# Patient Record
Sex: FEMALE | Race: WHITE | Employment: OTHER | URBAN - METROPOLITAN AREA
[De-identification: names, ages, dates, MRNs, and addresses within clinical notes are randomized per-mention and may not be internally consistent; named-entity substitution may affect disease eponyms.]

---

## 2017-02-09 ENCOUNTER — APPOINTMENT (OUTPATIENT)
Dept: CT IMAGING | Facility: CLINIC | Age: 82
DRG: 304 | End: 2017-02-09
Attending: EMERGENCY MEDICINE
Payer: COMMERCIAL

## 2017-02-09 ENCOUNTER — HOSPITAL ENCOUNTER (INPATIENT)
Facility: CLINIC | Age: 82
LOS: 3 days | Discharge: HOME | DRG: 304 | End: 2017-02-12
Attending: EMERGENCY MEDICINE
Payer: COMMERCIAL

## 2017-02-09 ENCOUNTER — APPOINTMENT (OUTPATIENT)
Dept: GENERAL RADIOLOGY | Facility: CLINIC | Age: 82
DRG: 304 | End: 2017-02-09
Attending: EMERGENCY MEDICINE
Payer: COMMERCIAL

## 2017-02-09 ENCOUNTER — APPOINTMENT (OUTPATIENT)
Dept: CARDIOLOGY | Facility: CLINIC | Age: 82
DRG: 304 | End: 2017-02-09
Payer: COMMERCIAL

## 2017-02-09 DIAGNOSIS — I63.9 CVA (CEREBRAL INFARCTION): Primary | ICD-10-CM

## 2017-02-09 DIAGNOSIS — N17.9 ACUTE RENAL FAILURE, UNSPECIFIED ACUTE RENAL FAILURE TYPE (H): ICD-10-CM

## 2017-02-09 DIAGNOSIS — R06.02 SHORTNESS OF BREATH: ICD-10-CM

## 2017-02-09 DIAGNOSIS — I25.10 ASCVD (ARTERIOSCLEROTIC CARDIOVASCULAR DISEASE): ICD-10-CM

## 2017-02-09 DIAGNOSIS — R11.2 NAUSEA AND VOMITING, INTRACTABILITY OF VOMITING NOT SPECIFIED, UNSPECIFIED VOMITING TYPE: ICD-10-CM

## 2017-02-09 DIAGNOSIS — I10 HYPERTENSION GOAL BP (BLOOD PRESSURE) < 140/90: ICD-10-CM

## 2017-02-09 DIAGNOSIS — I63.9 CEREBRAL INFARCTION (H): ICD-10-CM

## 2017-02-09 DIAGNOSIS — I10 ESSENTIAL HYPERTENSION WITH GOAL BLOOD PRESSURE LESS THAN 140/90: ICD-10-CM

## 2017-02-09 DIAGNOSIS — I50.21 CHF (CONGESTIVE HEART FAILURE), NYHA CLASS I, ACUTE, SYSTOLIC (H): ICD-10-CM

## 2017-02-09 PROBLEM — I50.9 HEART FAILURE (H): Status: ACTIVE | Noted: 2017-02-09

## 2017-02-09 LAB
ALBUMIN SERPL-MCNC: 3.6 G/DL (ref 3.4–5)
ALBUMIN SERPL-MCNC: 4.1 G/DL (ref 3.4–5)
ALP SERPL-CCNC: 85 U/L (ref 40–150)
ALP SERPL-CCNC: 87 U/L (ref 40–150)
ALT SERPL W P-5'-P-CCNC: 49 U/L (ref 0–50)
ALT SERPL W P-5'-P-CCNC: 56 U/L (ref 0–50)
ANION GAP SERPL CALCULATED.3IONS-SCNC: 9 MMOL/L (ref 3–14)
AST SERPL W P-5'-P-CCNC: 42 U/L (ref 0–45)
AST SERPL W P-5'-P-CCNC: 63 U/L (ref 0–45)
BASOPHILS # BLD AUTO: 0 10E9/L (ref 0–0.2)
BASOPHILS NFR BLD AUTO: 0.3 %
BILIRUB DIRECT SERPL-MCNC: 0.1 MG/DL (ref 0–0.2)
BILIRUB SERPL-MCNC: 0.5 MG/DL (ref 0.2–1.3)
BILIRUB SERPL-MCNC: 0.8 MG/DL (ref 0.2–1.3)
BUN SERPL-MCNC: 17 MG/DL (ref 7–30)
CALCIUM SERPL-MCNC: 9 MG/DL (ref 8.5–10.1)
CHLORIDE SERPL-SCNC: 108 MMOL/L (ref 94–109)
CHOLEST SERPL-MCNC: 169 MG/DL
CO2 BLDCOV-SCNC: 27 MMOL/L (ref 21–28)
CO2 SERPL-SCNC: 27 MMOL/L (ref 20–32)
CREAT BLD-MCNC: 1.1 MG/DL (ref 0.52–1.04)
CREAT SERPL-MCNC: 0.93 MG/DL (ref 0.52–1.04)
D DIMER PPP FEU-MCNC: 1 UG/ML FEU (ref 0–0.5)
DIFFERENTIAL METHOD BLD: ABNORMAL
EOSINOPHIL # BLD AUTO: 0.4 10E9/L (ref 0–0.7)
EOSINOPHIL NFR BLD AUTO: 4.8 %
ERYTHROCYTE [DISTWIDTH] IN BLOOD BY AUTOMATED COUNT: 14.4 % (ref 10–15)
GFR SERPL CREATININE-BSD FRML MDRD: 47 ML/MIN/1.7M2
GFR SERPL CREATININE-BSD FRML MDRD: 57 ML/MIN/1.7M2
GLUCOSE SERPL-MCNC: 117 MG/DL (ref 70–99)
HCT VFR BLD AUTO: 43 % (ref 35–47)
HDLC SERPL-MCNC: 87 MG/DL
HGB BLD-MCNC: 13.7 G/DL (ref 11.7–15.7)
IMM GRANULOCYTES # BLD: 0 10E9/L (ref 0–0.4)
IMM GRANULOCYTES NFR BLD: 0.3 %
INTERPRETATION ECG - MUSE: NORMAL
LACTATE BLD-SCNC: 0.7 MMOL/L (ref 0.7–2.1)
LDLC SERPL CALC-MCNC: 66 MG/DL
LYMPHOCYTES # BLD AUTO: 1.8 10E9/L (ref 0.8–5.3)
LYMPHOCYTES NFR BLD AUTO: 23.8 %
MCH RBC QN AUTO: 32.6 PG (ref 26.5–33)
MCHC RBC AUTO-ENTMCNC: 31.9 G/DL (ref 31.5–36.5)
MCV RBC AUTO: 102 FL (ref 78–100)
MONOCYTES # BLD AUTO: 0.6 10E9/L (ref 0–1.3)
MONOCYTES NFR BLD AUTO: 8.6 %
NEUTROPHILS # BLD AUTO: 4.6 10E9/L (ref 1.6–8.3)
NEUTROPHILS NFR BLD AUTO: 62.2 %
NONHDLC SERPL-MCNC: 82 MG/DL
NRBC # BLD AUTO: 0 10*3/UL
NRBC BLD AUTO-RTO: 0 /100
NT-PROBNP SERPL-MCNC: 2869 PG/ML (ref 0–1800)
PCO2 BLDV: 53 MM HG (ref 40–50)
PH BLDV: 7.32 PH (ref 7.32–7.43)
PLATELET # BLD AUTO: 154 10E9/L (ref 150–450)
PO2 BLDV: 40 MM HG (ref 25–47)
POTASSIUM SERPL-SCNC: 3.8 MMOL/L (ref 3.4–5.3)
PROCALCITONIN SERPL-MCNC: NORMAL NG/ML
PROT SERPL-MCNC: 6.5 G/DL (ref 6.8–8.8)
PROT SERPL-MCNC: 7.5 G/DL (ref 6.8–8.8)
RBC # BLD AUTO: 4.2 10E12/L (ref 3.8–5.2)
SAO2 % BLDV FROM PO2: 70 %
SODIUM SERPL-SCNC: 144 MMOL/L (ref 133–144)
TRIGL SERPL-MCNC: 80 MG/DL
TROPONIN I BLD-MCNC: 0.04 UG/L (ref 0–0.1)
TROPONIN I SERPL-MCNC: 0.05 UG/L (ref 0–0.04)
TSH SERPL DL<=0.005 MIU/L-ACNC: 1.66 MU/L (ref 0.4–4)
WBC # BLD AUTO: 7.4 10E9/L (ref 4–11)

## 2017-02-09 PROCEDURE — 21400006 ZZH R&B CCU INTERMEDIATE UMMC

## 2017-02-09 PROCEDURE — 25000132 ZZH RX MED GY IP 250 OP 250 PS 637: Performed by: STUDENT IN AN ORGANIZED HEALTH CARE EDUCATION/TRAINING PROGRAM

## 2017-02-09 PROCEDURE — 84145 PROCALCITONIN (PCT): CPT | Performed by: EMERGENCY MEDICINE

## 2017-02-09 PROCEDURE — 80061 LIPID PANEL: CPT | Performed by: STUDENT IN AN ORGANIZED HEALTH CARE EDUCATION/TRAINING PROGRAM

## 2017-02-09 PROCEDURE — 71260 CT THORAX DX C+: CPT

## 2017-02-09 PROCEDURE — 96374 THER/PROPH/DIAG INJ IV PUSH: CPT | Performed by: EMERGENCY MEDICINE

## 2017-02-09 PROCEDURE — 84484 ASSAY OF TROPONIN QUANT: CPT | Performed by: EMERGENCY MEDICINE

## 2017-02-09 PROCEDURE — 80076 HEPATIC FUNCTION PANEL: CPT | Performed by: STUDENT IN AN ORGANIZED HEALTH CARE EDUCATION/TRAINING PROGRAM

## 2017-02-09 PROCEDURE — 82803 BLOOD GASES ANY COMBINATION: CPT

## 2017-02-09 PROCEDURE — 99285 EMERGENCY DEPT VISIT HI MDM: CPT | Mod: 25 | Performed by: EMERGENCY MEDICINE

## 2017-02-09 PROCEDURE — 84484 ASSAY OF TROPONIN QUANT: CPT

## 2017-02-09 PROCEDURE — 93010 ELECTROCARDIOGRAM REPORT: CPT | Mod: Z6 | Performed by: EMERGENCY MEDICINE

## 2017-02-09 PROCEDURE — 85025 COMPLETE CBC W/AUTO DIFF WBC: CPT | Performed by: EMERGENCY MEDICINE

## 2017-02-09 PROCEDURE — 96376 TX/PRO/DX INJ SAME DRUG ADON: CPT | Performed by: EMERGENCY MEDICINE

## 2017-02-09 PROCEDURE — 25000128 H RX IP 250 OP 636: Performed by: STUDENT IN AN ORGANIZED HEALTH CARE EDUCATION/TRAINING PROGRAM

## 2017-02-09 PROCEDURE — 83880 ASSAY OF NATRIURETIC PEPTIDE: CPT | Performed by: EMERGENCY MEDICINE

## 2017-02-09 PROCEDURE — 25000132 ZZH RX MED GY IP 250 OP 250 PS 637: Performed by: EMERGENCY MEDICINE

## 2017-02-09 PROCEDURE — 71010 XR CHEST PORT 1 VW: CPT

## 2017-02-09 PROCEDURE — 82565 ASSAY OF CREATININE: CPT

## 2017-02-09 PROCEDURE — 93005 ELECTROCARDIOGRAM TRACING: CPT | Performed by: EMERGENCY MEDICINE

## 2017-02-09 PROCEDURE — 84443 ASSAY THYROID STIM HORMONE: CPT | Performed by: STUDENT IN AN ORGANIZED HEALTH CARE EDUCATION/TRAINING PROGRAM

## 2017-02-09 PROCEDURE — 83605 ASSAY OF LACTIC ACID: CPT

## 2017-02-09 PROCEDURE — 25000132 ZZH RX MED GY IP 250 OP 250 PS 637: Performed by: INTERNAL MEDICINE

## 2017-02-09 PROCEDURE — 25500064 ZZH RX 255 OP 636: Performed by: RADIOLOGY

## 2017-02-09 PROCEDURE — 80053 COMPREHEN METABOLIC PANEL: CPT | Performed by: EMERGENCY MEDICINE

## 2017-02-09 PROCEDURE — 25000128 H RX IP 250 OP 636: Performed by: EMERGENCY MEDICINE

## 2017-02-09 PROCEDURE — 93306 TTE W/DOPPLER COMPLETE: CPT | Mod: 26 | Performed by: INTERNAL MEDICINE

## 2017-02-09 PROCEDURE — 99223 1ST HOSP IP/OBS HIGH 75: CPT | Mod: 25

## 2017-02-09 PROCEDURE — 25000132 ZZH RX MED GY IP 250 OP 250 PS 637

## 2017-02-09 PROCEDURE — 25500064 ZZH RX 255 OP 636

## 2017-02-09 PROCEDURE — 85379 FIBRIN DEGRADATION QUANT: CPT | Performed by: EMERGENCY MEDICINE

## 2017-02-09 PROCEDURE — 40000264 ECHO COMPLETE WITH OPTISON

## 2017-02-09 PROCEDURE — 25000125 ZZHC RX 250: Performed by: STUDENT IN AN ORGANIZED HEALTH CARE EDUCATION/TRAINING PROGRAM

## 2017-02-09 RX ORDER — AMLODIPINE BESYLATE 10 MG/1
10 TABLET ORAL ONCE
Status: COMPLETED | OUTPATIENT
Start: 2017-02-09 | End: 2017-02-09

## 2017-02-09 RX ORDER — ALUMINA, MAGNESIA, AND SIMETHICONE 2400; 2400; 240 MG/30ML; MG/30ML; MG/30ML
15-30 SUSPENSION ORAL EVERY 4 HOURS PRN
Status: DISCONTINUED | OUTPATIENT
Start: 2017-02-09 | End: 2017-02-12 | Stop reason: HOSPADM

## 2017-02-09 RX ORDER — ATORVASTATIN CALCIUM 40 MG/1
40 TABLET, FILM COATED ORAL EVERY EVENING
Status: DISCONTINUED | OUTPATIENT
Start: 2017-02-09 | End: 2017-02-12 | Stop reason: HOSPADM

## 2017-02-09 RX ORDER — FLUTICASONE PROPIONATE 50 MCG
2 SPRAY, SUSPENSION (ML) NASAL EVERY OTHER DAY
Status: DISCONTINUED | OUTPATIENT
Start: 2017-02-10 | End: 2017-02-12 | Stop reason: HOSPADM

## 2017-02-09 RX ORDER — AMLODIPINE BESYLATE 10 MG/1
10 TABLET ORAL DAILY
Status: DISCONTINUED | OUTPATIENT
Start: 2017-02-10 | End: 2017-02-12 | Stop reason: HOSPADM

## 2017-02-09 RX ORDER — PREDNISONE 1 MG/1
2 TABLET ORAL DAILY
Status: ON HOLD | COMMUNITY
End: 2019-01-01

## 2017-02-09 RX ORDER — ACETAMINOPHEN 325 MG/1
650 TABLET ORAL EVERY 4 HOURS PRN
Status: DISCONTINUED | OUTPATIENT
Start: 2017-02-09 | End: 2017-02-12 | Stop reason: HOSPADM

## 2017-02-09 RX ORDER — IOPAMIDOL 755 MG/ML
53 INJECTION, SOLUTION INTRAVASCULAR ONCE
Status: COMPLETED | OUTPATIENT
Start: 2017-02-09 | End: 2017-02-09

## 2017-02-09 RX ORDER — PREDNISONE 1 MG/1
2 TABLET ORAL DAILY
Status: DISCONTINUED | OUTPATIENT
Start: 2017-02-09 | End: 2017-02-12 | Stop reason: HOSPADM

## 2017-02-09 RX ORDER — HYDRALAZINE HYDROCHLORIDE 20 MG/ML
10 INJECTION INTRAMUSCULAR; INTRAVENOUS EVERY 6 HOURS PRN
Status: DISCONTINUED | OUTPATIENT
Start: 2017-02-09 | End: 2017-02-09

## 2017-02-09 RX ORDER — FUROSEMIDE 10 MG/ML
40 INJECTION INTRAMUSCULAR; INTRAVENOUS ONCE
Status: COMPLETED | OUTPATIENT
Start: 2017-02-09 | End: 2017-02-09

## 2017-02-09 RX ORDER — HYDROCODONE BITARTRATE AND ACETAMINOPHEN 5; 325 MG/1; MG/1
1 TABLET ORAL DAILY
Status: DISCONTINUED | OUTPATIENT
Start: 2017-02-09 | End: 2017-02-12 | Stop reason: HOSPADM

## 2017-02-09 RX ORDER — LOSARTAN POTASSIUM 100 MG/1
100 TABLET ORAL ONCE
Status: COMPLETED | OUTPATIENT
Start: 2017-02-09 | End: 2017-02-09

## 2017-02-09 RX ORDER — ESCITALOPRAM OXALATE 10 MG/1
10 TABLET ORAL DAILY
Status: DISCONTINUED | OUTPATIENT
Start: 2017-02-09 | End: 2017-02-12 | Stop reason: HOSPADM

## 2017-02-09 RX ORDER — DOCUSATE SODIUM 100 MG/1
100 CAPSULE, LIQUID FILLED ORAL 2 TIMES DAILY
Status: DISCONTINUED | OUTPATIENT
Start: 2017-02-09 | End: 2017-02-12 | Stop reason: HOSPADM

## 2017-02-09 RX ORDER — ALBUTEROL SULFATE 90 UG/1
2 AEROSOL, METERED RESPIRATORY (INHALATION) EVERY 6 HOURS PRN
Status: DISCONTINUED | OUTPATIENT
Start: 2017-02-09 | End: 2017-02-12 | Stop reason: HOSPADM

## 2017-02-09 RX ORDER — LIDOCAINE 40 MG/G
CREAM TOPICAL
Status: DISCONTINUED | OUTPATIENT
Start: 2017-02-09 | End: 2017-02-12 | Stop reason: HOSPADM

## 2017-02-09 RX ORDER — AMOXICILLIN 250 MG
1-2 CAPSULE ORAL 2 TIMES DAILY
Status: DISCONTINUED | OUTPATIENT
Start: 2017-02-09 | End: 2017-02-12 | Stop reason: HOSPADM

## 2017-02-09 RX ORDER — LOSARTAN POTASSIUM 100 MG/1
100 TABLET ORAL DAILY
Status: DISCONTINUED | OUTPATIENT
Start: 2017-02-10 | End: 2017-02-11

## 2017-02-09 RX ORDER — ASPIRIN 81 MG/1
81 TABLET, CHEWABLE ORAL DAILY
Status: DISCONTINUED | OUTPATIENT
Start: 2017-02-09 | End: 2017-02-12 | Stop reason: HOSPADM

## 2017-02-09 RX ADMIN — IOPAMIDOL 53 ML: 755 INJECTION, SOLUTION INTRAVENOUS at 07:52

## 2017-02-09 RX ADMIN — Medication 1 TABLET: at 15:29

## 2017-02-09 RX ADMIN — DOCUSATE SODIUM 100 MG: 100 CAPSULE, LIQUID FILLED ORAL at 19:36

## 2017-02-09 RX ADMIN — Medication 12.5 MG: at 21:54

## 2017-02-09 RX ADMIN — LOSARTAN POTASSIUM 100 MG: 100 TABLET, FILM COATED ORAL at 07:20

## 2017-02-09 RX ADMIN — HYDROCODONE BITARTRATE AND ACETAMINOPHEN 1 TABLET: 5; 325 TABLET ORAL at 14:12

## 2017-02-09 RX ADMIN — VITAMIN D, TAB 1000IU (100/BT) 1000 UNITS: 25 TAB at 15:30

## 2017-02-09 RX ADMIN — AMLODIPINE BESYLATE 10 MG: 10 TABLET ORAL at 07:20

## 2017-02-09 RX ADMIN — FUROSEMIDE 40 MG: 10 INJECTION, SOLUTION INTRAVENOUS at 09:05

## 2017-02-09 RX ADMIN — ATORVASTATIN CALCIUM 40 MG: 40 TABLET, FILM COATED ORAL at 19:36

## 2017-02-09 RX ADMIN — ESCITALOPRAM OXALATE 10 MG: 10 TABLET ORAL at 14:17

## 2017-02-09 RX ADMIN — PREDNISONE 2 MG: 1 TABLET ORAL at 14:17

## 2017-02-09 RX ADMIN — HUMAN ALBUMIN MICROSPHERES AND PERFLUTREN 6 ML: 10; .22 INJECTION, SOLUTION INTRAVENOUS at 14:34

## 2017-02-09 RX ADMIN — FUROSEMIDE 40 MG: 10 INJECTION, SOLUTION INTRAVENOUS at 14:13

## 2017-02-09 RX ADMIN — ASPIRIN 81 MG CHEWABLE TABLET 81 MG: 81 TABLET CHEWABLE at 16:15

## 2017-02-09 ASSESSMENT — ACTIVITIES OF DAILY LIVING (ADL)
FALL_HISTORY_WITHIN_LAST_SIX_MONTHS: NO
COGNITION: 0 - NO COGNITION ISSUES REPORTED
TRANSFERRING: 0-->INDEPENDENT
AMBULATION: 0-->INDEPENDENT
SWALLOWING: 0-->SWALLOWS FOODS/LIQUIDS WITHOUT DIFFICULTY
DRESS: 0-->INDEPENDENT
BATHING: 0-->INDEPENDENT
TOILETING: 0-->INDEPENDENT
RETIRED_COMMUNICATION: 0-->UNDERSTANDS/COMMUNICATES WITHOUT DIFFICULTY
RETIRED_EATING: 0-->INDEPENDENT

## 2017-02-09 NOTE — H&P
History and physical  Attending: .   Reason for admission: SOB.   Date of Service: 2/9/2017      HPI:   This is an 86 YO F with history of coronary artery disease s/p CABG * 3 back in 1991, heart failure with preserved ejection fraction with EF of 51% last nuclear imaging back in 2014, uncontrolled hypertension and hyperlipidemia, stenting to RCA graft back in 2009, presented with increasing shortness of breath. Patient has been noticing difficulty breathing for the last two days. She was trying to go to the restroom and she had to sit and take rest secondary to increasing SOB. Denies any chest pain, palpitations, nausea and vomiting, though cannot exclude if she has PND.   She takes a very small dose of diuretic at home and is on 10 mg of lasix at home which she was taking. She presented to the ED with 180 to 200 systolic blood pressures and was She had an EKG which showed SR with incomplete RBBB and no other ischemic changes. She got a CT that showed signs of volume overload. In the ER, initially she was on 15 L of O2 but in a few minutes, she was down to 2L of nasal cannula and when I was talking to her, she did not require any O2 at all and was saturating in the 94s on RA.     Past Medical History:   Past Medical History   Diagnosis Date     Anxiety 1/9/2012     Low back pain 1/9/2012     Left hip pain 1/9/2012     ASCVD (arteriosclerotic cardiovascular disease) 1/9/2012     Incontinence of urine 1/9/2012     Hyperlipidemia LDL goal <100 1/9/2012     Hypertension goal BP (blood pressure) < 140/90 1/9/2012     Seasonal allergies 1/9/2012     Abdominal wall hernia      three hernias at previous incision sites, allergic to mesh so repair not repeated, wears girdle     Colon polyp      resected     DDD (degenerative disc disease), lumbar 1/10/2012     TIA (transient ischaemic attack) 1/10/2012     CKD (chronic kidney disease) stage 3, GFR 30-59 ml/min 1/10/2012     Stented coronary artery 8-      STEMI (ST elevation myocardial infarction) (H) 8-     with VF arrest.     Hyperlipidemia LDL goal <70 12/16/2013     Past Surgical History:   Past Surgical History   Procedure Laterality Date     Cholecystectomy       Abdominal abscess       at incisional site after kristine     Adominal hernia repair       had mesh placed, then allergic so it was removed and she wears a girdle     Coronary artery bypass  1992     Allergies: Per MAR     Allergies   Allergen Reactions     Adhesive Tape      blisters     Atenolol      SOB     Lopressor Hct      SOB     Medications:   Per MAR current outpatient cardiovascular medications include:   (Not in a hospital admission)  Current Outpatient Prescriptions   Medication Sig Dispense Refill     predniSONE (DELTASONE) 1 MG tablet Take 2 mg by mouth daily       HYDROcodone-acetaminophen (NORCO) 5-325 MG per tablet Take 1 tablet by mouth daily #30 per month **must be seen every 3 months for refills** 30 tablet 0     fluticasone (FLONASE) 50 MCG/ACT spray Spray 2 sprays into both nostrils every other day 16 g 3     albuterol (PROAIR HFA/PROVENTIL HFA/VENTOLIN HFA) 108 (90 BASE) MCG/ACT Inhaler Inhale 2 puffs into the lungs every 6 hours as needed 1 Inhaler 0     escitalopram (LEXAPRO) 10 MG tablet Take 1 tablet (10 mg) by mouth daily 30 tablet 9     amLODIPine (NORVASC) 10 MG tablet Take 1 tablet (10 mg) by mouth daily 90 tablet 3     atorvastatin (LIPITOR) 40 MG tablet Take 1 tablet (40 mg) by mouth daily 90 tablet 3     losartan (COZAAR) 100 MG tablet Take 1 tablet (100 mg) by mouth daily 90 tablet 3     furosemide (LASIX) 20 MG tablet Take 0.5 tablets (10 mg) by mouth daily 45 tablet 3     doxylamine (UNISOM) 25 MG TABS Take 12.5 mg by mouth nightly as needed        Saline (OCEAN NASAL SPRAY NA) Administer 1 spray in each nostril up to two times daily as needed       Cholecalciferol (VITAMIN D3 PO) Take 1,000 Units by mouth daily       Calcium Citrate-Vitamin D (CALCIUM CITRATE +  "PO) Take 1 tablet by mouth daily        sodium chloride-sodium bicarb (CLASSIC NETI POT SINUS WASH) 2300-700 MG KIT Mix 1 packet in Neti Pot and administer in each nostril daily as needed       Multiple Vitamins-Minerals (MULTIVITAMIN OR) Take 1 tablet by mouth daily        docusate sodium (COLACE) 100 MG capsule Take 1 capsule by mouth 2 times daily        aspirin  MG tablet Take 1 tablet (325 mg) by mouth daily 40 tablet 11     [DISCONTINUED] escitalopram (LEXAPRO) 10 MG tablet Take 1 tablet (10 mg) by mouth daily Take 1 tablet daily 90 tablet 1     Current Facility-Administered Medications   Medication Dose Route Frequency     sodium chloride (PF)  3 mL Intracatheter Q8H     senna-docusate  1-2 tablet Oral BID     amLODIPine  10 mg Oral Daily     atorvastatin  40 mg Oral Daily     calcium citrate-vitamin D  1 tablet Oral Daily     docusate sodium  100 mg Oral BID     escitalopram  10 mg Oral Daily     fluticasone  2 spray Both Nostrils Every Other Day     HYDROcodone-acetaminophen  1 tablet Oral Daily     losartan  100 mg Oral Daily     predniSONE  2 mg Oral Daily     cholecalciferol  1,000 Units Oral Daily     Family History:   No family history on file.  Social History:   Social History   Substance Use Topics     Smoking status: Former Smoker     Smokeless tobacco: Never Used      Comment: quit smoking in 1983     Alcohol Use: No       ROS:   A comprehensive 10 point ROS was negative other than as mentioned in HPI.    Physical Examination:   VITALS: /78 mmHg  Pulse 77  Temp(Src) 97.7  F (36.5  C) (Oral)  Resp 21  Ht 1.575 m (5' 2\")  SpO2 94%  GENERAL APPEARANCE: AxO, NAD   HEENT: NCAT, EOMI, MMM.   NECK: Supple. JVD mid neck. Good carotid upstroke.   CHEST: CTAB   CARDIOVASCULAR: S1S2, Reg, ESM in the aortic area. No m/r/g.   ABDOMEN: BS+, soft, No pulsatile masses or bruits.   EXTREMITIES: No pedal edema. Distal pulses intact.   NEURO: Grossly nonfocal.   PSYCH: Normal affect.  SKIN: Warm and " dry.   Data:   Labs:  BMP  Recent Labs  Lab 02/09/17  0440      POTASSIUM 3.8   CHLORIDE 108   JERILYN 9.0   CO2 27   BUN 17   CR 0.93   *     CBC  Recent Labs  Lab 02/09/17  0440   WBC 7.4   RBC 4.20   HGB 13.7   HCT 43.0   *   MCH 32.6   MCHC 31.9   RDW 14.4        INRNo lab results found in last 7 days.  No results found for: CKTOTAL, CKMB, TROPN  CHOLESTEROL (mg/dL)   Date Value   02/09/2017 169   08/15/2016 165   07/06/2015 163   04/25/2014 179     CHOLESTEROL/HDL RATIO (no units)   Date Value   07/06/2015 2.3   04/25/2014 2.8   10/17/2013 3.0   02/07/2013 2.8     HDL CHOLESTEROL (mg/dL)   Date Value   02/09/2017 87   08/15/2016 66   07/06/2015 72   04/25/2014 64     LDL CHOLESTEROL CALCULATED (mg/dL)   Date Value   02/09/2017 66   08/15/2016 74   07/06/2015 73   04/25/2014 95     EKG: NSR with incomplete RBBB and no ischemic changes.    Tele:NSR  ECHO:   Pending    CT chest:  IMPRESSION:    1. No evidence of pulmonary embolus.  2. Cardiomegaly with left-sided chamber enlargement, small bilateral pleural effusions, and interlobular septal thickening suggestive of  pulmonary edema suggest heart failure and volume overload with interstitial pulmonary edema.  3. Confluent opacities in the lower lobes most likely represents atelectasis but superimposed infection cannot be excluded based on  appearance.  4. Main pulmonary artery is enlarged, which can be seen in the setting of pulmonary arterial hypertension.  5. Centrilobular emphysema.    Assessment:   This is an 86 YO F with   - Acute exacerbation of chronic congestive heart failure with preserved ejection fraction versus hypertensive emergency with flash pulmonary edema.   - Coronary artery disease with history of CABG X 3 in 1992 at [place] and angiogram in 2009 with stenting to the RCA graft.  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   - Uncontrolled hypertension with possible hypertensive heart disease  - Hyperlipidemia.  - COPD on  inhalers at home.   Recommendations:  - Giving IV lasix 40 mg this AM and she responded well. Giving another IV dose of lasix 40 mg now.    - Needs better control of her blood pressures, HCTZ could make her orthostatic. BB may benefit considering her Coronary artery disease but she has history of COPD and on inhalers (listed as allergies). Will monitor blood pressures for now. She does not have any allergies listed for lisinopril and we could go to 40 mg of lisinopril (to double losartan dosage).    - Will continue asa at a lower dose of 81 mg per day.    - Continue lipitor.    - Continue COPD inhalers per home dose.    - Continue prednisone.    - DVT ppx- ambulate TID    - Cardiac diet.     The patient states understanding and is agreeable with plan.     The patient will be discussed w/ Dr. Guzman.  The above note reflects our joint plan.    John Mcwilliams MD  Cardiology.   [id number].   ------------------------------------------------------------------------------------------------------------------  [facility] Vascular Medicine/Cardiovascular Division Attending  ------------------------------------------------------------------------------------------------------------------    I have seen and examined the patient and the note above reflects our mutual assessment and plan.      HPI, PMH, FH, SH, and ROS per notes above.  Well stated care plan as described by Dr. Preciado.    Marcelo Guzman MD  Director, Vascular Medicine Program  Professor of Medicine, Epidemiology and Community Health  [facility] [place]  [address]    Tel: [phone]  Fax: [phone]  Pager: [phone]    Vascular medicine nurse clinician: Johnnie Johnson - Office [phone]  : Sumaya Robles - Office [phone]  ------------------------------------------------------------------------------------------------------------------

## 2017-02-09 NOTE — IP AVS SNAPSHOT
MRN:[id number]                      After Visit Summary   2/9/2017    Jennifer Bob    MRN: [id number]           Thank you!     Thank you for choosing [facility] for your care. Our goal is always to provide you with excellent care. Hearing back from our patients is one way we can continue to improve our services. Please take a few minutes to complete the written survey that you may receive in the mail after you visit with us. Thank you!        Patient Information     Date Of Birth          12/5/1931        About your hospital stay     You were admitted on:  February 9, 2017 You last received care in the:  [address] [facility]    You were discharged on:  February 12, 2017        Reason for your hospital stay       High blood pressures and congestive heart failure.                  Who to Call     For medical emergencies, please call 911.  For non-urgent questions about your medical care, please call your primary care provider or clinic, [phone]          Attending Provider     Provider Specialty    Dirk Kenny MD Emergency Medicine    [facility], Marcelo Roberson MD Internal Medicine       Primary Care Provider Office Phone # Fax #    Torri Fisher [phone]       [facility] [address] [facility] [address]        After Care Instructions     Activity       Your activity upon discharge: activity as tolerated            Diet       Follow this diet upon discharge: Orders Placed This Encounter  Low Saturated Fat Na <2400 mg            Discharge Instructions       Please stop taking your losartan on discharge.  This was stopped due to your decrease in kidney function.  Please follow-up with the blood work in 2 days (on 2/14) and with Dr. Fisher.  Discuss with Dr. Fisher whether your blood work has improved enough to restart your losartan.  You should also discuss with Dr. Fisher regarding when to restart your water pill (Lasix).      You should continue your  low sodium diet of less than 2000mg per day. You should drink no more than 2 liters of all fluids daily to help prevent future volume overload and rehospitalization.    You should also weigh yourself daily in the morning.  If you gain more than 3 pounds in a day or 5 pounds in two days, please call your doctor regarding the need for a water pill.  We would like to see your kidney function return to close to normal before restarting your water pill.  Please be sure to follow your sodium and fluid restrictions to help limit the need for a water pill.            Monitor and record       fluid intake and output daily  Limit intake to 1.5 per day                  Follow-up Appointments     Adult [facility] Follow-up and recommended labs and tests       Follow up with primary care provider, Torri Fisher MD, within 5 days to evaluate medication change.  You will need labs done in 3 days to check your kidney function.  These labs have been ordered for you and can be done at any [place] location.     Appointments on [place] and/or [facility] (with [facility] or [facility] provider or service). Call [phone] if you haven't heard regarding these appointments within 3 days of discharge.                  Your next 10 appointments already scheduled     Mar 06, 2017  9:40 AM CST   Office Visit with Torri Fisher MD   [facility] ([facility])    [address] [place] [id number] [phone]           Bring a current list of meds and any records pertaining to this visit.  For Physicals, please bring immunization records and any forms needing to be filled out.  Please arrive 10 minutes early to complete paperwork.              Additional Services     Medication Therapy Management Referral       Reason for referral:  on more than 5 medications and managing chronic disease and ED or hospital visit in last 6 months    This service is designed to help you get the most from your  "medications.  A specially trained pharmacist will work closely with you and your doctors  to solve any problems related to your medications and to help you get the   best results from taking them.      The Medication Therapy Management staff will call you to schedule an appointment.                  Future tests that were ordered for you     Basic metabolic panel                 General Recommendations To Control Heart Failure When You Get Home     Instructions To Patients and Families: Please read and check off each of these important instructions as you do them when you get home.           Weight and symptoms      ___ Put a scale in your bathroom  ___ Post a weight chart or calendar next to the scale  ___Weigh yourself every day as soon as you you get up in the morning. You should only be wearing your pajamas. Write your weight on the chart/calendar.  ___ Bring your weight chart/calendar with you to all appointments    ___Call your doctor if you gain 2 pounds in 1 day or 5 pounds in 1 week from your \"dry\" weight (baseline weight). Also call your doctor if you have shortness of breath that gets worse over time, leg swelling or fatigue.         Medicines and diet     ___ Make sure to take your medicines as prescribed.    ___Bring a current list of your medicines and all of your medicine bottles with you to all appointments.    ___ Limit fluids if you still have swelling or shortness of breath, or if your doctor tells you to do so.  ___ Eat less than 2000 mg of sodium (salt) every day. Read food labels, and do not add salt to meals.   ___ Heart healthy diet with low fat and low cholesterol          Activity and suggested lifestyle changes    ___ Stay active. Talk to your doctor about an exercise program that is safe for your heart.    ___ Stop smoking. Reduce alcohol use.      ___ Lose weight if you are overweight. Extra weight puts a lot of stress on the heart.          Control for Leg Swelling   ___ Keep your legs " elevated (raised) as needed for swelling. If swelling is uncomfortable or elevation doesn t help, ask your doctor about using ACE wrap or Jobst stockings.          Follow-up appointments   ___ Make a [facility] appointment with a basic metabolic panel lab draw 3 to 5 days after you leave the hospital. Call one of the following locations:   [facility] and [facility]  [phone],  [facility] [phone],  [facility]  [phone].     ___ Make sure to take your medications as prescribed and bring an accurate list of your medications and your weight chart/calendar to your follow up appointment at the [facility] for continued education and adjustments          What is the C[facility] clinic?    The [facility].O.R[facility] (Cardiomyopathy, Optimization, Rehabilitation, Education) Clinic is a heart failure specialty clinic within the [facility]. At [facility] you will work with nurse practitioners to carefully adjust medicines, get education and learn who and when to call if symptoms appear. [facility] nurses specialize in helping you:    better understand your disease.    slow the progress of your disease.    improve the length and quality of your life.    detect future heart problems before they become life threatening.    avoid hospital stays.            Pending Results     Date and Time Order Name Status Description    2/11/2017 1540 EKG 12-lead, tracing only Preliminary             Statement of Approval     Ordered          02/12/17 1049  I have reviewed and agree with all the recommendations and orders detailed in this document.  EFFECTIVE NOW     Approved and electronically signed by:  Dariusz Hightower MD           02/10/17 9593  I have reviewed and agree with all the recommendations and orders detailed in this document.  EFFECTIVE NOW     Approved and electronically signed by:  John Jha MD  "            Admission Information     Date & Time Provider Department Dept. Phone    2/9/2017 Marcelo Guzman MD [address] [facility] [phone]      Your Vitals Were     Blood Pressure Pulse Temperature Respirations Height Weight    171/66 (BP Location: Right arm) 77 98.7  F (37.1  C) (Oral) 18 1.575 m (5' 2\") 59 kg (130 lb 1.6 oz)    Pulse Oximetry BMI (Body Mass Index)                88% 23.8 kg/m2          MyChart Information     Mobile Authentication gives you secure access to your electronic health record. If you see a primary care provider, you can also send messages to your care team and make appointments. If you have questions, please call your primary care clinic.  If you do not have a primary care provider, please call [phone] and they will assist you.        Care EveryWhere ID     This is your Care EveryWhere ID. This could be used by other organizations to access your [facility] medical records  [id number]           Review of your medicines      START taking        Dose / Directions    aspirin 81 MG chewable tablet   Used for:  ASCVD (arteriosclerotic cardiovascular disease)   Replaces:  aspirin  MG EC tablet        Dose:  81 mg   Take 1 tablet (81 mg) by mouth daily   Quantity:  60 tablet   Refills:  3       hydrALAZINE 50 MG tablet   Commonly known as:  APRESOLINE   Used for:  Hypertension goal BP (blood pressure) < 140/90        Dose:  50 mg   Take 1 tablet (50 mg) by mouth every 8 hours   Quantity:  60 tablet   Refills:  3       ondansetron 4 MG ODT tab   Commonly known as:  ZOFRAN-ODT   Used for:  Nausea and vomiting, intractability of vomiting not specified, unspecified vomiting type        Dose:  4 mg   Take 1 tablet (4 mg) by mouth every 8 hours as needed for nausea   Quantity:  15 tablet   Refills:  0         CONTINUE these medicines which may have CHANGED, or have new prescriptions. If we are uncertain of the size of tablets/capsules you have at home, strength may be listed as something " that might have changed.        Dose / Directions    furosemide 20 MG tablet   Commonly known as:  LASIX   This may have changed:  how much to take   Used for:  Essential hypertension with goal blood pressure less than 140/90        Dose:  20 mg   Take 1 tablet (20 mg) by mouth daily   Quantity:  45 tablet   Refills:  3         CONTINUE these medicines which have NOT CHANGED        Dose / Directions    albuterol 108 (90 BASE) MCG/ACT Inhaler   Commonly known as:  PROAIR HFA/PROVENTIL HFA/VENTOLIN HFA   Used for:  Chronic obstructive pulmonary disease, unspecified COPD type (H)        Dose:  2 puff   Inhale 2 puffs into the lungs every 6 hours as needed   Quantity:  1 Inhaler   Refills:  0       amLODIPine 10 MG tablet   Commonly known as:  NORVASC   Used for:  Essential hypertension with goal blood pressure less than 140/90        Dose:  10 mg   Take 1 tablet (10 mg) by mouth daily   Quantity:  90 tablet   Refills:  3       atorvastatin 40 MG tablet   Commonly known as:  LIPITOR   Used for:  Hyperlipidemia LDL goal <100        Dose:  40 mg   Take 1 tablet (40 mg) by mouth daily   Quantity:  90 tablet   Refills:  3       CALCIUM CITRATE + PO        Dose:  1 tablet   Take 1 tablet by mouth daily   Refills:  0       CLASSIC NETI POT SINUS WASH 2300-700 MG Kit   Generic drug:  sodium chloride-sodium bicarb        Mix 1 packet in Neti Pot and administer in each nostril daily as needed   Refills:  0       COLACE 100 MG capsule   Generic drug:  docusate sodium        Dose:  1 capsule   Take 1 capsule by mouth 2 times daily   Refills:  0       escitalopram 10 MG tablet   Commonly known as:  LEXAPRO   Used for:  Anxiety        Dose:  10 mg   Take 1 tablet (10 mg) by mouth daily   Quantity:  30 tablet   Refills:  9       fluticasone 50 MCG/ACT spray   Commonly known as:  FLONASE   Used for:  Seasonal allergic rhinitis, unspecified allergic rhinitis trigger        Dose:  2 spray   Spray 2 sprays into both nostrils every other  day   Quantity:  16 g   Refills:  3       HYDROcodone-acetaminophen 5-325 MG per tablet   Commonly known as:  NORCO   Used for:  Low back pain, unspecified back pain laterality, unspecified chronicity, with sciatica presence unspecified        Dose:  1 tablet   Take 1 tablet by mouth daily #30 per month **must be seen every 3 months for refills**   Quantity:  30 tablet   Refills:  0       MULTIVITAMIN PO        Dose:  1 tablet   Take 1 tablet by mouth daily   Refills:  0       OCEAN NASAL SPRAY NA        Administer 1 spray in each nostril up to two times daily as needed   Refills:  0       predniSONE 1 MG tablet   Commonly known as:  DELTASONE        Dose:  2 mg   Take 2 mg by mouth daily   Refills:  0       UNISOM 25 MG Tabs tablet   Generic drug:  doxylamine        Dose:  12.5 mg   Take 12.5 mg by mouth nightly as needed   Refills:  0       VITAMIN D3 PO        Dose:  1000 Units   Take 1,000 Units by mouth daily   Refills:  0         STOP taking     aspirin  MG EC tablet   Replaced by:  aspirin 81 MG chewable tablet           losartan 100 MG tablet   Commonly known as:  COZAAR                Where to get your medicines      These medications were sent to [facility] [address] [address]     Phone:  [phone]     aspirin 81 MG chewable tablet    furosemide 20 MG tablet    hydrALAZINE 50 MG tablet    ondansetron 4 MG ODT tab                Protect others around you: Learn how to safely use, store and throw away your medicines at www.disposemymeds.org.             Medication List: This is a list of all your medications and when to take them. Check marks below indicate your daily home schedule. Keep this list as a reference.      Medications           Morning Afternoon Evening Bedtime As Needed    albuterol 108 (90 BASE) MCG/ACT Inhaler   Commonly known as:  PROAIR HFA/PROVENTIL HFA/VENTOLIN HFA   Inhale 2 puffs into the lungs  every 6 hours as needed                                   amLODIPine 10 MG tablet   Commonly known as:  NORVASC   Take 1 tablet (10 mg) by mouth daily   Last time this was given:  10 mg on 2/12/2017  8:49 AM                                   aspirin 81 MG chewable tablet   Take 1 tablet (81 mg) by mouth daily   Last time this was given:  81 mg on 2/12/2017  8:47 AM                                   atorvastatin 40 MG tablet   Commonly known as:  LIPITOR   Take 1 tablet (40 mg) by mouth daily   Last time this was given:  40 mg on 2/11/2017  8:22 PM                                   CALCIUM CITRATE + PO   Take 1 tablet by mouth daily                                   CLASSIC NETI POT SINUS WASH 2300-700 MG Kit   Mix 1 packet in Neti Pot and administer in each nostril daily as needed   Generic drug:  sodium chloride-sodium bicarb                                   COLACE 100 MG capsule   Take 1 capsule by mouth 2 times daily   Last time this was given:  100 mg on 2/11/2017  8:22 PM   Generic drug:  docusate sodium                                      escitalopram 10 MG tablet   Commonly known as:  LEXAPRO   Take 1 tablet (10 mg) by mouth daily   Last time this was given:  10 mg on 2/12/2017  8:47 AM                                   fluticasone 50 MCG/ACT spray   Commonly known as:  FLONASE   Spray 2 sprays into both nostrils every other day   Last time this was given:  2 sprays on 2/12/2017  8:51 AM                                furosemide 20 MG tablet   Commonly known as:  LASIX   Take 1 tablet (20 mg) by mouth daily   Last time this was given:  40 mg on 2/11/2017  8:28 AM                                hydrALAZINE 50 MG tablet   Commonly known as:  APRESOLINE   Take 1 tablet (50 mg) by mouth every 8 hours   Last time this was given:  50 mg on 2/12/2017  8:49 AM                                      HYDROcodone-acetaminophen 5-325 MG per tablet   Commonly known as:  NORCO   Take 1 tablet by mouth daily #30 per month  **must be seen every 3 months for refills**   Last time this was given:  1 tablet on 2/12/2017  8:46 AM                                   MULTIVITAMIN PO   Take 1 tablet by mouth daily                                   OCEAN NASAL SPRAY NA   Administer 1 spray in each nostril up to two times daily as needed                                      ondansetron 4 MG ODT tab   Commonly known as:  ZOFRAN-ODT   Take 1 tablet (4 mg) by mouth every 8 hours as needed for nausea   Last time this was given:  4 mg on 2/11/2017  3:20 PM                                   predniSONE 1 MG tablet   Commonly known as:  DELTASONE   Take 2 mg by mouth daily   Last time this was given:  2 mg on 2/12/2017  8:47 AM                                   UNISOM 25 MG Tabs tablet   Take 12.5 mg by mouth nightly as needed   Generic drug:  doxylamine                                   VITAMIN D3 PO   Take 1,000 Units by mouth daily   Last time this was given:  1,000 Units on 2/12/2017  8:47 AM

## 2017-02-09 NOTE — LETTER
Transition Communication Hand-off for Care Transitions to Next Level of Care Provider    Name: Jennifer Bob  MRN #: [id number]  Primary Care Provider: Torri Fisher MD     Primary Clinic: [facility] [address]  [facility] [address]     Reason for Hospitalization:  CHF (congestive heart failure), NYHA class I, acute, systolic (H) [I50.21]  Admit Date/Time: 2/9/2017  4:33 AM  Discharge Date: 2/10/2017  Payor Source: Payor: UCARE / Plan: [employer] FOR [employer]IORS / Product Type: HMO /     Readmission Assessment Measure (ROSIO) Risk Score/category: low    Reason for Communication Hand-off Referral: Multiple providers/specialties     Concern for non-adherence with plan of care:   No  Discharge Needs Assessment:    Follow-up specialty is recommended: Yes    Follow-up plan:  Future Appointments  Date Time Provider Department Center   3/6/2017 9:40 AM Torri Fisher MD [facility]       Any outstanding tests or procedures:        Referrals     Future Labs/Procedures    Medication Therapy Management Referral     Comments:    Reason for referral:  on more than 5 medications and managing chronic disease and ED or hospital visit in last 6 months    This service is designed to help you get the most from your medications.  A specially trained pharmacist will work closely with you and your doctors  to solve any problems related to your medications and to help you get the   best results from taking them.      The Medication Therapy Management staff will call you to schedule an appointment.            Key Recommendations:  Post hospitalization follow up.    Mercy Mathew RN BSN  6[address] Unit Care Coordinator  Phone number: [phone]  Pager: [phone]      AVS/Discharge Summary is the source of truth; this is a helpful guide for improved communication of patient story

## 2017-02-09 NOTE — PHARMACY-ADMISSION MEDICATION HISTORY
Admission medication history interview status for the 2/9/2017 admission is complete. See Epic admission navigator for allergy information, pharmacy, prior to admission medications and immunization status.     Medication history interview sources:  Patient and [facility]    Changes made to PTA medication list (reason)  Added: None    Deleted:   -diazepam 5 mg tablet, Take 1 tablet PO q6h PRN for anxiety or sleep (per patient, she no longer takes this medication and confirmed that it could be deleted from her profile. Per [facility], this medication has not been filled since 7/2016).  -escitalopram 10 mg tablet, Take 1 tablet PO daily (duplicate order)  -hydrocodone-acetaminophen 5-325 mg tablet, Take 1 tablet PO daily (two duplicate orders)    Changed:   -calcium citrate-vitamin D Take 600 mg PO daily-->Take 1 tablet PO daily (per patient, she was unsure of strength of calcium and did not know strength vitamin D. She does take 1 tablet daily)  -docusate 100 mg tablet, Take 1 capsule PO TID PRN-->Take 1 capsule PO BID (per patient report)  -doxylamine 25 mg tablet, Take 25 mg PO nightly PRN-->Take 12.5 mg (half-tab) PO nightly as needed (per patient report)  -multiple vitamins-minerals-->added sig of Take 1 tablet PO daily  -prednisone 20 mg tablet, Take 1 tablet PO daily-->prednisone 1 mg tablet, Take 2 mg PO daily (per patient and confirmed tablet strength and sig in [facility]. Patient last picked up a prescription for prednisone 1 mg, Take 2 mg PO daily on 11/18/16 for 120 tablets).  -saline (Ocean) spray, Spray 1-2 sprays in nostril daily-->Administer 1 spray in each nostril up twice daily as needed (per patient report)  -sodium chloride-sodium bicarb (Neti Pot), Spray 1 packet in nostril as needed-->Mix 1 packet in Neti Pot and administer in each nostril daily as needed (per patient report)    Additional medication history information (including reliability of information, actions taken by  pharmacist):  -Patient was a good historian and knew the name, strength, and frequency of use for most of her medications.  -Patient reports that she takes atorvastatin at night.  -Patient has not received an influenza vaccination this year as she was told not to due to prednisone use. She would not be opposed receiving a vaccination as long as it is safe to do so.    Prior to Admission medications    Medication Sig Last Dose Taking? Auth Provider   predniSONE (DELTASONE) 1 MG tablet Take 2 mg by mouth daily 2/8/2017 at AM Yes Unknown, Entered By History   HYDROcodone-acetaminophen (NORCO) 5-325 MG per tablet Take 1 tablet by mouth daily #30 per month **must be seen every 3 months for refills** 2/8/2017 at AM Yes Torri Fisher MD   fluticasone (FLONASE) 50 MCG/ACT spray Spray 2 sprays into both nostrils every other day 2/8/2017 at AM Yes Torri Fisher MD   albuterol (PROAIR HFA/PROVENTIL HFA/VENTOLIN HFA) 108 (90 BASE) MCG/ACT Inhaler Inhale 2 puffs into the lungs every 6 hours as needed 2/9/2017 at 03:30 Yes Torri Fisher MD   escitalopram (LEXAPRO) 10 MG tablet Take 1 tablet (10 mg) by mouth daily 2/8/2017 at AM Yes Torri Fisher MD   amLODIPine (NORVASC) 10 MG tablet Take 1 tablet (10 mg) by mouth daily 2/8/2017 at AM Yes Torri Fisher MD   atorvastatin (LIPITOR) 40 MG tablet Take 1 tablet (40 mg) by mouth daily 2/8/2017 at PM Yes Torri Fisher MD   losartan (COZAAR) 100 MG tablet Take 1 tablet (100 mg) by mouth daily 2/8/2017 at AM Yes Torri Fisher MD   furosemide (LASIX) 20 MG tablet Take 0.5 tablets (10 mg) by mouth daily 2/8/2017 at AM Yes Torri Fisher MD   doxylamine (UNISOM) 25 MG TABS Take 12.5 mg by mouth nightly as needed  2/8/2017 at Bedtime Yes Reported, Patient   Saline (OCEAN NASAL SPRAY NA) Administer 1 spray in each nostril up to two times daily as needed 2/8/2017 at AM Yes Reported, Patient   Cholecalciferol (VITAMIN D3 PO) Take 1,000 Units by mouth daily 2/8/2017 at AM  Yes Reported, Patient   Calcium Citrate-Vitamin D (CALCIUM CITRATE + PO) Take 1 tablet by mouth daily  2/8/2017 at AM Yes Reported, Patient   sodium chloride-sodium bicarb (CLASSIC NETI POT SINUS WASH) 2300-700 MG KIT Mix 1 packet in Neti Pot and administer in each nostril daily as needed 2/8/2017 at AM Yes Reported, Patient   Multiple Vitamins-Minerals (MULTIVITAMIN OR) Take 1 tablet by mouth daily  2/8/2017 at AM Yes Reported, Patient   docusate sodium (COLACE) 100 MG capsule Take 1 capsule by mouth 2 times daily  2/8/2017 at PM Yes Reported, Patient   aspirin  MG tablet Take 1 tablet (325 mg) by mouth daily 2/8/2017 at AM Yes Jan Delgadillo MD     Medication history completed by: Kirit PerezD

## 2017-02-09 NOTE — ED PROVIDER NOTES
History     Chief Complaint   Patient presents with     Shortness of Breath     HPI  Jennifer Bob is a 85 year old female with a history of CAD who presents with shortness of breath. For the past 1-2 days she has felt short of breath. She states she has orthopnea but also some shortness of breath with daily activities. She states some leg swelling, worse in the left leg which is chronic. No history of DVT/PE. She denies any chest pain. No fever or chills. She denies any cough, congestion or sore throat. She has no sick contacts. She has been complaint with her anti-hypertensives and diuretics.     This part of the document was transcribed by Nayeli Schumacher, Medical Scribe.  Past Medical History   Diagnosis Date     Anxiety 1/9/2012     Low back pain 1/9/2012     Left hip pain 1/9/2012     ASCVD (arteriosclerotic cardiovascular disease) 1/9/2012     Incontinence of urine 1/9/2012     Hyperlipidemia LDL goal <100 1/9/2012     Hypertension goal BP (blood pressure) < 140/90 1/9/2012     Seasonal allergies 1/9/2012     Abdominal wall hernia      three hernias at previous incision sites, allergic to mesh so repair not repeated, wears girdle     Colon polyp      resected     DDD (degenerative disc disease), lumbar 1/10/2012     TIA (transient ischaemic attack) 1/10/2012     CKD (chronic kidney disease) stage 3, GFR 30-59 ml/min 1/10/2012     Stented coronary artery 8-     STEMI (ST elevation myocardial infarction) (H) 8-     with VF arrest.     Hyperlipidemia LDL goal <70 12/16/2013       Past Surgical History   Procedure Laterality Date     Cholecystectomy       Abdominal abscess       at incisional site after kristine     Adominal hernia repair       had mesh placed, then allergic so it was removed and she wears a girdle     Coronary artery bypass  1992       No family history on file.    Social History   Substance Use Topics     Smoking status: Former Smoker     Smokeless tobacco: Never Used       "Comment: quit smoking in 1983     Alcohol Use: No       Current Facility-Administered Medications   Medication     furosemide (LASIX) injection 40 mg     Current Outpatient Prescriptions   Medication     fluticasone (FLONASE) 50 MCG/ACT spray     albuterol (PROAIR HFA/PROVENTIL HFA/VENTOLIN HFA) 108 (90 BASE) MCG/ACT Inhaler     escitalopram (LEXAPRO) 10 MG tablet     amLODIPine (NORVASC) 10 MG tablet     atorvastatin (LIPITOR) 40 MG tablet     losartan (COZAAR) 100 MG tablet     furosemide (LASIX) 20 MG tablet     aspirin  MG tablet     escitalopram (LEXAPRO) 10 MG tablet     HYDROcodone-acetaminophen (NORCO) 5-325 MG per tablet     HYDROcodone-acetaminophen (NORCO) 5-325 MG per tablet     HYDROcodone-acetaminophen (NORCO) 5-325 MG per tablet     diazepam (VALIUM) 5 MG tablet     doxylamine (UNISOM) 25 MG TABS     Saline (OCEAN NASAL SPRAY NA)     Cholecalciferol (VITAMIN D3 PO)     Calcium Citrate-Vitamin D (CALCIUM CITRATE + PO)     predniSONE (DELTASONE) 20 MG tablet     sodium chloride-sodium bicarb (CLASSIC NETI POT SINUS WASH) 2300-700 MG KIT     Multiple Vitamins-Minerals (MULTIVITAMIN OR)     docusate sodium (COLACE) 100 MG capsule        Allergies   Allergen Reactions     Adhesive Tape      blisters     Atenolol      SOB     Lopressor Hct      SOB     I have reviewed the Medications, Allergies, Past Medical and Surgical History, and Social History in the Epic system.    Review of Systems  ROS: 10 point ROS neg other than the symptoms noted above in the HPI.    Physical Exam   BP: (!) 168/101 mmHg  Pulse: 77  Heart Rate: 85  Temp: 97.7  F (36.5  C)  Resp: 26  Height: 157.5 cm (5' 2\")  SpO2: 99 %  Physical Exam   Constitutional: She is oriented to person, place, and time. No distress.   HENT:   Head: Normocephalic and atraumatic.   Mouth/Throat: Oropharynx is clear and moist. No oropharyngeal exudate.   Eyes: Conjunctivae are normal. Pupils are equal, round, and reactive to light. Right eye exhibits no " discharge. Left eye exhibits no discharge.   Neck: Normal range of motion. Neck supple.   Cardiovascular: Normal rate and intact distal pulses.    Pulmonary/Chest: Effort normal. No respiratory distress. She has no wheezes. She has no rales. She exhibits no tenderness.   Abdominal: She exhibits no mass. There is no tenderness. There is no rebound and no guarding.   Musculoskeletal: Normal range of motion. She exhibits edema.   Neurological: She is alert and oriented to person, place, and time.   Skin: Skin is warm and dry. No rash noted. She is not diaphoretic.   Psychiatric: She has a normal mood and affect. Her behavior is normal. Thought content normal.   Nursing note and vitals reviewed.      ED Course     Procedures             EKG Interpretation:      Interpreted by Dirk Kenny  Time reviewed: 450  Symptoms at time of EKG: sob  Rhythm: normal sinus   Rate: normal  Axis: normal  Ectopy: none  Conduction: normal  ST Segments/ T Waves: t wave inversion in V2 and V3  Q Waves: none  Comparison to prior: NSR with anterior t wave inversions    Clinical Impression: NSR with anterior t wave inversions.          Critical Care time:  none               Labs Ordered and Resulted from Time of ED Arrival Up to the Time of Departure from the ED   CBC WITH PLATELETS DIFFERENTIAL - Abnormal; Notable for the following:      (*)     All other components within normal limits   TROPONIN I - Abnormal; Notable for the following:     Troponin I ES 0.054 (*)     All other components within normal limits   COMPREHENSIVE METABOLIC PANEL - Abnormal; Notable for the following:     Glucose 117 (*)     GFR Estimate 57 (*)     Protein Total 6.5 (*)     All other components within normal limits   NT PROBNP INPATIENT - Abnormal; Notable for the following:     N-Terminal Pro BNP Inpatient 2869 (*)     All other components within normal limits   CREATININE POCT - Abnormal; Notable for the following:     Creatinine 1.1 (*)      GFR Estimate 47 (*)     GFR Estimate If Black 57 (*)     All other components within normal limits   D DIMER QUANTITATIVE - Abnormal; Notable for the following:     D Dimer 1.0 (*)     All other components within normal limits   ISTAT  GASES LACTATE NONA POCT - Abnormal; Notable for the following:     PCO2 Venous 53 (*)     All other components within normal limits   PROCALCITONIN   ISTAT CG4 GASES LACTATE NONA NURSING POCT   STRICT INTAKE AND OUTPUT   TROPONIN POCT       Assessments & Plan (with Medical Decision Making)   1. CHF     85 year old female with CAD presenting with shortness of breath. The patient presented with some mild tachypnea and was on 15 liters of oxygen, however, this was titrated down to 2 liters NC and she had no episodes of hypoxia. Her blood pressure was elevated in the 180 s-200 s systolic. She was given her home Amlodipine and Cozaar and was given Lasix 40 mg IV for findings of CHF. CT showed no evidence of PE. EKG showed some anterior t wave inversions and troponin was mildly elevated at 0.054, however, she has no chest pain. Troponinemia likely secondary to hypertension. BNP elevated at 2869 and procalcitonin of 0.05 which does not appear to be an infectious source. The patient will be admitted to cardiology for CHF.    I have reviewed the nursing notes.  I have reviewed the findings, diagnosis, plan and need for follow up with the patient.  This part of the document was transcribed by Nayeli Schumacher, Medical Scribe.  New Prescriptions    No medications on file       Final diagnoses:   CHF (congestive heart failure), NYHA class I, acute, systolic (H)       2/9/2017   [facility], EMERGENCY DEPARTMENT       Dirk Kenny MD  03/15/17 3145

## 2017-02-09 NOTE — IP AVS SNAPSHOT
[address] [address]    Phone:  [phone]                                       After Visit Summary   2/9/2017    Jennifer Bob    MRN: [id number]           After Visit Summary Signature Page     I have received my discharge instructions, and my questions have been answered. I have discussed any challenges I see with this plan with the nurse or doctor.    ..........................................................................................................................................  Patient/Patient Representative Signature      ..........................................................................................................................................  Patient Representative Print Name and Relationship to Patient    ..................................................               ................................................  Date                                            Time    ..........................................................................................................................................  Reviewed by Signature/Title    ...................................................              ..............................................  Date                                                            Time

## 2017-02-10 LAB
ANION GAP SERPL CALCULATED.3IONS-SCNC: 8 MMOL/L (ref 3–14)
BUN SERPL-MCNC: 17 MG/DL (ref 7–30)
CALCIUM SERPL-MCNC: 8.8 MG/DL (ref 8.5–10.1)
CHLORIDE SERPL-SCNC: 101 MMOL/L (ref 94–109)
CO2 SERPL-SCNC: 32 MMOL/L (ref 20–32)
CREAT SERPL-MCNC: 1.13 MG/DL (ref 0.52–1.04)
GFR SERPL CREATININE-BSD FRML MDRD: 46 ML/MIN/1.7M2
GLUCOSE SERPL-MCNC: 102 MG/DL (ref 70–99)
POTASSIUM SERPL-SCNC: 3.8 MMOL/L (ref 3.4–5.3)
SODIUM SERPL-SCNC: 140 MMOL/L (ref 133–144)

## 2017-02-10 PROCEDURE — 80048 BASIC METABOLIC PNL TOTAL CA: CPT | Performed by: STUDENT IN AN ORGANIZED HEALTH CARE EDUCATION/TRAINING PROGRAM

## 2017-02-10 PROCEDURE — 40000802 ZZH SITE CHECK

## 2017-02-10 PROCEDURE — 21400006 ZZH R&B CCU INTERMEDIATE UMMC

## 2017-02-10 PROCEDURE — 25000132 ZZH RX MED GY IP 250 OP 250 PS 637: Performed by: STUDENT IN AN ORGANIZED HEALTH CARE EDUCATION/TRAINING PROGRAM

## 2017-02-10 PROCEDURE — 25000128 H RX IP 250 OP 636: Performed by: STUDENT IN AN ORGANIZED HEALTH CARE EDUCATION/TRAINING PROGRAM

## 2017-02-10 PROCEDURE — 25000132 ZZH RX MED GY IP 250 OP 250 PS 637: Performed by: INTERNAL MEDICINE

## 2017-02-10 PROCEDURE — 25000125 ZZHC RX 250: Performed by: STUDENT IN AN ORGANIZED HEALTH CARE EDUCATION/TRAINING PROGRAM

## 2017-02-10 PROCEDURE — 40000141 ZZH STATISTIC PERIPHERAL IV START W/O US GUIDANCE

## 2017-02-10 PROCEDURE — 25000132 ZZH RX MED GY IP 250 OP 250 PS 637

## 2017-02-10 PROCEDURE — 25000128 H RX IP 250 OP 636: Performed by: INTERNAL MEDICINE

## 2017-02-10 PROCEDURE — 36415 COLL VENOUS BLD VENIPUNCTURE: CPT | Performed by: STUDENT IN AN ORGANIZED HEALTH CARE EDUCATION/TRAINING PROGRAM

## 2017-02-10 RX ORDER — HYDRALAZINE HYDROCHLORIDE 50 MG/1
50 TABLET, FILM COATED ORAL EVERY 8 HOURS PRN
Status: DISCONTINUED | OUTPATIENT
Start: 2017-02-10 | End: 2017-02-11

## 2017-02-10 RX ORDER — FUROSEMIDE 20 MG
40 TABLET ORAL DAILY
Qty: 45 TABLET | Refills: 3 | Status: SHIPPED
Start: 2017-02-10 | End: 2017-02-12

## 2017-02-10 RX ORDER — FUROSEMIDE 20 MG
20 TABLET ORAL DAILY
Qty: 45 TABLET | Refills: 3 | Status: SHIPPED
Start: 2017-02-10 | End: 2017-02-10

## 2017-02-10 RX ORDER — HYDRALAZINE HYDROCHLORIDE 25 MG/1
25 TABLET, FILM COATED ORAL EVERY 6 HOURS PRN
Status: DISCONTINUED | OUTPATIENT
Start: 2017-02-10 | End: 2017-02-10

## 2017-02-10 RX ORDER — HYDROCODONE BITARTRATE AND ACETAMINOPHEN 5; 325 MG/1; MG/1
1 TABLET ORAL ONCE
Status: COMPLETED | OUTPATIENT
Start: 2017-02-10 | End: 2017-02-10

## 2017-02-10 RX ORDER — HYDRALAZINE HYDROCHLORIDE 25 MG/1
25 TABLET, FILM COATED ORAL ONCE
Status: COMPLETED | OUTPATIENT
Start: 2017-02-10 | End: 2017-02-10

## 2017-02-10 RX ORDER — HYDRALAZINE HYDROCHLORIDE 25 MG/1
25 TABLET, FILM COATED ORAL EVERY 8 HOURS
Status: DISCONTINUED | OUTPATIENT
Start: 2017-02-10 | End: 2017-02-10

## 2017-02-10 RX ORDER — ONDANSETRON 2 MG/ML
4 INJECTION INTRAMUSCULAR; INTRAVENOUS EVERY 8 HOURS PRN
Status: DISCONTINUED | OUTPATIENT
Start: 2017-02-10 | End: 2017-02-12 | Stop reason: HOSPADM

## 2017-02-10 RX ORDER — DOXAZOSIN 1 MG/1
1 TABLET ORAL DAILY
Status: DISCONTINUED | OUTPATIENT
Start: 2017-02-10 | End: 2017-02-10

## 2017-02-10 RX ORDER — FUROSEMIDE 40 MG
40 TABLET ORAL DAILY
Status: DISCONTINUED | OUTPATIENT
Start: 2017-02-10 | End: 2017-02-12

## 2017-02-10 RX ORDER — FUROSEMIDE 10 MG/ML
40 INJECTION INTRAMUSCULAR; INTRAVENOUS ONCE
Status: COMPLETED | OUTPATIENT
Start: 2017-02-10 | End: 2017-02-10

## 2017-02-10 RX ORDER — HYDRALAZINE HYDROCHLORIDE 25 MG/1
25 TABLET, FILM COATED ORAL EVERY 8 HOURS PRN
Qty: 60 TABLET | Refills: 3 | Status: SHIPPED
Start: 2017-02-10 | End: 2017-02-12

## 2017-02-10 RX ORDER — HYDRALAZINE HYDROCHLORIDE 25 MG/1
25 TABLET, FILM COATED ORAL EVERY 8 HOURS PRN
Status: DISCONTINUED | OUTPATIENT
Start: 2017-02-10 | End: 2017-02-10

## 2017-02-10 RX ORDER — FUROSEMIDE 10 MG/ML
60 INJECTION INTRAMUSCULAR; INTRAVENOUS ONCE
Status: COMPLETED | OUTPATIENT
Start: 2017-02-10 | End: 2017-02-10

## 2017-02-10 RX ORDER — ASPIRIN 81 MG/1
81 TABLET, CHEWABLE ORAL DAILY
Qty: 60 TABLET | Refills: 3 | Status: SHIPPED
Start: 2017-02-10 | End: 2017-02-12

## 2017-02-10 RX ADMIN — ACETAMINOPHEN 650 MG: 325 TABLET, FILM COATED ORAL at 20:15

## 2017-02-10 RX ADMIN — FUROSEMIDE 60 MG: 10 INJECTION, SOLUTION INTRAVENOUS at 15:54

## 2017-02-10 RX ADMIN — PREDNISONE 2 MG: 1 TABLET ORAL at 08:09

## 2017-02-10 RX ADMIN — HYDRALAZINE HYDROCHLORIDE 25 MG: 25 TABLET ORAL at 21:18

## 2017-02-10 RX ADMIN — AMLODIPINE BESYLATE 10 MG: 10 TABLET ORAL at 08:07

## 2017-02-10 RX ADMIN — FUROSEMIDE 40 MG: 40 TABLET ORAL at 13:49

## 2017-02-10 RX ADMIN — FLUTICASONE PROPIONATE 2 SPRAY: 50 SPRAY, METERED NASAL at 10:05

## 2017-02-10 RX ADMIN — FUROSEMIDE 40 MG: 10 INJECTION, SOLUTION INTRAVENOUS at 18:33

## 2017-02-10 RX ADMIN — HYDRALAZINE HYDROCHLORIDE 25 MG: 25 TABLET ORAL at 03:16

## 2017-02-10 RX ADMIN — VITAMIN D, TAB 1000IU (100/BT) 1000 UNITS: 25 TAB at 08:08

## 2017-02-10 RX ADMIN — ESCITALOPRAM OXALATE 10 MG: 10 TABLET ORAL at 08:08

## 2017-02-10 RX ADMIN — HYDRALAZINE HYDROCHLORIDE 25 MG: 25 TABLET ORAL at 10:01

## 2017-02-10 RX ADMIN — HYDROCODONE BITARTRATE AND ACETAMINOPHEN 1 TABLET: 5; 325 TABLET ORAL at 06:21

## 2017-02-10 RX ADMIN — ATORVASTATIN CALCIUM 40 MG: 40 TABLET, FILM COATED ORAL at 20:15

## 2017-02-10 RX ADMIN — HYDRALAZINE HYDROCHLORIDE 25 MG: 25 TABLET ORAL at 20:17

## 2017-02-10 RX ADMIN — HYDROCODONE BITARTRATE AND ACETAMINOPHEN 1 TABLET: 5; 325 TABLET ORAL at 21:18

## 2017-02-10 RX ADMIN — ASPIRIN 81 MG CHEWABLE TABLET 81 MG: 81 TABLET CHEWABLE at 08:08

## 2017-02-10 RX ADMIN — DOCUSATE SODIUM 100 MG: 100 CAPSULE, LIQUID FILLED ORAL at 20:14

## 2017-02-10 RX ADMIN — Medication 1 TABLET: at 08:09

## 2017-02-10 RX ADMIN — ONDANSETRON 4 MG: 2 INJECTION INTRAMUSCULAR; INTRAVENOUS at 21:18

## 2017-02-10 RX ADMIN — LOSARTAN POTASSIUM 100 MG: 100 TABLET ORAL at 08:10

## 2017-02-10 RX ADMIN — SENNOSIDES AND DOCUSATE SODIUM 2 TABLET: 8.6; 5 TABLET ORAL at 08:08

## 2017-02-10 ASSESSMENT — PAIN DESCRIPTION - DESCRIPTORS
DESCRIPTORS: ACHING;DISCOMFORT

## 2017-02-10 NOTE — PLAN OF CARE
Problem: Goal Outcome Summary  Goal: Goal Outcome Summary  Outcome: No Change  D: HF, SOB    I: Monitored vitals and assessed pt status.   Running: PIV SL  PRN:     A: A0x4. NSR. HR 60s. Afebrile. BP high @ 180-200s systolic. Sx aware. Ordered hydralazine IVP- which pt refused and requested oral hydralazine- this was given @ 2200. New call parameters for BP, call if SBP >195. Pt resting comfortably in bed. On 2L NC while sleeping to keep sats greater than 90. Denies pain. Voiding in bedside commode adequate amounts. Up to commode independently. Ate well for dinner. BM this AM. Pt very pleasant, cooperative.     P: Continue to monitor BP- call for SBP >195. Continue to monitor Pt status and report changes to treatment team.

## 2017-02-10 NOTE — PROGRESS NOTES
Admission    Diagnosis: HF exacerbation  Admitted from: [facility]  Via: Wheelchair  Accompanied by: family  Belongings: At bedside, no medications. Nothing sent to security.  Admission Profile: Complete  Teaching: orientation to unit, call don't fall, use of console, meal times, visiting hours, when to call for the RN (angina/sob/dizzyness, etc.), and enforced importance of safety   Access: PIV  Telemetry: Placed on patient  Height/Weight: Complete

## 2017-02-10 NOTE — PROGRESS NOTES
Care Coordinator Progress Note     Admission Date/Time:  2/9/2017  Attending MD:  Marcelo Guzman MD     Data  Chart reviewed, discussed with interdisciplinary team.   Patient was admitted for: CHF (congestive heart failure), NYHA class I, acute, systolic (H).    Concerns with insurance coverage for discharge needs: None.  Current Living Situation: Patient lives alone. Pt said she is independent with her cares.  Support System: Supportive and Involved family  Services Involved: none currently  Transportation: Family or Friend will provide. Pt said her daughter will give her a ride home when discharged.  Barriers to Discharge: IV diuresis    Coordination of Care and Referrals: No home care needs identified.  Assessment  Pt currently receiving IV lasix inpatient.  Plan  Anticipated Discharge Date:  TBD  Anticipated Discharge Plan:  Discharge to home.  CC will continue to monitor patient's medical condition and progress towards discharge.    Mercy Mathew RN BSN  6[address] Unit Care Coordinator  Phone number: [phone]  Pager: [phone]

## 2017-02-10 NOTE — PLAN OF CARE
Problem: Goal Outcome Summary  Goal: Goal Outcome Summary  Outcome: Improving  D: CHF, HTN  I/A: VSS with high SBP. Sinus rhythm. Up independent to commode. Calls appropriately. Denies pain, SOB. Nasal cannula at 2 LPM while asleep. BP remains high, MD notified and changed hydralazine order to PRN.   P: Will continue to monitor.

## 2017-02-11 LAB
ANION GAP SERPL CALCULATED.3IONS-SCNC: 10 MMOL/L (ref 3–14)
ANION GAP SERPL CALCULATED.3IONS-SCNC: 8 MMOL/L (ref 3–14)
BUN SERPL-MCNC: 34 MG/DL (ref 7–30)
BUN SERPL-MCNC: 35 MG/DL (ref 7–30)
CALCIUM SERPL-MCNC: 8.8 MG/DL (ref 8.5–10.1)
CALCIUM SERPL-MCNC: 9 MG/DL (ref 8.5–10.1)
CHLORIDE SERPL-SCNC: 96 MMOL/L (ref 94–109)
CHLORIDE SERPL-SCNC: 96 MMOL/L (ref 94–109)
CO2 SERPL-SCNC: 32 MMOL/L (ref 20–32)
CO2 SERPL-SCNC: 32 MMOL/L (ref 20–32)
CREAT SERPL-MCNC: 1.65 MG/DL (ref 0.52–1.04)
CREAT SERPL-MCNC: 1.73 MG/DL (ref 0.52–1.04)
GFR SERPL CREATININE-BSD FRML MDRD: 28 ML/MIN/1.7M2
GFR SERPL CREATININE-BSD FRML MDRD: 30 ML/MIN/1.7M2
GLUCOSE SERPL-MCNC: 122 MG/DL (ref 70–99)
GLUCOSE SERPL-MCNC: 134 MG/DL (ref 70–99)
MAGNESIUM SERPL-MCNC: 2.1 MG/DL (ref 1.6–2.3)
POTASSIUM SERPL-SCNC: 3.8 MMOL/L (ref 3.4–5.3)
POTASSIUM SERPL-SCNC: 4.1 MMOL/L (ref 3.4–5.3)
SODIUM SERPL-SCNC: 136 MMOL/L (ref 133–144)
SODIUM SERPL-SCNC: 137 MMOL/L (ref 133–144)
TROPONIN I SERPL-MCNC: 0.05 UG/L (ref 0–0.04)
TROPONIN I SERPL-MCNC: 0.06 UG/L (ref 0–0.04)

## 2017-02-11 PROCEDURE — 25000125 ZZHC RX 250: Performed by: STUDENT IN AN ORGANIZED HEALTH CARE EDUCATION/TRAINING PROGRAM

## 2017-02-11 PROCEDURE — 21400006 ZZH R&B CCU INTERMEDIATE UMMC

## 2017-02-11 PROCEDURE — 93010 ELECTROCARDIOGRAM REPORT: CPT | Performed by: INTERNAL MEDICINE

## 2017-02-11 PROCEDURE — 93005 ELECTROCARDIOGRAM TRACING: CPT

## 2017-02-11 PROCEDURE — 25000132 ZZH RX MED GY IP 250 OP 250 PS 637

## 2017-02-11 PROCEDURE — 36415 COLL VENOUS BLD VENIPUNCTURE: CPT | Performed by: STUDENT IN AN ORGANIZED HEALTH CARE EDUCATION/TRAINING PROGRAM

## 2017-02-11 PROCEDURE — 99233 SBSQ HOSP IP/OBS HIGH 50: CPT | Mod: GC

## 2017-02-11 PROCEDURE — 25000128 H RX IP 250 OP 636: Performed by: INTERNAL MEDICINE

## 2017-02-11 PROCEDURE — 25000132 ZZH RX MED GY IP 250 OP 250 PS 637: Performed by: STUDENT IN AN ORGANIZED HEALTH CARE EDUCATION/TRAINING PROGRAM

## 2017-02-11 PROCEDURE — 84484 ASSAY OF TROPONIN QUANT: CPT | Performed by: STUDENT IN AN ORGANIZED HEALTH CARE EDUCATION/TRAINING PROGRAM

## 2017-02-11 PROCEDURE — 40000141 ZZH STATISTIC PERIPHERAL IV START W/O US GUIDANCE

## 2017-02-11 PROCEDURE — 80048 BASIC METABOLIC PNL TOTAL CA: CPT | Performed by: STUDENT IN AN ORGANIZED HEALTH CARE EDUCATION/TRAINING PROGRAM

## 2017-02-11 PROCEDURE — 83735 ASSAY OF MAGNESIUM: CPT | Performed by: STUDENT IN AN ORGANIZED HEALTH CARE EDUCATION/TRAINING PROGRAM

## 2017-02-11 PROCEDURE — 25000128 H RX IP 250 OP 636: Performed by: STUDENT IN AN ORGANIZED HEALTH CARE EDUCATION/TRAINING PROGRAM

## 2017-02-11 RX ORDER — ONDANSETRON 4 MG/1
4 TABLET, ORALLY DISINTEGRATING ORAL EVERY 8 HOURS PRN
Status: DISCONTINUED | OUTPATIENT
Start: 2017-02-11 | End: 2017-02-12 | Stop reason: HOSPADM

## 2017-02-11 RX ORDER — SODIUM CHLORIDE FOR INHALATION 0.9 %
3 VIAL, NEBULIZER (ML) INHALATION
Status: DISCONTINUED | OUTPATIENT
Start: 2017-02-11 | End: 2017-02-12 | Stop reason: HOSPADM

## 2017-02-11 RX ORDER — POTASSIUM CHLORIDE 1.5 G/1.58G
20 POWDER, FOR SOLUTION ORAL ONCE
Status: COMPLETED | OUTPATIENT
Start: 2017-02-11 | End: 2017-02-11

## 2017-02-11 RX ORDER — HYDRALAZINE HYDROCHLORIDE 50 MG/1
50 TABLET, FILM COATED ORAL EVERY 8 HOURS
Status: DISCONTINUED | OUTPATIENT
Start: 2017-02-11 | End: 2017-02-12 | Stop reason: HOSPADM

## 2017-02-11 RX ORDER — HYDROCODONE BITARTRATE AND ACETAMINOPHEN 5; 325 MG/1; MG/1
1 TABLET ORAL ONCE
Status: COMPLETED | OUTPATIENT
Start: 2017-02-11 | End: 2017-02-11

## 2017-02-11 RX ORDER — NITROGLYCERIN 0.4 MG/1
0.4 TABLET SUBLINGUAL EVERY 5 MIN PRN
Status: DISCONTINUED | OUTPATIENT
Start: 2017-02-11 | End: 2017-02-12 | Stop reason: HOSPADM

## 2017-02-11 RX ORDER — GUAIFENESIN 600 MG/1
600 TABLET, EXTENDED RELEASE ORAL 2 TIMES DAILY
Status: DISCONTINUED | OUTPATIENT
Start: 2017-02-11 | End: 2017-02-12 | Stop reason: HOSPADM

## 2017-02-11 RX ADMIN — LIDOCAINE HYDROCHLORIDE 30 ML: 20 SOLUTION ORAL; TOPICAL at 18:11

## 2017-02-11 RX ADMIN — ALUMINUM HYDROXIDE, MAGNESIUM HYDROXIDE, AND DIMETHICONE 30 ML: 400; 400; 40 SUSPENSION ORAL at 17:11

## 2017-02-11 RX ADMIN — Medication 1 TABLET: at 08:26

## 2017-02-11 RX ADMIN — PREDNISONE 2 MG: 1 TABLET ORAL at 08:29

## 2017-02-11 RX ADMIN — POTASSIUM CHLORIDE 20 MEQ: 1.5 POWDER, FOR SOLUTION ORAL at 08:26

## 2017-02-11 RX ADMIN — HYDROCODONE BITARTRATE AND ACETAMINOPHEN 1 TABLET: 5; 325 TABLET ORAL at 08:26

## 2017-02-11 RX ADMIN — NITROGLYCERIN 0.4 MG: 0.4 TABLET SUBLINGUAL at 16:31

## 2017-02-11 RX ADMIN — AMLODIPINE BESYLATE 10 MG: 10 TABLET ORAL at 08:28

## 2017-02-11 RX ADMIN — GUAIFENESIN 600 MG: 600 TABLET, EXTENDED RELEASE ORAL at 14:02

## 2017-02-11 RX ADMIN — ASPIRIN 81 MG CHEWABLE TABLET 81 MG: 81 TABLET CHEWABLE at 08:29

## 2017-02-11 RX ADMIN — HYDROCODONE BITARTRATE AND ACETAMINOPHEN 1 TABLET: 5; 325 TABLET ORAL at 17:08

## 2017-02-11 RX ADMIN — FUROSEMIDE 40 MG: 40 TABLET ORAL at 08:28

## 2017-02-11 RX ADMIN — HYDRALAZINE HYDROCHLORIDE 50 MG: 50 TABLET ORAL at 08:26

## 2017-02-11 RX ADMIN — LOSARTAN POTASSIUM 100 MG: 100 TABLET ORAL at 08:28

## 2017-02-11 RX ADMIN — ATORVASTATIN CALCIUM 40 MG: 40 TABLET, FILM COATED ORAL at 20:22

## 2017-02-11 RX ADMIN — VITAMIN D, TAB 1000IU (100/BT) 1000 UNITS: 25 TAB at 08:27

## 2017-02-11 RX ADMIN — GUAIFENESIN 600 MG: 600 TABLET, EXTENDED RELEASE ORAL at 20:22

## 2017-02-11 RX ADMIN — ESCITALOPRAM OXALATE 10 MG: 10 TABLET ORAL at 08:29

## 2017-02-11 RX ADMIN — ONDANSETRON 4 MG: 2 INJECTION INTRAMUSCULAR; INTRAVENOUS at 12:14

## 2017-02-11 RX ADMIN — SODIUM CHLORIDE 500 ML: 9 INJECTION, SOLUTION INTRAVENOUS at 18:07

## 2017-02-11 RX ADMIN — DOCUSATE SODIUM 100 MG: 100 CAPSULE, LIQUID FILLED ORAL at 08:27

## 2017-02-11 RX ADMIN — HYDRALAZINE HYDROCHLORIDE 50 MG: 50 TABLET ORAL at 22:41

## 2017-02-11 RX ADMIN — SENNOSIDES AND DOCUSATE SODIUM 2 TABLET: 8.6; 5 TABLET ORAL at 08:27

## 2017-02-11 RX ADMIN — ONDANSETRON 4 MG: 4 TABLET, ORALLY DISINTEGRATING ORAL at 15:20

## 2017-02-11 RX ADMIN — DOCUSATE SODIUM 100 MG: 100 CAPSULE, LIQUID FILLED ORAL at 20:22

## 2017-02-11 NOTE — PLAN OF CARE
Problem: Goal Outcome Summary  Goal: Goal Outcome Summary  Outcome: Improving    D: Admitted on 2/9/17 with SOB and Heart failure.      I/A: Pt is alert and oriented x 4. Pt up with SBA to commode at bedside. Pt had good urine output this evening. Some was unmeasured due to pt having 2 episodes of incontinence in briefs. SBP was elevated at 200/93 and pt received Hydralazine PRN. Team was paged and Hydralazine PRN dose was increased to 50 mg Q8H. Also, pt reported arthritis pain in back unrelieved by Acetaminophen and also became nauseous. Pt was coughing and spit up a small amount of clear liquid. Team placed an order for PRN Zofran IV and a one time dose of Norco 1 tab. Pt received both theses medications and reported relief from symptoms 60 minutes later. Pt oxygen saturations dropped while sleeping overnight and pt was placed on 1 L NC to keep oxygen levels above 90%. RN will continue to monitor and page team with changes. Plan is for possible d/c today.     Hadyee Cheng

## 2017-02-11 NOTE — PROGRESS NOTES
"  [facility]  Cardiology Progress Note    Jennifer Bob MRN# [id number]   Age: 85 year old YOB: 1931     Date of Admission: 2/9/2017  Date of Service: 2/11/2017   Hospital Day #2  ==================================================  24 HOUR EVENTS: One episode of nausea/vomiting overnight.  Now coughing due to 'secretions being stuck in throat.\"  No CP this morning, palpitations or shortness of breath. Initially feeling up for going home, but later developed nausea and recurrent NBNB emesis.  Wanting to stay after emesis.   ===================================================  PHYSICAL EXAM  Temp:  [97.5  F (36.4  C)-98.6  F (37  C)] 98  F (36.7  C)  Heart Rate:  [60-72] 63  Resp:  [16-22] 18  BP: (134-200)/(51-93) 178/68 mmHg  SpO2:  [90 %-97 %] 94 %    Resp: 18    General: Sitting in bed, NAD.   HEENT: Head AT/NC, sclera anicteric, PERRL  Neck: Supple, JVD to midneck.   Resp: clear in anterior lung fields, breathing comfortably on RA  Cardiac: RRR, normal S1/S2, no murmurs appreciated.   Abdomen: soft, nt, nd, NABS  Extremities: WWP, trace peripheral edema  Skin: Warm and dry, no rashes on gross examination  Neuro: CNs II-XII grossly intact, freely moving all 4 extremities, normal mentation, affect and tone.   =====================================================  LABORATORY DATA  ROUTINE ICU LABS (Last four results)  CMP  Recent Labs  Lab 02/10/17  0717 02/09/17  1239 02/09/17  0440     --  144   POTASSIUM 3.8  --  3.8   CHLORIDE 101  --  108   CO2 32  --  27   ANIONGAP 8  --  9   *  --  117*   BUN 17  --  17   CR 1.13*  --  0.93   GFRESTIMATED 46*  --  57*  47*   GFRESTBLACK 55*  --  69  57*   JERILYN 8.8  --  9.0   PROTTOTAL  --  7.5 6.5*   ALBUMIN  --  4.1 3.6   BILITOTAL  --  0.8 0.5   ALKPHOS  --  87 85   AST  --  63* 42   ALT  --  56* 49     CBC  Recent Labs  Lab 02/09/17  0440   WBC 7.4   RBC 4.20   HGB 13.7   HCT 43.0   *   MCH 32.6   MCHC " 31.9   RDW 14.4        INRNo lab results found in last 7 days.  Arterial Blood GasNo lab results found in last 7 days.  Venous Blood Gas   Recent Labs  Lab 02/09/17  0440   PHV 7.32   PCO2V 53*   PO2V 40   HCO3V 27       Intake/Output Summary (Last 24 hours) at 02/11/17 2055  Last data filed at 02/11/17 1700   Gross per 24 hour   Intake    540 ml   Output   1051 ml   Net   -511 ml     ==================================================  ASSESSMENT AND PLAN  This is an 86 YO F with COPD,  CAD s/p CABG x3 in 1992, HFpEF and hypertension presenting with acute exacerbation of heart failure and hypertensive emergency.      # Acute exacerbation of chronic diastolic heart failure.  Pt presented with signs/symptoms of volume overload. Diuresing well with weight from 134 on admit to 127 on 2/11.  Now likely over diuresed and gently repleting fluids.   - Hold diuresis overnight due to NICHOLE.   - Continue ARB, allergic to beta blockers.   - Gentle fluid repletion.     # Hypertensive emergency.    - Hold ARB overnight due to NICHOLE.    - Continue hydralazine 50mg PO TID.  May need to increase due to holding of ARB.    - Continue amlodipine 10mg PO qday.   - May need clonidine or other alpha blocker for BP control while NICHOLE resolving.     # NICHOLE, prerenal.  Secondary to over diuresis.    - Gentle fluid repletion.   - Recheck BMP in AM.      #CAD.   - Continue ASA, lipitor    # Chest pain. Noted on 2/11.  Unrelieved by nitro.  Associated with burping as well.  Troponin mildly elevated with no ischemic changes noted on EKG.  Pt later stated she had chronic back pain.  Relieved with GI cocktail for abdominal pain and norco for back pain. Troponin likely elevated secondary to demand ischemia/hypertension.     Chronic problems.   COPD- continue home inhalers.     FEN: NS bolus 500mL; replete PRN; cardiac diet, <2000mg NA  PPX: DVT-ambulate; GI-none; bowel-senna  CODE: DNR/DNI  DISPO: Home pending improvement in NICHOLE and PO  hypertension control regimen.     Patient seen and discussed with Dr. Guzman who agrees with the above assessment and plan.    Dariusz Hightower M.D.  PGY-2, IM  Pager: [phone]   ------------------------------------------------------------------------------------------------------------------  [facility] Vascular Medicine/Cardiovascular Division Attending  ------------------------------------------------------------------------------------------------------------------    I have seen and examined the patient and the note above reflects our mutual assessment and plan.    Ms. Bob is in good spirits, feels better, and I interpret her admission as a simple HF decompensation, with her elevated BP representing her discomfort, and not really as a hypertensive emergency.  We will continue her CV medications and these will need modification after DC. Thankfully, she is closely followed by Dr. Torri Fisher and Dr.Gladwin Adrian.     Marcelo Guzman MD  Director, Vascular Medicine Program  Professor of Medicine, Epidemiology and Community Health  [employer] [place]  [address]    Tel: [phone]  Fax: [phone]  Pager: [phone]    Vascular medicine nurse clinician: Johnnie Johnson - Office [phone]  : Sumaya Robles - Office [phone]  ------------------------------------------------------------------------------------------------------------------

## 2017-02-11 NOTE — PLAN OF CARE
Problem: Goal Outcome Summary  Goal: Goal Outcome Summary  Outcome: Improving  D: Admitted on 2/9/17 with SOB and Heart failure.   I/A: Alert and oriented x 4, denies any pain.  When pt ambulated in the hallway today,  pt would get SOB and would de-sat on R/A to 84%. Pt was given a total of 100mg IV Lasix and 40 mg PO lasix, making good urine output this evening. Wean off 2L O2 to room air, Sp02 between 90-93%. -160/ 64-75, PRN Hydralazine given for SBP greater than 180 today continue to monitor BP and treat accordingly. Skin is warm dry and intact. Pt ambulated in the hallway numerous time with her family this evening.  On 2 gm diet, tolerating well.  Plan for possible d/c tomorrow. Continue to monitor and notify cards 1 of issues and concerns.

## 2017-02-11 NOTE — PROGRESS NOTES
"    Cards 1  Follow up Note   Attending:    Date of Service: 2/10/2017     S: Patient had a good diuresis, feeling better, on RA. Denies any chest pain, no acute events overnight.     O:   Vitals: /73 mmHg  Pulse 77  Temp(Src) 98.4  F (36.9  C) (Oral)  Resp 22  Ht 1.575 m (5' 2\")  Wt 59.013 kg (130 lb 1.6 oz)  BMI 23.79 kg/m2  SpO2 91%  GENERAL APPEARANCE: AxO, NAD    HEENT: NCAT, EOMI, MMM.    NECK: Supple. JVD mid neck. Good carotid upstroke.    CHEST: CTAB , mild crackles.    CARDIOVASCULAR: S1S2, Reg, ESM in the aortic area. No m/r/g.    ABDOMEN: BS+, soft, No pulsatile masses or bruits.    EXTREMITIES: No pedal edema. Distal pulses intact.    NEURO: Grossly nonfocal.    PSYCH: Normal affect.  SKIN: Warm and dry.      Data:  Labs:  BMP  Recent Labs  Lab 02/10/17  0717 02/09/17  0440    144   POTASSIUM 3.8 3.8   CHLORIDE 101 108   JERILYN 8.8 9.0   CO2 32 27   BUN 17 17   CR 1.13* 0.93   * 117*     CBC  Recent Labs  Lab 02/09/17  0440   WBC 7.4   RBC 4.20   HGB 13.7   HCT 43.0   *   MCH 32.6   MCHC 31.9   RDW 14.4        INRNo lab results found in last 7 days.    Tele shows NSR with RBBB  EKG: RBBB with NSR    Meds per EPIC EMR:  Current Facility-Administered Medications   Medication     hydrALAZINE (APRESOLINE) tablet 25 mg     furosemide (LASIX) tablet 40 mg     furosemide (LASIX) injection 40 mg     lidocaine 1 % 1 mL     lidocaine (LMX4) kit     sodium chloride (PF) 0.9% PF flush 3 mL     sodium chloride (PF) 0.9% PF flush 3 mL     medication instruction     alum & mag hydroxide-simethicone (MYLANTA ES/MAALOX  ES) suspension 15-30 mL     acetaminophen (TYLENOL) tablet 650 mg     acetaminophen (TYLENOL) Suppository 650 mg     senna-docusate (SENOKOT-S;PERICOLACE) 8.6-50 MG per tablet 1-2 tablet     albuterol (PROAIR HFA/PROVENTIL HFA/VENTOLIN HFA) Inhaler 2 puff     amLODIPine (NORVASC) tablet 10 mg     atorvastatin (LIPITOR) tablet 40 mg     calcium citrate-vitamin " D (CITRACAL) 315-250 MG-UNIT per tablet 1 tablet     docusate sodium (COLACE) capsule 100 mg     doxylamine (UNISOM) half-tab 12.5 mg     escitalopram (LEXAPRO) tablet 10 mg     fluticasone (FLONASE) 50 MCG/ACT spray 2 spray     HYDROcodone-acetaminophen (NORCO) 5-325 MG per tablet 1 tablet     losartan (COZAAR) tablet 100 mg     predniSONE (DELTASONE) tablet 2 mg     cholecalciferol (vitamin D) tablet 1,000 Units     aspirin chewable tablet 81 mg     Echo:  Recent Results (from the past 4320 hour(s))   ECHO COMPLETE WITH OPTISON    Narrative    [id number]  [id number]  [id number]  [id number]^TANYA LOMELI^LUCIO^           [facility],[place]  Echocardiography Laboratory  [address] [place] [id number]     Name: TRACY BARBER  MRN: [id number]  : 1931  Study Date: 2017 02:17 PM  Age: 85 yrs  Gender: Female  Patient Location: [facility]  Reason For Study: CHF  Ordering Physician: LUCIO HEARN  Performed By: [facility] Bello     BSA: 1.6 m2  Height: 62 in  Weight: 133 lb  BP: 203/80 mmHg  _____________________________________________________________________________  __        Procedure  Echocardiogram with two-dimensional, color and spectral Doppler performed.  Contrast Optison. Optison (NDC #3172-2800-54) given intravenously. Patient was  given 6.0 ml mixture of 3 ml Optison and 6 ml saline. 3.0 ml wasted.  _____________________________________________________________________________  __        Interpretation Summary  Mildly (EF 40-45%) reduced left ventricular function is present. Traced at  42%. Inferior wall akinesis is present.  Grade III or advanced diastolic dysfunction.  Right ventricular function, chamber size, wall motion, and thickness are  normal.  The inferior vena cava was normal in size with preserved respiratory  variability.  A left pleural effusion is present.     Compared to prior study, LV fxn is lower. Inferior akinesis is  old.  _____________________________________________________________________________  __        Left Ventricle  Mild concentric wall thickening consistent with left ventricular hypertrophy  is present. Mildly (EF 40-45%) reduced left ventricular function is present.  Grade III or advanced diastolic dysfunction. Inferior wall akinesis is  present.     Right Ventricle  Right ventricular function, chamber size, wall motion, and thickness are  normal.     Atria  The right atria appears normal. Severe left atrial enlargement is present.        Mitral Valve  Trace mitral insufficiency is present.     Aortic Valve  The aortic valve is tricuspid. Trace aortic insufficiency is present.     Tricuspid Valve  Trace tricuspid insufficiency is present. The peak velocity of the tricuspid  regurgitant jet is not obtainable.     Pulmonic Valve  Trace pulmonic insufficiency is present.     Vessels  The thoracic aorta is normal. The inferior vena cava was normal in size with  preserved respiratory variability.     Pericardium  No pericardial effusion is present.        Miscellaneous  A left pleural effusion is present.  _____________________________________________________________________________  __     MMode/2D Measurements & Calculations  RVDd: 2.7 cm  IVSd: 1.2 cm  LVIDd: 5.0 cm  LVIDs: 4.5 cm  LVPWd: 1.2 cm  FS: 11.0 %  EDV(Teich): 118.9 ml  ESV(Teich): 90.6 ml  LV mass(C)d: 233.8 grams  Ao root diam: 2.6 cm  asc Aorta Diam: 3.0 cm  LVOT diam: 1.9 cm  LVOT area: 2.9 cm2     EF(MOD-bp): 42.1 %  LA Volume (BP): 101.9 ml        Doppler Measurements & Calculations  MV E max arti: 126.7 cm/sec  MV A max arti: 49.7 cm/sec  MV E/A: 2.5  MV dec slope: 924.8 cm/sec2  MV dec time: 0.14 sec  Lateral E/e': 21.9  Medial E/e': 35.3              _____________________________________________________________________________  __           Report approved by: Juani Guillaume 02/09/2017 04:41 PM        This is an 84 YO F with      ASSESSMENT:   -  Acute exacerbation of chronic congestive heart failure with preserved ejection fraction versus hypertensive emergency with flash pulmonary edema.    - Coronary artery disease with history of CABG X 3 in 1992 at [place] and angiogram in 2009 with stenting to the RCA graft.  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    - Uncontrolled hypertension with possible hypertensive heart disease  - Hyperlipidemia.  - COPD on inhalers at home.      PLAN:    - Got 80 mg IV lasix yesterday and diuresed 2.5 L. Had hypoxia this AM while walking with PT so ended up staying today, diuresing with IV lasix (100 IV) plus 40 mg PO today. Will monitor for NICHOLE, her echo showed IVC smaller with preserved respiratory variability.     - Needs better control of her blood pressures, HCTZ could make her orthostatic. BB may benefit considering her Coronary artery disease but she has history of COPD and on inhalers (listed as allergies). Lisinopril allergy, started on hydralazine.     - Will continue asa at a lower dose of 81 mg per day.    - Continue lipitor.    - Continue COPD inhalers per home dose.    - Continue prednisone.    - DVT ppx- ambulate TID    - Cardiac diet.     The patient states understanding and is agreeable with plan.     The patient will be discussed w/ Dr. Guzman.  The above note reflects our joint plan.    John Mcwilliams MD  Cardiology.    [id number].

## 2017-02-12 VITALS
OXYGEN SATURATION: 88 % | RESPIRATION RATE: 18 BRPM | TEMPERATURE: 98.7 F | DIASTOLIC BLOOD PRESSURE: 66 MMHG | BODY MASS INDEX: 23.94 KG/M2 | WEIGHT: 130.1 LBS | HEIGHT: 62 IN | HEART RATE: 77 BPM | SYSTOLIC BLOOD PRESSURE: 171 MMHG

## 2017-02-12 LAB
ANION GAP SERPL CALCULATED.3IONS-SCNC: 7 MMOL/L (ref 3–14)
BUN SERPL-MCNC: 38 MG/DL (ref 7–30)
CALCIUM SERPL-MCNC: 9 MG/DL (ref 8.5–10.1)
CHLORIDE SERPL-SCNC: 98 MMOL/L (ref 94–109)
CO2 SERPL-SCNC: 32 MMOL/L (ref 20–32)
CREAT SERPL-MCNC: 1.52 MG/DL (ref 0.52–1.04)
GFR SERPL CREATININE-BSD FRML MDRD: 32 ML/MIN/1.7M2
GLUCOSE SERPL-MCNC: 80 MG/DL (ref 70–99)
POTASSIUM SERPL-SCNC: 4.2 MMOL/L (ref 3.4–5.3)
SODIUM SERPL-SCNC: 137 MMOL/L (ref 133–144)

## 2017-02-12 PROCEDURE — 80048 BASIC METABOLIC PNL TOTAL CA: CPT | Performed by: STUDENT IN AN ORGANIZED HEALTH CARE EDUCATION/TRAINING PROGRAM

## 2017-02-12 PROCEDURE — 25000132 ZZH RX MED GY IP 250 OP 250 PS 637: Performed by: STUDENT IN AN ORGANIZED HEALTH CARE EDUCATION/TRAINING PROGRAM

## 2017-02-12 PROCEDURE — 25000128 H RX IP 250 OP 636: Performed by: INTERNAL MEDICINE

## 2017-02-12 PROCEDURE — 25000125 ZZHC RX 250: Performed by: STUDENT IN AN ORGANIZED HEALTH CARE EDUCATION/TRAINING PROGRAM

## 2017-02-12 PROCEDURE — 25000132 ZZH RX MED GY IP 250 OP 250 PS 637

## 2017-02-12 PROCEDURE — 36415 COLL VENOUS BLD VENIPUNCTURE: CPT | Performed by: STUDENT IN AN ORGANIZED HEALTH CARE EDUCATION/TRAINING PROGRAM

## 2017-02-12 PROCEDURE — 99238 HOSP IP/OBS DSCHRG MGMT 30/<: CPT | Mod: GC

## 2017-02-12 RX ORDER — ASPIRIN 81 MG/1
81 TABLET, CHEWABLE ORAL DAILY
Qty: 60 TABLET | Refills: 3 | Status: ON HOLD
Start: 2017-02-12 | End: 2019-01-01

## 2017-02-12 RX ORDER — FUROSEMIDE 20 MG
20 TABLET ORAL DAILY
Qty: 45 TABLET | Refills: 3 | Status: SHIPPED
Start: 2017-02-12 | End: 2017-04-03

## 2017-02-12 RX ORDER — HYDRALAZINE HYDROCHLORIDE 50 MG/1
50 TABLET, FILM COATED ORAL EVERY 8 HOURS
Qty: 60 TABLET | Refills: 3 | Status: SHIPPED
Start: 2017-02-12 | End: 2017-02-22

## 2017-02-12 RX ORDER — ONDANSETRON 4 MG/1
4 TABLET, ORALLY DISINTEGRATING ORAL EVERY 8 HOURS PRN
Qty: 15 TABLET | Refills: 0 | Status: SHIPPED
Start: 2017-02-12 | End: 2017-02-12

## 2017-02-12 RX ORDER — ONDANSETRON 4 MG/1
4 TABLET, ORALLY DISINTEGRATING ORAL EVERY 8 HOURS PRN
Qty: 15 TABLET | Refills: 0 | Status: SHIPPED
Start: 2017-02-12 | End: 2017-03-14

## 2017-02-12 RX ORDER — HYDRALAZINE HYDROCHLORIDE 50 MG/1
50 TABLET, FILM COATED ORAL EVERY 8 HOURS
Qty: 60 TABLET | Refills: 3 | Status: SHIPPED
Start: 2017-02-12 | End: 2017-02-12

## 2017-02-12 RX ADMIN — HYDROCODONE BITARTRATE AND ACETAMINOPHEN 1 TABLET: 5; 325 TABLET ORAL at 08:46

## 2017-02-12 RX ADMIN — ONDANSETRON 4 MG: 2 INJECTION INTRAMUSCULAR; INTRAVENOUS at 10:05

## 2017-02-12 RX ADMIN — FLUTICASONE PROPIONATE 2 SPRAY: 50 SPRAY, METERED NASAL at 08:51

## 2017-02-12 RX ADMIN — ASPIRIN 81 MG CHEWABLE TABLET 81 MG: 81 TABLET CHEWABLE at 08:47

## 2017-02-12 RX ADMIN — PREDNISONE 2 MG: 1 TABLET ORAL at 08:47

## 2017-02-12 RX ADMIN — ALUMINUM HYDROXIDE, MAGNESIUM HYDROXIDE, AND DIMETHICONE 30 ML: 400; 400; 40 SUSPENSION ORAL at 08:03

## 2017-02-12 RX ADMIN — GUAIFENESIN 600 MG: 600 TABLET, EXTENDED RELEASE ORAL at 08:48

## 2017-02-12 RX ADMIN — HYDRALAZINE HYDROCHLORIDE 50 MG: 50 TABLET ORAL at 08:49

## 2017-02-12 RX ADMIN — ESCITALOPRAM OXALATE 10 MG: 10 TABLET ORAL at 08:47

## 2017-02-12 RX ADMIN — Medication 1 TABLET: at 08:48

## 2017-02-12 RX ADMIN — AMLODIPINE BESYLATE 10 MG: 10 TABLET ORAL at 08:49

## 2017-02-12 RX ADMIN — VITAMIN D, TAB 1000IU (100/BT) 1000 UNITS: 25 TAB at 08:47

## 2017-02-12 ASSESSMENT — PAIN DESCRIPTION - DESCRIPTORS: DESCRIPTORS: ACHING

## 2017-02-12 NOTE — DISCHARGE SUMMARY
Medicine Discharge Summary  Jennifer Bob MRN: [id number]  12/5/1931  Home clinic: [facility]  Primary care provider: Torri Fisher  ___________________________________          Date of Admission:  2/9/2017  Date of Discharge:  2/12/2017   Admitting Physician:  Marcelo Guzman MD  Discharge Physician:  Marcelo Guzman MD  Discharging Service:  Cards 1     Primary Provider: Torri Fisher         OUTPATIENT FOLLOW-UP   BMP on 2/14  PCP within 3-5 days.   [facility] clinic 1 week.          Reason for Admission:   Increasing SOB          Discharge Diagnosis:   Hypertensive urgency  Acute exacerbation of chronic diastolic heart failure.          Procedures & Significant Findings:   Echocardiogram 2/9/17  Interpretation Summary  Mildly (EF 40-45%) reduced left ventricular function is present. Traced at  42%. Inferior wall akinesis is present.  Grade III or advanced diastolic dysfunction.  Right ventricular function, chamber size, wall motion, and thickness are  normal.  The inferior vena cava was normal in size with preserved respiratory  variability.  A left pleural effusion is present.     Compared to prior study, LV fxn is lower. Inferior akinesis is old.         Consultations:   None          Hospital Course by Problem:    # Acute exacerbation of chronic diastolic heart failure. Pt presented with signs/symptoms of volume overload. Diuresing well with weight from 134 on admit to 127 on 2/11. Overdiuresed on 2/11 with NICHOLE, creatinine to 1.73.  Improved with 500mL IVF on day of discharge.   - Hold diuresis pending creatinine on 2/14.    - Hold ARB pending creatinine on 2/14.    - PCP to restart diuresis/ARB when creatinine improved.   - [facility] clinic referral.     # Hypertensive urgency.   - ARB held on discharge due to NICHOLE.   - Hydralazine 50mg PO Q8H on discharge.   - Continue amlodipine 10mg PO qday.     # NICHOLE, prerenal. Secondary  "to over diuresis. Resolving on discharge.  Medications held as above.      #CAD.   - Continue ASA, lipitor    Physical Exam on day of Discharge:  Blood pressure 171/66, pulse 77, temperature 98.7  F (37.1  C), temperature source Oral, resp. rate 18, height 1.575 m (5' 2\"), weight 59 kg (130 lb 1.6 oz), SpO2 (!) 88 %.  General: sitting in bed, NAD  HEENT: MMM, AT/NC, PERRL  Neck: JVD visible just above clavicle at 45 degrees.   Respiratory: CTAB, no wheezing, normal WOB on RA  Heart/CV: RRR, normal S1/S2, no m/r/g  Abdomen/GI: soft, non-tender, non-distended, BS+  Extremities/MSK: WWPx4, reduced bulk, normal tone, trace LE edema  Skin: Warm, dry. No ecchymoses or rash on gross examination.   Neuro: CN II-XII grossly intact.  Freely moving all 4 extremities.  Normal mentation/speech.   Psych: Normal affect and tone.     Lines/Tubes: None          Pending Results:     Unresulted Labs Ordered in the Past 30 Days of this Admission     No orders found from 12/11/2016 to 2/10/2017.               Discharge Medications:     Current Discharge Medication List      START taking these medications    Details   !! hydrALAZINE (APRESOLINE) 50 MG tablet Take 1 tablet (50 mg) by mouth every 8 hours  Qty: 60 tablet, Refills: 3    Associated Diagnoses: Hypertension goal BP (blood pressure) < 140/90      aspirin 81 MG chewable tablet Take 1 tablet (81 mg) by mouth daily  Qty: 60 tablet, Refills: 3    Associated Diagnoses: ASCVD (arteriosclerotic cardiovascular disease)      !! hydrALAZINE (APRESOLINE) 25 MG tablet Take 1 tablet (25 mg) by mouth every 8 hours as needed (SBP>180)  Qty: 60 tablet, Refills: 3    Associated Diagnoses: CHF (congestive heart failure), NYHA class I, acute, systolic (H); Hypertension goal BP (blood pressure) < 140/90       !! - Potential duplicate medications found. Please discuss with provider.      CONTINUE these medications which have CHANGED    Details   furosemide (LASIX) 20 MG tablet Take 2 tablets (40 mg) " by mouth daily  Qty: 45 tablet, Refills: 3    Associated Diagnoses: Essential hypertension with goal blood pressure less than 140/90         CONTINUE these medications which have NOT CHANGED    Details   predniSONE (DELTASONE) 1 MG tablet Take 2 mg by mouth daily      HYDROcodone-acetaminophen (NORCO) 5-325 MG per tablet Take 1 tablet by mouth daily #30 per month **must be seen every 3 months for refills**  Qty: 30 tablet, Refills: 0    Associated Diagnoses: Low back pain, unspecified back pain laterality, unspecified chronicity, with sciatica presence unspecified      fluticasone (FLONASE) 50 MCG/ACT spray Spray 2 sprays into both nostrils every other day  Qty: 16 g, Refills: 3    Associated Diagnoses: Seasonal allergic rhinitis, unspecified allergic rhinitis trigger      albuterol (PROAIR HFA/PROVENTIL HFA/VENTOLIN HFA) 108 (90 BASE) MCG/ACT Inhaler Inhale 2 puffs into the lungs every 6 hours as needed  Qty: 1 Inhaler, Refills: 0    Associated Diagnoses: Chronic obstructive pulmonary disease, unspecified COPD type (H)      escitalopram (LEXAPRO) 10 MG tablet Take 1 tablet (10 mg) by mouth daily  Qty: 30 tablet, Refills: 9    Associated Diagnoses: Anxiety      amLODIPine (NORVASC) 10 MG tablet Take 1 tablet (10 mg) by mouth daily  Qty: 90 tablet, Refills: 3    Associated Diagnoses: Essential hypertension with goal blood pressure less than 140/90      atorvastatin (LIPITOR) 40 MG tablet Take 1 tablet (40 mg) by mouth daily  Qty: 90 tablet, Refills: 3    Associated Diagnoses: Hyperlipidemia LDL goal <100      doxylamine (UNISOM) 25 MG TABS Take 12.5 mg by mouth nightly as needed       Saline (OCEAN NASAL SPRAY NA) Administer 1 spray in each nostril up to two times daily as needed      Cholecalciferol (VITAMIN D3 PO) Take 1,000 Units by mouth daily      Calcium Citrate-Vitamin D (CALCIUM CITRATE + PO) Take 1 tablet by mouth daily       sodium chloride-sodium bicarb (CLASSIC NETI POT SINUS WASH) 2300-700 MG KIT Mix 1  packet in Neti Pot and administer in each nostril daily as needed      Multiple Vitamins-Minerals (MULTIVITAMIN OR) Take 1 tablet by mouth daily       docusate sodium (COLACE) 100 MG capsule Take 1 capsule by mouth 2 times daily          STOP taking these medications       losartan (COZAAR) 100 MG tablet Comments:   Reason for Stopping:         aspirin  MG tablet Comments:   Reason for Stopping:                    Discharge Instructions and Follow-Up:     Discharge Procedure Orders  Basic metabolic panel   Standing Status: Future  Standing Exp. Date: 04/11/17     Basic metabolic panel   Standing Status: Future  Standing Exp. Date: 04/13/17     Medication Therapy Management Referral   Referral Type: Med Therapy Management     Reason for your hospital stay   Order Comments: High blood pressures and congestive heart failure.     Activity   Order Comments: Your activity upon discharge: activity as tolerated   Order Specific Question Answer Comments   Is discharge order? Yes      Monitor and record   Order Comments: fluid intake and output daily  Limit intake to 1.5 per day     [facility]ult [facility] Follow-up and recommended labs and tests   Order Comments: Follow up with primary care provider, Torri Fisehr MD, within 5 days to evaluate medication change.  You will need labs done in 3 days to check your kidney function.  These labs have been ordered for you and can be done at any [place] location.     Appointments on [place] and/or [facility] (with [facility] or [facility] provider or service). Call [phone] if you haven't heard regarding these appointments within 3 days of discharge.     Discharge Instructions   Order Comments: Please stop taking your losartan on discharge.  This was stopped due to your decrease in kidney function.  Please follow-up with the blood work in 3 days and with Dr. Fisher.  Discuss with Dr. Fisher whether your blood work has improved enough to restart your losartan.  You should also discuss  with Dr. Fisher regarding the need to start taking a water pill (Lasix).      You should continue your low sodium diet of less than 2000mg per day. You should drink no more than 2 liters of all fluids daily to help prevent future volume overload and rehospitalization.    You should also weigh yourself daily in the morning.  If you gain more than 3 pounds in a day or 5 pounds in two days, please call your doctor regarding the need for a water pill.  We would like to see your kidney function return to close to normal before starting a water pill, if it is needed.  Please be sure to follow your sodium and fluid restrictions to help limit the need for a water pill.     DNR/DNI     Diet   Order Comments: Follow this diet upon discharge: Orders Placed This Encounter  Low Saturated Fat Na <2400 mg   Order Specific Question Answer Comments   Is discharge order? Yes              Discharge Disposition:   Home          Condition on Discharge:   Discharge condition: Stable   Code status on discharge: DNR / DNI        Date of service: 2/12/2017    The patient was discussed with Dr. Guzman.    Dariusz Hightower  pager # [phone]  ------------------------------------------------------------------------------------------------------------------  [facility] Vascular Medicine/Cardiovascular Division Attending  ------------------------------------------------------------------------------------------------------------------    I have seen and examined the patient and the note above reflects our mutual assessment and plan.    The patient is doing well and is ready for discharge. I have reviewed the care plan, medications, and followup plans.   These are understood and she is ready to return home.    Marcelo Guzman MD  Cardiovascular Division  [facility] [place] [address]    Page: [phone]  Office: [phone]    ------------------------------------------------------------------------------------------------------------------

## 2017-02-12 NOTE — PLAN OF CARE
Problem: Goal Outcome Summary  Goal: Goal Outcome Summary  Outcome: Adequate for Discharge Date Met:  02/12/17  DISCHARGE                         No discharge date for patient encounter.  ----------------------------------------------------------------------------  Discharged to: Home  Via: Automobile  Accompanied by: Daughter  Discharge Instructions: diet, activity, medications, follow up appointments, when to call the MD, aftercare instructions, and what to watchout for (i.e. s/s of fluid overload, increasing SOB, palpitations, chest pain and infections reviewed with patient and daughter.  Prescriptions: To be filled by [facility] per pt's request; medication list reviewed & sent with pt  Follow Up Appointments: arranged; information given  Belongings: All sent with pt  IV: out  Telemetry: off  Pt exhibits understanding of above discharge instructions; all questions answered.     Discharge Paperwork: Signed, copied, and sent home with patient.      Pt discharged home accompanied by daughter.  Pt denies any pain or nausea at present time, All questions answered.

## 2017-02-12 NOTE — PLAN OF CARE
Problem: Goal Outcome Summary  Goal: Goal Outcome Summary  Outcome: Improving     D: Admitted on 2/9/17 with SOB and Heart failure.       I/A: Pt is alert and oriented x 4. Pt up with SBA to commode at bedside. Pt had minimal urine output this evening. Some was unmeasured due to pt having 2 episodes of incontinence in briefs. Pt had 2 bowel movements overnight. SBP was stable. Pt on 2 L NC to keep oxygen levels above 90%. RN will continue to monitor and page team with changes. Plan is for possible d/c today.      Haydee Cheng

## 2017-02-12 NOTE — PLAN OF CARE
Problem: Goal Outcome Summary  Goal: Goal Outcome Summary  DIAP: Pt admitted with Heart Failure and shortness of breath.   I/A. Pt is alert and oriented x4.  Had nausea and vomiting x 2, yellow/undigested food emesis PRN IV Zofran given, pt reports mild relief of the nauesa. Pt c/o burping and reports L sided pain underneath L breast, which radiate her back. Placed on 2L 02 NC, /100, HR 67, MD notified. 12 Lead ECG done, labs completed for troponin checked. 1 tablet, of Nitro given, /40, HR 67.  GI cocktail with Maalox and Lidocaine given indigestion and burping. One time order Hydrocodone tablet for given back pain.  After 40 mins,  pt reports relief from back pain and burping/indigestion. Pt declined to eat or order anything due to nausea, voiding in small amounts alba urine, wears brief/pull up for urgency and incontinence, up using bedside commode. Family members by bedside today. Continue to monitor and notify Cards 1 of issues.

## 2017-02-13 ENCOUNTER — CARE COORDINATION (OUTPATIENT)
Dept: CARE COORDINATION | Facility: CLINIC | Age: 82
End: 2017-02-13

## 2017-02-13 ENCOUNTER — TELEPHONE (OUTPATIENT)
Dept: FAMILY MEDICINE | Facility: CLINIC | Age: 82
End: 2017-02-13

## 2017-02-13 ENCOUNTER — CARE COORDINATION (OUTPATIENT)
Dept: CARDIOLOGY | Facility: CLINIC | Age: 82
End: 2017-02-13

## 2017-02-13 ENCOUNTER — CARE COORDINATION (OUTPATIENT)
Dept: CASE MANAGEMENT | Facility: CLINIC | Age: 82
End: 2017-02-13

## 2017-02-13 DIAGNOSIS — I50.9 ACUTE ON CHRONIC HEART FAILURE, UNSPECIFIED HEART FAILURE TYPE (H): Primary | ICD-10-CM

## 2017-02-13 DIAGNOSIS — N17.9 AKI (ACUTE KIDNEY INJURY) (H): ICD-10-CM

## 2017-02-13 NOTE — PROGRESS NOTES
Clinic Care Coordination Contact  Care Team Conversations    Reviewed Epic chart information and CTS information.  Patient was inpatient at [facility] from 2/9/17-2/12/17 for increasing SOB.  Patient was diuresed (weight on admission was 134# and on 2/11/17, weight was 127#).  EF was 40-45% from echocardiogram on 2/9/17 which was reported as Grade 111 or advanced diastolic dysfunction, right ventricular function, chamber size, wall motion and thickness were WNL.  Patient was contacted by PCP nurse earlier today (2/13/17) for post-hospital contact.  Patient is having BMP at clinic on 2/14/17 and then sees Dr. Cisse/[facility] on 2/15/17.  Patient is to have follow-up with [facility] clinic in 1 week.      Will pend to attempt to contact patient later in the week after clinic appt on 2/15/17 to assess status and needs.      Karla Laura, RN   [facility]   [phone]  February 13, 2017

## 2017-02-13 NOTE — TELEPHONE ENCOUNTER
"Dr. Fisher/Dr. Cisse-Patient will be in lab tomorrow for BMP lab draw.  It looks like there is a future order in chart, but ordering provider appears to be from hospital.  Writer wanted to bring your attention to this to see if either of you want to place a BMP order so results go directly to you.  Patient holding Lasix until BMP results are back.    Patient's hospital follow up scheduled on 2/15/17 with Dr. Cisse.    Thank you!  SIMON Monique, RN      Hospital/TCU/ED for chronic condition Discharge Protocol    \"Hi, my name is Nelda Ivory, a registered nurse, and I am calling from [facility].  I am calling to follow up and see how things are going for you after your recent emergency visit/hospital/TCU stay.\"    Tell me how you are doing now that you are home?\"   -Feeling better but still weak.  -Appetite decreased, but was told to expect decreased appetite     -Not taking Losartan nor Lasix.  Follow up lab appt for BMP scheduled tomorrow at [facility].  Will wait to take Lasix until BMP comes back.  Taking Hydralazine.    -Daughter, who is an RN, manages medications and will schedule [facility] clinic follow up.      Discharge Instructions    \"Let's review your discharge instructions.  What is/are the follow-up recommendations?  Pt. Response: Follow up with PCP clinic and [facility] clinic    \"Has an appointment with your primary care provider been scheduled?\"   Yes. (confirm)    \"When you see the provider, I would recommend that you bring your medications with you.\"    Medications    \"Tell me what changed about your medicines when you discharged?\"    Changes to chronic meds?    2 or more, but patient declined MTM referral    \"What questions do you have about your medications?\"    None     New diagnoses of heart failure, COPD, diabetes, or MI?    Yes - Care Coordination Referral needed              Medication reconciliation completed? No, due to patient stated her daughter manages medications  Was MTM referral " "placed (*Make sure to put transitions as reason for referral)?   No    Call Summary    \"What questions or concerns do you have about your recent visit and your follow-up care?\"     none    \"If you have questions or things don't continue to improve, we encourage you contact us through the main clinic number (give number).  Even if the clinic is not open, triage nurses are available 24/7 to help you.     We would like you to know that our clinic has extended hours (provide information).  We also have urgent care (provide details on closest location and hours/contact info)\"      \"Thank you for your time and take care!\"             "

## 2017-02-13 NOTE — LETTER
[facility]  Complex Care Plan  About Me  Patient Name:  Jennifer Bob    YOB: 1931  Age:   85 year old   Lelia MRN: [id number] Telephone Information:     Home Phone [phone]   Mobile [phone]       Address:    [address]     [facility] [address] Email address:  [email or web address]      Emergency Contact(s)  Name Relationship Lgl Grd Work Phone Home Phone Mobile Phone   1. KVNG HOLLINGSWORTH* Daughter   [phone]   2. NO SECONDARY C*    NONE            Primary language:  English     needed? No   [facility]:  [phone] op. 1  Other communication barriers: No  Preferred Method of Communication:  Phone  Current living arrangement: I live alone, I live in a private home ([facility])  Mobility Status/ Medical Equipment: Independent  Other information to know about me:    Health Maintenance  Health Maintenance Reviewed: Due/Overdue Please discuss with PCP    My Access Plan  Medical Emergency 911   Primary Clinic Line [facility]- [phone]   24 Hour Appointment Line [phone] or  [phone]) (toll-free)   24 Hour Nurse Line [phone] (toll-free)   Preferred Urgent Care [facility] [phone]   Preferred Hospital [facility]  [phone]   Preferred Pharmacy [facility] - [place], [place] - [address]     Behavioral Health Crisis Line [facility], [phone] or 911     My Care Team Members  Patient Care Team       Relationship Specialty Notifications Start End    Torri Fisher MD PCP - General Family Practice  1/16/12     Phone: [phone] Fax: [phone]         [facility] [address] [facility] [address]    [facility] Care Coordinator Cardiology Admissions 12/10/14     Phone: [phone] Pager:  [phone]        Irlanda Adrian MD MD Internal Medicine  7/7/15     Phone: [phone] Fax: [phone]          [facility] [address] [facility] [address] [facility] [address]    Karla Laura, RN Case Manager   2/17/17     Comment:  [facility]    Phone: [phone] Fax: [phone]             My Care Plans  Self Management and Treatment Plan  Goals and (Comments)  Goal #1: I will have stable weight, heart and respiratory status by 5/1/17.           Action Plans on File: None  Advance Care Plans/Directives Type:        My Medical and Care Information  Problem List   Patient Active Problem List   Diagnosis     Anxiety     Low back pain     Left hip pain     ASCVD (arteriosclerotic cardiovascular disease)     Incontinence of urine     Hypertension goal BP (blood pressure) < 140/90     Seasonal allergies     Myocardial infarction (H)     DDD (degenerative disc disease), lumbar     Transient cerebral ischemia     CKD (chronic kidney disease) stage 3, GFR 30-59 ml/min     Corns and callosities     Health Care Home     Spinal stenosis, lumbar region, without neurogenic claudication     Synovial cyst of lumbar facet joint     Acquired spondylolisthesis     Lumbar radicular pain     Stroke (H)     Hypertension     Hyperlipidemia LDL goal <70     Late effects of CVA (cerebrovascular accident)     COPD (chronic obstructive pulmonary disease) (H)     ACP (advance care planning)     RA (rheumatoid arthritis) (H)     Gout     Chronic pain syndrome     Heart failure (H)      Current Medications and Allergies:  See printed Medication Report.    Care Coordination Start Date: 02/17/17   Frequency of Care Coordination: as needed   Form Last Updated: 02/17/2017

## 2017-02-13 NOTE — PROGRESS NOTES
Care Coordination referral from PCP office   Pt is [facility]  Radha Gonzalez RN Clinic Care Coordinator  [facility] [phone]

## 2017-02-13 NOTE — LETTER
[facility] - CARE MANAGEMENT DEPT  [address]  [address]  [facility] [address]  Phone: [phone]      February 17, 2017      Jennifer Bob  [address]     [facility] [address]    Dear Jennifer,  I am the Clinic Care Coordinator that works with your primary care provider's clinic. I wanted to thank you for spending the time to talk with me.  Below is a description of what Clinic Care Coordination is and how I can further assist you.     The Clinic Care Coordinator role is a Registered Nurse and/or  who understands the health care system. The goal of Clinic Care Coordination is to help you manage your health and improve access to the [facility] system in the most efficient manner.  The Registered Nurse can assist you in meeting your health care goals by providing education, coordinating services, and strengthening the communication among your providers. The  can assist you with financial, behavioral, psychosocial, and chemical dependency and counseling/psychiatric resources.    Please feel free to keep this letter and contact information to contact me at [phone] with any further questions or concerns that may arise. We at [facility] are focused on providing you with the highest-quality healthcare experience possible and that all starts with you.       Sincerely,     Karla Laura    Enclosed: I have enclosed a copy of the Complex Care Plan. This has helpful information and goals that we have talked about. Please keep this in an easy to access place to use as needed.

## 2017-02-13 NOTE — PROGRESS NOTES
"Patient already had post DC follow up before this dept could do post DC follow up call so this will suffice as call      Telephone Encounter  Encounter Date: 2/13/2017  Nelda Ivory, RN        Dr. Fisher/Dr. Cisse-Patient will be in lab tomorrow for BMP lab draw. It looks like there is a future order in chart, but ordering provider appears to be from hospital. Writer wanted to bring your attention to this to see if either of you want to place a BMP order so results go directly to you. Patient holding Lasix until BMP results are back.     Patient's hospital follow up scheduled on 2/15/17 with Dr. Cisse.     Thank you!  SIMON Monique, RN        Hospital/TCU/ED for chronic condition Discharge Protocol     \"Hi, my name is Nelda Ivory, a registered nurse, and I am calling from [facility]. I am calling to follow up and see how things are going for you after your recent emergency visit/hospital/TCU stay.\"     Tell me how you are doing now that you are home?\"   -Feeling better but still weak.  -Appetite decreased, but was told to expect decreased appetite      -Not taking Losartan nor Lasix. Follow up lab appt for BMP scheduled tomorrow at [facility]. Will wait to take Lasix until BMP comes back. Taking Hydralazine.     -Daughter, who is an RN, manages medications and will schedule [facility] clinic follow up.        Discharge Instructions     \"Let's review your discharge instructions. What is/are the follow-up recommendations?  Pt. Response: Follow up with PCP clinic and [facility] clinic     \"Has an appointment with your primary care provider been scheduled?\"  Yes. (confirm)     \"When you see the provider, I would recommend that you bring your medications with you.\"     Medications     \"Tell me what changed about your medicines when you discharged?\"  Changes to chronic meds?  2 or more, but patient declined MTM referral     \"What questions do you have about your medications?\"  None     New diagnoses of heart " "failure, COPD, diabetes, or MI?  Yes - Care Coordination Referral needed               Medication reconciliation completed? No, due to patient stated her daughter manages medications  Was MTM referral placed (*Make sure to put transitions as reason for referral)?  No     Call Summary     \"What questions or concerns do you have about your recent visit and your follow-up care?\"   none     \"If you have questions or things don't continue to improve, we encourage you contact us through the main clinic number (give number). Even if the clinic is not open, triage nurses are available 24/7 to help you.      We would like you to know that our clinic has extended hours (provide information). We also have urgent care (provide details on closest location and hours/contact info)\"        \"Thank you for your time and take care!\"                "

## 2017-02-14 DIAGNOSIS — I50.21 CHF (CONGESTIVE HEART FAILURE), NYHA CLASS I, ACUTE, SYSTOLIC (H): ICD-10-CM

## 2017-02-14 DIAGNOSIS — N17.9 AKI (ACUTE KIDNEY INJURY) (H): ICD-10-CM

## 2017-02-14 DIAGNOSIS — I50.9 ACUTE ON CHRONIC HEART FAILURE, UNSPECIFIED HEART FAILURE TYPE (H): ICD-10-CM

## 2017-02-14 DIAGNOSIS — N17.9 ACUTE RENAL FAILURE, UNSPECIFIED ACUTE RENAL FAILURE TYPE (H): ICD-10-CM

## 2017-02-14 LAB
ANION GAP SERPL CALCULATED.3IONS-SCNC: 6 MMOL/L (ref 3–14)
BUN SERPL-MCNC: 36 MG/DL (ref 7–30)
CALCIUM SERPL-MCNC: 9.5 MG/DL (ref 8.5–10.1)
CHLORIDE SERPL-SCNC: 100 MMOL/L (ref 94–109)
CO2 SERPL-SCNC: 32 MMOL/L (ref 20–32)
CREAT SERPL-MCNC: 1.25 MG/DL (ref 0.52–1.04)
GFR SERPL CREATININE-BSD FRML MDRD: 41 ML/MIN/1.7M2
GLUCOSE SERPL-MCNC: 117 MG/DL (ref 70–99)
INTERPRETATION ECG - MUSE: NORMAL
POTASSIUM SERPL-SCNC: 4.6 MMOL/L (ref 3.4–5.3)
SODIUM SERPL-SCNC: 138 MMOL/L (ref 133–144)

## 2017-02-14 PROCEDURE — 80048 BASIC METABOLIC PNL TOTAL CA: CPT | Performed by: FAMILY MEDICINE

## 2017-02-14 PROCEDURE — 36415 COLL VENOUS BLD VENIPUNCTURE: CPT | Performed by: FAMILY MEDICINE

## 2017-02-15 ENCOUNTER — OFFICE VISIT (OUTPATIENT)
Dept: FAMILY MEDICINE | Facility: CLINIC | Age: 82
End: 2017-02-15
Payer: COMMERCIAL

## 2017-02-15 ENCOUNTER — CARE COORDINATION (OUTPATIENT)
Dept: CARDIOLOGY | Facility: CLINIC | Age: 82
End: 2017-02-15

## 2017-02-15 VITALS
WEIGHT: 131.31 LBS | DIASTOLIC BLOOD PRESSURE: 60 MMHG | TEMPERATURE: 99.2 F | HEART RATE: 75 BPM | OXYGEN SATURATION: 93 % | BODY MASS INDEX: 24.02 KG/M2 | RESPIRATION RATE: 18 BRPM | SYSTOLIC BLOOD PRESSURE: 156 MMHG

## 2017-02-15 DIAGNOSIS — I16.0 HYPERTENSIVE URGENCY: ICD-10-CM

## 2017-02-15 DIAGNOSIS — I10 BENIGN ESSENTIAL HYPERTENSION: ICD-10-CM

## 2017-02-15 DIAGNOSIS — I50.9 ACUTE ON CHRONIC HEART FAILURE, UNSPECIFIED HEART FAILURE TYPE (H): Primary | ICD-10-CM

## 2017-02-15 DIAGNOSIS — N17.9 AKI (ACUTE KIDNEY INJURY) (H): ICD-10-CM

## 2017-02-15 PROCEDURE — 99214 OFFICE O/P EST MOD 30 MIN: CPT | Performed by: FAMILY MEDICINE

## 2017-02-15 NOTE — PATIENT INSTRUCTIONS
Restart Lasix 20 mg daily   Continue to hold Losartan  Follow up in 1 week to recheck kidney function, weight and blood pressure   Follow up sooner if experiencing dizziness, light headiness, shortness of breath, chest pain or leg swelling

## 2017-02-15 NOTE — PROGRESS NOTES
Patient called after recent hospitalization. Discharge instructions reviewed.  Patient states she is feeling well, danny has stayed steady and that she will be seeing her Primary care physicain today to discuss lab results and medications. All questions answered to patient's satisfaction. Per discharge instructions, patient is to follow up with [facility] next week.  Will send message to scheduling to call and schedule patient.

## 2017-02-15 NOTE — MR AVS SNAPSHOT
After Visit Summary   2/15/2017    Jennifer Bob    MRN: [id number]           Patient Information     Date Of Birth          12/5/1931        Visit Information        Provider Department      2/15/2017 11:00 AM Lopez Cisse MD [facility]        Today's Diagnoses     Acute on chronic heart failure, unspecified heart failure type (H)    -  1    NICHOLE (acute kidney injury) (H)        Benign essential hypertension        Hypertensive urgency          Care Instructions    Restart Lasix 20 mg daily   Continue to hold Losartan  Follow up in 1 week to recheck kidney function, weight and blood pressure   Follow up sooner if experiencing dizziness, light headiness, shortness of breath, chest pain or leg swelling         Follow-ups after your visit        Your next 10 appointments already scheduled     Feb 16, 2017 12:00 PM CST   TELEMEDICINE with Peggy Chamberlain [facility] and Infectious Diseases [facility] ([facility])    [address] [place] [address]  [address] [facility] [address]              Note: this is not an onsite visit; there is no need to come to the facility.            Mar 06, 2017  9:40 AM CST   Office Visit with Torri Fisher MD   [facility]    [address] [place] [address] [phone]           Bring a current list of meds and any records pertaining to this visit.  For Physicals, please bring immunization records and any forms needing to be filled out.  Please arrive 10 minutes early to complete paperwork.              Who to contact     If you have questions or need follow up information about today's clinic visit or your schedule please contact [facility] directly at [phone].  Normal or non-critical lab and imaging results will be communicated to you by MyChart, letter or phone within 4 business days after the clinic has received the results. If  you do not hear from us within 7 days, please contact the clinic through [employer] or phone. If you have a critical or abnormal lab result, we will notify you by phone as soon as possible.  Submit refill requests through [employer] or call your pharmacy and they will forward the refill request to us. Please allow 3 business days for your refill to be completed.          Additional Information About Your Visit        [employer] Information     [employer] Works gives you secure access to your electronic health record. If you see a primary care provider, you can also send messages to your care team and make appointments. If you have questions, please call your primary care clinic.  If you do not have a primary care provider, please call [phone] and they will assist you.        Care EveryWhere ID     This is your Care EveryWhere ID. This could be used by other organizations to access your [facility] medical records  [id number]        Your Vitals Were     Pulse Temperature Respirations Pulse Oximetry BMI (Body Mass Index)       75 99.2  F (37.3  C) (Tympanic) 18 93% 24.02 kg/m2        Blood Pressure from Last 3 Encounters:   02/15/17 156/60   02/12/17 171/66   12/08/16 180/70    Weight from Last 3 Encounters:   02/15/17 131 lb 5 oz (59.6 kg)   02/12/17 130 lb 1.6 oz (59 kg)   12/08/16 139 lb (63 kg)              Today, you had the following     No orders found for display       Primary Care Provider Office Phone # Fax #    Torri Fisher [phone]       [facility] [address] [facility] [address]        Thank you!     Thank you for choosing [facility]  for your care. Our goal is always to provide you with excellent care. Hearing back from our patients is one way we can continue to improve our services. Please take a few minutes to complete the written survey that you may receive in the mail after your visit with us. Thank you!             Your Updated Medication List - Protect others  around you: Learn how to safely use, store and throw away your medicines at www.disposemymeds.org.          This list is accurate as of: 2/15/17 11:47 AM.  Always use your most recent med list.                   Brand Name Dispense Instructions for use    albuterol 108 (90 BASE) MCG/ACT Inhaler    PROAIR HFA/PROVENTIL HFA/VENTOLIN HFA    1 Inhaler    Inhale 2 puffs into the lungs every 6 hours as needed       amLODIPine 10 MG tablet    NORVASC    90 tablet    Take 1 tablet (10 mg) by mouth daily       aspirin 81 MG chewable tablet     60 tablet    Take 1 tablet (81 mg) by mouth daily       atorvastatin 40 MG tablet    LIPITOR    90 tablet    Take 1 tablet (40 mg) by mouth daily       CALCIUM CITRATE + PO      Take 1 tablet by mouth daily       CLASSIC NETI POT SINUS WASH 2300-700 MG Kit   Generic drug:  sodium chloride-sodium bicarb      Mix 1 packet in Neti Pot and administer in each nostril daily as needed       COLACE 100 MG capsule   Generic drug:  docusate sodium      Take 1 capsule by mouth 2 times daily       escitalopram 10 MG tablet    LEXAPRO    30 tablet    Take 1 tablet (10 mg) by mouth daily       fluticasone 50 MCG/ACT spray    FLONASE    16 g    Spray 2 sprays into both nostrils every other day       furosemide 20 MG tablet    LASIX    45 tablet    Take 1 tablet (20 mg) by mouth daily       hydrALAZINE 50 MG tablet    APRESOLINE    60 tablet    Take 1 tablet (50 mg) by mouth every 8 hours       HYDROcodone-acetaminophen 5-325 MG per tablet    NORCO    30 tablet    Take 1 tablet by mouth daily #30 per month **must be seen every 3 months for refills**       MULTIVITAMIN PO      Take 1 tablet by mouth daily       OCEAN NASAL SPRAY NA      Administer 1 spray in each nostril up to two times daily as needed       ondansetron 4 MG ODT tab    ZOFRAN-ODT    15 tablet    Take 1 tablet (4 mg) by mouth every 8 hours as needed for nausea       predniSONE 1 MG tablet    DELTASONE     Take 2 mg by mouth daily        UNISOM 25 MG Tabs tablet   Generic drug:  doxylamine      Take 12.5 mg by mouth nightly as needed       VITAMIN D3 PO      Take 1,000 Units by mouth daily

## 2017-02-15 NOTE — NURSING NOTE
"Chief Complaint   Patient presents with     Hospital F/U     improving        Initial /60 (BP Location: Right arm, Patient Position: Chair, Cuff Size: Adult Regular)  Pulse 75  Temp 99.2  F (37.3  C) (Tympanic)  Resp 18  Wt 131 lb 5 oz (59.6 kg)  SpO2 93%  BMI 24.02 kg/m2 Estimated body mass index is 24.02 kg/(m^2) as calculated from the following:    Height as of 2/9/17: 5' 2\" (1.575 m).    Weight as of this encounter: 131 lb 5 oz (59.6 kg).  Medication Reconciliation: complete.Shruthi DUARTE MA      "

## 2017-02-16 ENCOUNTER — ALLIED HEALTH/NURSE VISIT (OUTPATIENT)
Dept: PHARMACY | Facility: CLINIC | Age: 82
End: 2017-02-16
Payer: COMMERCIAL

## 2017-02-16 DIAGNOSIS — E78.5 HYPERLIPIDEMIA LDL GOAL <70: ICD-10-CM

## 2017-02-16 DIAGNOSIS — I10 HYPERTENSION GOAL BP (BLOOD PRESSURE) < 140/90: ICD-10-CM

## 2017-02-16 DIAGNOSIS — J30.2 SEASONAL ALLERGIC RHINITIS, UNSPECIFIED ALLERGIC RHINITIS TRIGGER: ICD-10-CM

## 2017-02-16 DIAGNOSIS — K59.00 CONSTIPATION, UNSPECIFIED CONSTIPATION TYPE: ICD-10-CM

## 2017-02-16 DIAGNOSIS — M06.9 RHEUMATOID ARTHRITIS, INVOLVING UNSPECIFIED SITE, UNSPECIFIED RHEUMATOID FACTOR PRESENCE: ICD-10-CM

## 2017-02-16 DIAGNOSIS — I50.9 ACUTE ON CHRONIC HEART FAILURE, UNSPECIFIED HEART FAILURE TYPE (H): Primary | ICD-10-CM

## 2017-02-16 DIAGNOSIS — J44.9 CHRONIC OBSTRUCTIVE PULMONARY DISEASE, UNSPECIFIED COPD TYPE (H): ICD-10-CM

## 2017-02-16 DIAGNOSIS — F41.9 ANXIETY: ICD-10-CM

## 2017-02-16 DIAGNOSIS — E63.9 NUTRITIONAL DEFICIENCY: ICD-10-CM

## 2017-02-16 DIAGNOSIS — G47.00 INSOMNIA, UNSPECIFIED TYPE: ICD-10-CM

## 2017-02-16 DIAGNOSIS — R11.0 NAUSEA: ICD-10-CM

## 2017-02-16 PROCEDURE — 99605 MTMS BY PHARM NP 15 MIN: CPT | Performed by: PHARMACIST

## 2017-02-16 PROCEDURE — 99607 MTMS BY PHARM ADDL 15 MIN: CPT | Performed by: PHARMACIST

## 2017-02-16 NOTE — MR AVS SNAPSHOT
After Visit Summary   2/16/2017    Jennifer Bob    MRN: [id number]           Patient Information     Date Of Birth          12/5/1931        Visit Information        Provider Department      2/16/2017 12:00 PM Peggy Chamberlain, [facility] and Infectious Diseases MTM        Care Instructions    Recommendations from today's MTM visit:                                                    MTM (medication therapy management) is a service provided by a clinical pharmacist designed to help you get the most of out of your medicines.   Today we reviewed what your medicines are for, how to know if they are working, that your medicines are safe and how to make your medicine regimen as easy as possible.     1. As we discussed, please be aware, the sleeping aid you are taking can lead to falls, daytime grogginess and confusion. I am glad you are not experiencing any of these, but be aware they could happen.     2. Your PCP may consider checking you Vitamin D levels the next time you see her.    To schedule another MTM appointment, please call the clinic directly or you may call the MTM scheduling line at [phone] or toll-free at [phone].     My Clinical Pharmacist's contact information:                                                      It was a pleasure talking to you today!  Please feel free to contact me with any questions or concerns you have.      Peggy Chamberlain, [place] Pharmacist.   [phone]      You may receive a survey about the MTM services you received.  I would appreciate your feedback to help me serve you better in the future. Please fill it out and return it when you can. Your comments will be anonymous.      My healthcare goals:                                                      Continue to check daily weights, low sodium diet and checking blood pressure.               Follow-ups after your visit        Your next 10 appointments already scheduled     Feb 22, 2017   9:40 AM CST   Office Visit with Lopez Cisse MD   [facility] ([facility])    [address] [place] [address] [phone]           Bring a current list of meds and any records pertaining to this visit.  For Physicals, please bring immunization records and any forms needing to be filled out.  Please arrive 10 minutes early to complete paperwork.            Mar 06, 2017  9:40 AM CST   Office Visit with Torri Fisher MD   [facility] ([facility])    [address] [place] [address] [phone]           Bring a current list of meds and any records pertaining to this visit.  For Physicals, please bring immunization records and any forms needing to be filled out.  Please arrive 10 minutes early to complete paperwork.              Who to contact     If you have questions or need follow up information about today's clinic visit or your schedule please contact [facility] AND INFECTIOUS DISEASES [facility] directly at No information on file..  Normal or non-critical lab and imaging results will be communicated to you by Al[employer], letter or phone within 4 business days after the clinic has received the results. If you do not hear from us within 7 days, please contact the clinic through [employer] or phone. If you have a critical or abnormal lab result, we will notify you by phone as soon as possible.  Submit refill requests through Aud[employer]Point or call your pharmacy and they will forward the refill request to us. Please allow 3 business days for your refill to be completed.          Additional Information About Your Visit        Al[employer] Information     [employer] gives you secure access to your electronic health record. If you see a primary care provider, you can also send messages to your care team and make appointments. If you have questions, please call your primary care clinic.  If you do not have a primary care  provider, please call [phone] and they will assist you.        Care EveryWhere ID     This is your Care EveryWhere ID. This could be used by other organizations to access your [facility] medical records  [id number]         Blood Pressure from Last 3 Encounters:   02/15/17 156/60   02/12/17 171/66   12/08/16 180/70    Weight from Last 3 Encounters:   02/15/17 131 lb 5 oz (59.6 kg)   02/12/17 130 lb 1.6 oz (59 kg)   12/08/16 139 lb (63 kg)              Today, you had the following     No orders found for display       Primary Care Provider Office Phone # Fax #    Torri Fisher [phone]       [facility] [address] [facility] [address]        Thank you!     Thank you for choosing [facility] AND INFECTIOUS DISEASES [facility]  for your care. Our goal is always to provide you with excellent care. Hearing back from our patients is one way we can continue to improve our services. Please take a few minutes to complete the written survey that you may receive in the mail after your visit with us. Thank you!             Your Updated Medication List - Protect others around you: Learn how to safely use, store and throw away your medicines at www.disposemymeds.org.          This list is accurate as of: 2/16/17 11:59 PM.  Always use your most recent med list.                   Brand Name Dispense Instructions for use    albuterol 108 (90 BASE) MCG/ACT Inhaler    PROAIR HFA/PROVENTIL HFA/VENTOLIN HFA    1 Inhaler    Inhale 2 puffs into the lungs every 6 hours as needed       amLODIPine 10 MG tablet    NORVASC    90 tablet    Take 1 tablet (10 mg) by mouth daily       aspirin 81 MG chewable tablet     60 tablet    Take 1 tablet (81 mg) by mouth daily       atorvastatin 40 MG tablet    LIPITOR    90 tablet    Take 1 tablet (40 mg) by mouth daily       CALCIUM CITRATE + PO      Take 1 tablet by mouth daily       CLASSIC NETI POT SINUS WASH 2300-700 MG Kit   Generic drug:  sodium  chloride-sodium bicarb      Mix 1 packet in Neti Pot and administer in each nostril daily as needed       COLACE 100 MG capsule   Generic drug:  docusate sodium      Take 1 capsule by mouth 2 times daily       escitalopram 10 MG tablet    LEXAPRO    30 tablet    Take 1 tablet (10 mg) by mouth daily       fluticasone 50 MCG/ACT spray    FLONASE    16 g    Spray 2 sprays into both nostrils every other day       furosemide 20 MG tablet    LASIX    45 tablet    Take 1 tablet (20 mg) by mouth daily       hydrALAZINE 50 MG tablet    APRESOLINE    60 tablet    Take 1 tablet (50 mg) by mouth every 8 hours       HYDROcodone-acetaminophen 5-325 MG per tablet    NORCO    30 tablet    Take 1 tablet by mouth daily #30 per month **must be seen every 3 months for refills**       MULTIVITAMIN PO      Take 1 tablet by mouth daily       OCEAN NASAL SPRAY NA      Administer 1 spray in each nostril up to two times daily as needed       ondansetron 4 MG ODT tab    ZOFRAN-ODT    15 tablet    Take 1 tablet (4 mg) by mouth every 8 hours as needed for nausea       predniSONE 1 MG tablet    DELTASONE     Take 2 mg by mouth daily       UNISOM 25 MG Tabs tablet   Generic drug:  doxylamine      Take 12.5 mg by mouth nightly as needed       VITAMIN D3 PO      Take 1,000 Units by mouth daily

## 2017-02-16 NOTE — PROGRESS NOTES
"SUBJECTIVE/OBJECTIVE:                                                    Jennifre Bob is a 85 year old female called for a transitions of care visit.  She was discharged from [facility] on 02/12/17 for hypertensive urgency, acute exacerbation of chronic diastolic heart failure.     Chief Complaint: none.  Personal Healthcare Goals: checking daily wts, following a low salt, low fat diet and is checking blood pressures.     Allergies/ADRs: Reviewed in Epic  Tobacco: No tobacco use   Alcohol: none  Caffeine: no caffeine  Activity: walks around and moves around.  PMH: Reviewed in Epic    Medication Adherence: no issues reported and sets up own med boxes    HFpEF/HTN: Current medications include:  Hydralazine 50 mg, every 8 hours.  Aspirin 81 mg, once daily.  Furosemide 20 mg, once daily.  Amlodipine 10 mg, once daily.  Patient reports no current medication side effects.     ECHO:  Date 02/09/17, EF 40-45%  Pt is not complaining of sx of HF, no edema symptoms.    Pt is measuring daily weightsand reports stable, at 130 pounds.   Patient does self-monitor BP. Home BP monitoring in range of 118's to 140\"s systolic over 60's diastolic.   Pt is following a low sodium diet, is avoiding EtOH.    Hyperlipidemia: Current therapy includes atorvastatin 40 mg once daily.  Pt reports no significant myalgias or other side effects.     Nausea: Reports takes Ondansetron 4 mg, only when needed, about once every few days, and feels this is stable.     Arthritis: Reports takes Prednisone 2 mg, once daily, and feels this helps arthritis. Reports takes Norco 5-325 mg, one pill, once per day and feel this helps.    Seasonal allergic rhinitis: Reports uses fluticasone nasal spray 50 mcg/act, 2 sprays in each nostril, once every other day, and feels this helps. Reports uses Saline nasal spray 1 spray in each nostril, and a neti pot sinus washhhh,  as needed and feels this helps.     COPD/bronchitis: Current medications: Albuterol MDI and " (Pt reports using albuterol only when needed, only when has bronchitis) Reports is using Mucinex for phlegm and feels this is helping.    Pt is not experiencing side effects.   Pt reports the following symptoms: none.  Pt does not have an COPD Action Plan on file.   Has spirometry been completed: Doesn't know    Anxiety:  Current medications include: Escitalopram 10 mg once daily. Pt reports that depression symptoms are stable, but feels nervous when she goes to the doctor.  No flowsheet data found.    Insomnia: Current medications include: Doxylamine (Unisom) 25 mg tablets, and takes a 1/2 tablet at bedtime and feels this helps. Reports no adverse effects. .    Constipation: Reports takes docusate sodium 100 mg, once daily, and feels symptoms are stable.    Supplements: On 02/14/17, Ca+ level was 9.5 mg/dl, Vit D last checked in 2013. Reports takes Vit D3 1,000 U/day, Calcium w/D (not sure of strength), once daily, and a multivitamin with minerals, once daily.       Current labs include:  BP Readings from Last 3 Encounters:   02/15/17 156/60   02/12/17 171/66   12/08/16 180/70     Today's Vitals: There were no vitals taken for this visit.  Lab Results   Component Value Date    A1C 5.6 04/24/2014   .  Lab Results   Component Value Date    CHOL 169 02/09/2017     Lab Results   Component Value Date    TRIG 80 02/09/2017     Lab Results   Component Value Date    HDL 87 02/09/2017     Lab Results   Component Value Date    LDL 66 02/09/2017     Lab Results   Component Value Date    ALT 56 02/09/2017     Lab Results   Component Value Date    UCRR 38 02/16/2016    MICROL 120 02/16/2016    UMALCR 312.50 (H) 02/16/2016       Last Basic Metabolic Panel:  Lab Results   Component Value Date     02/14/2017      Lab Results   Component Value Date    POTASSIUM 4.6 02/14/2017     Lab Results   Component Value Date    CHLORIDE 100 02/14/2017     Lab Results   Component Value Date    BUN 36 02/14/2017     Lab Results    Component Value Date    CR 1.25 02/14/2017     GFR Estimate   Date Value Ref Range Status   02/14/2017 41 (L) >60 mL/min/1.7m2 Final     Comment:     Non  GFR Calc   02/12/2017 32 (L) >60 mL/min/1.7m2 Final     Comment:     Non  GFR Calc   02/11/2017 28 (L) >60 mL/min/1.7m2 Final     Comment:     Non  GFR Calc     GFR Estimate If Black   Date Value Ref Range Status   02/14/2017 49 (L) >60 mL/min/1.7m2 Final     Comment:      GFR Calc   02/12/2017 39 (L) >60 mL/min/1.7m2 Final     Comment:      GFR Calc   02/11/2017 34 (L) >60 mL/min/1.7m2 Final     Comment:      GFR Calc     TSH   Date Value Ref Range Status   02/09/2017 1.66 0.40 - 4.00 mU/L Final   ]    Most Recent Immunizations   Administered Date(s) Administered     Influenza (IIV3) 09/01/2013     Pneumococcal (PCV 13) 07/06/2015     Pneumococcal 23 valent 12/08/2016     TDAP (ADACEL AGES 11-64) 11/11/2013     Zoster vaccine, live 10/30/2012     ASSESSMENT:                                                       Current medications were reviewed today.      Medication Adherence: no issues identified    HFpEF/HTN: Needs improvement. Patient repots she is meeting BP goal of < 140/90mmHg, however, Epic records show higher blood pressure. Pt started on meds at discharge.  Current weight is stable and diuretic dose seems to be appropriate. Pt would benefit from no changes in current therapy.     Hyperlipidemia: Stable. Since pt appears to be tolerating medication, ok to continue.     Nausea: Stable.     Arthritis: Stable.    Seasonal allergic rhinitis: Stable.    COPD/bronchitis: Stable. Patient would benefit from no changes at this time.    Anxiety: Stable.     Insomnia: Stable. Pt appears not to be complaining about any adverse effects. Discussed concern of falls, daytime grogginess, confusion.     Constipation: Stable.    Supplements: Stable. PCP may consider future  Vitamin D level check.       PLAN:                                                      Post Discharge Medication Reconciliation Status: discharge medications reconciled, continue medications without change.    1) No considerations for medications changes at this time.     2) Discussed concern of falls, daytime grogginess, confusion with current sleep aid.     3) PCP may consider future Vit D check.       I spent 30 minutes with this patient today.  I offer these suggestions with the understanding that I don't fully understand Jennifer's past medical history and the complexity of her health conditions. Jennifer should make no changes without the approval of her physician. A copy of the visit note was provided to the patient's primary care provider.    Pt reports she does not feel it necessary to follow up with MTM. She is asked to call if she has any question/concerns about her medications.     The patient was mailed a summary of these recommendations as an after visit summary.    Peggy Chamberlain, [facility] Pharmacist.   [phone]

## 2017-02-16 NOTE — LETTER
It was so nice to speak with you on Thursday, February 16th.  I hope I was able to give you some useful information during our Medication Therapy Management (MTM) visit. The purpose of this visit with a clinical pharmacist was to review the medicines you received when discharged from the hospital. We want to make sure that you know which medicines to take and what they are for. We also want to make sure all your medicines are working, safe, and as easy to take as possible.    Enclosed is a summary of the suggestions we talked about and any other thoughts I had. There is also a list of your medicines included. This information has also been shared with your primary care provider.    Feel free to call if you have any questions or concerns. By working together with you and your doctor, I hope to help you feel confident managing your medicines and improving your quality of life.       Best wishes,         Peggy Chamberlain, [facility] Pharmacist.   [phone]\

## 2017-02-16 NOTE — Clinical Note
Hello, This patient was referred for a MTM visit after their recent hospital discharge as part of [facility]'s transitions of care work. I reviewed their medications. Please see my note for more details and contact me with any questions. This pt is scheduled to see you soon. Peggy Chamberlain, MTM Pharmacist.  [phone]

## 2017-02-17 ENCOUNTER — TRANSFERRED RECORDS (OUTPATIENT)
Dept: HEALTH INFORMATION MANAGEMENT | Facility: CLINIC | Age: 82
End: 2017-02-17

## 2017-02-17 NOTE — PROGRESS NOTES
Clinic Care Coordination Contact  OUTREACH    Referral Information:  Referral Source: CTS  Reason for Contact: care coordination referral from clinic  Care Conference: No     Universal Utilization:   ED Visits in last year: 0  Hospital visits in last year: 1  Last PCP appointment: 02/15/17  Missed Appointments:  (none known)  Concerns: inpatient 1X due to volume overload, utilization is appropriate       Clinical Concerns:  Current Medical Concerns: Contacted patient and introduced self.  Patient reported that she is feeling improved, but still tired.  Patient is weighing self every day and current weight is 130# and patient reported that her normal weight is about 135#.  Patient reported that she has good neighbors/friends at [facility] and usually play cards daily.  Dtr is doing grocery shopping for patient at this time.  Patient reported that she is eating better~ no salt and is staying in fluid restriction amount per day of no more than 2L.  Patient sees Dr. Valdes/rheumatologist in March to discuss ongoing prednisone use for arthritis.  Patient has chronic back and hip pain.   Patient reported that dtr is awaiting call from [facility] regarding appointment, patient wanted care coordinator to contact dtr regarding scheduling [facility] clinic appt.         Current Behavioral Concerns: No concerns    Education Provided to patient: Discussed contacting clinic regarding weight gains and patient states understanding to call if gains 3# overnight or 5# in a week.  Discussed appropriate diet and fluid restriction.     Clinical Pathway Name: None  Clinical Pathway: None at this time. Patient to be seen at [facility] clinic for heart failure.      Medication Management:  Patient is taking medications as prescribed. Lasix was restarted on 2/15/17 by PCP. Losartan continues to be held.  Patient sets up medications in med box for 2 weeks at a time.  Patient has MTM phone visit on 2/16/17.       Functional  Status:  Mobility Status: Independent  Equipment Currently Used at Home: other (see comments) (emergency pull cords in apt) [employer]  Transportation: Drives, dtr transporting to clinic appts at this time.            Psychosocial:  Current living arrangement:: I live alone, I live in a private home ([facility] [address])  Financial/Insurance: [facility]  2 dtrs, Esthela lives in [place], Yesika lives in [place]     Resources and Interventions:  Current Resources: [facility] (pt is to have appt with [facility]); Other (see comment) (none)        Advanced Care Plans/Directives on file:: No  Referrals Placed: Other (informed pts dtjanelle, Esthela, [facility] clinic phone number)     Goals:   Goal 1 Statement: I will have stable weight, heart and respiratory status by 5/1/17.   Goal 1 Progression Percent: 20%  Goal 1 Progression Date: 02/17/17              Barriers: Elderly female  Strengths: Patient has understanding of condition, supportive family and friends  Patient/Caregiver understanding: Patient has good understanding of condition and is taking medications as ordered and weighing self daily with understanding of problems to report.    Frequency of Care Coordination: as needed  Upcoming appointment: 02/22/17 with PCP/labs     Plan:   1. Contacted patient's dtr, Esthela (SOPHIA on file) and introduced self and informed her of [facility] phone number ([phone]).  Esthela reported that she was aware that appt needs to be scheduled and she has not received a call from [facility] yet.  Esthela reported that she will plan to contact [facility] early next week to schedule appt for patient.   2. Patient will follow-up at PCP office with labs on 2/22/17.    3. Will pend to check status with patient in 2-3 weeks.     Karla Laura, RN   [facility]   [phone]  February 17, 2017

## 2017-02-20 RX ORDER — GUAIFENESIN 600 MG/1
1200 TABLET, EXTENDED RELEASE ORAL 2 TIMES DAILY
Qty: 28 TABLET | COMMUNITY
Start: 2017-02-20 | End: 2018-07-16

## 2017-02-20 NOTE — PATIENT INSTRUCTIONS
Recommendations from today's MTM visit:                                                    MTM (medication therapy management) is a service provided by a clinical pharmacist designed to help you get the most of out of your medicines.   Today we reviewed what your medicines are for, how to know if they are working, that your medicines are safe and how to make your medicine regimen as easy as possible.     1. As we discussed, please be aware, the sleeping aid you are taking can lead to falls, daytime grogginess and confusion. I am glad you are not experiencing any of these, but be aware they could happen.     2. Your PCP may consider checking you Vitamin D levels the next time you see her.    To schedule another MTM appointment, please call the clinic directly or you may call the MTM scheduling line at [phone] or toll-free at [phone].     My Clinical Pharmacist's contact information:                                                      It was a pleasure talking to you today!  Please feel free to contact me with any questions or concerns you have.      Peggy Chamberlain, MTM Pharmacist.   [phone]      You may receive a survey about the MTM services you received.  I would appreciate your feedback to help me serve you better in the future. Please fill it out and return it when you can. Your comments will be anonymous.      My healthcare goals:                                                      Continue to check daily weights, low sodium diet and checking blood pressure.

## 2017-02-22 ENCOUNTER — OFFICE VISIT (OUTPATIENT)
Dept: FAMILY MEDICINE | Facility: CLINIC | Age: 82
End: 2017-02-22
Payer: COMMERCIAL

## 2017-02-22 VITALS
SYSTOLIC BLOOD PRESSURE: 168 MMHG | HEIGHT: 62 IN | BODY MASS INDEX: 23.92 KG/M2 | WEIGHT: 130 LBS | DIASTOLIC BLOOD PRESSURE: 64 MMHG | TEMPERATURE: 98.5 F | HEART RATE: 69 BPM | OXYGEN SATURATION: 93 %

## 2017-02-22 DIAGNOSIS — N17.9 AKI (ACUTE KIDNEY INJURY) (H): Primary | ICD-10-CM

## 2017-02-22 DIAGNOSIS — I50.9 ACUTE ON CHRONIC CONGESTIVE HEART FAILURE, UNSPECIFIED CONGESTIVE HEART FAILURE TYPE: ICD-10-CM

## 2017-02-22 DIAGNOSIS — I10 HYPERTENSION GOAL BP (BLOOD PRESSURE) < 140/90: ICD-10-CM

## 2017-02-22 LAB
ANION GAP SERPL CALCULATED.3IONS-SCNC: 8 MMOL/L (ref 3–14)
BUN SERPL-MCNC: 20 MG/DL (ref 7–30)
CALCIUM SERPL-MCNC: 9.4 MG/DL (ref 8.5–10.1)
CHLORIDE SERPL-SCNC: 104 MMOL/L (ref 94–109)
CO2 SERPL-SCNC: 28 MMOL/L (ref 20–32)
CREAT SERPL-MCNC: 1.09 MG/DL (ref 0.52–1.04)
CREAT UR-MCNC: 104 MG/DL
GFR SERPL CREATININE-BSD FRML MDRD: 48 ML/MIN/1.7M2
GLUCOSE SERPL-MCNC: 122 MG/DL (ref 70–99)
MICROALBUMIN UR-MCNC: 108 MG/L
MICROALBUMIN/CREAT UR: 103.85 MG/G CR (ref 0–25)
POTASSIUM SERPL-SCNC: 4.1 MMOL/L (ref 3.4–5.3)
SODIUM SERPL-SCNC: 140 MMOL/L (ref 133–144)

## 2017-02-22 PROCEDURE — 80048 BASIC METABOLIC PNL TOTAL CA: CPT | Performed by: FAMILY MEDICINE

## 2017-02-22 PROCEDURE — 82043 UR ALBUMIN QUANTITATIVE: CPT | Performed by: FAMILY MEDICINE

## 2017-02-22 PROCEDURE — 36415 COLL VENOUS BLD VENIPUNCTURE: CPT | Performed by: FAMILY MEDICINE

## 2017-02-22 PROCEDURE — 99214 OFFICE O/P EST MOD 30 MIN: CPT | Performed by: FAMILY MEDICINE

## 2017-02-22 RX ORDER — HYDRALAZINE HYDROCHLORIDE 50 MG/1
50 TABLET, FILM COATED ORAL 2 TIMES DAILY
Qty: 60 TABLET | Refills: 3 | COMMUNITY
Start: 2017-02-22 | End: 2017-05-08

## 2017-02-22 NOTE — NURSING NOTE
"Chief Complaint   Patient presents with     Hypertension     f/u       Initial /68 (BP Location: Right arm, Patient Position: Chair, Cuff Size: Adult Regular)  Pulse 69  Temp 98.5  F (36.9  C) (Tympanic)  Ht 5' 2\" (1.575 m)  Wt 130 lb (59 kg)  SpO2 93%  BMI 23.78 kg/m2 Estimated body mass index is 23.78 kg/(m^2) as calculated from the following:    Height as of this encounter: 5' 2\" (1.575 m).    Weight as of this encounter: 130 lb (59 kg).  Medication Reconciliation: complete     Haily Ayers, SMA    "

## 2017-02-22 NOTE — PROGRESS NOTES
"  SUBJECTIVE:                                                    Jennifer Bob is a 85 year old female who presents to clinic today for the following health issues:    Hypertension Follow-up      Outpatient blood pressures are being checked at home.  130-164/50-60 .    Low Salt Diet: no added salt       Amount of exercise or physical activity: 2-3 days/week for an average of 15-30 minutes    Problems taking medications regularly: No    Medication side effects: none  Diet: low fat/cholesterol      No chest pain, shortness of breath, dyspnea, orthopnea or leg swelling. Pt is walking around her atrium without any dyspnea.   Pt doesn't want to f/u with the [facility] clinic.  Pt weights herself daily and tracks and have been stable.       Problem list and histories reviewed & adjusted, as indicated.  Additional history: as documented    Problem list, Medication list, Allergies, and Medical/Social/Surgical histories reviewed in EPIC and updated as appropriate.    ROS:  Constitutional, HEENT, cardiovascular, pulmonary, gi and gu systems are negative, except as otherwise noted.    OBJECTIVE:                                                    /64 (BP Location: Right arm, Patient Position: Chair, Cuff Size: Adult Regular)  Pulse 69  Temp 98.5  F (36.9  C) (Tympanic)  Ht 5' 2\" (1.575 m)  Wt 130 lb (59 kg)  SpO2 93%  BMI 23.78 kg/m2  Body mass index is 23.78 kg/(m^2).  GENERAL: healthy, alert and no distress  EYES: Eyes grossly normal to inspection  HENT:  nose and mouth without ulcers or lesions  RESP: lungs clear to auscultation - no rales, rhonchi or wheezes  CV: regular rate and rhythm, normal S1 S2, no S3 or S4  MS: no peripheral edema    Diagnostic Test Results:  none      ASSESSMENT/PLAN:                                                      1. NICHOLE (acute kidney injury) (H)  - repeat labs to ensure that kidney functions are improving   - Basic metabolic panel  (Ca, Cl, CO2, Creat, Gluc, K, Na, BUN)    2. Acute on " chronic congestive heart failure, unspecified congestive heart failure type (H)  - pts weight is stable, she monitors home weights daily  - declined [facility] clinic  - continue Lasix 20 mg daily   - call clinic if weight is up more than 3 pounds in one day or 5 lbs in one week     3. Hypertension goal BP (blood pressure) < 140/90  - B/P not at goal, pt notes that she also has white coat HTN and clinic B/P's are higher than home B/P's  - She continues to hold Losartan as kidney functions are not back, she would need to restart it due to CHF. For now I recommended calling in with home blood pressures over the next two days. I'll adjust medication accordingly. As her B/P cuff is around 10 years old, I did recommend bringing it in to the next clinic visit to compare results to our blood pressure.   - Basic metabolic panel  (Ca, Cl, CO2, Creat, Gluc, K, Na, BUN)  - Albumin Random Urine Quantitative  - hydrALAZINE (APRESOLINE) 50 MG tablet; Take 1 tablet (50 mg) by mouth 2 times daily Takes an additional 50 mg in the afternoon if systolic blood pressure is higher than 160  Dispense: 60 tablet; Refill: 3    Trinitya Trevon Cisse MD  [facility]

## 2017-02-22 NOTE — MR AVS SNAPSHOT
After Visit Summary   2/22/2017    Jennifer Bob    MRN: [id number]           Patient Information     Date Of Birth          12/5/1931        Visit Information        Provider Department      2/22/2017 9:40 AM Lopez Cisse MD [facility]        Today's Diagnoses     NICHOLE (acute kidney injury) (H)    -  1    Acute on chronic congestive heart failure, unspecified congestive heart failure type (H)        Hypertension goal BP (blood pressure) < 140/90           Follow-ups after your visit        Your next 10 appointments already scheduled     Mar 06, 2017  9:40 AM CST   Office Visit with Torri Fisher MD   [facility] ([facility])    [address] [place] [id number] [phone]           Bring a current list of meds and any records pertaining to this visit.  For Physicals, please bring immunization records and any forms needing to be filled out.  Please arrive 10 minutes early to complete paperwork.              Who to contact     If you have questions or need follow up information about today's clinic visit or your schedule please contact [facility] directly at [phone].  Normal or non-critical lab and imaging results will be communicated to you by MyChart, letter or phone within 4 business days after the clinic has received the results. If you do not hear from us within 7 days, please contact the clinic through [employer] or phone. If you have a critical or abnormal lab result, we will notify you by phone as soon as possible.  Submit refill requests through [employer] or call your pharmacy and they will forward the refill request to us. Please allow 3 business days for your refill to be completed.          Additional Information About Your Visit        MyChart Information     [employer] gives you secure access to your electronic health record. If you see a primary care provider, you can also send messages to your  "care team and make appointments. If you have questions, please call your primary care clinic.  If you do not have a primary care provider, please call [phone] and they will assist you.        Care EveryWhere ID     This is your Care EveryWhere ID. This could be used by other organizations to access your [facility] medical records  [id number]        Your Vitals Were     Pulse Temperature Height Pulse Oximetry BMI (Body Mass Index)       69 98.5  F (36.9  C) (Tympanic) 5' 2\" (1.575 m) 93% 23.78 kg/m2        Blood Pressure from Last 3 Encounters:   02/22/17 168/64   02/15/17 156/60   02/12/17 171/66    Weight from Last 3 Encounters:   02/22/17 130 lb (59 kg)   02/15/17 131 lb 5 oz (59.6 kg)   02/12/17 130 lb 1.6 oz (59 kg)              We Performed the Following     Albumin Random Urine Quantitative     Basic metabolic panel  (Ca, Cl, CO2, Creat, Gluc, K, Na, BUN)          Today's Medication Changes          These changes are accurate as of: 2/22/17 11:35 AM.  If you have any questions, ask your nurse or doctor.               These medicines have changed or have updated prescriptions.        Dose/Directions    hydrALAZINE 50 MG tablet   Commonly known as:  APRESOLINE   This may have changed:    - when to take this  - additional instructions   Used for:  Hypertension goal BP (blood pressure) < 140/90   Changed by:  Lopez Cisse MD        Dose:  50 mg   Take 1 tablet (50 mg) by mouth 2 times daily Takes an additional 50 mg in the afternoon if systolic blood pressure is higher than 160   Quantity:  60 tablet   Refills:  3                Primary Care Provider Office Phone # Fax #    Torri Fisher [phone]       [facility] [address]  [facility] [address]        Thank you!     Thank you for choosing [facility]  for your care. Our goal is always to provide you with excellent care. Hearing back from our patients is one way we can continue to improve our services. " Please take a few minutes to complete the written survey that you may receive in the mail after your visit with us. Thank you!             Your Updated Medication List - Protect others around you: Learn how to safely use, store and throw away your medicines at www.disposemymeds.org.          This list is accurate as of: 2/22/17 11:35 AM.  Always use your most recent med list.                   Brand Name Dispense Instructions for use    albuterol 108 (90 BASE) MCG/ACT Inhaler    PROAIR HFA/PROVENTIL HFA/VENTOLIN HFA    1 Inhaler    Inhale 2 puffs into the lungs every 6 hours as needed       amLODIPine 10 MG tablet    NORVASC    90 tablet    Take 1 tablet (10 mg) by mouth daily       aspirin 81 MG chewable tablet     60 tablet    Take 1 tablet (81 mg) by mouth daily       atorvastatin 40 MG tablet    LIPITOR    90 tablet    Take 1 tablet (40 mg) by mouth daily       CALCIUM CITRATE + PO      Take 1 tablet by mouth daily       CLASSIC NETI POT SINUS WASH 2300-700 MG Kit   Generic drug:  sodium chloride-sodium bicarb      Mix 1 packet in Neti Pot and administer in each nostril daily as needed       COLACE 100 MG capsule   Generic drug:  docusate sodium      Take 1 capsule by mouth 2 times daily       escitalopram 10 MG tablet    LEXAPRO    30 tablet    Take 1 tablet (10 mg) by mouth daily       fluticasone 50 MCG/ACT spray    FLONASE    16 g    Spray 2 sprays into both nostrils every other day       furosemide 20 MG tablet    LASIX    45 tablet    Take 1 tablet (20 mg) by mouth daily       hydrALAZINE 50 MG tablet    APRESOLINE    60 tablet    Take 1 tablet (50 mg) by mouth 2 times daily Takes an additional 50 mg in the afternoon if systolic blood pressure is higher than 160       HYDROcodone-acetaminophen 5-325 MG per tablet    NORCO    30 tablet    Take 1 tablet by mouth daily #30 per month **must be seen every 3 months for refills**       MUCINEX 600 MG 12 hr tablet   Generic drug:  guaiFENesin     28 tablet    Take  2 tablets (1,200 mg) by mouth 2 times daily       MULTIVITAMIN PO      Take 1 tablet by mouth daily       OCEAN NASAL SPRAY NA      Administer 1 spray in each nostril up to two times daily as needed       ondansetron 4 MG ODT tab    ZOFRAN-ODT    15 tablet    Take 1 tablet (4 mg) by mouth every 8 hours as needed for nausea       predniSONE 1 MG tablet    DELTASONE     Take 2 mg by mouth daily       UNISOM 25 MG Tabs tablet   Generic drug:  doxylamine      Take 12.5 mg by mouth nightly as needed       VITAMIN D3 PO      Take 1,000 Units by mouth daily

## 2017-02-24 ENCOUNTER — TELEPHONE (OUTPATIENT)
Dept: FAMILY MEDICINE | Facility: CLINIC | Age: 82
End: 2017-02-24

## 2017-02-24 NOTE — TELEPHONE ENCOUNTER
Jennifer was told by Dr Cisse to report her Bp results for this week - Wednesday 2/22/17 was 147/58 and Thursday 2/23/17 was 145/56

## 2017-02-24 NOTE — TELEPHONE ENCOUNTER
BP Readings from Last 6 Encounters:   02/22/17 168/64   02/15/17 156/60   02/12/17 171/66   12/08/16 180/70   08/15/16 184/79   07/28/16 (!) 198/92       It appears reported BPs are improved from previous BP readings.    Routing to Dr. Cisse.    Dr. Cisse-Please review.    Thank you!  GLENN MoniqueN, RN

## 2017-02-24 NOTE — TELEPHONE ENCOUNTER
Left message to call back and ask to speak with an available triage nurse.    MyChart message also sent to patient.    GLENN MoniqueN, RN

## 2017-02-24 NOTE — TELEPHONE ENCOUNTER
I would recommend pt taking Hydralazine 50 mg TID instead of BID. Continue to monitor blood pressures every other day. Call clinic if B/P are consistently higher than 140/90. F/u with Dr. Fisher as scheduled.  DM

## 2017-03-02 ENCOUNTER — CARE COORDINATION (OUTPATIENT)
Dept: CASE MANAGEMENT | Facility: CLINIC | Age: 82
End: 2017-03-02

## 2017-03-02 NOTE — PROGRESS NOTES
"Clinic Care Coordination Contact  OUTREACH    Referral Information:  Referral Source: CTS  Reason for Contact: follow-up  Care Conference: No     Universal Utilization:   ED Visits in last year: 0  Hospital visits in last year: 1  Last PCP appointment: 02/22/17  Missed Appointments:  (none known)  Concerns: inpatient 1X due to volume overload, utilization is appropriate       Clinical Concerns:  Current Medical Concerns: Received call back from patient and she reported that her weight is remaining stable at 130# and patient is \"back to feeling like her normal self\".  Patient is not going to pursue being seen at the [facility] at this time.  Patient reported that her blood pressure was 154/65 this AM prior to her medications.  Patient reported that she has appt with Dr. Fisher on 3/6/17, however that appt that was scheduled for 3/6/17 had been cancelled.      Current Behavioral Concerns: No acute concerns    Education Provided to patient: Discussed clinic appt information as above and patient will call to reschedule appt with Dr. Fisher.     Clinical Pathway Name: None  Clinical Pathway: None    Medication Management:  Patient is taking medications as prescribed.      Functional Status:  Mobility Status: Independent  Equipment Currently Used at Home: other (see comments) (emergency pull cords in apt, [employer])  Transportation: Dtr transports and patient drives           Psychosocial:  Current living arrangement:: I live alone, I live in a private home ([facility] [address])  Financial/Insurance: [facility]  2 dtrs, one lives in [place] and 1 lives in [place]     Resources and Interventions:  Current Resources: Other (Comments) (pt declining [facility]); Other (see comment) (none)        Advanced Care Plans/Directives on file:: No  Referrals Placed: Other  (none at this time)     Goals:   Goal 1 Statement: I will have stable weight, heart and respiratory status by 5/1/17.   Goal 1 Progression Percent: " 30%  Goal 1 Progression Date: 03/02/17     Patient/Caregiver understanding: Patient has good understanding of conditions and will contact PCP for scheduling follow-up.   Frequency of Care Coordination: as needed        Plan: Will pend for 2-3 weeks to check status and if any care coordination needs are present at that time.     Karla Laura, RN   FPA [facility]   [phone]  March 2, 2017

## 2017-03-02 NOTE — PROGRESS NOTES
Clinic Care Coordination Contact  [facility]/Voicemail    Referral Source: CTS  Clinical Data: Care Coordinator Outreach. Reviewed Epic chart notes which indicated that patient is declining follow-up at the [facility].  Adjustments made to medications related to high blood pressures that patient called into the clinic.    Outreach attempted x 1.  Left message on voicemail with call back information and requested return call.  Plan: Care Coordinator will await return call from patient and pend to check status next week if no call back.    Karla Laura RN   [facility]   [phone]  March 2, 2017

## 2017-03-13 NOTE — TELEPHONE ENCOUNTER
It appears patient read 2/24/17 MyChart encounter.    Patient has appt scheduled with PCP on 3/14/17.    GLENN MoniqueN, RN

## 2017-03-14 ENCOUNTER — OFFICE VISIT (OUTPATIENT)
Dept: FAMILY MEDICINE | Facility: CLINIC | Age: 82
End: 2017-03-14
Payer: COMMERCIAL

## 2017-03-14 VITALS
RESPIRATION RATE: 12 BRPM | SYSTOLIC BLOOD PRESSURE: 170 MMHG | TEMPERATURE: 98.3 F | DIASTOLIC BLOOD PRESSURE: 54 MMHG | WEIGHT: 131 LBS | BODY MASS INDEX: 23.96 KG/M2 | OXYGEN SATURATION: 96 % | HEART RATE: 69 BPM

## 2017-03-14 DIAGNOSIS — N18.30 CKD (CHRONIC KIDNEY DISEASE) STAGE 3, GFR 30-59 ML/MIN (H): ICD-10-CM

## 2017-03-14 DIAGNOSIS — I10 ESSENTIAL HYPERTENSION WITH GOAL BLOOD PRESSURE LESS THAN 140/90: ICD-10-CM

## 2017-03-14 DIAGNOSIS — J44.9 CHRONIC OBSTRUCTIVE PULMONARY DISEASE, UNSPECIFIED COPD TYPE (H): ICD-10-CM

## 2017-03-14 DIAGNOSIS — E78.5 HYPERLIPIDEMIA LDL GOAL <70: ICD-10-CM

## 2017-03-14 DIAGNOSIS — I50.32 CHRONIC DIASTOLIC HEART FAILURE (H): Primary | ICD-10-CM

## 2017-03-14 PROCEDURE — 99214 OFFICE O/P EST MOD 30 MIN: CPT | Performed by: FAMILY MEDICINE

## 2017-03-14 RX ORDER — LOSARTAN POTASSIUM 100 MG/1
100 TABLET ORAL DAILY
Qty: 90 TABLET | Refills: 3 | Status: CANCELLED | OUTPATIENT
Start: 2017-03-14

## 2017-03-14 ASSESSMENT — ANXIETY QUESTIONNAIRES
7. FEELING AFRAID AS IF SOMETHING AWFUL MIGHT HAPPEN: NOT AT ALL
6. BECOMING EASILY ANNOYED OR IRRITABLE: NOT AT ALL
5. BEING SO RESTLESS THAT IT IS HARD TO SIT STILL: NOT AT ALL
2. NOT BEING ABLE TO STOP OR CONTROL WORRYING: SEVERAL DAYS
1. FEELING NERVOUS, ANXIOUS, OR ON EDGE: SEVERAL DAYS
3. WORRYING TOO MUCH ABOUT DIFFERENT THINGS: NOT AT ALL
IF YOU CHECKED OFF ANY PROBLEMS ON THIS QUESTIONNAIRE, HOW DIFFICULT HAVE THESE PROBLEMS MADE IT FOR YOU TO DO YOUR WORK, TAKE CARE OF THINGS AT HOME, OR GET ALONG WITH OTHER PEOPLE: NOT DIFFICULT AT ALL
GAD7 TOTAL SCORE: 2

## 2017-03-14 ASSESSMENT — PATIENT HEALTH QUESTIONNAIRE - PHQ9: 5. POOR APPETITE OR OVEREATING: NOT AT ALL

## 2017-03-14 NOTE — PROGRESS NOTES
"  SUBJECTIVE:                                                    Jennifer Bob is a 85 year old female who presents to clinic today for the following health issues:    Hypertension Follow-up      Outpatient blood pressures are being checked at home.  Results are 130/50.    Low Salt Diet: low salt     Heart Failure Follow-up    Symptoms:    Shortness of breath: none    Lower extremity edema: none    Chest pain: No    Using more pillows than normal: No    Cough at night: No    Weight:    Checking weight daily: Yes    Weight change: none    Cardiology visits, ER/UC, or hospital admissions since last visit: None and Hospitalizations - February 207     Medication side effects: none     Chronic Kidney Disease Follow-up    Current NSAID use?  No    Amount of exercise or physical activity: 6-7 days/week for an average of 15-30 minutes    Problems taking medications regularly: No    Medication side effects: none    Diet: low fat/cholesterol and no salt       Clinic on 2/22/17:  \"1. NICHOLE (acute kidney injury) (H)  - repeat labs to ensure that kidney functions are improving   - Basic metabolic panel (Ca, Cl, CO2, Creat, Gluc, K, Na, BUN)  2. Acute on chronic congestive heart failure, unspecified congestive heart failure type (H)  - pts weight is stable, she monitors home weights daily  - declined [facility] clinic  - continue Lasix 20 mg daily   - call clinic if weight is up more than 3 pounds in one day or 5 lbs in one week   3. Hypertension goal BP (blood pressure) < 140/90  - B/P not at goal, pt notes that she also has white coat HTN and clinic B/P's are higher than home B/P's  - She continues to hold Losartan as kidney functions are not back, she would need to restart it due to CHF. For now I recommended calling in with home blood pressures over the next two days. I'll adjust medication accordingly. As her B/P cuff is around 10 years old, I did recommend bringing it in to the next clinic visit to compare results to our " "blood pressure.\"      Her breathing is back to normal since ED visit.   She continues to weigh herself every day.    Has not been on Losartan since 2/9/17. She would like to check her kidney function again prior to starting.  Prior to medication BP readings at home: 150-160/60   After medication BP readings: 130-130 (lowest 106)    Patient is no longer on any pain medications.   She is sleeping better at night and no longer needs to take her sleeping pill.  Walking for 50 minutes daily, since being in the ED her energy level has increased.      Problem list and histories reviewed & adjusted, as indicated.  Additional history: as documented    Patient Active Problem List   Diagnosis     Anxiety     Low back pain     Left hip pain     ASCVD (arteriosclerotic cardiovascular disease)     Incontinence of urine     Hypertension goal BP (blood pressure) < 140/90     Seasonal allergies     Myocardial infarction (H)     DDD (degenerative disc disease), lumbar     Transient cerebral ischemia     CKD (chronic kidney disease) stage 3, GFR 30-59 ml/min     Corns and callosities     Health Care Home     Spinal stenosis, lumbar region, without neurogenic claudication     Synovial cyst of lumbar facet joint     Acquired spondylolisthesis     Lumbar radicular pain     Stroke (H)     Hypertension     Hyperlipidemia LDL goal <70     Late effects of CVA (cerebrovascular accident)     COPD (chronic obstructive pulmonary disease) (H)     ACP (advance care planning)     RA (rheumatoid arthritis) (H)     Gout     Chronic pain syndrome     Heart failure (H)     Past Surgical History   Procedure Laterality Date     Cholecystectomy       Abdominal abscess       at incisional site after kristine     Adominal hernia repair       had mesh placed, then allergic so it was removed and she wears a girdle     Coronary artery bypass  1992       Social History   Substance Use Topics     Smoking status: Former Smoker     Smokeless tobacco: Never Used      " Comment: quit smoking in 1983     Alcohol use No     History reviewed. No pertinent family history.      Current Outpatient Prescriptions   Medication Sig Dispense Refill     BETA BLOCKER NOT PRESCRIBED, INTENTIONAL, Beta Blocker not prescribed intentionally due to COPD, shortness of breath with atenolol and lopressor  0     hydrALAZINE (APRESOLINE) 50 MG tablet Take 1 tablet (50 mg) by mouth 2 times daily Takes an additional 50 mg in the afternoon if systolic blood pressure is higher than 160 60 tablet 3     guaiFENesin (MUCINEX) 600 MG 12 hr tablet Take 2 tablets (1,200 mg) by mouth 2 times daily 28 tablet      aspirin 81 MG chewable tablet Take 1 tablet (81 mg) by mouth daily 60 tablet 3     ondansetron (ZOFRAN-ODT) 4 MG ODT tab Take 1 tablet (4 mg) by mouth every 8 hours as needed for nausea 15 tablet 0     furosemide (LASIX) 20 MG tablet Take 1 tablet (20 mg) by mouth daily 45 tablet 3     predniSONE (DELTASONE) 1 MG tablet Take 2 mg by mouth daily       HYDROcodone-acetaminophen (NORCO) 5-325 MG per tablet Take 1 tablet by mouth daily #30 per month **must be seen every 3 months for refills** 30 tablet 0     fluticasone (FLONASE) 50 MCG/ACT spray Spray 2 sprays into both nostrils every other day 16 g 3     albuterol (PROAIR HFA/PROVENTIL HFA/VENTOLIN HFA) 108 (90 BASE) MCG/ACT Inhaler Inhale 2 puffs into the lungs every 6 hours as needed 1 Inhaler 0     escitalopram (LEXAPRO) 10 MG tablet Take 1 tablet (10 mg) by mouth daily 30 tablet 9     amLODIPine (NORVASC) 10 MG tablet Take 1 tablet (10 mg) by mouth daily 90 tablet 3     atorvastatin (LIPITOR) 40 MG tablet Take 1 tablet (40 mg) by mouth daily 90 tablet 3     doxylamine (UNISOM) 25 MG TABS Take 12.5 mg by mouth nightly as needed        Saline (OCEAN NASAL SPRAY NA) Administer 1 spray in each nostril up to two times daily as needed       Cholecalciferol (VITAMIN D3 PO) Take 1,000 Units by mouth daily       Calcium Citrate-Vitamin D (CALCIUM CITRATE + PO)  Take 1 tablet by mouth daily        sodium chloride-sodium bicarb (CLASSIC NETI POT SINUS WASH) 2300-700 MG KIT Mix 1 packet in Neti Pot and administer in each nostril daily as needed       Multiple Vitamins-Minerals (MULTIVITAMIN OR) Take 1 tablet by mouth daily        docusate sodium (COLACE) 100 MG capsule Take 1 capsule by mouth 2 times daily        Allergies   Allergen Reactions     Adhesive Tape      blisters     Atenolol      SOB     Lopressor Hct      SOB     Recent Labs   Lab Test  02/22/17   0928  02/14/17   0935   02/09/17   1239  02/09/17   0440 11/17/16  08/15/16   1008  09/22/15   1240  07/06/15   1039   04/24/14   2221   10/17/13   0552   A1C   --    --    --    --    --    --    --    --    --    --   5.6   --   5.6   LDL   --    --    --   66   --    --   74   --   73   < >   --    --   85   HDL   --    --    --   87   --    --   66   --   72   < >   --    --   59   TRIG   --    --    --   80   --    --   123   --   88   < >   --    --   107   ALT   --    --    --   56*  49  29   --   27   --    --    --    < >   --    CR  1.09*  1.25*   < >   --   0.93  0.870  0.98  1.04  1.15*   < >   --    < >  1.00   GFRESTIMATED  48*  41*   < >   --   57*  47*  65.9  54*  51*  45*   < >   --    < >  53*   GFRESTBLACK  58*  49*   < >   --   69  57*   --   65  61  54*   < >   --    < >  64   POTASSIUM  4.1  4.6   < >   --   3.8   --   4.3  3.8  4.3   < >   --    < >  3.7   TSH   --    --    --   1.66   --    --    --   1.78   --    --    --    --    --     < > = values in this interval not displayed.      BP Readings from Last 3 Encounters:   03/14/17 170/54   02/22/17 168/64   02/15/17 156/60    Wt Readings from Last 3 Encounters:   03/14/17 59.4 kg (131 lb)   02/22/17 59 kg (130 lb)   02/15/17 59.6 kg (131 lb 5 oz)         Reviewed and updated as needed this visit by clinical staff  Reviewed and updated as needed this visit by Provider    ROS:  See above.    This document serves as a record of the services  and decisions personally performed and made by Torri Fisher MD. It was created on his/her behalf by Francheska Julian, trained medical scribe. The creation of this document is based the provider's statements to the medical scribes.    Scribe Francheska Julian, March 14, 2017  OBJECTIVE:                                                    /54 (Cuff Size: Adult Regular)  Pulse 69  Temp 98.3  F (36.8  C) (Oral)  Resp 12  Wt 59.4 kg (131 lb)  SpO2 96%  BMI 23.96 kg/m2  Body mass index is 23.96 kg/(m^2).  GENERAL: alert and no distress    Diagnostic Test Results:  Last Basic Metabolic Panel:  Lab Results   Component Value Date     02/22/2017      Lab Results   Component Value Date    POTASSIUM 4.1 02/22/2017     Lab Results   Component Value Date    CHLORIDE 104 02/22/2017     Lab Results   Component Value Date    JERILYN 9.4 02/22/2017     Lab Results   Component Value Date    CO2 28 02/22/2017     Lab Results   Component Value Date    BUN 20 02/22/2017     Lab Results   Component Value Date    CR 1.09 02/22/2017     Lab Results   Component Value Date     02/22/2017          ASSESSMENT/PLAN:                                                    1.   Chronic diastolic heart failure(H) -- acute HF resolved. Sx stable.   Weight is stable, she is weighing herself daily. Continues on Lasix 20 MG. Followed by cardiology. Declined [facility] clinic. Exercise endurance is increasing. She declined starting losartan for now (Cr is back to approx baseline at 1.09 on 2/22/17), but willing to do so in a month. Discussed importance of losartan for her heart failure as well as CKD and blood pressure control.   - BETA BLOCKER NOT PRESCRIBED, INTENTIONAL,; Beta Blocker not prescribed intentionally due to COPD, shortness of breath with atenolol and lopressor; Refill: 0    2. Chronic obstructive pulmonary disease, unspecified COPD type (H)  Stable, no changes today     3. CKD (chronic kidney disease) stage 3, GFR 30-59 ml/min  Stable.  She would like to have her kidney function checked prior to restarting Losartan. F/u in 1 month  - Basic metabolic panel  (Ca, Cl, CO2, Creat, Gluc, K, Na, BUN); Future    4. Hyperlipidemia LDL goal <70  Stable. On atorvastatin 40mg daily     5. Essential hypertension with goal blood pressure less than 140/90  White coat hypertension. BP elevated today in clinic. Continues to check her BP at home and her BP with medication is in the 100s to 130s range/50-60s . Will check GFR in three weeks, follow up with me in clinic in one month to discuss restarting Losartan. Continue hydralazine, furosemde 20mg daily, amlodipine 10mg daily.       Patient Instructions   1. Schedule a lab draw the week before your next visit with me.  2. Follow up with me in clinic in one month.      The information in this document, created by the medical scribe for me, accurately reflects the services I personally performed and the decisions made by me. I have reviewed and approved this document for accuracy prior to leaving the patient care area. 03/14/17    Torri Fisher MD  [facility]

## 2017-03-14 NOTE — PATIENT INSTRUCTIONS
1. Schedule a lab draw the week before your next visit with me.  2. Follow up with me in clinic in one month.

## 2017-03-14 NOTE — MR AVS SNAPSHOT
After Visit Summary   3/14/2017    Jennifer Bob    MRN: [id number]           Patient Information     Date Of Birth          12/5/1931        Visit Information        Provider Department      3/14/2017 10:40 AM Torri Fisher MD [facility]        Today's Diagnoses     Acute on chronic heart failure, unspecified heart failure type (H)    -  1    Chronic obstructive pulmonary disease, unspecified COPD type (H)        CKD (chronic kidney disease) stage 3, GFR 30-59 ml/min        Hyperlipidemia LDL goal <70        Essential hypertension with goal blood pressure less than 140/90          Care Instructions    1. Schedule a lab draw the week before your next visit with me.  2. Follow up with me in clinic in one month.        Follow-ups after your visit        Future tests that were ordered for you today     Open Future Orders        Priority Expected Expires Ordered    Basic metabolic panel  (Ca, Cl, CO2, Creat, Gluc, K, Na, BUN) Routine  3/14/2018 3/14/2017            Who to contact     If you have questions or need follow up information about today's clinic visit or your schedule please contact [facility] directly at [phone].  Normal or non-critical lab and imaging results will be communicated to you by BorrowersFirsthart, letter or phone within 4 business days after the clinic has received the results. If you do not hear from us within 7 days, please contact the clinic through Patronpatht or phone. If you have a critical or abnormal lab result, we will notify you by phone as soon as possible.  Submit refill requests through TRIXandTRAX or call your pharmacy and they will forward the refill request to us. Please allow 3 business days for your refill to be completed.          Additional Information About Your Visit        MyChart Information     TRIXandTRAX gives you secure access to your electronic health record. If you see a primary care provider, you can also send messages to your  care team and make appointments. If you have questions, please call your primary care clinic.  If you do not have a primary care provider, please call [phone] and they will assist you.        Care EveryWhere ID     This is your Care EveryWhere ID. This could be used by other organizations to access your [place] medical records  [id number]        Your Vitals Were     Pulse Temperature Respirations Pulse Oximetry BMI (Body Mass Index)       69 98.3  F (36.8  C) (Oral) 12 96% 23.96 kg/m2        Blood Pressure from Last 3 Encounters:   03/14/17 170/54   02/22/17 168/64   02/15/17 156/60    Weight from Last 3 Encounters:   03/14/17 131 lb (59.4 kg)   02/22/17 130 lb (59 kg)   02/15/17 131 lb 5 oz (59.6 kg)                 Today's Medication Changes          These changes are accurate as of: 3/14/17 11:31 AM.  If you have any questions, ask your nurse or doctor.               Start taking these medicines.        Dose/Directions    BETA BLOCKER NOT PRESCRIBED (INTENTIONAL)   Used for:  Acute on chronic heart failure, unspecified heart failure type (H)   Started by:  Torri Fisher MD        Beta Blocker not prescribed intentionally due to COPD, shortness of breath with atenolol and lopressor   Refills:  0            Where to get your medicines      Some of these will need a paper prescription and others can be bought over the counter.  Ask your nurse if you have questions.     You don't need a prescription for these medications     BETA BLOCKER NOT PRESCRIBED (INTENTIONAL)                Primary Care Provider Office Phone # Fax #    Torri Fisher [phone]       [facility] [address]  [facility] [address]        Thank you!     Thank you for choosing [facility]  for your care. Our goal is always to provide you with excellent care. Hearing back from our patients is one way we can continue to improve our services. Please take a few minutes to complete the written  survey that you may receive in the mail after your visit with us. Thank you!             Your Updated Medication List - Protect others around you: Learn how to safely use, store and throw away your medicines at www.disposemymeds.org.          This list is accurate as of: 3/14/17 11:31 AM.  Always use your most recent med list.                   Brand Name Dispense Instructions for use    albuterol 108 (90 BASE) MCG/ACT Inhaler    PROAIR HFA/PROVENTIL HFA/VENTOLIN HFA    1 Inhaler    Inhale 2 puffs into the lungs every 6 hours as needed       amLODIPine 10 MG tablet    NORVASC    90 tablet    Take 1 tablet (10 mg) by mouth daily       aspirin 81 MG chewable tablet     60 tablet    Take 1 tablet (81 mg) by mouth daily       atorvastatin 40 MG tablet    LIPITOR    90 tablet    Take 1 tablet (40 mg) by mouth daily       BETA BLOCKER NOT PRESCRIBED (INTENTIONAL)      Beta Blocker not prescribed intentionally due to COPD, shortness of breath with atenolol and lopressor       CALCIUM CITRATE + PO      Take 1 tablet by mouth daily       CLASSIC NETI POT SINUS WASH 2300-700 MG Kit   Generic drug:  sodium chloride-sodium bicarb      Mix 1 packet in Neti Pot and administer in each nostril daily as needed       COLACE 100 MG capsule   Generic drug:  docusate sodium      Take 1 capsule by mouth 2 times daily       escitalopram 10 MG tablet    LEXAPRO    30 tablet    Take 1 tablet (10 mg) by mouth daily       fluticasone 50 MCG/ACT spray    FLONASE    16 g    Spray 2 sprays into both nostrils every other day       furosemide 20 MG tablet    LASIX    45 tablet    Take 1 tablet (20 mg) by mouth daily       hydrALAZINE 50 MG tablet    APRESOLINE    60 tablet    Take 1 tablet (50 mg) by mouth 2 times daily Takes an additional 50 mg in the afternoon if systolic blood pressure is higher than 160       MUCINEX 600 MG 12 hr tablet   Generic drug:  guaiFENesin     28 tablet    Take 2 tablets (1,200 mg) by mouth 2 times daily        MULTIVITAMIN PO      Take 1 tablet by mouth daily       OCEAN NASAL SPRAY NA      Administer 1 spray in each nostril up to two times daily as needed       predniSONE 1 MG tablet    DELTASONE     Take 2 mg by mouth daily       VITAMIN D3 PO      Take 1,000 Units by mouth daily

## 2017-03-14 NOTE — NURSING NOTE
"Chief Complaint   Patient presents with     Hypertension       Initial /54 (Cuff Size: Adult Regular)  Pulse 69  Temp 98.3  F (36.8  C) (Oral)  Resp 12  Wt 131 lb (59.4 kg)  SpO2 96%  BMI 23.96 kg/m2 Estimated body mass index is 23.96 kg/(m^2) as calculated from the following:    Height as of 2/22/17: 5' 2\" (1.575 m).    Weight as of this encounter: 131 lb (59.4 kg).  Medication Reconciliation: complete       Gaby Long MA     "

## 2017-03-15 ASSESSMENT — ANXIETY QUESTIONNAIRES: GAD7 TOTAL SCORE: 2

## 2017-03-15 ASSESSMENT — PATIENT HEALTH QUESTIONNAIRE - PHQ9: SUM OF ALL RESPONSES TO PHQ QUESTIONS 1-9: 0

## 2017-03-16 NOTE — PROGRESS NOTES
Clinic Care Coordination Contact  [facility]/Voicemail    Referral Source: [facility]  Clinical Data: Care Coordinator Outreach.  Patient was seen by PCP on 3/14/17 and was stable regarding weight and cardiac/respiratory status.  Patient is having lab work on 4/12/17 and then follow-up with PCP on 4/18/17.    Outreach attempted x 1.  Left message on voicemail with call back information and requested return call if patient has any questions, concerns or needs.   Plan: Care Coordinator will do no further outreaches at this time.  Karla Laura RN   [facility]   [phone]  March 16, 2017

## 2017-04-03 DIAGNOSIS — I10 ESSENTIAL HYPERTENSION WITH GOAL BLOOD PRESSURE LESS THAN 140/90: ICD-10-CM

## 2017-04-03 NOTE — TELEPHONE ENCOUNTER
Lasix  Last Written Prescription Date: 2-12-17  Last Fill Quantity: 45,  # refills: 3   Last Office Visit with [facility] or [facility] prescribing provider: 3-14-17                                         Next 5 appointments (look out 90 days)     Apr 18, 2017 10:20 AM CDT   SHORT with Torri Fisher MD   [facility]    [address] [place] [id number] [phone]                  [facility]  [facility]  On Behalf of [facility]

## 2017-04-05 DIAGNOSIS — N18.30 CKD (CHRONIC KIDNEY DISEASE) STAGE 3, GFR 30-59 ML/MIN (H): ICD-10-CM

## 2017-04-05 LAB
ANION GAP SERPL CALCULATED.3IONS-SCNC: 10 MMOL/L (ref 3–14)
BUN SERPL-MCNC: 25 MG/DL (ref 7–30)
CALCIUM SERPL-MCNC: 9.2 MG/DL (ref 8.5–10.1)
CHLORIDE SERPL-SCNC: 104 MMOL/L (ref 94–109)
CO2 SERPL-SCNC: 30 MMOL/L (ref 20–32)
CREAT SERPL-MCNC: 1.12 MG/DL (ref 0.52–1.04)
GFR SERPL CREATININE-BSD FRML MDRD: 46 ML/MIN/1.7M2
GLUCOSE SERPL-MCNC: 90 MG/DL (ref 70–99)
POTASSIUM SERPL-SCNC: 4 MMOL/L (ref 3.4–5.3)
SODIUM SERPL-SCNC: 144 MMOL/L (ref 133–144)

## 2017-04-05 PROCEDURE — 36415 COLL VENOUS BLD VENIPUNCTURE: CPT | Performed by: FAMILY MEDICINE

## 2017-04-05 PROCEDURE — 80048 BASIC METABOLIC PNL TOTAL CA: CPT | Performed by: FAMILY MEDICINE

## 2017-04-05 RX ORDER — FUROSEMIDE 20 MG
20 TABLET ORAL DAILY
Qty: 45 TABLET | Refills: 1 | Status: SHIPPED | OUTPATIENT
Start: 2017-04-05 | End: 2017-07-04

## 2017-04-05 NOTE — TELEPHONE ENCOUNTER
Resent avail rx on last rx. It had 2 refills on last rx.  Cr is high.   Routing to pcp. BRAYDEN Fox    Last Written Prescription Date: 2/12/17  Last Fill Quantity: 45, # refills: 3  Last Office Visit with [facility] or [facility] prescribing provider: 3/14/17  Next 5 appointments (look out 90 days)     Apr 18, 2017 10:20 AM CDT   SHORT with Torri Fisher MD   [facility] ([facility]    [address] [place] [address] [phone]                   Potassium   Date Value Ref Range Status   02/22/2017 4.1 3.4 - 5.3 mmol/L Final     Creatinine   Date Value Ref Range Status   02/22/2017 1.09 (H) 0.52 - 1.04 mg/dL Final     BP Readings from Last 3 Encounters:   03/14/17 170/54   02/22/17 168/64   02/15/17 156/60

## 2017-04-18 ENCOUNTER — OFFICE VISIT (OUTPATIENT)
Dept: FAMILY MEDICINE | Facility: CLINIC | Age: 82
End: 2017-04-18
Payer: COMMERCIAL

## 2017-04-18 ENCOUNTER — TELEPHONE (OUTPATIENT)
Dept: FAMILY MEDICINE | Facility: CLINIC | Age: 82
End: 2017-04-18

## 2017-04-18 VITALS
DIASTOLIC BLOOD PRESSURE: 66 MMHG | TEMPERATURE: 97.9 F | WEIGHT: 133 LBS | HEART RATE: 64 BPM | BODY MASS INDEX: 24.33 KG/M2 | SYSTOLIC BLOOD PRESSURE: 190 MMHG | RESPIRATION RATE: 12 BRPM | OXYGEN SATURATION: 96 %

## 2017-04-18 DIAGNOSIS — I10 ESSENTIAL HYPERTENSION: Primary | ICD-10-CM

## 2017-04-18 DIAGNOSIS — I50.32 CHRONIC DIASTOLIC HEART FAILURE (H): ICD-10-CM

## 2017-04-18 DIAGNOSIS — J44.9 CHRONIC OBSTRUCTIVE PULMONARY DISEASE, UNSPECIFIED COPD TYPE (H): ICD-10-CM

## 2017-04-18 DIAGNOSIS — N18.30 CKD (CHRONIC KIDNEY DISEASE) STAGE 3, GFR 30-59 ML/MIN (H): ICD-10-CM

## 2017-04-18 DIAGNOSIS — I10 ESSENTIAL HYPERTENSION WITH GOAL BLOOD PRESSURE LESS THAN 140/90: ICD-10-CM

## 2017-04-18 PROCEDURE — 99214 OFFICE O/P EST MOD 30 MIN: CPT | Performed by: FAMILY MEDICINE

## 2017-04-18 RX ORDER — ALBUTEROL SULFATE 90 UG/1
2 AEROSOL, METERED RESPIRATORY (INHALATION) EVERY 6 HOURS PRN
Qty: 1 INHALER | Refills: 0 | Status: SHIPPED | OUTPATIENT
Start: 2017-04-18 | End: 2018-01-23

## 2017-04-18 RX ORDER — LOSARTAN POTASSIUM 100 MG/1
100 TABLET ORAL DAILY
Qty: 90 TABLET | Refills: 3 | Status: SHIPPED | OUTPATIENT
Start: 2017-04-18 | End: 2018-04-30

## 2017-04-18 NOTE — NURSING NOTE
"Chief Complaint   Patient presents with     Hypertension     Heart Failure     Kidney Problem       Initial BP (!) 207/71  Pulse 64  Temp 97.9  F (36.6  C) (Oral)  Resp 12  Wt 133 lb (60.3 kg)  SpO2 96%  BMI 24.33 kg/m2 Estimated body mass index is 24.33 kg/(m^2) as calculated from the following:    Height as of 2/22/17: 5' 2\" (1.575 m).    Weight as of this encounter: 133 lb (60.3 kg).  Medication Reconciliation: complete       Gaby Long MA     "

## 2017-04-18 NOTE — MR AVS SNAPSHOT
After Visit Summary   4/18/2017    Jennifer Bob    MRN: [id number]           Patient Information     Date Of Birth          12/5/1931        Visit Information        Provider Department      4/18/2017 10:20 AM Torri Fisher MD [facility]        Today's Diagnoses     Essential hypertension    -  1    Acute on chronic heart failure, unspecified heart failure type (H)        CKD (chronic kidney disease) stage 3, GFR 30-59 ml/min        Essential hypertension with goal blood pressure less than 140/90        Chronic obstructive pulmonary disease, unspecified COPD type (H)          Care Instructions    1. Start taking Losartan 100 MG daily in the morning. Continue your other medications unchanged.   2. Follow up with me in one to two weeks. We will check labs at that time. If you have severe headache, any chest pain or lightheadedness with high blood pressure or worsening shortness of breath go to the ER.        Follow-ups after your visit        Your next 10 appointments already scheduled     Jul 28, 2017 11:00 AM CDT   (Arrive by 10:45 AM)   Return Visit with Irlanda Adrian MD   [facility] ([facility])    [address] [place] [address] [phone]              Who to contact     If you have questions or need follow up information about today's clinic visit or your schedule please contact [facility] directly at [phone].  Normal or non-critical lab and imaging results will be communicated to you by MyChart, letter or phone within 4 business days after the clinic has received the results. If you do not hear from us within 7 days, please contact the clinic through MyChart or phone. If you have a critical or abnormal lab result, we will notify you by phone as soon as possible.  Submit refill requests through Marketsync or call your pharmacy and they will forward the refill request to us. Please  allow 3 business days for your refill to be completed.          Additional Information About Your Visit        MyChart Information     Greats gives you secure access to your electronic health record. If you see a primary care provider, you can also send messages to your care team and make appointments. If you have questions, please call your primary care clinic.  If you do not have a primary care provider, please call [phone] and they will assist you.        Care EveryWhere ID     This is your Care EveryWhere ID. This could be used by other organizations to access your [facility] medical records  [id number]        Your Vitals Were     Pulse Temperature Respirations Pulse Oximetry BMI (Body Mass Index)       64 97.9  F (36.6  C) (Oral) 12 96% 24.33 kg/m2        Blood Pressure from Last 3 Encounters:   04/18/17 (!) 207/71   03/14/17 170/54   02/22/17 168/64    Weight from Last 3 Encounters:   04/18/17 133 lb (60.3 kg)   03/14/17 131 lb (59.4 kg)   02/22/17 130 lb (59 kg)              We Performed the Following     Basic metabolic panel          Today's Medication Changes          These changes are accurate as of: 4/18/17 10:52 AM.  If you have any questions, ask your nurse or doctor.               Start taking these medicines.        Dose/Directions    losartan 100 MG tablet   Commonly known as:  COZAAR   Used for:  Essential hypertension with goal blood pressure less than 140/90   Started by:  Torri Fisher MD        Dose:  100 mg   Take 1 tablet (100 mg) by mouth daily   Quantity:  90 tablet   Refills:  3            Where to get your medicines      These medications were sent to [facility] [place] [address]  [address] [facility] [address]     Phone:  [phone]     albuterol 108 (90 BASE) MCG/ACT Inhaler    losartan 100 MG tablet                Primary Care Provider Office Phone # Fax #    Torri Fisher [phone]       [facility] [address]  [facility] [address]        Thank you!     Thank you for choosing [facility]  for your care. Our goal is always to provide you with excellent care. Hearing back from our patients is one way we can continue to improve our services. Please take a few minutes to complete the written survey that you may receive in the mail after your visit with us. Thank you!             Your Updated Medication List - Protect others around you: Learn how to safely use, store and throw away your medicines at www.disposemymeds.org.          This list is accurate as of: 4/18/17 10:52 AM.  Always use your most recent med list.                   Brand Name Dispense Instructions for use    albuterol 108 (90 BASE) MCG/ACT Inhaler    PROAIR HFA/PROVENTIL HFA/VENTOLIN HFA    1 Inhaler    Inhale 2 puffs into the lungs every 6 hours as needed       amLODIPine 10 MG tablet    NORVASC    90 tablet    Take 1 tablet (10 mg) by mouth daily       aspirin 81 MG chewable tablet     60 tablet    Take 1 tablet (81 mg) by mouth daily       atorvastatin 40 MG tablet    LIPITOR    90 tablet    Take 1 tablet (40 mg) by mouth daily       BETA BLOCKER NOT PRESCRIBED (INTENTIONAL)      Beta Blocker not prescribed intentionally due to COPD, shortness of breath with atenolol and lopressor       CALCIUM CITRATE + PO      Take 1 tablet by mouth daily       CLASSIC NETI POT SINUS WASH 2300-700 MG Kit   Generic drug:  sodium chloride-sodium bicarb      Mix 1 packet in Neti Pot and administer in each nostril daily as needed       COLACE 100 MG capsule   Generic drug:  docusate sodium      Take 1 capsule by mouth 2 times daily       escitalopram 10 MG tablet    LEXAPRO    30 tablet    Take 1 tablet (10 mg) by mouth daily       fluticasone 50 MCG/ACT spray    FLONASE    16 g    Spray 2 sprays into both nostrils every other day       furosemide 20 MG tablet    LASIX    45 tablet    Take 1 tablet (20 mg) by mouth daily       hydrALAZINE 50 MG  tablet    APRESOLINE    60 tablet    Take 1 tablet (50 mg) by mouth 2 times daily Takes an additional 50 mg in the afternoon if systolic blood pressure is higher than 160       losartan 100 MG tablet    COZAAR    90 tablet    Take 1 tablet (100 mg) by mouth daily       MUCINEX 600 MG 12 hr tablet   Generic drug:  guaiFENesin     28 tablet    Take 2 tablets (1,200 mg) by mouth 2 times daily       MULTIVITAMIN PO      Take 1 tablet by mouth daily       OCEAN NASAL SPRAY NA      Administer 1 spray in each nostril up to two times daily as needed       predniSONE 1 MG tablet    DELTASONE     Take 2 mg by mouth daily       VITAMIN D3 PO      Take 1,000 Units by mouth daily

## 2017-04-18 NOTE — PATIENT INSTRUCTIONS
1. Start taking Losartan 100 MG daily in the morning. Continue your other medications unchanged.   2. Follow up with me in one to two weeks. We will check labs at that time. If you have severe headache, any chest pain or lightheadedness with high blood pressure or worsening shortness of breath go to the ER.

## 2017-04-18 NOTE — PROGRESS NOTES
"  SUBJECTIVE:                                                    Jennifer Bob is a 85 year old female who presents to clinic today for the following health issues:    Hypertension Follow-up      Outpatient blood pressures are being checked at home.  Results are 153/58.    Low Salt Diet: no added salt     Heart Failure Follow-up    Symptoms:    Shortness of breath: none    Lower extremity edema: none    Chest pain: No    Using more pillows than normal: No    Cough at night: No    Weight:    Checking weight daily: Yes    Weight change: none    Cardiology visits, ER/UC, or hospital admissions since last visit: None    Medication side effects: none         Chronic Kidney Disease Follow-up    Current NSAID use?  No    Amount of exercise or physical activity: 6-7 days/week for an average of 15-30 minutes    Problems taking medications regularly: No    Medication side effects: none    Diet: regular (no restrictions)    Clinic on 3/14/17:   \"1. Chronic diastolic heart failure(H) -- acute HF resolved. Sx stable.   Weight is stable, she is weighing herself daily. Continues on Lasix 20 MG. Followed by cardiology. Declined CORE clinic. Exercise endurance is increasing. She declined starting losartan for now (Cr is back to approx baseline at 1.09 on 2/22/17), but willing to do so in a month. Discussed importance of losartan for her heart failure as well as CKD and blood pressure control.   2. Chronic obstructive pulmonary disease, unspecified COPD type (H)  Stable, no changes today   3. CKD (chronic kidney disease) stage 3, GFR 30-59 ml/min  Stable. She would like to have her kidney function checked prior to restarting Losartan. F/u in 1 month  4. Hyperlipidemia LDL goal <70  Stable. On atorvastatin 40mg daily   5. Essential hypertension with goal blood pressure less than 140/90  White coat hypertension. BP elevated today in clinic. Continues to check her BP at home and her BP with medication is in the 100s to 130s " "range/50-60s . Will check GFR in three weeks, follow up with me in clinic in one month to discuss restarting Losartan. Continue hydralazine, furosemde 20mg daily, amlodipine 10mg daily.\"      At home her BP is 150 systolic before taking her medication. Occasionally 115. She denies headaches, vision changes, weakness, numbness, tingling, chest pain, SOB, PND, orthopnea, worsening leg swelling, or weight gain.      Problem list and histories reviewed & adjusted, as indicated.  Additional history: as documented    Patient Active Problem List   Diagnosis     Anxiety     Low back pain     Left hip pain     ASCVD (arteriosclerotic cardiovascular disease)     Incontinence of urine     Hypertension goal BP (blood pressure) < 140/90     Seasonal allergies     Myocardial infarction (H)     DDD (degenerative disc disease), lumbar     Transient cerebral ischemia     CKD (chronic kidney disease) stage 3, GFR 30-59 ml/min     Corns and callosities     Health Care Home     Spinal stenosis, lumbar region, without neurogenic claudication     Synovial cyst of lumbar facet joint     Acquired spondylolisthesis     Lumbar radicular pain     Stroke (H)     Hypertension     Hyperlipidemia LDL goal <70     Late effects of CVA (cerebrovascular accident)     COPD (chronic obstructive pulmonary disease) (H)     ACP (advance care planning)     RA (rheumatoid arthritis) (H)     Gout     Chronic pain syndrome     Heart failure (H)     Chronic diastolic heart failure (H)     Past Surgical History:   Procedure Laterality Date     abdominal abscess      at incisional site after kristine     adominal hernia repair      had mesh placed, then allergic so it was removed and she wears a girdle     CHOLECYSTECTOMY       CORONARY ARTERY BYPASS  1992       Social History   Substance Use Topics     Smoking status: Former Smoker     Smokeless tobacco: Never Used      Comment: quit smoking in 1983     Alcohol use No     History reviewed. No pertinent family " history.      Current Outpatient Prescriptions   Medication Sig Dispense Refill     losartan (COZAAR) 100 MG tablet Take 1 tablet (100 mg) by mouth daily 90 tablet 3     albuterol (PROAIR HFA/PROVENTIL HFA/VENTOLIN HFA) 108 (90 BASE) MCG/ACT Inhaler Inhale 2 puffs into the lungs every 6 hours as needed 1 Inhaler 0     furosemide (LASIX) 20 MG tablet Take 1 tablet (20 mg) by mouth daily 45 tablet 1     BETA BLOCKER NOT PRESCRIBED, INTENTIONAL, Beta Blocker not prescribed intentionally due to COPD, shortness of breath with atenolol and lopressor  0     hydrALAZINE (APRESOLINE) 50 MG tablet Take 1 tablet (50 mg) by mouth 2 times daily Takes an additional 50 mg in the afternoon if systolic blood pressure is higher than 160 60 tablet 3     guaiFENesin (MUCINEX) 600 MG 12 hr tablet Take 2 tablets (1,200 mg) by mouth 2 times daily 28 tablet      aspirin 81 MG chewable tablet Take 1 tablet (81 mg) by mouth daily 60 tablet 3     predniSONE (DELTASONE) 1 MG tablet Take 2 mg by mouth daily       fluticasone (FLONASE) 50 MCG/ACT spray Spray 2 sprays into both nostrils every other day 16 g 3     escitalopram (LEXAPRO) 10 MG tablet Take 1 tablet (10 mg) by mouth daily 30 tablet 9     amLODIPine (NORVASC) 10 MG tablet Take 1 tablet (10 mg) by mouth daily 90 tablet 3     atorvastatin (LIPITOR) 40 MG tablet Take 1 tablet (40 mg) by mouth daily 90 tablet 3     Saline (OCEAN NASAL SPRAY NA) Administer 1 spray in each nostril up to two times daily as needed       Cholecalciferol (VITAMIN D3 PO) Take 1,000 Units by mouth daily       Calcium Citrate-Vitamin D (CALCIUM CITRATE + PO) Take 1 tablet by mouth daily        sodium chloride-sodium bicarb (CLASSIC NETI POT SINUS WASH) 2300-700 MG KIT Mix 1 packet in Neti Pot and administer in each nostril daily as needed       Multiple Vitamins-Minerals (MULTIVITAMIN OR) Take 1 tablet by mouth daily        docusate sodium (COLACE) 100 MG capsule Take 1 capsule by mouth 2 times daily         [DISCONTINUED] albuterol (PROAIR HFA/PROVENTIL HFA/VENTOLIN HFA) 108 (90 BASE) MCG/ACT Inhaler Inhale 2 puffs into the lungs every 6 hours as needed 1 Inhaler 0     Allergies   Allergen Reactions     Adhesive Tape      blisters     Atenolol      SOB     Lopressor Hct      SOB     Recent Labs   Lab Test  04/05/17   0856  02/22/17   0928   02/09/17   1239  02/09/17   0440 11/17/16  08/15/16   1008  09/22/15   1240  07/06/15   1039   04/24/14   2221   10/17/13   0552   A1C   --    --    --    --    --    --    --    --    --    --   5.6   --   5.6   LDL   --    --    --   66   --    --   74   --   73   < >   --    --   85   HDL   --    --    --   87   --    --   66   --   72   < >   --    --   59   TRIG   --    --    --   80   --    --   123   --   88   < >   --    --   107   ALT   --    --    --   56*  49  29   --   27   --    --    --    < >   --    CR  1.12*  1.09*   < >   --   0.93  0.870  0.98  1.04  1.15*   < >   --    < >  1.00   GFRESTIMATED  46*  48*   < >   --   57*  47*  65.9  54*  51*  45*   < >   --    < >  53*   GFRESTBLACK  56*  58*   < >   --   69  57*   --   65  61  54*   < >   --    < >  64   POTASSIUM  4.0  4.1   < >   --   3.8   --   4.3  3.8  4.3   < >   --    < >  3.7   TSH   --    --    --   1.66   --    --    --   1.78   --    --    --    --    --     < > = values in this interval not displayed.      BP Readings from Last 3 Encounters:   04/18/17 190/66   03/14/17 170/54   02/22/17 168/64    Wt Readings from Last 3 Encounters:   04/18/17 60.3 kg (133 lb)   03/14/17 59.4 kg (131 lb)   02/22/17 59 kg (130 lb)        Reviewed and updated as needed this visit by clinical staff  Reviewed and updated as needed this visit by Provider    ROS:  See above    This document serves as a record of the services and decisions personally performed and made by Torri Fisher MD. It was created on his/her behalf by Francheska Julian, joaquin medical scribe. The creation of this document is based the provider's  statements to the medical scribes.    Scribe Francheska Julian, April 18, 2017  OBJECTIVE:                                                    /66  Pulse 64  Temp 97.9  F (36.6  C) (Oral)  Resp 12  Wt 60.3 kg (133 lb)  SpO2 96%  BMI 24.33 kg/m2  Body mass index is 24.33 kg/(m^2).  GENERAL: healthy, alert and no distress  PSYCH: mentation appears normal, affect normal/bright    Diagnostic Test Results: none     ASSESSMENT/PLAN:                                                    1. Essential hypertension  Elevated today. Her home BP readings are around 150 systolic. She will start taking Losartan 100 MG daily in the morning. Continues on amlodipine, hydralazine, and furosemide. Follow-up with me in clinic in two weeks for BP check and labs.  - Basic metabolic panel    2. Chronic heart failure, unspecified heart failure type (H)  Stable. Continues on Furosemide. Followed by cardiology.    3. CKD (chronic kidney disease) stage 3, GFR 30-59 ml/min  Stable. No changes today.    4. Chronic obstructive pulmonary disease, unspecified COPD type (H)  Stable. Refilled albuterol inhaler.  - albuterol (PROAIR HFA/PROVENTIL HFA/VENTOLIN HFA) 108 (90 BASE) MCG/ACT Inhaler; Inhale 2 puffs into the lungs every 6 hours as needed  Dispense: 1 Inhaler; Refill: 0    Patient Instructions   1. Start taking Losartan 100 MG daily in the morning. Continue your other medications unchanged.   2. Follow up with me in one to two weeks. We will check labs at that time. If you have severe headache, any chest pain or lightheadedness with high blood pressure or worsening shortness of breath go to the ER.      The information in this document, created by the medical scribe for me, accurately reflects the services I personally performed and the decisions made by me. I have reviewed and approved this document for accuracy. 04/18/17    Torri Fisher MD  [facility]

## 2017-04-18 NOTE — TELEPHONE ENCOUNTER
Pt states they were told to call back with BP readin/53 at 11:30 am today, .    Yeimi Barrios  Patient Representative

## 2017-05-08 ENCOUNTER — OFFICE VISIT (OUTPATIENT)
Dept: FAMILY MEDICINE | Facility: CLINIC | Age: 82
End: 2017-05-08
Payer: COMMERCIAL

## 2017-05-08 VITALS
WEIGHT: 132.5 LBS | DIASTOLIC BLOOD PRESSURE: 72 MMHG | TEMPERATURE: 97.9 F | HEART RATE: 67 BPM | BODY MASS INDEX: 24.23 KG/M2 | OXYGEN SATURATION: 96 % | RESPIRATION RATE: 12 BRPM | SYSTOLIC BLOOD PRESSURE: 191 MMHG

## 2017-05-08 DIAGNOSIS — I50.32 CHRONIC DIASTOLIC HEART FAILURE (H): ICD-10-CM

## 2017-05-08 DIAGNOSIS — I10 HYPERTENSION GOAL BP (BLOOD PRESSURE) < 140/90: Primary | ICD-10-CM

## 2017-05-08 DIAGNOSIS — I10 ESSENTIAL HYPERTENSION: ICD-10-CM

## 2017-05-08 DIAGNOSIS — N18.30 CKD (CHRONIC KIDNEY DISEASE) STAGE 3, GFR 30-59 ML/MIN (H): ICD-10-CM

## 2017-05-08 PROCEDURE — 80048 BASIC METABOLIC PNL TOTAL CA: CPT | Performed by: FAMILY MEDICINE

## 2017-05-08 PROCEDURE — 99214 OFFICE O/P EST MOD 30 MIN: CPT | Performed by: FAMILY MEDICINE

## 2017-05-08 PROCEDURE — 36415 COLL VENOUS BLD VENIPUNCTURE: CPT | Performed by: FAMILY MEDICINE

## 2017-05-08 RX ORDER — HYDRALAZINE HYDROCHLORIDE 50 MG/1
50 TABLET, FILM COATED ORAL 2 TIMES DAILY
Qty: 90 TABLET | Refills: 3 | Status: SHIPPED | OUTPATIENT
Start: 2017-05-08 | End: 2018-04-30

## 2017-05-08 NOTE — MR AVS SNAPSHOT
After Visit Summary   5/8/2017    Jennifer Bob    MRN: [id number]           Patient Information     Date Of Birth          12/5/1931        Visit Information        Provider Department      5/8/2017 10:40 AM Torri Fisher MD [facility]        Today's Diagnoses     Hypertension goal BP (blood pressure) < 140/90    -  1    CKD (chronic kidney disease) stage 3, GFR 30-59 ml/min          Care Instructions    Follow up with me in September.         Follow-ups after your visit        Your next 10 appointments already scheduled     Jul 28, 2017 11:00 AM CDT   (Arrive by 10:45 AM)   Return Visit with Irlanda Adrian MD   [facility] ([facility])    [address]  3rd Floor  [facility] [address] [phone]              Who to contact     If you have questions or need follow up information about today's clinic visit or your schedule please contact [facility] directly at [phone].  Normal or non-critical lab and imaging results will be communicated to you by Bundle Buyhart, letter or phone within 4 business days after the clinic has received the results. If you do not hear from us within 7 days, please contact the clinic through Glustert or phone. If you have a critical or abnormal lab result, we will notify you by phone as soon as possible.  Submit refill requests through Microarrays or call your pharmacy and they will forward the refill request to us. Please allow 3 business days for your refill to be completed.          Additional Information About Your Visit        Bundle Buyhart Information     Microarrays gives you secure access to your electronic health record. If you see a primary care provider, you can also send messages to your care team and make appointments. If you have questions, please call your primary care clinic.  If you do not have a primary care provider, please call [phone] and they will assist you.        Care  EveryWhere ID     This is your Care EveryWhere ID. This could be used by other organizations to access your [facility] medical records  [id number]        Your Vitals Were     Pulse Temperature Respirations Pulse Oximetry BMI (Body Mass Index)       67 97.9  F (36.6  C) (Oral) 12 96% 24.23 kg/m2        Blood Pressure from Last 3 Encounters:   05/08/17 191/72   04/18/17 190/66   03/14/17 170/54    Weight from Last 3 Encounters:   05/08/17 132 lb 8 oz (60.1 kg)   04/18/17 133 lb (60.3 kg)   03/14/17 131 lb (59.4 kg)              We Performed the Following     Basic metabolic panel          Where to get your medicines      These medications were sent to [facility] [address]  [address] [facility] [address]     Phone:  [phone]     hydrALAZINE 50 MG tablet          Primary Care Provider Office Phone # Fax #    Torri Fisher [phone]       [facility] [address]  [facility] [address]        Thank you!     Thank you for choosing [facility]  for your care. Our goal is always to provide you with excellent care. Hearing back from our patients is one way we can continue to improve our services. Please take a few minutes to complete the written survey that you may receive in the mail after your visit with us. Thank you!             Your Updated Medication List - Protect others around you: Learn how to safely use, store and throw away your medicines at www.disposemymeds.org.          This list is accurate as of: 5/8/17 11:08 AM.  Always use your most recent med list.                   Brand Name Dispense Instructions for use    albuterol 108 (90 BASE) MCG/ACT Inhaler    PROAIR HFA/PROVENTIL HFA/VENTOLIN HFA    1 Inhaler    Inhale 2 puffs into the lungs every 6 hours as needed       amLODIPine 10 MG tablet    NORVASC    90 tablet    Take 1 tablet (10 mg) by mouth daily       aspirin 81 MG chewable tablet     60 tablet    Take 1  tablet (81 mg) by mouth daily       atorvastatin 40 MG tablet    LIPITOR    90 tablet    Take 1 tablet (40 mg) by mouth daily       BETA BLOCKER NOT PRESCRIBED (INTENTIONAL)      Beta Blocker not prescribed intentionally due to COPD, shortness of breath with atenolol and lopressor       CALCIUM CITRATE + PO      Take 1 tablet by mouth daily       CLASSIC NETI POT SINUS WASH 2300-700 MG Kit   Generic drug:  sodium chloride-sodium bicarb      Mix 1 packet in Neti Pot and administer in each nostril daily as needed       COLACE 100 MG capsule   Generic drug:  docusate sodium      Take 1 capsule by mouth 2 times daily       escitalopram 10 MG tablet    LEXAPRO    30 tablet    Take 1 tablet (10 mg) by mouth daily       fluticasone 50 MCG/ACT spray    FLONASE    16 g    Spray 2 sprays into both nostrils every other day       furosemide 20 MG tablet    LASIX    45 tablet    Take 1 tablet (20 mg) by mouth daily       hydrALAZINE 50 MG tablet    APRESOLINE    90 tablet    Take 1 tablet (50 mg) by mouth 2 times daily Takes an additional 50 mg in the afternoon if systolic blood pressure is higher than 160       losartan 100 MG tablet    COZAAR    90 tablet    Take 1 tablet (100 mg) by mouth daily       MUCINEX 600 MG 12 hr tablet   Generic drug:  guaiFENesin     28 tablet    Take 2 tablets (1,200 mg) by mouth 2 times daily       MULTIVITAMIN PO      Take 1 tablet by mouth daily       OCEAN NASAL SPRAY NA      Administer 1 spray in each nostril up to two times daily as needed       predniSONE 1 MG tablet    DELTASONE     Take 2 mg by mouth daily       VITAMIN D3 PO      Take 1,000 Units by mouth daily

## 2017-05-08 NOTE — NURSING NOTE
"Chief Complaint   Patient presents with     Hypertension       Initial /72  Pulse 67  Temp 97.9  F (36.6  C) (Oral)  Resp 12  Wt 132 lb 8 oz (60.1 kg)  SpO2 96%  BMI 24.23 kg/m2 Estimated body mass index is 24.23 kg/(m^2) as calculated from the following:    Height as of 2/22/17: 5' 2\" (1.575 m).    Weight as of this encounter: 132 lb 8 oz (60.1 kg).  Medication Reconciliation: complete     Gaby Long MA     "

## 2017-05-08 NOTE — PROGRESS NOTES
SUBJECTIVE:                                                    Jennifer Bob is a 85 year old female who presents to clinic today for the following health issues:    Hypertension Follow-up      Outpatient blood pressures are being checked at home.  Results are 168/58.    Low Salt Diet: low salt     The highest systolic BP number Jennifer sees at home is 170. The lowest is 118. She notes that her prescription for hydralazine is not written for enough pills to take as her cardiologist instructed (one extra pill for systolic above 160). She reports her cardiologist told her not to worry about her blood pressure otherwise.    Chronic Kidney Disease Follow-up      Current NSAID use?  No      Amount of exercise or physical activity: 6-7 days/week for an average of greater than 60 minutes    Problems taking medications regularly: No    Medication side effects: none    Diet: regular (no restrictions)    Problem list and histories reviewed & adjusted, as indicated.  Additional history: as documented    Patient Active Problem List   Diagnosis     Anxiety     Low back pain     Left hip pain     ASCVD (arteriosclerotic cardiovascular disease)     Incontinence of urine     Hypertension goal BP (blood pressure) < 140/90     Seasonal allergies     Myocardial infarction (H)     DDD (degenerative disc disease), lumbar     Transient cerebral ischemia     CKD (chronic kidney disease) stage 3, GFR 30-59 ml/min     Corns and callosities     [facility]     Spinal stenosis, lumbar region, without neurogenic claudication     Synovial cyst of lumbar facet joint     Acquired spondylolisthesis     Lumbar radicular pain     Stroke (H)     Hypertension     Hyperlipidemia LDL goal <70     Late effects of CVA (cerebrovascular accident)     COPD (chronic obstructive pulmonary disease) (H)     ACP (advance care planning)     RA (rheumatoid arthritis) (H)     Gout     Chronic pain syndrome     Heart failure (H)     Chronic diastolic  heart failure (H)     Past Surgical History:   Procedure Laterality Date     abdominal abscess      at incisional site after kristine     adominal hernia repair      had mesh placed, then allergic so it was removed and she wears a girdle     CHOLECYSTECTOMY       CORONARY ARTERY BYPASS  1992       Social History   Substance Use Topics     Smoking status: Former Smoker     Smokeless tobacco: Never Used      Comment: quit smoking in 1983     Alcohol use No     History reviewed. No pertinent family history.      Current Outpatient Prescriptions   Medication Sig Dispense Refill     hydrALAZINE (APRESOLINE) 50 MG tablet Take 1 tablet (50 mg) by mouth 2 times daily Takes an additional 50 mg in the afternoon if systolic blood pressure is higher than 160 90 tablet 3     losartan (COZAAR) 100 MG tablet Take 1 tablet (100 mg) by mouth daily 90 tablet 3     albuterol (PROAIR HFA/PROVENTIL HFA/VENTOLIN HFA) 108 (90 BASE) MCG/ACT Inhaler Inhale 2 puffs into the lungs every 6 hours as needed 1 Inhaler 0     furosemide (LASIX) 20 MG tablet Take 1 tablet (20 mg) by mouth daily 45 tablet 1     BETA BLOCKER NOT PRESCRIBED, INTENTIONAL, Beta Blocker not prescribed intentionally due to COPD, shortness of breath with atenolol and lopressor  0     guaiFENesin (MUCINEX) 600 MG 12 hr tablet Take 2 tablets (1,200 mg) by mouth 2 times daily 28 tablet      aspirin 81 MG chewable tablet Take 1 tablet (81 mg) by mouth daily 60 tablet 3     predniSONE (DELTASONE) 1 MG tablet Take 2 mg by mouth daily       fluticasone (FLONASE) 50 MCG/ACT spray Spray 2 sprays into both nostrils every other day 16 g 3     escitalopram (LEXAPRO) 10 MG tablet Take 1 tablet (10 mg) by mouth daily 30 tablet 9     amLODIPine (NORVASC) 10 MG tablet Take 1 tablet (10 mg) by mouth daily 90 tablet 3     atorvastatin (LIPITOR) 40 MG tablet Take 1 tablet (40 mg) by mouth daily 90 tablet 3     Saline (OCEAN NASAL SPRAY NA) Administer 1 spray in each nostril up to two times daily  as needed       Cholecalciferol (VITAMIN D3 PO) Take 1,000 Units by mouth daily       Calcium Citrate-Vitamin D (CALCIUM CITRATE + PO) Take 1 tablet by mouth daily        sodium chloride-sodium bicarb (CLASSIC NETI POT SINUS WASH) 2300-700 MG KIT Mix 1 packet in Neti Pot and administer in each nostril daily as needed       Multiple Vitamins-Minerals (MULTIVITAMIN OR) Take 1 tablet by mouth daily        docusate sodium (COLACE) 100 MG capsule Take 1 capsule by mouth 2 times daily        [DISCONTINUED] hydrALAZINE (APRESOLINE) 50 MG tablet Take 1 tablet (50 mg) by mouth 2 times daily Takes an additional 50 mg in the afternoon if systolic blood pressure is higher than 160 60 tablet 3     Allergies   Allergen Reactions     Adhesive Tape      blisters     Atenolol      SOB     Lopressor Hct      SOB     Recent Labs   Lab Test  04/05/17   0856  02/22/17   0928   02/09/17   1239  02/09/17   0440 11/17/16  08/15/16   1008  09/22/15   1240  07/06/15   1039   04/24/14   2221   10/17/13   0552   A1C   --    --    --    --    --    --    --    --    --    --   5.6   --   5.6   LDL   --    --    --   66   --    --   74   --   73   < >   --    --   85   HDL   --    --    --   87   --    --   66   --   72   < >   --    --   59   TRIG   --    --    --   80   --    --   123   --   88   < >   --    --   107   ALT   --    --    --   56*  49  29   --   27   --    --    --    < >   --    CR  1.12*  1.09*   < >   --   0.93  0.870  0.98  1.04  1.15*   < >   --    < >  1.00   GFRESTIMATED  46*  48*   < >   --   57*  47*  65.9  54*  51*  45*   < >   --    < >  53*   GFRESTBLACK  56*  58*   < >   --   69  57*   --   65  61  54*   < >   --    < >  64   POTASSIUM  4.0  4.1   < >   --   3.8   --   4.3  3.8  4.3   < >   --    < >  3.7   TSH   --    --    --   1.66   --    --    --   1.78   --    --    --    --    --     < > = values in this interval not displayed.      BP Readings from Last 3 Encounters:   05/08/17 191/72   04/18/17 190/66    03/14/17 170/54    Wt Readings from Last 3 Encounters:   05/08/17 132 lb 8 oz (60.1 kg)   04/18/17 133 lb (60.3 kg)   03/14/17 131 lb (59.4 kg)         Reviewed and updated as needed this visit by clinical staff  Tobacco  Allergies  Meds  Med Hx  Surg Hx  Fam Hx  Soc Hx      Reviewed and updated as needed this visit by Provider       ROS:  C: NEGATIVE for fever  No complaint of chest pain or SOB.    This document serves as a record of the services and decisions personally performed and made by Torri Fisher MD. It was created on her behalf by Claudia Kiser, a trained medical scribe. The creation of this document is based on the provider's statements to the medical scribe.  Claudia Kiser May 8, 2017 11:02 AM     OBJECTIVE:                                                    /72  Pulse 67  Temp 97.9  F (36.6  C) (Oral)  Resp 12  Wt 132 lb 8 oz (60.1 kg)  SpO2 96%  BMI 24.23 kg/m2   Body mass index is 24.23 kg/(m^2).   GENERAL: healthy, alert and no distress    Diagnostic Test Results:  none      ASSESSMENT/PLAN:                                                    1. Hypertension goal BP (blood pressure) < 140/90  White coat hypertension. Home blood pressures are better than what we see in clinic. Managed with losartan 100 mg, amlodipine 10 mg daily, hydralazine 50 mg twice daily (instructed to take an extra dose if systolic above 160), also has lasix 20 mg daily. Managed with cardiology.  - Basic metabolic panel  - hydrALAZINE (APRESOLINE) 50 MG tablet; Take 1 tablet (50 mg) by mouth 2 times daily Takes an additional 50 mg in the afternoon if systolic blood pressure is higher than 160  Dispense: 90 tablet; Refill: 3    2. CKD (chronic kidney disease) stage 3, GFR 30-59 ml/min  Back on losartan. Will check BMP today.    3. Heart failure  Stable.  Back on losartan, continues on lasix. Beta blocker intentionally not prescribed due to COPD. Continues on baby aspirin daily.      Patient Instructions    Follow up with me in September.       The information in this document, created by the medical scribe for me, accurately reflects the services I personally performed and the decisions made by me. I have reviewed and approved this document for accuracy prior to leaving the patient care area.  5/8/2017 11:02 AM      Torri Fisher MD  [facility]

## 2017-05-09 LAB
ANION GAP SERPL CALCULATED.3IONS-SCNC: 5 MMOL/L (ref 3–14)
BUN SERPL-MCNC: 20 MG/DL (ref 7–30)
CALCIUM SERPL-MCNC: 9.2 MG/DL (ref 8.5–10.1)
CHLORIDE SERPL-SCNC: 104 MMOL/L (ref 94–109)
CO2 SERPL-SCNC: 31 MMOL/L (ref 20–32)
CREAT SERPL-MCNC: 1.02 MG/DL (ref 0.52–1.04)
GFR SERPL CREATININE-BSD FRML MDRD: 51 ML/MIN/1.7M2
GLUCOSE SERPL-MCNC: 98 MG/DL (ref 70–99)
POTASSIUM SERPL-SCNC: 4.4 MMOL/L (ref 3.4–5.3)
SODIUM SERPL-SCNC: 140 MMOL/L (ref 133–144)

## 2017-05-18 ENCOUNTER — TRANSFERRED RECORDS (OUTPATIENT)
Dept: HEALTH INFORMATION MANAGEMENT | Facility: CLINIC | Age: 82
End: 2017-05-18

## 2017-07-04 DIAGNOSIS — I10 ESSENTIAL HYPERTENSION WITH GOAL BLOOD PRESSURE LESS THAN 140/90: ICD-10-CM

## 2017-07-05 RX ORDER — FUROSEMIDE 20 MG
20 TABLET ORAL DAILY
Qty: 90 TABLET | Refills: 3 | Status: SHIPPED | OUTPATIENT
Start: 2017-07-05 | End: 2018-04-30

## 2017-07-05 NOTE — TELEPHONE ENCOUNTER
HTN addressed 5/8/2017    Prescription approved per [facility] Refill Protocol.    Signed Prescriptions:                        Disp   Refills    furosemide (LASIX) 20 MG tablet            90 tab*3        Sig: Take 1 tablet (20 mg) by mouth daily  Authorizing Provider: WING COLMENARES  Ordering User: JAMIL GÓMEZ      Closing encounter - no further actions needed at this time    Jamil Gómez RN

## 2017-07-05 NOTE — TELEPHONE ENCOUNTER
Medication Detail      Disp Refills Start End NABEEL   furosemide (LASIX) 20 MG tablet 45 tablet 1 4/5/2017  No   Sig: Take 1 tablet (20 mg) by mouth daily       Last Office Visit with G, P or [facility] prescribing provider: 5/8/17  Next 5 appointments (look out 90 days)     Sep 11, 2017 10:40 AM CDT   SHORT with Torri Fisher MD   [facility]    [facility] [address] [place] [id number] [phone]                   Potassium   Date Value Ref Range Status   05/08/2017 4.4 3.4 - 5.3 mmol/L Final     Creatinine   Date Value Ref Range Status   05/08/2017 1.02 0.52 - 1.04 mg/dL Final     BP Readings from Last 3 Encounters:   05/08/17 191/72   04/18/17 190/66   03/14/17 170/54

## 2017-07-28 ENCOUNTER — OFFICE VISIT (OUTPATIENT)
Dept: CARDIOLOGY | Facility: CLINIC | Age: 82
End: 2017-07-28
Attending: INTERNAL MEDICINE
Payer: COMMERCIAL

## 2017-07-28 ENCOUNTER — PRE VISIT (OUTPATIENT)
Dept: CARDIOLOGY | Facility: CLINIC | Age: 82
End: 2017-07-28

## 2017-07-28 VITALS
WEIGHT: 131.8 LBS | SYSTOLIC BLOOD PRESSURE: 180 MMHG | HEART RATE: 74 BPM | BODY MASS INDEX: 24.25 KG/M2 | HEIGHT: 62 IN | OXYGEN SATURATION: 95 % | DIASTOLIC BLOOD PRESSURE: 62 MMHG

## 2017-07-28 DIAGNOSIS — I25.10 CORONARY ARTERY DISEASE INVOLVING NATIVE CORONARY ARTERY OF NATIVE HEART WITHOUT ANGINA PECTORIS: Primary | ICD-10-CM

## 2017-07-28 PROCEDURE — 99214 OFFICE O/P EST MOD 30 MIN: CPT | Mod: ZP | Performed by: INTERNAL MEDICINE

## 2017-07-28 PROCEDURE — 99213 OFFICE O/P EST LOW 20 MIN: CPT | Mod: ZF

## 2017-07-28 ASSESSMENT — PAIN SCALES - GENERAL: PAINLEVEL: NO PAIN (0)

## 2017-07-28 NOTE — LETTER
7/28/2017      RE: Jennifer Bob  [address]     [facility] [id number]       Dear Colleague,    Thank you for the opportunity to participate in the care of your patient, Jennifer Bob, at the [facility] at [facility]. Please see a copy of my visit note below.    HPI:      Jennifer is an 85 year old patient here for follow up for CAD, uncontrolled HTN and HL, CAD s/p had CABG X 3 in 1992 at [place] and s/p MI requiring angiogram in 2009 with stenting to the RCA graft who is here for annual follow up.     She reports feeling well today and denies CP, palpitations, and some mild SOB w/ exertion. She has cut back on exercise because she was feeling lazy but is now walking about 400 feet per day and occasionally goes to the gym in her facility. She has had some chronic LE edema L > R but feels this has been stable. She has not had any orthopnea or PND. She has not had any pre or ne syncope. She reports that blood pressures tend to run 140 systolic in the am prior to taking her medications and can occasionally drop as low as 84 after taking her medications so she has been holding off on taking any Hydralazine. She feels more fatigued and sluggish w/ hypotension and feels better w/ blood pressures around 130 systolic.     She does report that she had a hospitalization in February for heart failure requiring diuresis. She has been feeling well since that time and has been following a low sodium diet w/o any recurrence of issue.          PAST MEDICAL HISTORY:  Past Medical History:   Diagnosis Date     Abdominal wall hernia     three hernias at previous incision sites, allergic to mesh so repair not repeated, wears girdle     Anxiety 1/9/2012     ASCVD (arteriosclerotic cardiovascular disease) 1/9/2012     CKD (chronic kidney disease) stage 3, GFR 30-59 ml/min 1/10/2012     Colon polyp     resected     DDD (degenerative disc disease), lumbar  1/10/2012     Hyperlipidemia LDL goal <100 1/9/2012     Hyperlipidemia LDL goal <70 12/16/2013     Hypertension goal BP (blood pressure) < 140/90 1/9/2012     Incontinence of urine 1/9/2012     Left hip pain 1/9/2012     Low back pain 1/9/2012     Seasonal allergies 1/9/2012     STEMI (ST elevation myocardial infarction) (H) 8-    with VF arrest.     Stented coronary artery 8-     TIA (transient ischaemic attack) 1/10/2012       CURRENT MEDICATIONS:  Current Outpatient Prescriptions   Medication Sig Dispense Refill     furosemide (LASIX) 20 MG tablet Take 1 tablet (20 mg) by mouth daily (Patient taking differently: Take 10 mg by mouth daily ) 90 tablet 3     hydrALAZINE (APRESOLINE) 50 MG tablet Take 1 tablet (50 mg) by mouth 2 times daily Takes an additional 50 mg in the afternoon if systolic blood pressure is higher than 160 90 tablet 3     losartan (COZAAR) 100 MG tablet Take 1 tablet (100 mg) by mouth daily 90 tablet 3     albuterol (PROAIR HFA/PROVENTIL HFA/VENTOLIN HFA) 108 (90 BASE) MCG/ACT Inhaler Inhale 2 puffs into the lungs every 6 hours as needed 1 Inhaler 0     BETA BLOCKER NOT PRESCRIBED, INTENTIONAL, Beta Blocker not prescribed intentionally due to COPD, shortness of breath with atenolol and lopressor  0     guaiFENesin (MUCINEX) 600 MG 12 hr tablet Take 2 tablets (1,200 mg) by mouth 2 times daily 28 tablet      aspirin 81 MG chewable tablet Take 1 tablet (81 mg) by mouth daily 60 tablet 3     predniSONE (DELTASONE) 1 MG tablet Take 2 mg by mouth daily       fluticasone (FLONASE) 50 MCG/ACT spray Spray 2 sprays into both nostrils every other day 16 g 3     escitalopram (LEXAPRO) 10 MG tablet Take 1 tablet (10 mg) by mouth daily 30 tablet 9     amLODIPine (NORVASC) 10 MG tablet Take 1 tablet (10 mg) by mouth daily 90 tablet 3     atorvastatin (LIPITOR) 40 MG tablet Take 1 tablet (40 mg) by mouth daily 90 tablet 3     Saline (OCEAN NASAL SPRAY NA) Administer 1 spray in each nostril up to  two times daily as needed       Cholecalciferol (VITAMIN D3 PO) Take 1,000 Units by mouth daily       Calcium Citrate-Vitamin D (CALCIUM CITRATE + PO) Take 1 tablet by mouth daily        sodium chloride-sodium bicarb (CLASSIC NETI POT SINUS WASH) 2300-700 MG KIT Mix 1 packet in Neti Pot and administer in each nostril daily as needed       Multiple Vitamins-Minerals (MULTIVITAMIN OR) Take 1 tablet by mouth daily        docusate sodium (COLACE) 100 MG capsule Take 1 capsule by mouth 2 times daily          PAST SURGICAL HISTORY:  Past Surgical History:   Procedure Laterality Date     abdominal abscess      at incisional site after kristine     adominal hernia repair      had mesh placed, then allergic so it was removed and she wears a girdle     CHOLECYSTECTOMY       CORONARY ARTERY BYPASS  1992       ALLERGIES     Allergies   Allergen Reactions     Adhesive Tape      blisters     Atenolol      SOB     Lopressor Hct      SOB       FAMILY HISTORY:  No family history on file.    SOCIAL HISTORY:  History     Social History     Marital Status:      Spouse Name: N/A     Number of Children: N/A     Years of Education: N/A     Social History Main Topics     Smoking status: Former Smoker     Smokeless tobacco: Never Used      Comment: quit smoking in 1983     Alcohol Use: No     Drug Use: No     Sexually Active: No     Other Topics Concern     Parent/Sibling W/ Cabg, Mi Or Angioplasty Before 65f 55m? No     Social History Narrative       ROS:   Constitutional: No fever, chills, or sweats. No weight gain/loss.  ENT: No visual disturbance, ear ache, epistaxis, sore throat.  Allergies/Immunologic: Negative.   Respiratory: No cough, wheezing, or hemoptysia.  Cardiovascular: As per HPI.  GI: No nausea, vomiting, hematemesis, melena, or hematochezia  : Some urinary urgency/frequency w/o hematuria.   Integument: No rashes, sores.  Psychiatric: Negative  Neuro: Some mild right forefinger numbness/weakness as a result of TIA in  "2013 which has remained unchanged.  Endocrinology: Negative   Musculoskeletal: Negative    EXAM:  /62  Pulse 74  Ht 1.575 m (5' 2\")  Wt 59.8 kg (131 lb 12.8 oz)  SpO2 95%  BMI 24.11 kg/m2     In general, the patient is a pleasant female in no apparent distress.    HEENT:  Sclerae white, not injected.  Nares clear.  Pharynx without erythema or exudate.  Dentition intact.    Neck: No adenopathy.  No thyromegaly. Carotids +4/4 bilaterally without bruits. JVP mildly elevated.   Heart: RRR. Normal S1, S2 splits physiologically. Soft ESM. The PMI is in the 5th ICS in the midclavicular line. There is no heave.    Lungs: CTA.  No ronchi, wheezes, rales.  No dullness to percussion.   Abdomen: Soft, nontender, nondistended. No organomegaly.  No bruits. She does have multiple small hernias.   Extremities: No clubbing, or cyanosis. Trace LE edema w/ sheryl hose on.  The pulses are +4/4 at the radial, brachial, femoral, popliteal, DP, and PT sites bilaterally.  No bruits are noted.  Neurologic: Alert and oriented to person/place/time, normal speech, gait and affect.  Skin: No petechiae, purpura or rash.    Labs:  LIPID RESULTS:  Lab Results   Component Value Date    CHOL 169 02/09/2017    HDL 87 02/09/2017    LDL 66 02/09/2017    TRIG 80 02/09/2017    CHOLHDLRATIO 2.3 07/06/2015       LIVER ENZYME RESULTS:  Lab Results   Component Value Date    AST 63 (H) 02/09/2017    ALT 56 (H) 02/09/2017       CBC RESULTS:  Lab Results   Component Value Date    WBC 7.4 02/09/2017    RBC 4.20 02/09/2017    HGB 13.7 02/09/2017    HCT 43.0 02/09/2017     (H) 02/09/2017    MCH 32.6 02/09/2017    MCHC 31.9 02/09/2017    RDW 14.4 02/09/2017     02/09/2017       BMP RESULTS:  Lab Results   Component Value Date     05/08/2017    POTASSIUM 4.4 05/08/2017    CHLORIDE 104 05/08/2017    CO2 31 05/08/2017    ANIONGAP 5 05/08/2017    GLC 98 05/08/2017    BUN 20 05/08/2017    CR 1.02 05/08/2017    GFRESTIMATED 51 (L) 05/08/2017 "    GFRESTBLACK 62 05/08/2017    JERILYN 9.2 05/08/2017        A1C RESULTS:  Lab Results   Component Value Date    A1C 5.6 04/24/2014       INR RESULTS:  Lab Results   Component Value Date    INR 0.90 04/24/2014    INR 0.90 10/17/2013       Procedures:  Echocardiogram 2/9/2017: Mild concentric wall thickening consistent w/ LV hypertrophy. Mildly reduced LV function w/ EF 40-45 % and Grade III or advanced diastolic dysfunction. Inferior wall akinesis is present. RV function, chamber size, wall motion, and thickness are normal. The right atrial appears normal. Severe left atrial enlargement is present. Trace MR. The aortic valve is tricuspid. Trace AI is present. Trace pulmonic insufficiency is present.     PFT's: 6/25/2014  EV1/FVC is reduced. The FEV1 and the FVC are reduced. There is an 18% improvement in FEV1 after administration of a bronchodilator. The TLC is normal. The RV/TLC is within normal limits. DLCO is reduced to 48% predicted, not corrected for hemoglobin.  The flow-volume loop was not available for interpretation and the interpretation is based on absolute values.  IMPRESSION:  Moderate airflow obstruction with a significant response to bronchodilators.   Moderate reduction in diffusing capacity.   Normal lung volumes.       Echo: 10/17/2013   No cardiac source for embolus identified. Bubble study negative though the images are of suboptimal quality. Normal LV function with EF 60%.      Nuclear Stress: 5/30/2014   Impression:   1. Abnormal myocardial SPECT study with a summed stress score of 6.   There are changes of ischemia of the inferolateral wall of left   ventricle in the left circumflex coronary artery distribution.   2. Abnormal wall motion of the inferolateral wall hypokinesia during   stress.       CCL: 8- Posterobasal akinesis with overall preserved left ventricular global systolic function. Mild to moderate (1-2+) mitral regurgitation. Acute occlusion of vein graft to RCA, well treated  with placement of BMS into the body of the vein graft, normalization of antegrade flow. Good revascularization of the LAD and circumflex systems.       Assessment/Plan :  Ms. Bob is doing well. No chest pains or shortness of breath.  Cardiac exam is benign. Medications and labs reviewed. Continue with current therapies.  Follow-up in a year.    JANE Barraza, CNP  [facility]  Interventional/Structural Cardiology-CSI Service    Patient examined, chart reviewed and the above reflects our joint assessment and plans.      Irlanda Adrian MD                                                                                                                                                                      CC  Patient Care Team:  Torri Colmenares MD as PCP - General (Family Practice)  Guillermina Monroy as Clinic Care Coordinator (Cardiology)  Irlanda Adrian MD as MD (Internal Medicine)  TORRI COLMENARES

## 2017-07-28 NOTE — PROGRESS NOTES
HPI:      Jennifer is an 85 year old patient here for follow up for CAD, uncontrolled HTN and HL, CAD s/p had CABG X 3 in 1992 at [place] and s/p MI requiring angiogram in 2009 with stenting to the RCA graft who is here for annual follow up.     She reports feeling well today and denies CP, palpitations, and some mild SOB w/ exertion. She has cut back on exercise because she was feeling lazy but is now walking about 400 feet per day and occasionally goes to the gym in her facility. She has had some chronic LE edema L > R but feels this has been stable. She has not had any orthopnea or PND. She has not had any pre or ne syncope. She reports that blood pressures tend to run 140 systolic in the am prior to taking her medications and can occasionally drop as low as 84 after taking her medications so she has been holding off on taking any Hydralazine. She feels more fatigued and sluggish w/ hypotension and feels better w/ blood pressures around 130 systolic.     She does report that she had a hospitalization in February for heart failure requiring diuresis. She has been feeling well since that time and has been following a low sodium diet w/o any recurrence of issue.          PAST MEDICAL HISTORY:  Past Medical History:   Diagnosis Date     Abdominal wall hernia     three hernias at previous incision sites, allergic to mesh so repair not repeated, wears girdle     Anxiety 1/9/2012     ASCVD (arteriosclerotic cardiovascular disease) 1/9/2012     CKD (chronic kidney disease) stage 3, GFR 30-59 ml/min 1/10/2012     Colon polyp     resected     DDD (degenerative disc disease), lumbar 1/10/2012     Hyperlipidemia LDL goal <100 1/9/2012     Hyperlipidemia LDL goal <70 12/16/2013     Hypertension goal BP (blood pressure) < 140/90 1/9/2012     Incontinence of urine 1/9/2012     Left hip pain 1/9/2012     Low back pain 1/9/2012     Seasonal allergies 1/9/2012     STEMI (ST elevation myocardial infarction) (H) 8-    with  VF arrest.     Stented coronary artery 8-     TIA (transient ischaemic attack) 1/10/2012       CURRENT MEDICATIONS:  Current Outpatient Prescriptions   Medication Sig Dispense Refill     furosemide (LASIX) 20 MG tablet Take 1 tablet (20 mg) by mouth daily (Patient taking differently: Take 10 mg by mouth daily ) 90 tablet 3     hydrALAZINE (APRESOLINE) 50 MG tablet Take 1 tablet (50 mg) by mouth 2 times daily Takes an additional 50 mg in the afternoon if systolic blood pressure is higher than 160 90 tablet 3     losartan (COZAAR) 100 MG tablet Take 1 tablet (100 mg) by mouth daily 90 tablet 3     albuterol (PROAIR HFA/PROVENTIL HFA/VENTOLIN HFA) 108 (90 BASE) MCG/ACT Inhaler Inhale 2 puffs into the lungs every 6 hours as needed 1 Inhaler 0     BETA BLOCKER NOT PRESCRIBED, INTENTIONAL, Beta Blocker not prescribed intentionally due to COPD, shortness of breath with atenolol and lopressor  0     guaiFENesin (MUCINEX) 600 MG 12 hr tablet Take 2 tablets (1,200 mg) by mouth 2 times daily 28 tablet      aspirin 81 MG chewable tablet Take 1 tablet (81 mg) by mouth daily 60 tablet 3     predniSONE (DELTASONE) 1 MG tablet Take 2 mg by mouth daily       fluticasone (FLONASE) 50 MCG/ACT spray Spray 2 sprays into both nostrils every other day 16 g 3     escitalopram (LEXAPRO) 10 MG tablet Take 1 tablet (10 mg) by mouth daily 30 tablet 9     amLODIPine (NORVASC) 10 MG tablet Take 1 tablet (10 mg) by mouth daily 90 tablet 3     atorvastatin (LIPITOR) 40 MG tablet Take 1 tablet (40 mg) by mouth daily 90 tablet 3     Saline (OCEAN NASAL SPRAY NA) Administer 1 spray in each nostril up to two times daily as needed       Cholecalciferol (VITAMIN D3 PO) Take 1,000 Units by mouth daily       Calcium Citrate-Vitamin D (CALCIUM CITRATE + PO) Take 1 tablet by mouth daily        sodium chloride-sodium bicarb (CLASSIC NETI POT SINUS WASH) 2300-700 MG KIT Mix 1 packet in Neti Pot and administer in each nostril daily as needed        "Multiple Vitamins-Minerals (MULTIVITAMIN OR) Take 1 tablet by mouth daily        docusate sodium (COLACE) 100 MG capsule Take 1 capsule by mouth 2 times daily          PAST SURGICAL HISTORY:  Past Surgical History:   Procedure Laterality Date     abdominal abscess      at incisional site after kristine     adominal hernia repair      had mesh placed, then allergic so it was removed and she wears a girdle     CHOLECYSTECTOMY       CORONARY ARTERY BYPASS  1992       ALLERGIES     Allergies   Allergen Reactions     Adhesive Tape      blisters     Atenolol      SOB     Lopressor Hct      SOB       FAMILY HISTORY:  No family history on file.    SOCIAL HISTORY:  History     Social History     Marital Status:      Spouse Name: N/A     Number of Children: N/A     Years of Education: N/A     Social History Main Topics     Smoking status: Former Smoker     Smokeless tobacco: Never Used      Comment: quit smoking in 1983     Alcohol Use: No     Drug Use: No     Sexually Active: No     Other Topics Concern     Parent/Sibling W/ Cabg, Mi Or Angioplasty Before 65f 55m? No     Social History Narrative       ROS:   Constitutional: No fever, chills, or sweats. No weight gain/loss.  ENT: No visual disturbance, ear ache, epistaxis, sore throat.  Allergies/Immunologic: Negative.   Respiratory: No cough, wheezing, or hemoptysia.  Cardiovascular: As per HPI.  GI: No nausea, vomiting, hematemesis, melena, or hematochezia  : Some urinary urgency/frequency w/o hematuria.   Integument: No rashes, sores.  Psychiatric: Negative  Neuro: Some mild right forefinger numbness/weakness as a result of TIA in 2013 which has remained unchanged.  Endocrinology: Negative   Musculoskeletal: Negative    EXAM:  /62  Pulse 74  Ht 1.575 m (5' 2\")  Wt 59.8 kg (131 lb 12.8 oz)  SpO2 95%  BMI 24.11 kg/m2     In general, the patient is a pleasant female in no apparent distress.    HEENT:  Sclerae white, not injected.  Nares clear.  Pharynx " without erythema or exudate.  Dentition intact.    Neck: No adenopathy.  No thyromegaly. Carotids +4/4 bilaterally without bruits. JVP mildly elevated.   Heart: RRR. Normal S1, S2 splits physiologically. Soft ESM. The PMI is in the 5th ICS in the midclavicular line. There is no heave.    Lungs: CTA.  No ronchi, wheezes, rales.  No dullness to percussion.   Abdomen: Soft, nontender, nondistended. No organomegaly.  No bruits. She does have multiple small hernias.   Extremities: No clubbing, or cyanosis. Trace LE edema w/ sheryl hose on.  The pulses are +4/4 at the radial, brachial, femoral, popliteal, DP, and PT sites bilaterally.  No bruits are noted.  Neurologic: Alert and oriented to person/place/time, normal speech, gait and affect.  Skin: No petechiae, purpura or rash.    Labs:  LIPID RESULTS:  Lab Results   Component Value Date    CHOL 169 02/09/2017    HDL 87 02/09/2017    LDL 66 02/09/2017    TRIG 80 02/09/2017    CHOLHDLRATIO 2.3 07/06/2015       LIVER ENZYME RESULTS:  Lab Results   Component Value Date    AST 63 (H) 02/09/2017    ALT 56 (H) 02/09/2017       CBC RESULTS:  Lab Results   Component Value Date    WBC 7.4 02/09/2017    RBC 4.20 02/09/2017    HGB 13.7 02/09/2017    HCT 43.0 02/09/2017     (H) 02/09/2017    MCH 32.6 02/09/2017    MCHC 31.9 02/09/2017    RDW 14.4 02/09/2017     02/09/2017       BMP RESULTS:  Lab Results   Component Value Date     05/08/2017    POTASSIUM 4.4 05/08/2017    CHLORIDE 104 05/08/2017    CO2 31 05/08/2017    ANIONGAP 5 05/08/2017    GLC 98 05/08/2017    BUN 20 05/08/2017    CR 1.02 05/08/2017    GFRESTIMATED 51 (L) 05/08/2017    GFRESTBLACK 62 05/08/2017    JERILYN 9.2 05/08/2017        A1C RESULTS:  Lab Results   Component Value Date    A1C 5.6 04/24/2014       INR RESULTS:  Lab Results   Component Value Date    INR 0.90 04/24/2014    INR 0.90 10/17/2013       Procedures:  Echocardiogram 2/9/2017: Mild concentric wall thickening consistent w/ LV hypertrophy.  Mildly reduced LV function w/ EF 40-45 % and Grade III or advanced diastolic dysfunction. Inferior wall akinesis is present. RV function, chamber size, wall motion, and thickness are normal. The right atrial appears normal. Severe left atrial enlargement is present. Trace MR. The aortic valve is tricuspid. Trace AI is present. Trace pulmonic insufficiency is present.     PFT's: 6/25/2014  EV1/FVC is reduced. The FEV1 and the FVC are reduced. There is an 18% improvement in FEV1 after administration of a bronchodilator. The TLC is normal. The RV/TLC is within normal limits. DLCO is reduced to 48% predicted, not corrected for hemoglobin.  The flow-volume loop was not available for interpretation and the interpretation is based on absolute values.  IMPRESSION:  Moderate airflow obstruction with a significant response to bronchodilators.   Moderate reduction in diffusing capacity.   Normal lung volumes.       Echo: 10/17/2013   No cardiac source for embolus identified. Bubble study negative though the images are of suboptimal quality. Normal LV function with EF 60%.      Nuclear Stress: 5/30/2014   Impression:   1. Abnormal myocardial SPECT study with a summed stress score of 6.   There are changes of ischemia of the inferolateral wall of left   ventricle in the left circumflex coronary artery distribution.   2. Abnormal wall motion of the inferolateral wall hypokinesia during   stress.       CCL: 8- Posterobasal akinesis with overall preserved left ventricular global systolic function. Mild to moderate (1-2+) mitral regurgitation. Acute occlusion of vein graft to RCA, well treated with placement of BMS into the body of the vein graft, normalization of antegrade flow. Good revascularization of the LAD and circumflex systems.       Assessment/Plan :  Ms. Bob is doing well. No chest pains or shortness of breath.  Cardiac exam is benign. Medications and labs reviewed. Continue with current therapies.  Follow-up  in a year.    JANE Barraza, CNP  [facility]  Interventional/Structural Cardiology-CSI Service    Patient examined, chart reviewed and the above reflects our joint assessment and plans.      Irlanda Adrian MD                                                                                                                                                                      CC  Patient Care Team:  Torri Colmenares MD as PCP - General (Family Practice)  Guillermina Monroy as Clinic Care Coordinator (Cardiology)  Irlanda Adrian MD as MD (Internal Medicine)  TORRI COLMENARES

## 2017-07-28 NOTE — TELEPHONE ENCOUNTER
PFT's: 6/25/2014  EV1/FVC is reduced. The FEV1 and the FVC are reduced. There is an 18% improvement in FEV1 after administration of a bronchodilator. The TLC is normal. The RV/TLC is within normal limits. DLCO is reduced to 48% predicted, not corrected for hemoglobin.  The flow-volume loop was not available for interpretation and the interpretation is based on absolute values.  IMPRESSION:  Moderate airflow obstruction with a significant response to bronchodilators.   Moderate reduction in diffusing capacity.   Normal lung volumes.         2/9/17--Echo  Interpretation Summary  Mildly (EF 40-45%) reduced left ventricular function is present. Traced at  42%. Inferior wall akinesis is present.  Grade III or advanced diastolic dysfunction.  Right ventricular function, chamber size, wall motion, and thickness are  normal.  The inferior vena cava was normal in size with preserved respiratory  variability.  A left pleural effusion is present.     Compared to prior study, LV fxn is lower. Inferior akinesis is old.  Echo: 10/17/2013   No cardiac source for embolus identified. Bubble study negative though the images are of suboptimal quality. Normal LV function with EF 60%.        Nuclear Stress: 5/30/2014   Impression:   1. Abnormal myocardial SPECT study with a summed stress score of 6.   There are changes of ischemia of the inferolateral wall of left   ventricle in the left circumflex coronary artery distribution.   2. Abnormal wall motion of the inferolateral wall hypokinesia during   stress.         CCL: 8- Posterobasal akinesis with overall preserved left ventricular global systolic function. Mild to moderate (1-2+) mitral regurgitation. Acute occlusion of vein graft to RCA, well treated with placement of BMS into the body of the vein graft, normalization of antegrade flow. Good revascularization of the LAD and circumflex systems.

## 2017-07-28 NOTE — MR AVS SNAPSHOT
After Visit Summary   7/28/2017    Jennifer Bob    MRN: [id number]           Patient Information     Date Of Birth          12/5/1931        Visit Information        Provider Department      7/28/2017 11:00 AM Irlanad Adrian MD [facility]        Care Instructions    Thank you for your visit today.  Please call me with any questions or concerns.   Johnnie Mcknight RN  Cardiology Care Coordinator  [phone], press option 1 then option 3          Follow-ups after your visit        Follow-up notes from your care team     Return in about 1 year (around 7/28/2018) for Heart Failure, Dr. Adrian.      Your next 10 appointments already scheduled     Sep 11, 2017 10:40 AM CDT   SHORT with Torri Fisher MD   [facility] ([facility]    [address] [place] [id number] [phone]              Who to contact     If you have questions or need follow up information about today's clinic visit or your schedule please contact [facility] directly at [phone].  Normal or non-critical lab and imaging results will be communicated to you by [employer], letter or phone within 4 business days after the clinic has received the results. If you do not hear from us within 7 days, please contact the clinic through [employer] or phone. If you have a critical or abnormal lab result, we will notify you by phone as soon as possible.  Submit refill requests through [employer] or call your pharmacy and they will forward the refill request to us. Please allow 3 business days for your refill to be completed.          Additional Information About Your Visit        MyChart Information     [employer] gives you secure access to your electronic health record. If you see a primary care provider, you can also send messages to your care team and make appointments. If you have questions, please call your primary care clinic.  If you do not have a primary care provider,  "please call [phone] and they will assist you.        Care EveryWhere ID     This is your Care EveryWhere ID. This could be used by other organizations to access your [facility] medical records  [id number]        Your Vitals Were     Pulse Height Pulse Oximetry BMI (Body Mass Index)          74 1.575 m (5' 2\") 95% 24.11 kg/m2         Blood Pressure from Last 3 Encounters:   07/28/17 180/62   05/08/17 191/72   04/18/17 190/66    Weight from Last 3 Encounters:   07/28/17 59.8 kg (131 lb 12.8 oz)   05/08/17 60.1 kg (132 lb 8 oz)   04/18/17 60.3 kg (133 lb)              Today, you had the following     No orders found for display         Today's Medication Changes          These changes are accurate as of: 7/28/17 11:49 AM.  If you have any questions, ask your nurse or doctor.               These medicines have changed or have updated prescriptions.        Dose/Directions    furosemide 20 MG tablet   Commonly known as:  LASIX   This may have changed:  how much to take   Used for:  Essential hypertension with goal blood pressure less than 140/90        Dose:  20 mg   Take 1 tablet (20 mg) by mouth daily   Quantity:  90 tablet   Refills:  3                Primary Care Provider Office Phone # Fax #    Torri Fisher [phone]       [facility] [address]  [facility] [address]        Equal Access to Services     PRO [facility] HospitalFELICIA AH: Hadii tex torreso [place], waaxda luqadaha, qaybta kaalmada becky, nicolas cintron. So [facility] [phone].    ATENCIÓN: Si habla español, tiene a nagel disposición servicios gratuitos de asistencia lingüística. Llame al [phone].    We comply with applicable federal civil rights laws and [place] laws. We do not discriminate on the basis of race, color, national origin, age, disability sex, sexual orientation or gender identity.            Thank you!     Thank you for choosing [facility]  for your care. Our goal is always " to provide you with excellent care. Hearing back from our patients is one way we can continue to improve our services. Please take a few minutes to complete the written survey that you may receive in the mail after your visit with us. Thank you!             Your Updated Medication List - Protect others around you: Learn how to safely use, store and throw away your medicines at www.disposemymeds.org.          This list is accurate as of: 7/28/17 11:49 AM.  Always use your most recent med list.                   Brand Name Dispense Instructions for use Diagnosis    albuterol 108 (90 BASE) MCG/ACT Inhaler    PROAIR HFA/PROVENTIL HFA/VENTOLIN HFA    1 Inhaler    Inhale 2 puffs into the lungs every 6 hours as needed    Chronic obstructive pulmonary disease, unspecified COPD type (H)       amLODIPine 10 MG tablet    NORVASC    90 tablet    Take 1 tablet (10 mg) by mouth daily    Essential hypertension with goal blood pressure less than 140/90       aspirin 81 MG chewable tablet     60 tablet    Take 1 tablet (81 mg) by mouth daily    ASCVD (arteriosclerotic cardiovascular disease)       atorvastatin 40 MG tablet    LIPITOR    90 tablet    Take 1 tablet (40 mg) by mouth daily    Hyperlipidemia LDL goal <100       BETA BLOCKER NOT PRESCRIBED (INTENTIONAL)      Beta Blocker not prescribed intentionally due to COPD, shortness of breath with atenolol and lopressor        CALCIUM CITRATE + PO      Take 1 tablet by mouth daily        CLASSIC NETI POT SINUS WASH 2300-700 MG Kit   Generic drug:  sodium chloride-sodium bicarb      Mix 1 packet in Neti Pot and administer in each nostril daily as needed        COLACE 100 MG capsule   Generic drug:  docusate sodium      Take 1 capsule by mouth 2 times daily        escitalopram 10 MG tablet    LEXAPRO    30 tablet    Take 1 tablet (10 mg) by mouth daily    Anxiety       fluticasone 50 MCG/ACT spray    FLONASE    16 g    Spray 2 sprays into both nostrils every other day    Seasonal  allergic rhinitis, unspecified allergic rhinitis trigger       furosemide 20 MG tablet    LASIX    90 tablet    Take 1 tablet (20 mg) by mouth daily    Essential hypertension with goal blood pressure less than 140/90       hydrALAZINE 50 MG tablet    APRESOLINE    90 tablet    Take 1 tablet (50 mg) by mouth 2 times daily Takes an additional 50 mg in the afternoon if systolic blood pressure is higher than 160    Hypertension goal BP (blood pressure) < 140/90       losartan 100 MG tablet    COZAAR    90 tablet    Take 1 tablet (100 mg) by mouth daily    Essential hypertension with goal blood pressure less than 140/90       MUCINEX 600 MG 12 hr tablet   Generic drug:  guaiFENesin     28 tablet    Take 2 tablets (1,200 mg) by mouth 2 times daily        MULTIVITAMIN PO      Take 1 tablet by mouth daily        OCEAN NASAL SPRAY NA      Administer 1 spray in each nostril up to two times daily as needed        predniSONE 1 MG tablet    DELTASONE     Take 2 mg by mouth daily        VITAMIN D3 PO      Take 1,000 Units by mouth daily

## 2017-07-28 NOTE — NURSING NOTE
Chief Complaint   Patient presents with     Follow Up For     CAD S/P stenting and CABG X 3 in 1992      Vitals were taken and medications were reconciled.     LIDIA Blanco  10:36 AM

## 2017-07-28 NOTE — NURSING NOTE
Med Reconcile: Reviewed and verified all current medications with the patient. The updated medication list was printed and given to the patient.  Return Appointment: Follow up in one year. Patient given instructions regarding scheduling next clinic visit. Patient demonstrated understanding of this information and agreed to call with further questions or concerns.  Patient stated she understood all health information given and agreed to call with further questions or concerns.

## 2017-07-28 NOTE — PATIENT INSTRUCTIONS
Thank you for your visit today.  Please call me with any questions or concerns.   Johnnie Mcknight RN  Cardiology Care Coordinator  [phone], press option 1 then option 3

## 2017-09-12 ENCOUNTER — OFFICE VISIT (OUTPATIENT)
Dept: FAMILY MEDICINE | Facility: CLINIC | Age: 82
End: 2017-09-12
Payer: COMMERCIAL

## 2017-09-12 VITALS
BODY MASS INDEX: 23.78 KG/M2 | OXYGEN SATURATION: 96 % | WEIGHT: 130 LBS | SYSTOLIC BLOOD PRESSURE: 190 MMHG | TEMPERATURE: 98.2 F | DIASTOLIC BLOOD PRESSURE: 61 MMHG | HEART RATE: 70 BPM

## 2017-09-12 DIAGNOSIS — E78.5 HYPERLIPIDEMIA LDL GOAL <70: ICD-10-CM

## 2017-09-12 DIAGNOSIS — I10 HYPERTENSION GOAL BP (BLOOD PRESSURE) < 140/90: Primary | ICD-10-CM

## 2017-09-12 DIAGNOSIS — M35.3 PMR (POLYMYALGIA RHEUMATICA) (H): ICD-10-CM

## 2017-09-12 DIAGNOSIS — I69.90 LATE EFFECTS OF CVA (CEREBROVASCULAR ACCIDENT): ICD-10-CM

## 2017-09-12 DIAGNOSIS — J44.9 CHRONIC OBSTRUCTIVE PULMONARY DISEASE, UNSPECIFIED COPD TYPE (H): ICD-10-CM

## 2017-09-12 DIAGNOSIS — I50.32 CHRONIC DIASTOLIC HEART FAILURE (H): ICD-10-CM

## 2017-09-12 DIAGNOSIS — N18.30 CKD (CHRONIC KIDNEY DISEASE) STAGE 3, GFR 30-59 ML/MIN (H): ICD-10-CM

## 2017-09-12 PROCEDURE — 99214 OFFICE O/P EST MOD 30 MIN: CPT | Performed by: FAMILY MEDICINE

## 2017-09-12 NOTE — PROGRESS NOTES
"  SUBJECTIVE:   Jennifer Bob is a 85 year old female who presents to clinic today for the following health issues:    Hypertension Follow-up    Outpatient blood pressures are being checked at home.  Results are 150/58 and later 130/50.    Low Salt Diet: not monitoring salt        Amount of exercise or physical activity: 1 day/week for an average of 15-30 minutes    Problems taking medications regularly: No    Medication side effects: none    Diet: regular (no restrictions)      Cardiology on 7/28/17:  \"Assessment/Plan :  Ms. Bob is doing well. No chest pains or shortness of breath.  Cardiac exam is benign. Medications and labs reviewed. Continue with current therapies.  Follow-up in a year.\"        She says she is still experiencing arthritic pain. She is still able to sew and play cards. She has follow up with Rheum in November.     Declines flu shot. She will speak with Dr. Valdes as he did not want her to get the shot last year.    She has not had to use hydralazine with high blood pressure at home. Home readings have been within range.        Problem list and histories reviewed & adjusted, as indicated.  Additional history: as documented    Patient Active Problem List   Diagnosis     Anxiety     Low back pain     Left hip pain     ASCVD (arteriosclerotic cardiovascular disease)     Incontinence of urine     Hypertension goal BP (blood pressure) < 140/90     Seasonal allergies     Myocardial infarction (H)     DDD (degenerative disc disease), lumbar     Transient cerebral ischemia     CKD (chronic kidney disease) stage 3, GFR 30-59 ml/min     Corns and callosities     Health Care Home     Spinal stenosis, lumbar region, without neurogenic claudication     Synovial cyst of lumbar facet joint     Acquired spondylolisthesis     Lumbar radicular pain     Stroke (H)     Hypertension     Hyperlipidemia LDL goal <70     Late effects of CVA (cerebrovascular accident)     COPD (chronic obstructive pulmonary " disease) (H)     ACP (advance care planning)     RA (rheumatoid arthritis) (H)     Gout     Chronic pain syndrome     Heart failure (H)     Chronic diastolic heart failure (H)     Past Surgical History:   Procedure Laterality Date     abdominal abscess      at incisional site after kristine     adominal hernia repair      had mesh placed, then allergic so it was removed and she wears a girdle     CHOLECYSTECTOMY       CORONARY ARTERY BYPASS  1992       Social History   Substance Use Topics     Smoking status: Former Smoker     Smokeless tobacco: Never Used      Comment: quit smoking in 1983     Alcohol use No     History reviewed. No pertinent family history.      Current Outpatient Prescriptions   Medication Sig Dispense Refill     furosemide (LASIX) 20 MG tablet Take 1 tablet (20 mg) by mouth daily (Patient taking differently: Take 10 mg by mouth daily ) 90 tablet 3     hydrALAZINE (APRESOLINE) 50 MG tablet Take 1 tablet (50 mg) by mouth 2 times daily Takes an additional 50 mg in the afternoon if systolic blood pressure is higher than 160 90 tablet 3     losartan (COZAAR) 100 MG tablet Take 1 tablet (100 mg) by mouth daily 90 tablet 3     albuterol (PROAIR HFA/PROVENTIL HFA/VENTOLIN HFA) 108 (90 BASE) MCG/ACT Inhaler Inhale 2 puffs into the lungs every 6 hours as needed 1 Inhaler 0     BETA BLOCKER NOT PRESCRIBED, INTENTIONAL, Beta Blocker not prescribed intentionally due to COPD, shortness of breath with atenolol and lopressor  0     guaiFENesin (MUCINEX) 600 MG 12 hr tablet Take 2 tablets (1,200 mg) by mouth 2 times daily 28 tablet      aspirin 81 MG chewable tablet Take 1 tablet (81 mg) by mouth daily 60 tablet 3     predniSONE (DELTASONE) 1 MG tablet Take 2 mg by mouth daily       fluticasone (FLONASE) 50 MCG/ACT spray Spray 2 sprays into both nostrils every other day 16 g 3     escitalopram (LEXAPRO) 10 MG tablet Take 1 tablet (10 mg) by mouth daily 30 tablet 9     amLODIPine (NORVASC) 10 MG tablet Take 1 tablet  (10 mg) by mouth daily 90 tablet 3     atorvastatin (LIPITOR) 40 MG tablet Take 1 tablet (40 mg) by mouth daily 90 tablet 3     Saline (OCEAN NASAL SPRAY NA) Administer 1 spray in each nostril up to two times daily as needed       Cholecalciferol (VITAMIN D3 PO) Take 1,000 Units by mouth daily       Calcium Citrate-Vitamin D (CALCIUM CITRATE + PO) Take 1 tablet by mouth daily        sodium chloride-sodium bicarb (CLASSIC NETI POT SINUS WASH) 2300-700 MG KIT Mix 1 packet in Neti Pot and administer in each nostril daily as needed       Multiple Vitamins-Minerals (MULTIVITAMIN OR) Take 1 tablet by mouth daily        docusate sodium (COLACE) 100 MG capsule Take 1 capsule by mouth 2 times daily        Allergies   Allergen Reactions     Adhesive Tape      blisters     Atenolol      SOB     Lopressor Hct      SOB     Recent Labs   Lab Test  05/08/17   1126  04/05/17   0856   02/09/17   1239  02/09/17   0440 11/17/16  08/15/16   1008  09/22/15   1240  07/06/15   1039   04/24/14   2221   10/17/13   0552   A1C   --    --    --    --    --    --    --    --    --    --   5.6   --   5.6   LDL   --    --    --   66   --    --   74   --   73   < >   --    --   85   HDL   --    --    --   87   --    --   66   --   72   < >   --    --   59   TRIG   --    --    --   80   --    --   123   --   88   < >   --    --   107   ALT   --    --    --   56*  49  29   --   27   --    --    --    < >   --    CR  1.02  1.12*   < >   --   0.93  0.870  0.98  1.04  1.15*   < >   --    < >  1.00   GFRESTIMATED  51*  46*   < >   --   57*  47*  65.9  54*  51*  45*   < >   --    < >  53*   GFRESTBLACK  62  56*   < >   --   69  57*   --   65  61  54*   < >   --    < >  64   POTASSIUM  4.4  4.0   < >   --   3.8   --   4.3  3.8  4.3   < >   --    < >  3.7   TSH   --    --    --   1.66   --    --    --   1.78   --    --    --    --    --     < > = values in this interval not displayed.      BP Readings from Last 3 Encounters:   09/12/17 190/61   07/28/17  180/62   05/08/17 191/72    Wt Readings from Last 3 Encounters:   09/12/17 59 kg (130 lb)   07/28/17 59.8 kg (131 lb 12.8 oz)   05/08/17 60.1 kg (132 lb 8 oz)         Reviewed and updated as needed this visit by clinical staffTobacco  Allergies  Meds  Med Hx  Surg Hx  Fam Hx  Soc Hx      Reviewed and updated as needed this visit by Provider         ROS:   Denies lower extremity edema, PND, orthopnea, chest pain,shortness of breath.    This document serves as a record of the services and decisions personally performed and made by Torri Fisher MD. It was created on his/her behalf by Francheska Julian, trained medical scribe. The creation of this document is based the provider's statements to the medical scribes.    Scribe Francheska Julian, September 12, 2017  OBJECTIVE:   /61  Pulse 70  Temp 98.2  F (36.8  C) (Oral)  Wt 59 kg (130 lb)  SpO2 96%  BMI 23.78 kg/m2  Body mass index is 23.78 kg/(m^2).  GENERAL: healthy, alert and no distress  PSYCH: mentation appears normal, affect normal/bright    Diagnostic Test Results:  none   ASSESSMENT/PLAN:   1. Hypertension goal BP (blood pressure) < 140/90  Controlled. Continues on losartan 100mg daily, furosemide 10mg daily, and amlodipine 10mg daily. Uses hydralazine 50mg twice daily and additionally as needed for high BP per cardiology. Follow up in March.    2. CKD (chronic kidney disease) stage 3, GFR 30-59 ml/min  Stable.    3. Hyperlipidemia LDL goal <70  Stable. Continues atorvastatin.    4. Chronic obstructive pulmonary disease, unspecified COPD type (H)  Stable.     5. Chronic diastolic heart failure (H)  Stable. Followed by Cardiology.    6. Late effects of CVA (cerebrovascular accident)  Stable. No new symptoms    7. PMR (polymyalgia rheumatica) (H)  Stable. Followed by rheum. She has follow up in November.    *Declines flu shot at time.      Patient Instructions   1. Follow up with me in March.       The information in this document, created by the  medical scribe for me, accurately reflects the services I personally performed and the decisions made by me. I have reviewed and approved this document for accuracy. 09/12/17    Torri Fisher MD  [facility]

## 2017-09-12 NOTE — MR AVS SNAPSHOT
After Visit Summary   9/12/2017    Jennifer Bob    MRN: [id number]           Patient Information     Date Of Birth          12/5/1931        Visit Information        Provider Department      9/12/2017 10:20 AM Torri Fisher MD [facility]        Today's Diagnoses     Hypertension goal BP (blood pressure) < 140/90    -  1    CKD (chronic kidney disease) stage 3, GFR 30-59 ml/min        Hyperlipidemia LDL goal <70        Chronic obstructive pulmonary disease, unspecified COPD type (H)        Chronic diastolic heart failure (H)        Late effects of CVA (cerebrovascular accident)        PMR (polymyalgia rheumatica) (H)          Care Instructions    1. Follow up with me in March.           Follow-ups after your visit        Who to contact     If you have questions or need follow up information about today's clinic visit or your schedule please contact [facility] directly at [phone].  Normal or non-critical lab and imaging results will be communicated to you by MyChart, letter or phone within 4 business days after the clinic has received the results. If you do not hear from us within 7 days, please contact the clinic through [employer] or phone. If you have a critical or abnormal lab result, we will notify you by phone as soon as possible.  Submit refill requests through [employer] or call your pharmacy and they will forward the refill request to us. Please allow 3 business days for your refill to be completed.          Additional Information About Your Visit        MyChart Information     [employer] gives you secure access to your electronic health record. If you see a primary care provider, you can also send messages to your care team and make appointments. If you have questions, please call your primary care clinic.  If you do not have a primary care provider, please call [phone] and they will assist you.        Care EveryWhere ID     This is your Care EveryWhere  ID. This could be used by other organizations to access your [facility] medical records  [id number]        Your Vitals Were     Pulse Temperature Pulse Oximetry BMI (Body Mass Index)          70 98.2  F (36.8  C) (Oral) 96% 23.78 kg/m2         Blood Pressure from Last 3 Encounters:   09/12/17 190/61   07/28/17 180/62   05/08/17 191/72    Weight from Last 3 Encounters:   09/12/17 59 kg (130 lb)   07/28/17 59.8 kg (131 lb 12.8 oz)   05/08/17 60.1 kg (132 lb 8 oz)              Today, you had the following     No orders found for display         Today's Medication Changes          These changes are accurate as of: 9/12/17 10:40 AM.  If you have any questions, ask your nurse or doctor.               These medicines have changed or have updated prescriptions.        Dose/Directions    furosemide 20 MG tablet   Commonly known as:  LASIX   This may have changed:  how much to take   Used for:  Essential hypertension with goal blood pressure less than 140/90        Dose:  20 mg   Take 1 tablet (20 mg) by mouth daily   Quantity:  90 tablet   Refills:  3                Primary Care Provider Office Phone # Fax #    Torri Fisher [phone] [address] [place] [address]        Equal Access to Services     GONZALO KAY AH: Clint torreso Soivonali, waaxda luqadaha, qaybta kaalmada adeegyada, nicolas cintron. So [facility] [phone].    ATENCIÓN: Si habla español, tiene a nagel disposición servicios gratuitos de asistencia lingüística. Llame al [phone].    We comply with applicable federal civil rights laws and [place] laws. We do not discriminate on the basis of race, color, national origin, age, disability sex, sexual orientation or gender identity.            Thank you!     Thank you for choosing [facility]  for your care. Our goal is always to provide you with excellent care. Hearing back from our patients is one way we can continue to improve our  services. Please take a few minutes to complete the written survey that you may receive in the mail after your visit with us. Thank you!             Your Updated Medication List - Protect others around you: Learn how to safely use, store and throw away your medicines at www.disposemymeds.org.          This list is accurate as of: 9/12/17 10:40 AM.  Always use your most recent med list.                   Brand Name Dispense Instructions for use Diagnosis    albuterol 108 (90 BASE) MCG/ACT Inhaler    PROAIR HFA/PROVENTIL HFA/VENTOLIN HFA    1 Inhaler    Inhale 2 puffs into the lungs every 6 hours as needed    Chronic obstructive pulmonary disease, unspecified COPD type (H)       amLODIPine 10 MG tablet    NORVASC    90 tablet    Take 1 tablet (10 mg) by mouth daily    Essential hypertension with goal blood pressure less than 140/90       aspirin 81 MG chewable tablet     60 tablet    Take 1 tablet (81 mg) by mouth daily    ASCVD (arteriosclerotic cardiovascular disease)       atorvastatin 40 MG tablet    LIPITOR    90 tablet    Take 1 tablet (40 mg) by mouth daily    Hyperlipidemia LDL goal <100       BETA BLOCKER NOT PRESCRIBED (INTENTIONAL)      Beta Blocker not prescribed intentionally due to COPD, shortness of breath with atenolol and lopressor        CALCIUM CITRATE + PO      Take 1 tablet by mouth daily        CLASSIC NETI POT SINUS WASH 2300-700 MG Kit   Generic drug:  sodium chloride-sodium bicarb      Mix 1 packet in Neti Pot and administer in each nostril daily as needed        COLACE 100 MG capsule   Generic drug:  docusate sodium      Take 1 capsule by mouth 2 times daily        escitalopram 10 MG tablet    LEXAPRO    30 tablet    Take 1 tablet (10 mg) by mouth daily    Anxiety       fluticasone 50 MCG/ACT spray    FLONASE    16 g    Spray 2 sprays into both nostrils every other day    Seasonal allergic rhinitis, unspecified allergic rhinitis trigger       furosemide 20 MG tablet    LASIX    90 tablet     Take 1 tablet (20 mg) by mouth daily    Essential hypertension with goal blood pressure less than 140/90       hydrALAZINE 50 MG tablet    APRESOLINE    90 tablet    Take 1 tablet (50 mg) by mouth 2 times daily Takes an additional 50 mg in the afternoon if systolic blood pressure is higher than 160    Hypertension goal BP (blood pressure) < 140/90       losartan 100 MG tablet    COZAAR    90 tablet    Take 1 tablet (100 mg) by mouth daily    Essential hypertension with goal blood pressure less than 140/90       MUCINEX 600 MG 12 hr tablet   Generic drug:  guaiFENesin     28 tablet    Take 2 tablets (1,200 mg) by mouth 2 times daily        MULTIVITAMIN PO      Take 1 tablet by mouth daily        OCEAN NASAL SPRAY NA      Administer 1 spray in each nostril up to two times daily as needed        predniSONE 1 MG tablet    DELTASONE     Take 2 mg by mouth daily        VITAMIN D3 PO      Take 1,000 Units by mouth daily

## 2017-10-17 DIAGNOSIS — E78.5 HYPERLIPIDEMIA LDL GOAL <100: ICD-10-CM

## 2017-10-17 DIAGNOSIS — I10 ESSENTIAL HYPERTENSION WITH GOAL BLOOD PRESSURE LESS THAN 140/90: ICD-10-CM

## 2017-10-19 RX ORDER — ATORVASTATIN CALCIUM 40 MG/1
TABLET, FILM COATED ORAL
Qty: 90 TABLET | Refills: 0 | Status: SHIPPED | OUTPATIENT
Start: 2017-10-19 | End: 2018-01-23

## 2017-10-19 RX ORDER — AMLODIPINE BESYLATE 10 MG/1
TABLET ORAL
Qty: 90 TABLET | Refills: 1 | Status: SHIPPED | OUTPATIENT
Start: 2017-10-19 | End: 2018-04-30

## 2017-10-19 NOTE — TELEPHONE ENCOUNTER
"Routing refill request to provider for review/approval because:  --Last blood pressure's so RN can't refill amlodipine.    --Plan in last office visit 9/12/17 per  says ---- \"Hypertension goal BP (blood pressure) < 140/90  Controlled. Continues on losartan 100mg daily, furosemide 10mg daily, and amlodipine 10mg daily. Uses hydralazine 50mg twice daily and additionally as needed for high BP per cardiology. Follow up in March.\"    BP Readings from Last 6 Encounters:   09/12/17 190/61   07/28/17 180/62   05/08/17 191/72   04/18/17 190/66   03/14/17 170/54   02/22/17 168/64               "

## 2017-11-07 DIAGNOSIS — F41.9 ANXIETY: ICD-10-CM

## 2017-11-08 RX ORDER — ESCITALOPRAM OXALATE 10 MG/1
10 TABLET ORAL DAILY
Qty: 30 TABLET | Refills: 0 | Status: SHIPPED | OUTPATIENT
Start: 2017-11-08 | End: 2018-04-30

## 2017-11-08 NOTE — TELEPHONE ENCOUNTER
Pt due for appointment addressing anxiety. Message to patient to schedule. Refill sent in.    GLENN ErazoN, RN  [facility]

## 2018-01-23 DIAGNOSIS — E78.5 HYPERLIPIDEMIA LDL GOAL <100: ICD-10-CM

## 2018-01-23 DIAGNOSIS — J44.9 CHRONIC OBSTRUCTIVE PULMONARY DISEASE, UNSPECIFIED COPD TYPE (H): ICD-10-CM

## 2018-01-23 DIAGNOSIS — J30.2 SEASONAL ALLERGIC RHINITIS: ICD-10-CM

## 2018-01-24 NOTE — TELEPHONE ENCOUNTER
"Requested Prescriptions   Pending Prescriptions Disp Refills     fluticasone (FLONASE) 50 MCG/ACT spray [Pharmacy Med Name: FLUTICASONE 50MCG NASAL SPRAY]  Last Written Prescription Date:  12/8/2016  Last Fill Quantity: 16g,  # refills: 3   Last Office Visit: 9/12/2017   Future Office Visit:      16 g 3     Sig: USE TWO SPRAYS IN EACH NOSTRIL EVERY OTHER DAY    Inhaled Steroids Protocol Passed    1/23/2018  4:41 PM       Passed - Patient is age 12 or older       Passed - Recent or future visit with authorizing provider's specialty    Patient had office visit in the last year or has a visit in the next 30 days with authorizing provider.  See \"Patient Info\" tab in inbasket, or \"Choose Columns\" in Meds & Orders section of the refill encounter.        ________________________________________________________________________________       atorvastatin (LIPITOR) 40 MG tablet [Pharmacy Med Name: ATORVASTATIN CALCIUM 40MG TABS]  Last Written Prescription Date:  10/19/2017  Last Fill Quantity: 90 tablet,  # refills: 0   Last Office Visit: 9/12/2017   Future Office Visit:      90 tablet 0     Sig: TAKE ONE TABLET BY MOUTH EVERY DAY    Statins Protocol Passed    1/23/2018  4:41 PM       Passed - LDL on file in past 12 months    Recent Labs   Lab Test  02/09/17   1239   LDL  66          Passed - No abnormal creatine kinase in past 12 months    No lab results found.       Passed - Recent or future visit with authorizing provider    Patient had office visit in the last year or has a visit in the next 30 days with authorizing provider.  See \"Patient Info\" tab in inbasket, or \"Choose Columns\" in Meds & Orders section of the refill encounter.          Passed - Patient is age 18 or older       Passed - No active pregnancy on record       Passed - No positive pregnancy test in past 12 months     ________________________________________________________________________________       VENTOLIN [facility] 108 (90 BASE) MCG/ACT Inhaler [Pharmacy " "Med Name: VENTPOWER HFA 108MCG/ACT AERS]  Last Written Prescription Date:  4/18/2017  Last Fill Quantity: 1,  # refills: 0   Last Office Visit: 9/12/2017   Future Office Visit:      18 g 0     Sig: INHALE 2 PUFFS INTO THE LUNGS EVERY 6 HOURS AS NEEDED    Asthma Maintenance Inhalers - Anticholinergics Passed    1/23/2018  4:41 PM   No flowsheet data found.      Passed - Patient is age 12 years or older       Passed - Recent or future visit with authorizing provider's specialty    Patient had office visit in the last year or has a visit in the next 30 days with authorizing provider.  See \"Patient Info\" tab in inbasket, or \"Choose Columns\" in Meds & Orders section of the refill encounter.               "

## 2018-01-26 RX ORDER — ATORVASTATIN CALCIUM 40 MG/1
TABLET, FILM COATED ORAL
Qty: 90 TABLET | Refills: 0 | Status: SHIPPED | OUTPATIENT
Start: 2018-01-26 | End: 2018-04-30

## 2018-01-26 RX ORDER — FLUTICASONE PROPIONATE 50 MCG
SPRAY, SUSPENSION (ML) NASAL
Qty: 16 G | Refills: 8 | Status: SHIPPED | OUTPATIENT
Start: 2018-01-26 | End: 2019-01-01

## 2018-01-26 RX ORDER — ALBUTEROL SULFATE 90 UG/1
AEROSOL, METERED RESPIRATORY (INHALATION)
Qty: 18 G | Refills: 9 | Status: SHIPPED | OUTPATIENT
Start: 2018-01-26 | End: 2019-01-01

## 2018-01-26 NOTE — TELEPHONE ENCOUNTER
LDL Cholesterol Calculated   Date Value Ref Range Status   02/09/2017 66 <100 mg/dL Final     Comment:     Desirable:       <100 mg/dl   ]  Due for lipid profile.     Due to see PCP in March. REfliled so she does not run out and noted on refill she is due for appt in March. Future lipid panel ordered    Lakeisha Bello RN, BSN

## 2018-04-30 ENCOUNTER — OFFICE VISIT (OUTPATIENT)
Dept: FAMILY MEDICINE | Facility: CLINIC | Age: 83
End: 2018-04-30
Payer: COMMERCIAL

## 2018-04-30 VITALS
TEMPERATURE: 98.1 F | HEART RATE: 67 BPM | OXYGEN SATURATION: 95 % | WEIGHT: 134.5 LBS | DIASTOLIC BLOOD PRESSURE: 60 MMHG | HEIGHT: 63 IN | SYSTOLIC BLOOD PRESSURE: 178 MMHG | RESPIRATION RATE: 20 BRPM | BODY MASS INDEX: 23.83 KG/M2

## 2018-04-30 DIAGNOSIS — E78.5 HYPERLIPIDEMIA LDL GOAL <70: ICD-10-CM

## 2018-04-30 DIAGNOSIS — I25.10 ASCVD (ARTERIOSCLEROTIC CARDIOVASCULAR DISEASE): ICD-10-CM

## 2018-04-30 DIAGNOSIS — Z78.0 POST-MENOPAUSAL: ICD-10-CM

## 2018-04-30 DIAGNOSIS — I10 HYPERTENSION GOAL BP (BLOOD PRESSURE) < 140/90: Primary | ICD-10-CM

## 2018-04-30 DIAGNOSIS — N18.30 CKD (CHRONIC KIDNEY DISEASE) STAGE 3, GFR 30-59 ML/MIN (H): ICD-10-CM

## 2018-04-30 DIAGNOSIS — M35.3 PMR (POLYMYALGIA RHEUMATICA) (H): ICD-10-CM

## 2018-04-30 DIAGNOSIS — I50.32 CHRONIC DIASTOLIC HEART FAILURE (H): ICD-10-CM

## 2018-04-30 DIAGNOSIS — J44.9 CHRONIC OBSTRUCTIVE PULMONARY DISEASE, UNSPECIFIED COPD TYPE (H): ICD-10-CM

## 2018-04-30 DIAGNOSIS — F41.9 ANXIETY: ICD-10-CM

## 2018-04-30 DIAGNOSIS — E78.5 HYPERLIPIDEMIA LDL GOAL <100: ICD-10-CM

## 2018-04-30 DIAGNOSIS — I10 ESSENTIAL HYPERTENSION WITH GOAL BLOOD PRESSURE LESS THAN 140/90: ICD-10-CM

## 2018-04-30 PROCEDURE — 99214 OFFICE O/P EST MOD 30 MIN: CPT | Performed by: FAMILY MEDICINE

## 2018-04-30 RX ORDER — LOSARTAN POTASSIUM 100 MG/1
100 TABLET ORAL DAILY
Qty: 90 TABLET | Refills: 3 | Status: SHIPPED | OUTPATIENT
Start: 2018-04-30 | End: 2018-08-29

## 2018-04-30 RX ORDER — ESCITALOPRAM OXALATE 10 MG/1
10 TABLET ORAL DAILY
Qty: 90 TABLET | Refills: 3 | Status: SHIPPED | OUTPATIENT
Start: 2018-04-30 | End: 2018-08-29

## 2018-04-30 RX ORDER — ATORVASTATIN CALCIUM 40 MG/1
TABLET, FILM COATED ORAL
Qty: 90 TABLET | Refills: 3 | Status: SHIPPED | OUTPATIENT
Start: 2018-04-30 | End: 2018-08-29

## 2018-04-30 RX ORDER — AMLODIPINE BESYLATE 10 MG/1
10 TABLET ORAL DAILY
Qty: 90 TABLET | Refills: 1 | Status: SHIPPED | OUTPATIENT
Start: 2018-04-30 | End: 2018-08-29

## 2018-04-30 RX ORDER — ATORVASTATIN CALCIUM 40 MG/1
TABLET, FILM COATED ORAL
Qty: 90 TABLET | Refills: 0 | Status: SHIPPED | OUTPATIENT
Start: 2018-04-30 | End: 2018-04-30

## 2018-04-30 RX ORDER — HYDRALAZINE HYDROCHLORIDE 50 MG/1
50 TABLET, FILM COATED ORAL 2 TIMES DAILY
Qty: 90 TABLET | Refills: 3 | Status: SHIPPED | OUTPATIENT
Start: 2018-04-30 | End: 2018-07-20

## 2018-04-30 RX ORDER — FUROSEMIDE 20 MG
20 TABLET ORAL DAILY
Qty: 90 TABLET | Refills: 3 | Status: SHIPPED | OUTPATIENT
Start: 2018-04-30 | End: 2018-08-29

## 2018-04-30 NOTE — MR AVS SNAPSHOT
After Visit Summary   4/30/2018    Jennifer Bob    MRN: [id number]           Patient Information     Date Of Birth          12/5/1931        Visit Information        Provider Department      4/30/2018 10:40 AM Torri Fisher MD [facility]        Today's Diagnoses     Hypertension goal BP (blood pressure) < 140/90    -  1    CKD (chronic kidney disease) stage 3, GFR 30-59 ml/min        Hyperlipidemia LDL goal <70        Chronic obstructive pulmonary disease, unspecified COPD type (H)        Chronic diastolic heart failure (H)        ASCVD (arteriosclerotic cardiovascular disease)        PMR (polymyalgia rheumatica) (H)        Post-menopausal        Hyperlipidemia LDL goal <100        Essential hypertension with goal blood pressure less than 140/90        Anxiety          Care Instructions    1. Call your rheumatologist today to let him know about the new joint pains.  2. Return to the clinic in the coming week or so when you are fasting for your lab work and bone density test.  3. Follow up with me in 3 months.           Follow-ups after your visit        Future tests that were ordered for you today     Open Future Orders        Priority Expected Expires Ordered    DX Hip/Pelvis/Spine Routine  4/30/2019 4/30/2018            Who to contact     If you have questions or need follow up information about today's clinic visit or your schedule please contact [facility] directly at [phone].  Normal or non-critical lab and imaging results will be communicated to you by MyChart, letter or phone within 4 business days after the clinic has received the results. If you do not hear from us within 7 days, please contact the clinic through MyChart or phone. If you have a critical or abnormal lab result, we will notify you by phone as soon as possible.  Submit refill requests through [employer] or call your pharmacy and they will forward the refill request to us. Please allow 3  "business days for your refill to be completed.          Additional Information About Your Visit        [employer] Information     [employer] gives you secure access to your electronic health record. If you see a primary care provider, you can also send messages to your care team and make appointments. If you have questions, please call your primary care clinic.  If you do not have a primary care provider, please call [phone] and they will assist you.        Care EveryWhere ID     This is your Care EveryWhere ID. This could be used by other organizations to access your [place] medical records  [id number]        Your Vitals Were     Pulse Temperature Respirations Height Pulse Oximetry BMI (Body Mass Index)    67 98.1  F (36.7  C) (Oral) 20 5' 3\" (1.6 m) 95% 23.83 kg/m2       Blood Pressure from Last 3 Encounters:   04/30/18 178/60   09/12/17 190/61   07/28/17 180/62    Weight from Last 3 Encounters:   04/30/18 134 lb 8 oz (61 kg)   09/12/17 130 lb (59 kg)   07/28/17 131 lb 12.8 oz (59.8 kg)              We Performed the Following     Albumin Random Urine Quantitative with Creat Ratio     CBC with platelets     Comprehensive metabolic panel     Lipid panel reflex to direct LDL Fasting          Today's Medication Changes          These changes are accurate as of 4/30/18 11:33 AM.  If you have any questions, ask your nurse or doctor.               Start taking these medicines.        Dose/Directions    atorvastatin 40 MG tablet   Commonly known as:  LIPITOR   Used for:  Hyperlipidemia LDL goal <100   Started by:  Torri Fisher MD        Due for appt in March, one tab daily   Quantity:  90 tablet   Refills:  3         These medicines have changed or have updated prescriptions.        Dose/Directions    amLODIPine 10 MG tablet   Commonly known as:  NORVASC   This may have changed:  See the new instructions.   Used for:  Essential hypertension with goal blood pressure less than 140/90   Changed by:  Torri Fisher MD "        Dose:  10 mg   Take 1 tablet (10 mg) by mouth daily   Quantity:  90 tablet   Refills:  1       furosemide 20 MG tablet   Commonly known as:  LASIX   This may have changed:  how much to take   Used for:  Essential hypertension with goal blood pressure less than 140/90        Dose:  20 mg   Take 1 tablet (20 mg) by mouth daily   Quantity:  90 tablet   Refills:  3            Where to get your medicines      These medications were sent to [facility] [address]  [address] [facility] [address]     Phone:  [phone]     amLODIPine 10 MG tablet    atorvastatin 40 MG tablet    escitalopram 10 MG tablet    furosemide 20 MG tablet    hydrALAZINE 50 MG tablet    losartan 100 MG tablet                Primary Care Provider Office Phone # Fax #    Torri Fisher [phone]       [address]  [facility] [address]        Equal Access to Services     PRO [facility] RegionFELICIA : Hadii tex torreso [place], waaxda luqadaha, qaybta kaalmada adeegyada, nicolas bran . So [facility] [phone].    ATENCIÓN: Si habla español, tiene a nagel disposición servicios gratuitos de asistencia lingüística. Llame al [phone].    We comply with applicable federal civil rights laws and [place] laws. We do not discriminate on the basis of race, color, national origin, age, disability, sex, sexual orientation, or gender identity.            Thank you!     Thank you for choosing [facility]  for your care. Our goal is always to provide you with excellent care. Hearing back from our patients is one way we can continue to improve our services. Please take a few minutes to complete the written survey that you may receive in the mail after your visit with us. Thank you!             Your Updated Medication List - Protect others around you: Learn how to safely use, store and throw away your medicines at www.disposemymeds.org.          This list is  accurate as of 4/30/18 11:33 AM.  Always use your most recent med list.                   Brand Name Dispense Instructions for use Diagnosis    amLODIPine 10 MG tablet    NORVASC    90 tablet    Take 1 tablet (10 mg) by mouth daily    Essential hypertension with goal blood pressure less than 140/90       aspirin 81 MG chewable tablet     60 tablet    Take 1 tablet (81 mg) by mouth daily    ASCVD (arteriosclerotic cardiovascular disease)       atorvastatin 40 MG tablet    LIPITOR    90 tablet    Due for appt in March, one tab daily    Hyperlipidemia LDL goal <100       BETA BLOCKER NOT PRESCRIBED (INTENTIONAL)      Beta Blocker not prescribed intentionally due to COPD, shortness of breath with atenolol and lopressor        CALCIUM CITRATE + PO      Take 1 tablet by mouth daily        CLASSIC NETI POT SINUS WASH 2300-700 MG Kit   Generic drug:  sodium chloride-sodium bicarb      Mix 1 packet in Neti Pot and administer in each nostril daily as needed        COLACE 100 MG capsule   Generic drug:  docusate sodium      Take 1 capsule by mouth 2 times daily        escitalopram 10 MG tablet    LEXAPRO    90 tablet    Take 1 tablet (10 mg) by mouth daily Needs appointment before refills.    Anxiety       fluticasone 50 MCG/ACT spray    FLONASE    16 g    USE TWO SPRAYS IN EACH NOSTRIL EVERY OTHER DAY    Seasonal allergic rhinitis       furosemide 20 MG tablet    LASIX    90 tablet    Take 1 tablet (20 mg) by mouth daily    Essential hypertension with goal blood pressure less than 140/90       hydrALAZINE 50 MG tablet    APRESOLINE    90 tablet    Take 1 tablet (50 mg) by mouth 2 times daily Takes an additional 50 mg in the afternoon if systolic blood pressure is higher than 160    Hypertension goal BP (blood pressure) < 140/90       losartan 100 MG tablet    COZAAR    90 tablet    Take 1 tablet (100 mg) by mouth daily    Essential hypertension with goal blood pressure less than 140/90       MUCINEX 600 MG 12 hr tablet    Generic drug:  guaiFENesin     28 tablet    Take 2 tablets (1,200 mg) by mouth 2 times daily        MULTIVITAMIN PO      Take 1 tablet by mouth daily        OCEAN NASAL SPRAY NA      Administer 1 spray in each nostril up to two times daily as needed        predniSONE 1 MG tablet    DELTASONE     Take 2 mg by mouth daily        VENTOLIN  (90 Base) MCG/ACT Inhaler   Generic drug:  albuterol     18 g    INHALE 2 PUFFS INTO THE LUNGS EVERY 6 HOURS AS NEEDED    Chronic obstructive pulmonary disease, unspecified COPD type (H)       VITAMIN D3 PO      Take 1,000 Units by mouth daily

## 2018-04-30 NOTE — PROGRESS NOTES
SUBJECTIVE:                                                    Jennifer Bob is a 86 year old female who presents to clinic today for the following health issues:      Hypertension Follow-up      Outpatient blood pressures are being checked at home.  Results are between 145-160 before medication. After results to down to 130-120    Low Salt Diet: low salt    Chronic Kidney Disease Follow-up      Current NSAID use?  no      Amount of exercise or physical activity: 6-7 days/week for an average of 100 feet before she experiences pain in her hip, and left leg    Problems taking medications regularly: No    Medication side effects: none    Diet: low salt and low fat/cholesterol      ADDITIONAL: she is experiencing pain in her right hip and her left leg, it prevents her from walking for long periods at a time. She also wants to discuss having a bone scan. He arthritis doctor wants this done. She also wants to discuss her right ankle.     Problem list and histories reviewed & adjusted, as indicated.  Additional history: as documented    Patient notes that she has been having exacerbated oint pain and she as been unable to schedule a visit with her rheumatologist. She is currently taking prednisone 2 mg, she states that the highest she has been on is 20 mg. She is unsure if her rheumatologist does injections or not. Her arthritis of her ankles has become so painful he has difficulty walking.    Her Blood pressure at home has been between 130-102. She states that she will follow up with cardiology in July.     Patient Active Problem List   Diagnosis     Anxiety     Low back pain     Left hip pain     ASCVD (arteriosclerotic cardiovascular disease)     Incontinence of urine     Hypertension goal BP (blood pressure) < 140/90     Seasonal allergies     Myocardial infarction     DDD (degenerative disc disease), lumbar     Transient cerebral ischemia     CKD (chronic kidney disease) stage 3, GFR 30-59 ml/min     [facility]     Spinal stenosis, lumbar region, without neurogenic claudication     Synovial cyst of lumbar facet joint     Acquired spondylolisthesis     Lumbar radicular pain     Stroke (H)     Hypertension     Hyperlipidemia LDL goal <70     Late effects of CVA (cerebrovascular accident)     COPD (chronic obstructive pulmonary disease) (H)     ACP (advance care planning)     RA (rheumatoid arthritis) (H)     Gout     Chronic pain syndrome     Heart failure (H)     Chronic diastolic heart failure (H)     PMR (polymyalgia rheumatica) (H)     Past Surgical History:   Procedure Laterality Date     abdominal abscess      at incisional site after kristine     adominal hernia repair      had mesh placed, then allergic so it was removed and she wears a girdle     CHOLECYSTECTOMY       CORONARY ARTERY BYPASS  1992       Social History   Substance Use Topics     Smoking status: Former Smoker     Smokeless tobacco: Never Used      Comment: quit smoking in 1983     Alcohol use No     History reviewed. No pertinent family history.      Current Outpatient Prescriptions   Medication Sig Dispense Refill     amLODIPine (NORVASC) 10 MG tablet TAKE ONE TABLET BY MOUTH EVERY DAY 90 tablet 1     aspirin 81 MG chewable tablet Take 1 tablet (81 mg) by mouth daily 60 tablet 3     atorvastatin (LIPITOR) 40 MG tablet Due for appt in March, one tab daily 90 tablet 0     BETA BLOCKER NOT PRESCRIBED, INTENTIONAL, Beta Blocker not prescribed intentionally due to COPD, shortness of breath with atenolol and lopressor  0     Calcium Citrate-Vitamin D (CALCIUM CITRATE + PO) Take 1 tablet by mouth daily        Cholecalciferol (VITAMIN D3 PO) Take 1,000 Units by mouth daily       docusate sodium (COLACE) 100 MG capsule Take 1 capsule by mouth 2 times daily        escitalopram (LEXAPRO) 10 MG tablet Take 1 tablet (10 mg) by mouth daily Needs appointment before refills. 30 tablet 0     fluticasone (FLONASE) 50 MCG/ACT spray USE  TWO SPRAYS IN EACH NOSTRIL EVERY OTHER DAY 16 g 8     furosemide (LASIX) 20 MG tablet Take 1 tablet (20 mg) by mouth daily (Patient taking differently: Take 10 mg by mouth daily ) 90 tablet 3     guaiFENesin (MUCINEX) 600 MG 12 hr tablet Take 2 tablets (1,200 mg) by mouth 2 times daily 28 tablet      hydrALAZINE (APRESOLINE) 50 MG tablet Take 1 tablet (50 mg) by mouth 2 times daily Takes an additional 50 mg in the afternoon if systolic blood pressure is higher than 160 90 tablet 3     losartan (COZAAR) 100 MG tablet Take 1 tablet (100 mg) by mouth daily 90 tablet 3     Multiple Vitamins-Minerals (MULTIVITAMIN OR) Take 1 tablet by mouth daily        predniSONE (DELTASONE) 1 MG tablet Take 2 mg by mouth daily       Saline (OCEAN NASAL SPRAY NA) Administer 1 spray in each nostril up to two times daily as needed       sodium chloride-sodium bicarb (CLASSIC NETI POT SINUS WASH) 2300-700 MG KIT Mix 1 packet in Neti Pot and administer in each nostril daily as needed       VENTOLIN  (90 BASE) MCG/ACT Inhaler INHALE 2 PUFFS INTO THE LUNGS EVERY 6 HOURS AS NEEDED 18 g 9     Allergies   Allergen Reactions     Adhesive Tape      blisters     Atenolol      SOB     Lopressor Hct      SOB     Recent Labs   Lab Test  05/08/17   1126  04/05/17   0856   02/09/17   1239  02/09/17   0440 11/17/16  08/15/16   1008  09/22/15   1240  07/06/15   1039   04/24/14   2221   10/17/13   0552   A1C   --    --    --    --    --    --    --    --    --    --   5.6   --   5.6   LDL   --    --    --   66   --    --   74   --   73   < >   --    --   85   HDL   --    --    --   87   --    --   66   --   72   < >   --    --   59   TRIG   --    --    --   80   --    --   123   --   88   < >   --    --   107   ALT   --    --    --   56*  49  29   --   27   --    --    --    < >   --    CR  1.02  1.12*   < >   --   0.93  0.870  0.98  1.04  1.15*   < >   --    < >  1.00   GFRESTIMATED  51*  46*   < >   --   57*  47*  65.9  54*  51*  45*   < >   --    " < >  53*   GFRESTBLACK  62  56*   < >   --   69  57*   --   65  61  54*   < >   --    < >  64   POTASSIUM  4.4  4.0   < >   --   3.8   --   4.3  3.8  4.3   < >   --    < >  3.7   TSH   --    --    --   1.66   --    --    --   1.78   --    --    --    --    --     < > = values in this interval not displayed.      BP Readings from Last 3 Encounters:   04/30/18 178/60   09/12/17 190/61   07/28/17 180/62    Wt Readings from Last 3 Encounters:   04/30/18 134 lb 8 oz (61 kg)   09/12/17 130 lb (59 kg)   07/28/17 131 lb 12.8 oz (59.8 kg)        ROS:  See above    This document serves as a record of the services and decisions personally performed and made by Torri Fisher MD. It was created on his/her behalf by Adrianna Pearce, trained medical scribe. The creation of this document is based the provider's statements to the medical scribes.    Scribe Adrianna Pearce, April 30, 2018  OBJECTIVE:     /60  Pulse 67  Temp 98.1  F (36.7  C) (Oral)  Resp 20  Ht 5' 3\" (1.6 m)  Wt 134 lb 8 oz (61 kg)  SpO2 95%  BMI 23.83 kg/m2  Body mass index is 23.83 kg/(m^2).     GENERAL: healthy, alert and no distress  PSYCH: mentation appears normal, affect normal/bright    Diagnostic Test Results:  No results found for this or any previous visit (from the past 24 hour(s)).    ASSESSMENT/PLAN:     1. Hypertension goal BP (blood pressure) < 140/90  White coat hypertension. Home bp per pt report all in goal range.Continues on losartan 100mg daily, furosemide 10mg daily, and amlodipine 10mg daily. Uses hydralazine 50mg twice daily and additionally as needed for high BP per cardiology. Follow up with cardiology due in July.   - hydrALAZINE (APRESOLINE) 50 MG tablet; Take 1 tablet (50 mg) by mouth 2 times daily Takes an additional 50 mg in the afternoon if systolic blood pressure is higher than 160  Dispense: 90 tablet; Refill: 3  - amLODIPine (NORVASC) 10 MG tablet; Take 1 tablet (10 mg) by mouth daily  Dispense: 90 tablet; Refill: 1  - " furosemide (LASIX) 20 MG tablet; Take 1 tablet (20 mg) by mouth daily  Dispense: 90 tablet; Refill: 3  - losartan (COZAAR) 100 MG tablet; Take 1 tablet (100 mg) by mouth daily  Dispense: 90 tablet; Refill: 3    2. CKD (chronic kidney disease) stage 3, GFR 30-59 ml/min    - Comprehensive metabolic panel  - CBC with platelets  - Albumin Random Urine Quantitative with Creat Ratio    3. Hyperlipidemia LDL goal <70    - Comprehensive metabolic panel  - Lipid panel reflex to direct LDL Fasting    4. Chronic obstructive pulmonary disease, unspecified COPD type (H)  Controlled. Patient continues on ventolin  (90 base) inhaler 2 puffs every 6 hours as needed.    5. Chronic diastolic heart failure (H)    - CBC with platelets    6. ASCVD (arteriosclerotic cardiovascular disease)  stable    7. PMR (polymyalgia rheumatica) (H)  Followed by rheumatology. Continues on prednisone 2mg daily. Has had increased pain lately, particularly hip and shoulder. She agrees to contact her rheumatologist today.     8. Post-menopausal, osteopenia  Higher risk osteoporosis due to ongoing prednisone use.   - DX Hip/Pelvis/Spine; Future    9. Hyperlipidemia LDL goal <100  Controlled. Patient will continue on lipitor 40 mg daily.  - atorvastatin (LIPITOR) 40 MG tablet; Due for appt in March, one tab daily  Dispense: 90 tablet; Refill: 3    10. Anxiety  Controlled. Patient continues on lexapro 10 mg daily.  - escitalopram (LEXAPRO) 10 MG tablet; Take 1 tablet (10 mg) by mouth daily Needs appointment before refills.  Dispense: 90 tablet; Refill: 3    1. Call your rheumatologist today to let him know about the new joint pains.  2. Return to the clinic in the coming week or so when you are fasting for your lab work and bone density test.  3. Follow up with me in 3 months.    The information in this document, created by the medical scribe for me, accurately reflects the services I personally performed and the decisions made by me. I have reviewed  and approved this document for accuracy. 04/30/18    Torri Fisher MD  [facility]

## 2018-04-30 NOTE — PATIENT INSTRUCTIONS
1. Call your rheumatologist today to let him know about the new joint pains.  2. Return to the clinic in the coming week or so when you are fasting for your lab work and bone density test.  3. Follow up with me in 3 months.

## 2018-05-02 ENCOUNTER — RADIANT APPOINTMENT (OUTPATIENT)
Dept: BONE DENSITY | Facility: CLINIC | Age: 83
End: 2018-05-02
Attending: FAMILY MEDICINE
Payer: COMMERCIAL

## 2018-05-02 DIAGNOSIS — Z78.0 POST-MENOPAUSAL: ICD-10-CM

## 2018-05-02 DIAGNOSIS — E78.5 HYPERLIPIDEMIA LDL GOAL <70: ICD-10-CM

## 2018-05-02 DIAGNOSIS — I50.32 CHRONIC DIASTOLIC HEART FAILURE (H): ICD-10-CM

## 2018-05-02 DIAGNOSIS — N18.30 CKD (CHRONIC KIDNEY DISEASE) STAGE 3, GFR 30-59 ML/MIN (H): ICD-10-CM

## 2018-05-02 LAB
ALBUMIN SERPL-MCNC: 4.1 G/DL (ref 3.4–5)
ALP SERPL-CCNC: 60 U/L (ref 40–150)
ALT SERPL W P-5'-P-CCNC: 29 U/L (ref 0–50)
ANION GAP SERPL CALCULATED.3IONS-SCNC: 12 MMOL/L (ref 3–14)
AST SERPL W P-5'-P-CCNC: 24 U/L (ref 0–45)
BILIRUB SERPL-MCNC: 0.6 MG/DL (ref 0.2–1.3)
BUN SERPL-MCNC: 19 MG/DL (ref 7–30)
CALCIUM SERPL-MCNC: 9.8 MG/DL (ref 8.5–10.1)
CHLORIDE SERPL-SCNC: 107 MMOL/L (ref 94–109)
CHOLEST SERPL-MCNC: 153 MG/DL
CO2 SERPL-SCNC: 24 MMOL/L (ref 20–32)
CREAT SERPL-MCNC: 0.88 MG/DL (ref 0.52–1.04)
CREAT UR-MCNC: 10 MG/DL
ERYTHROCYTE [DISTWIDTH] IN BLOOD BY AUTOMATED COUNT: 14.2 % (ref 10–15)
GFR SERPL CREATININE-BSD FRML MDRD: 61 ML/MIN/1.7M2
GLUCOSE SERPL-MCNC: 104 MG/DL (ref 70–99)
HCT VFR BLD AUTO: 38.9 % (ref 35–47)
HDLC SERPL-MCNC: 72 MG/DL
HGB BLD-MCNC: 12.9 G/DL (ref 11.7–15.7)
LDLC SERPL CALC-MCNC: 67 MG/DL
MCH RBC QN AUTO: 33.8 PG (ref 26.5–33)
MCHC RBC AUTO-ENTMCNC: 33.2 G/DL (ref 31.5–36.5)
MCV RBC AUTO: 102 FL (ref 78–100)
MICROALBUMIN UR-MCNC: 40 MG/L
MICROALBUMIN/CREAT UR: 397.06 MG/G CR (ref 0–25)
NONHDLC SERPL-MCNC: 81 MG/DL
PLATELET # BLD AUTO: 116 10E9/L (ref 150–450)
POTASSIUM SERPL-SCNC: 3.9 MMOL/L (ref 3.4–5.3)
PROT SERPL-MCNC: 6.9 G/DL (ref 6.8–8.8)
RBC # BLD AUTO: 3.82 10E12/L (ref 3.8–5.2)
SODIUM SERPL-SCNC: 143 MMOL/L (ref 133–144)
TRIGL SERPL-MCNC: 71 MG/DL
WBC # BLD AUTO: 7.3 10E9/L (ref 4–11)

## 2018-05-02 PROCEDURE — 82043 UR ALBUMIN QUANTITATIVE: CPT | Performed by: FAMILY MEDICINE

## 2018-05-02 PROCEDURE — 36415 COLL VENOUS BLD VENIPUNCTURE: CPT | Performed by: FAMILY MEDICINE

## 2018-05-02 PROCEDURE — 85027 COMPLETE CBC AUTOMATED: CPT | Performed by: FAMILY MEDICINE

## 2018-05-02 PROCEDURE — 77085 DXA BONE DENSITY AXL VRT FX: CPT | Performed by: INTERNAL MEDICINE

## 2018-05-02 PROCEDURE — 80053 COMPREHEN METABOLIC PANEL: CPT | Performed by: FAMILY MEDICINE

## 2018-05-02 PROCEDURE — 80061 LIPID PANEL: CPT | Performed by: FAMILY MEDICINE

## 2018-05-10 ENCOUNTER — TELEPHONE (OUTPATIENT)
Dept: FAMILY MEDICINE | Facility: CLINIC | Age: 83
End: 2018-05-10

## 2018-05-10 DIAGNOSIS — D69.6 THROMBOCYTOPENIA (H): Primary | ICD-10-CM

## 2018-05-10 NOTE — TELEPHONE ENCOUNTER
RN -- Please call Jennifer about her results. Her kidney and liver tests and electrolytes were fairly stable. There is mild increase in her urine protein (a kidney test), which has fluctuated over time. We will continue to monitor this. Good blood pressure control is one of the ways we work to prevent worsening kidney function over time. Her platelets were low. This can happen for many reasons, including in the setting of an illness and sometimes medications. We should recheck her blood counts in the next 1-2 weeks. I placed the order.     Thanks,     Torri Fisher M.D.

## 2018-05-10 NOTE — TELEPHONE ENCOUNTER
Attempted to reach, unable. Left message on home phone to call back. (cell phone no answer and voice mailbox not set up yet)  Lakeisha Bello RN

## 2018-05-10 NOTE — TELEPHONE ENCOUNTER
Patient updated to results of labs. Patient scheduled for re-test of CBC on Monday 5/21/2018 at 10:00.    Thanks! Haily Radford RN

## 2018-05-21 DIAGNOSIS — D69.6 THROMBOCYTOPENIA (H): ICD-10-CM

## 2018-05-21 LAB
ERYTHROCYTE [DISTWIDTH] IN BLOOD BY AUTOMATED COUNT: 13.6 % (ref 10–15)
HCT VFR BLD AUTO: 42.4 % (ref 35–47)
HGB BLD-MCNC: 13.5 G/DL (ref 11.7–15.7)
MCH RBC QN AUTO: 33.3 PG (ref 26.5–33)
MCHC RBC AUTO-ENTMCNC: 31.8 G/DL (ref 31.5–36.5)
MCV RBC AUTO: 104 FL (ref 78–100)
PLATELET # BLD AUTO: 168 10E9/L (ref 150–450)
RBC # BLD AUTO: 4.06 10E12/L (ref 3.8–5.2)
WBC # BLD AUTO: 8 10E9/L (ref 4–11)

## 2018-05-21 PROCEDURE — 36415 COLL VENOUS BLD VENIPUNCTURE: CPT | Performed by: FAMILY MEDICINE

## 2018-05-21 PROCEDURE — 85027 COMPLETE CBC AUTOMATED: CPT | Performed by: FAMILY MEDICINE

## 2018-06-25 ENCOUNTER — TRANSFERRED RECORDS (OUTPATIENT)
Dept: HEALTH INFORMATION MANAGEMENT | Facility: CLINIC | Age: 83
End: 2018-06-25

## 2018-06-25 ENCOUNTER — TELEPHONE (OUTPATIENT)
Dept: FAMILY MEDICINE | Facility: CLINIC | Age: 83
End: 2018-06-25

## 2018-06-25 NOTE — TELEPHONE ENCOUNTER
Reason for Call:  Request for results:    Name of test or procedure: Dexa    Date of test of procedure: 5/2/2018    Location of the test or procedure:     OK to leave the result message on voice mail or with a family member? Not Applicable    Phone number Patient can be reached at:  Home number on file [phone] (home)    Additional comments: Patient states Dr. Valdes, her arthritis and rheumatology physician is requesting her DX results faxed to him at [phone]. Please assist. Thanks!    Call taken on 6/25/2018 at 4:27 PM by Niurka Terrell

## 2018-06-25 NOTE — TELEPHONE ENCOUNTER
I did go ahead and fax the DEXA to make this easier for pt. If she calls back just tell her it was done    Lakeisha Bello, RN, BSN

## 2018-06-25 NOTE — TELEPHONE ENCOUNTER
,    Please ask her to call [facility] Records release for this at [phone]    I called her home and  called her but she did not come to phone so please try her tomorrow        Lakeisha Bello, RN, BSN

## 2018-07-07 ENCOUNTER — HOSPITAL ENCOUNTER (INPATIENT)
Facility: CLINIC | Age: 83
LOS: 3 days | Discharge: HOME HEALTH | DRG: 061 | End: 2018-07-11
Attending: EMERGENCY MEDICINE | Admitting: PSYCHIATRY & NEUROLOGY
Payer: COMMERCIAL

## 2018-07-07 ENCOUNTER — APPOINTMENT (OUTPATIENT)
Dept: CT IMAGING | Facility: CLINIC | Age: 83
DRG: 061 | End: 2018-07-07
Attending: STUDENT IN AN ORGANIZED HEALTH CARE EDUCATION/TRAINING PROGRAM
Payer: COMMERCIAL

## 2018-07-07 DIAGNOSIS — J44.9 CHRONIC OBSTRUCTIVE PULMONARY DISEASE, UNSPECIFIED COPD TYPE (H): Primary | ICD-10-CM

## 2018-07-07 DIAGNOSIS — E78.5 HYPERLIPIDEMIA LDL GOAL <100: ICD-10-CM

## 2018-07-07 DIAGNOSIS — I63.9 ACUTE ISCHEMIC STROKE (H): ICD-10-CM

## 2018-07-07 LAB
ANION GAP SERPL CALCULATED.3IONS-SCNC: 8 MMOL/L (ref 3–14)
B-HCG FREE SERPL-ACNC: <0.9 [IU]/L (ref 0.86–1.14)
BUN SERPL-MCNC: 24 MG/DL (ref 7–30)
CALCIUM SERPL-MCNC: 8.1 MG/DL (ref 8.5–10.1)
CHLORIDE SERPL-SCNC: 107 MMOL/L (ref 94–109)
CO2 SERPL-SCNC: 26 MMOL/L (ref 20–32)
CREAT BLD-MCNC: 1 MG/DL (ref 0.52–1.04)
CREAT SERPL-MCNC: 0.9 MG/DL (ref 0.52–1.04)
ERYTHROCYTE [DISTWIDTH] IN BLOOD BY AUTOMATED COUNT: 13.4 % (ref 10–15)
GFR SERPL CREATININE-BSD FRML MDRD: 53 ML/MIN/1.7M2
GFR SERPL CREATININE-BSD FRML MDRD: 59 ML/MIN/1.7M2
GLUCOSE SERPL-MCNC: 145 MG/DL (ref 70–99)
HCT VFR BLD AUTO: 38.4 % (ref 35–47)
HGB BLD-MCNC: 12.1 G/DL (ref 11.7–15.7)
INR PPP: 1 (ref 0.86–1.14)
MCH RBC QN AUTO: 32.7 PG (ref 26.5–33)
MCHC RBC AUTO-ENTMCNC: 31.5 G/DL (ref 31.5–36.5)
MCV RBC AUTO: 104 FL (ref 78–100)
PLATELET # BLD AUTO: 123 10E9/L (ref 150–450)
POTASSIUM SERPL-SCNC: 3.4 MMOL/L (ref 3.4–5.3)
RBC # BLD AUTO: 3.7 10E12/L (ref 3.8–5.2)
SODIUM SERPL-SCNC: 141 MMOL/L (ref 133–144)
TROPONIN I BLD-MCNC: 0.02 UG/L (ref 0–0.1)
TROPONIN I SERPL-MCNC: 0.01 UG/L (ref 0–0.04)
WBC # BLD AUTO: 8.5 10E9/L (ref 4–11)

## 2018-07-07 PROCEDURE — 82565 ASSAY OF CREATININE: CPT

## 2018-07-07 PROCEDURE — 80048 BASIC METABOLIC PNL TOTAL CA: CPT | Performed by: EMERGENCY MEDICINE

## 2018-07-07 PROCEDURE — 96375 TX/PRO/DX INJ NEW DRUG ADDON: CPT | Performed by: EMERGENCY MEDICINE

## 2018-07-07 PROCEDURE — 93010 ELECTROCARDIOGRAM REPORT: CPT | Mod: Z6 | Performed by: EMERGENCY MEDICINE

## 2018-07-07 PROCEDURE — 70496 CT ANGIOGRAPHY HEAD: CPT

## 2018-07-07 PROCEDURE — 00000146 ZZHCL STATISTIC GLUCOSE BY METER IP

## 2018-07-07 PROCEDURE — 85610 PROTHROMBIN TIME: CPT

## 2018-07-07 PROCEDURE — 96366 THER/PROPH/DIAG IV INF ADDON: CPT | Performed by: EMERGENCY MEDICINE

## 2018-07-07 PROCEDURE — 3E03317 INTRODUCTION OF OTHER THROMBOLYTIC INTO PERIPHERAL VEIN, PERCUTANEOUS APPROACH: ICD-10-PCS | Performed by: EMERGENCY MEDICINE

## 2018-07-07 PROCEDURE — 84484 ASSAY OF TROPONIN QUANT: CPT

## 2018-07-07 PROCEDURE — 40000740 ZZH STATISTIC STROKE CODE W/ ACCESS

## 2018-07-07 PROCEDURE — 99285 EMERGENCY DEPT VISIT HI MDM: CPT | Mod: 25 | Performed by: EMERGENCY MEDICINE

## 2018-07-07 PROCEDURE — 25000128 H RX IP 250 OP 636: Performed by: EMERGENCY MEDICINE

## 2018-07-07 PROCEDURE — 85027 COMPLETE CBC AUTOMATED: CPT | Performed by: EMERGENCY MEDICINE

## 2018-07-07 PROCEDURE — 93005 ELECTROCARDIOGRAM TRACING: CPT | Performed by: EMERGENCY MEDICINE

## 2018-07-07 PROCEDURE — 96365 THER/PROPH/DIAG IV INF INIT: CPT | Performed by: EMERGENCY MEDICINE

## 2018-07-07 PROCEDURE — 85610 PROTHROMBIN TIME: CPT | Performed by: EMERGENCY MEDICINE

## 2018-07-07 PROCEDURE — 84484 ASSAY OF TROPONIN QUANT: CPT | Performed by: EMERGENCY MEDICINE

## 2018-07-07 RX ORDER — ONDANSETRON 2 MG/ML
4 INJECTION INTRAMUSCULAR; INTRAVENOUS ONCE
Status: COMPLETED | OUTPATIENT
Start: 2018-07-07 | End: 2018-07-07

## 2018-07-07 RX ORDER — HYDRALAZINE HYDROCHLORIDE 20 MG/ML
20 INJECTION INTRAMUSCULAR; INTRAVENOUS ONCE
Status: COMPLETED | OUTPATIENT
Start: 2018-07-07 | End: 2018-07-07

## 2018-07-07 RX ORDER — IOPAMIDOL 755 MG/ML
75 INJECTION, SOLUTION INTRAVASCULAR ONCE
Status: COMPLETED | OUTPATIENT
Start: 2018-07-07 | End: 2018-07-07

## 2018-07-07 RX ADMIN — IOPAMIDOL 75 ML: 755 INJECTION, SOLUTION INTRAVENOUS at 22:50

## 2018-07-07 RX ADMIN — ALTEPLASE 5.66 MG: KIT at 23:17

## 2018-07-07 RX ADMIN — ALTEPLASE 51 MG: KIT at 23:22

## 2018-07-07 RX ADMIN — ONDANSETRON HYDROCHLORIDE 4 MG: 2 INJECTION, SOLUTION INTRAMUSCULAR; INTRAVENOUS at 22:19

## 2018-07-07 RX ADMIN — PROCHLORPERAZINE EDISYLATE 5 MG: 5 INJECTION INTRAMUSCULAR; INTRAVENOUS at 23:09

## 2018-07-07 RX ADMIN — HYDRALAZINE HYDROCHLORIDE 20 MG: 20 INJECTION INTRAMUSCULAR; INTRAVENOUS at 22:56

## 2018-07-07 ASSESSMENT — VISUAL ACUITY
OU: BLURRED VISION;GLASSES
OU: BLURRED VISION;GLASSES
OU: GLASSES
OU: GLASSES

## 2018-07-07 ASSESSMENT — ENCOUNTER SYMPTOMS
ABDOMINAL PAIN: 0
NAUSEA: 1
SHORTNESS OF BREATH: 0
HEADACHES: 0
WEAKNESS: 1
VOMITING: 1

## 2018-07-07 NOTE — IP AVS SNAPSHOT
[address] [address]    Phone:  [phone]                                       After Visit Summary   7/7/2018    Jennifer Bob    MRN: [id number]           After Visit Summary Signature Page     I have received my discharge instructions, and my questions have been answered. I have discussed any challenges I see with this plan with the nurse or doctor.    ..........................................................................................................................................  Patient/Patient Representative Signature      ..........................................................................................................................................  Patient Representative Print Name and Relationship to Patient    ..................................................               ................................................  Date                                            Time    ..........................................................................................................................................  Reviewed by Signature/Title    ...................................................              ..............................................  Date                                                            Time

## 2018-07-07 NOTE — IP AVS SNAPSHOT
MRN:[id number]                      After Visit Summary   7/7/2018    Jennifer Bob    MRN: [id number]           Thank you!     Thank you for choosing [facility] for your care. Our goal is always to provide you with excellent care. Hearing back from our patients is one way we can continue to improve our services. Please take a few minutes to complete the written survey that you may receive in the mail after you visit with us. Thank you!        Patient Information     Date Of Birth          12/5/1931        Designated Caregiver       Most Recent Value    Caregiver    Will someone help with your care after discharge? yes    Name of designated caregiver Pushpa    Phone number of caregiver  [phone]    Caregiver address unknown      About your hospital stay     You were admitted on:  July 8, 2018 You last received care in the:  [address] [facility]    You were discharged on:  July 11, 2018        Reason for your hospital stay       Jennifer Bob is a 86 year old year old female with h/o left internal capsule/corona radiata stroke in 2013, MI, HLD, and HTN who presented to the ED with acute onset nausea/vomiting, double vision, and difficulty maintaining posture. Stroke code was activated. CTA was done and tPA was given that improved her presenting symtpoms            Reason for your hospital stay                 Who to Call     For medical emergencies, please call 911.  For non-urgent questions about your medical care, please call your primary care provider or clinic, [phone]          Attending Provider     Provider Specialty    Dewayne Garcia MD Emergency Medicine    [facility], Mejia Cordoba MD Neurology    [facility], Shalom Musa MD Neurology       Primary Care Provider Office Phone # Fax #    Torri Fisher [phone]       When to contact your care team       Call your primary doctor if you have any of the following:  shortness of breath  Call  the neurology team if you have any sudden onset weakness or imbalance or symptoms  similar to the presenting ones                  After Care Instructions     Activity       Your activity upon discharge: activity as tolerated and no driving for today            Activity       Your activity upon discharge: activity as tolerated and no driving for today            Diet       Follow this diet upon discharge: Orders Placed This Encounter      Regular Diet Adult Thin Liquids (water, ice chips, juice, milk, gelatin, ice cream, etc)            Diet       Follow this diet upon discharge: Regular            Discharge Instructions       Take Asprin and Plavix for 3 months then continue on Asprin alone            Monitor and record       blood pressure daily  Zio patch for cardiac monitoring                  Follow-up Appointments     Adult [facility] Follow-up and recommended labs and tests       Follow up with primary care provider, Torri Fisher MD, within 7 days for blood pressure control and to address the cause of desaturation during sleep. Sleep study is ordered    Appointments on [place] and/or [facility] (with [facility] or [facility] provider or service). Call [phone] if you haven't heard regarding these appointments within 7 days of discharge.                  Additional Services     Home Care OT Referral for Hospital Discharge       [facility] [phone], Fax [phone]    OT to eval and treat    Your provider has ordered home care - occupational therapy. If you have not been contacted within 2 days of your discharge please call the department phone number listed on the top of this document.            Home Care PT Referral for Hospital Discharge       [facility] [phone] Fax [phone]    PT to eval and treat    Your provider has ordered home care - physical therapy. If you have not been contacted within 2 days of your discharge please call the department phone number listed on the top  "of this document.            Internal Medicine Referral       The patient had oxygen desaturation down to 81% that happened during sleep and during daily activity. Her chest x ray showed hyperinflated chest. D Dimer was 1 (slightly high). She needs to be seen within a week of her discharge to get PFT and to be evaluated for COPD. She will need to repeat the D Dimer    She needs to be seen within a week            Physical Therapy Referral       *This therapy referral will be filtered to a centralized scheduling office at [facility] and the patient will receive a call to schedule an appointment at a [facility] location most convenient for them. *     [facility] provides Physical Therapy evaluation and treatment and many specialty services across the [facility] system.  If requesting a specialty program, please choose from the list below.    If you have not heard from the scheduling office within 2 business days, please call [phone] for all locations, with the exception of [place], please call [phone] and [facility], please call [phone]  Treatment: Evaluation & Treatment  Special Instructions/Modalities: deviation to the right while walking and very mild right sided ataxia for out patient PT  Special Programs: not needed    Please be aware that coverage of these services is subject to the terms and limitations of your health insurance plan.  Call member services at your health plan with any benefit or coverage questions.      **Note to Provider:  If you are referring outside of [facility] for the therapy appointment, please list the name of the location in the \"special instructions\" above, print the referral and give to the patient to schedule the appointment.                  Further instructions from your care team       - Use home oxygen during sleep  - Schedule appointment with the PCP or internal medicine specialist to address the cause of desaturation and for " tight blood pressure control  - Schedule sleep study     Oxygen Provider:  Arranged through [email or web address]. Medical Equipment, contact number [phone]. If you have any questions or concerns please call the oxygen company directly.      Stroke Education     Please review the items below to help with stroke prevention.  Additional prevention suggestions are in the Understanding Stroke Handbook that you received.    Stroke risk factor changes that can help with stroke prevention:      Blood Pressure: Maintain your blood pressure within the goal the doctor sets with you.    If you are diabetic, work with your doctor to control your blood sugar and monitor your hemoglobin A1C (HbA1c).     Your doctor may recommend a statin medication to help lower your cholesterol level.    If you have an irregular heartbeat, speak to your doctor about possible treatments.    Maintain a healthy weight.    Eat a healthy diet. We suggest a Mediterranean or heart-healthy diet, but discuss what's best for you with your doctor.    Limit alcohol consumption.    If you smoke - QUIT.  We encourage you to get support. Start by talking with your doctor. Another option is to call the Quit Plan Program at 1-982.334.2543.    Stroke  Warning Signs:     It s important to know the warning signs of stroke. Call 911 if you experience one or more warning signs like these:      Sudden numbness or weakness of the face, arm or leg, especially on one side of the body    Sudden confusion, trouble speaking or understanding    Sudden trouble seeing in one or both eyes    Sudden trouble walking, dizziness, loss of balance or coordination    Sudden severe headache with no known cause          Pending Results     Date and Time Order Name Status Description    7/11/2018 1243 XR Chest Port 1 View In process     7/11/2018 0945 Urine Culture Aerobic Bacterial Preliminary             Statement of Approval     Ordered          07/11/18 0899  I have reviewed and agree  "with all the recommendations and orders detailed in this document.  EFFECTIVE NOW     Approved and electronically signed by:  Deneen Sun MD           07/10/18 9158  I have reviewed and agree with all the recommendations and orders detailed in this document.  EFFECTIVE NOW     Approved and electronically signed by:  Deneen Sun MD             Admission Information     Date & Time Provider Department Dept. Phone    7/7/2018 Shalom Christensen MD [address] [facility] [phone]      Your Vitals Were     Blood Pressure Pulse Temperature Respirations Height Weight    150/58 (BP Location: Right arm) 80 98.2  F (36.8  C) 16 1.575 m (5' 2\") 58.8 kg (129 lb 9.6 oz)    Pulse Oximetry BMI (Body Mass Index)                92% 23.7 kg/m2          MyChart Information     [employer] gives you secure access to your electronic health record. If you see a primary care provider, you can also send messages to your care team and make appointments. If you have questions, please call your primary care clinic.  If you do not have a primary care provider, please call [phone] and they will assist you.        Care EveryWhere ID     This is your Care EveryWhere ID. This could be used by other organizations to access your [facility] medical records  [id number]        Equal Access to Services     GONZALO KAY : Clint vivas [place], waaxda luqadaha, qaybta kaalmada becky, nicolas cintron. So [facility] [phone].    ATENCIÓN: Si habla español, tiene a nagel disposición servicios gratuitos de asistencia lingüística. Llame al [phone].    We comply with applicable federal civil rights laws and [place] laws. We do not discriminate on the basis of race, color, national origin, age, disability, sex, sexual orientation, or gender identity.               Review of your medicines      START taking        Dose / Directions    clopidogrel 75 MG tablet   Commonly known " as:  PLAVIX   Used for:  Acute ischemic stroke (H)        Dose:  75 mg   Take 1 tablet (75 mg) by mouth daily   Quantity:  90 tablet   Refills:  0       order for DME   Used for:  Acute ischemic stroke (H)        Equipment being ordered: [employer] (), Treatment Diagnosis: Acute ischemic stroke   Quantity:  1 Units   Refills:  0         CONTINUE these medicines which have NOT CHANGED        Dose / Directions    amLODIPine 10 MG tablet   Commonly known as:  NORVASC   Used for:  Essential hypertension with goal blood pressure less than 140/90        Dose:  10 mg   Take 1 tablet (10 mg) by mouth daily   Quantity:  90 tablet   Refills:  1       aspirin 81 MG chewable tablet   Used for:  ASCVD (arteriosclerotic cardiovascular disease)        Dose:  81 mg   Take 1 tablet (81 mg) by mouth daily   Quantity:  60 tablet   Refills:  3       atorvastatin 40 MG tablet   Commonly known as:  LIPITOR   Used for:  Hyperlipidemia LDL goal <100        Due for appt in March, one tab daily   Quantity:  90 tablet   Refills:  3       BETA BLOCKER NOT PRESCRIBED (INTENTIONAL)        Beta Blocker not prescribed intentionally due to COPD, shortness of breath with atenolol and lopressor   Refills:  0       CALCIUM CITRATE + PO        Dose:  1 tablet   Take 1 tablet by mouth daily   Refills:  0       CLASSIC NETI POT SINUS WASH 2300-700 MG Kit   Generic drug:  sodium chloride-sodium bicarb        Mix 1 packet in Neti Pot and administer in each nostril daily as needed   Refills:  0       COLACE 100 MG capsule   Generic drug:  docusate sodium        Dose:  1 capsule   Take 1 capsule by mouth 2 times daily   Refills:  0       escitalopram 10 MG tablet   Commonly known as:  LEXAPRO   Used for:  Anxiety        Dose:  10 mg   Take 1 tablet (10 mg) by mouth daily Needs appointment before refills.   Quantity:  90 tablet   Refills:  3       fluticasone 50 MCG/ACT spray   Commonly known as:  FLONASE   Used for:  Seasonal allergic rhinitis         USE TWO SPRAYS IN EACH NOSTRIL EVERY OTHER DAY   Quantity:  16 g   Refills:  8       furosemide 20 MG tablet   Commonly known as:  LASIX   Used for:  Essential hypertension with goal blood pressure less than 140/90        Dose:  20 mg   Take 1 tablet (20 mg) by mouth daily   Quantity:  90 tablet   Refills:  3       hydrALAZINE 50 MG tablet   Commonly known as:  APRESOLINE   Used for:  Hypertension goal BP (blood pressure) < 140/90        Dose:  50 mg   Take 1 tablet (50 mg) by mouth 2 times daily Takes an additional 50 mg in the afternoon if systolic blood pressure is higher than 160   Quantity:  90 tablet   Refills:  3       losartan 100 MG tablet   Commonly known as:  COZAAR   Used for:  Essential hypertension with goal blood pressure less than 140/90        Dose:  100 mg   Take 1 tablet (100 mg) by mouth daily   Quantity:  90 tablet   Refills:  3       MUCINEX 600 MG 12 hr tablet   Generic drug:  guaiFENesin        Dose:  1200 mg   Take 2 tablets (1,200 mg) by mouth 2 times daily   Quantity:  28 tablet   Refills:  0       MULTIVITAMIN PO        Dose:  1 tablet   Take 1 tablet by mouth daily   Refills:  0       OCEAN NASAL SPRAY NA        Administer 1 spray in each nostril up to two times daily as needed   Refills:  0       predniSONE 1 MG tablet   Commonly known as:  DELTASONE        Dose:  2 mg   Take 2 mg by mouth daily   Refills:  0       TRAMADOL HCL PO        Dose:  50 mg   Take 50 mg by mouth every 6 hours as needed for moderate to severe pain   Refills:  0       VENTOLIN  (90 Base) MCG/ACT Inhaler   Used for:  Chronic obstructive pulmonary disease, unspecified COPD type (H)   Generic drug:  albuterol        INHALE 2 PUFFS INTO THE LUNGS EVERY 6 HOURS AS NEEDED   Quantity:  18 g   Refills:  9       VITAMIN D3 PO        Dose:  1000 Units   Take 1,000 Units by mouth daily   Refills:  0            Where to get your medicines      Some of these will need a paper prescription and others can be bought over  the counter. Ask your nurse if you have questions.     Bring a paper prescription for each of these medications     clopidogrel 75 MG tablet    order for DME                Protect others around you: Learn how to safely use, store and throw away your medicines at www.disposemymeds.org.        Information about OPIOIDS     PRESCRIPTION OPIOIDS: WHAT YOU NEED TO KNOW   We gave you an opioid (narcotic) pain medicine. It is important to manage your pain, but opioids are not always the best choice. You should first try all the other options your care team gave you. Take this medicine for as short a time (and as few doses) as possible.     These medicines have risks:    DO NOT drive when on new or higher doses of pain medicine. These medicines can affect your alertness and reaction times, and you could be arrested for driving under the influence (DUI). If you need to use opioids long-term, talk to your care team about driving.    DO NOT operate heave machinery    DO NOT do any other dangerous activities while taking these medicines.     DO NOT drink any alcohol while taking these medicines.      If the opioid prescribed includes acetaminophen, DO NOT take with any other medicines that contain acetaminophen. Read all labels carefully. Look for the word  acetaminophen  or  Tylenol.  Ask your pharmacist if you have questions or are unsure.    You can get addicted to pain medicines, especially if you have a history of addiction (chemical, alcohol or substance dependence). Talk to your care team about ways to reduce this risk.    Store your pills in a secure place, locked if possible. We will not replace any lost or stolen medicine. If you don t finish your medicine, please throw away (dispose) as directed by your pharmacist. The [place] Pollution Control Agency has more information about safe disposal: https://www.pca.state.[place]/living-green/managing-unwanted-medications.     All opioids tend to cause constipation. Drink  plenty of water and eat foods that have a lot of fiber, such as fruits, vegetables, prune juice, apple juice and high-fiber cereal. Take a laxative (Miralax, milk of magnesia, Colace, Senna) if you don t move your bowels at least every other day.              Medication List: This is a list of all your medications and when to take them. Check marks below indicate your daily home schedule. Keep this list as a reference.      Medications           Morning Afternoon Evening Bedtime As Needed    amLODIPine 10 MG tablet   Commonly known as:  NORVASC   Take 1 tablet (10 mg) by mouth daily   Last time this was given:  10 mg on 7/11/2018  8:29 AM                                aspirin 81 MG chewable tablet   Take 1 tablet (81 mg) by mouth daily                                atorvastatin 40 MG tablet   Commonly known as:  LIPITOR   Due for appt in March, one tab daily   Last time this was given:  40 mg on 7/10/2018  8:35 PM                                BETA BLOCKER NOT PRESCRIBED (INTENTIONAL)   Beta Blocker not prescribed intentionally due to COPD, shortness of breath with atenolol and lopressor                                CALCIUM CITRATE + PO   Take 1 tablet by mouth daily                                CLASSIC NETI POT SINUS WASH 2300-700 MG Kit   Mix 1 packet in Neti Pot and administer in each nostril daily as needed   Generic drug:  sodium chloride-sodium bicarb                                clopidogrel 75 MG tablet   Commonly known as:  PLAVIX   Take 1 tablet (75 mg) by mouth daily   Last time this was given:  75 mg on 7/11/2018  8:30 AM                                COLACE 100 MG capsule   Take 1 capsule by mouth 2 times daily   Last time this was given:  100 mg on 7/11/2018  8:30 AM   Generic drug:  docusate sodium                                escitalopram 10 MG tablet   Commonly known as:  LEXAPRO   Take 1 tablet (10 mg) by mouth daily Needs appointment before refills.   Last time this was given:  10 mg on  7/11/2018  8:30 AM                                fluticasone 50 MCG/ACT spray   Commonly known as:  FLONASE   USE TWO SPRAYS IN EACH NOSTRIL EVERY OTHER DAY   Last time this was given:  2 sprays on 7/10/2018  9:02 AM                                furosemide 20 MG tablet   Commonly known as:  LASIX   Take 1 tablet (20 mg) by mouth daily   Last time this was given:  20 mg on 7/11/2018  8:30 AM                                hydrALAZINE 50 MG tablet   Commonly known as:  APRESOLINE   Take 1 tablet (50 mg) by mouth 2 times daily Takes an additional 50 mg in the afternoon if systolic blood pressure is higher than 160   Last time this was given:  50 mg on 7/11/2018  8:29 AM                                losartan 100 MG tablet   Commonly known as:  COZAAR   Take 1 tablet (100 mg) by mouth daily   Last time this was given:  100 mg on 7/11/2018  8:29 AM                                MUCINEX 600 MG 12 hr tablet   Take 2 tablets (1,200 mg) by mouth 2 times daily   Generic drug:  guaiFENesin                                MULTIVITAMIN PO   Take 1 tablet by mouth daily                                OCEAN NASAL SPRAY NA   Administer 1 spray in each nostril up to two times daily as needed                                order for DME   Equipment being ordered: Walker [employer] (), Treatment Diagnosis: Acute ischemic stroke                                predniSONE 1 MG tablet   Commonly known as:  DELTASONE   Take 2 mg by mouth daily   Last time this was given:  2 mg on 7/11/2018  8:29 AM                                TRAMADOL HCL PO   Take 50 mg by mouth every 6 hours as needed for moderate to severe pain   Last time this was given:  50 mg on 7/8/2018  5:26 PM                                VENTOLIN  (90 Base) MCG/ACT Inhaler   INHALE 2 PUFFS INTO THE LUNGS EVERY 6 HOURS AS NEEDED   Last time this was given:  2 puffs on 7/11/2018 12:35 PM   Generic drug:  albuterol                                VITAMIN D3 PO   Take  1,000 Units by mouth daily   Last time this was given:  1,000 Units on 7/11/2018  8:30 AM

## 2018-07-08 ENCOUNTER — APPOINTMENT (OUTPATIENT)
Dept: CT IMAGING | Facility: CLINIC | Age: 83
DRG: 061 | End: 2018-07-08
Payer: COMMERCIAL

## 2018-07-08 ENCOUNTER — APPOINTMENT (OUTPATIENT)
Dept: SPEECH THERAPY | Facility: CLINIC | Age: 83
DRG: 061 | End: 2018-07-08
Attending: STUDENT IN AN ORGANIZED HEALTH CARE EDUCATION/TRAINING PROGRAM
Payer: COMMERCIAL

## 2018-07-08 ENCOUNTER — APPOINTMENT (OUTPATIENT)
Dept: MRI IMAGING | Facility: CLINIC | Age: 83
DRG: 061 | End: 2018-07-08
Attending: STUDENT IN AN ORGANIZED HEALTH CARE EDUCATION/TRAINING PROGRAM
Payer: COMMERCIAL

## 2018-07-08 LAB
ABO + RH BLD: NORMAL
ABO + RH BLD: NORMAL
ANION GAP SERPL CALCULATED.3IONS-SCNC: 9 MMOL/L (ref 3–14)
APTT PPP: 28 SEC (ref 22–37)
BLD GP AB SCN SERPL QL: NORMAL
BLOOD BANK CMNT PATIENT-IMP: NORMAL
BUN SERPL-MCNC: 24 MG/DL (ref 7–30)
CALCIUM SERPL-MCNC: 8.4 MG/DL (ref 8.5–10.1)
CHLORIDE SERPL-SCNC: 107 MMOL/L (ref 94–109)
CHOLEST SERPL-MCNC: 134 MG/DL
CO2 SERPL-SCNC: 27 MMOL/L (ref 20–32)
CREAT SERPL-MCNC: 0.9 MG/DL (ref 0.52–1.04)
ERYTHROCYTE [DISTWIDTH] IN BLOOD BY AUTOMATED COUNT: 13.4 % (ref 10–15)
GFR SERPL CREATININE-BSD FRML MDRD: 59 ML/MIN/1.7M2
GLUCOSE BLDC GLUCOMTR-MCNC: 110 MG/DL (ref 70–99)
GLUCOSE BLDC GLUCOMTR-MCNC: 124 MG/DL (ref 70–99)
GLUCOSE BLDC GLUCOMTR-MCNC: 139 MG/DL (ref 70–99)
GLUCOSE BLDC GLUCOMTR-MCNC: 147 MG/DL (ref 70–99)
GLUCOSE BLDC GLUCOMTR-MCNC: 150 MG/DL (ref 70–99)
GLUCOSE BLDC GLUCOMTR-MCNC: 96 MG/DL (ref 70–99)
GLUCOSE SERPL-MCNC: 138 MG/DL (ref 70–99)
HBA1C MFR BLD: 5.9 % (ref 0–5.6)
HCT VFR BLD AUTO: 39.3 % (ref 35–47)
HDLC SERPL-MCNC: 69 MG/DL
HGB BLD-MCNC: 12.7 G/DL (ref 11.7–15.7)
INR PPP: 1.04 (ref 0.86–1.14)
INTERPRETATION ECG - MUSE: NORMAL
LDLC SERPL CALC-MCNC: 57 MG/DL
MAGNESIUM SERPL-MCNC: 1.9 MG/DL (ref 1.6–2.3)
MCH RBC QN AUTO: 33.2 PG (ref 26.5–33)
MCHC RBC AUTO-ENTMCNC: 32.3 G/DL (ref 31.5–36.5)
MCV RBC AUTO: 103 FL (ref 78–100)
MRSA DNA SPEC QL NAA+PROBE: NEGATIVE
NONHDLC SERPL-MCNC: 65 MG/DL
PHOSPHATE SERPL-MCNC: 4 MG/DL (ref 2.5–4.5)
PLATELET # BLD AUTO: 142 10E9/L (ref 150–450)
POTASSIUM SERPL-SCNC: 4 MMOL/L (ref 3.4–5.3)
RBC # BLD AUTO: 3.82 10E12/L (ref 3.8–5.2)
SODIUM SERPL-SCNC: 142 MMOL/L (ref 133–144)
SPECIMEN EXP DATE BLD: NORMAL
SPECIMEN SOURCE: NORMAL
TRIGL SERPL-MCNC: 40 MG/DL
TROPONIN I SERPL-MCNC: 0.07 UG/L (ref 0–0.04)
TROPONIN I SERPL-MCNC: 0.09 UG/L (ref 0–0.04)
TROPONIN I SERPL-MCNC: 0.09 UG/L (ref 0–0.04)
TROPONIN I SERPL-MCNC: 0.1 UG/L (ref 0–0.04)
TROPONIN I SERPL-MCNC: 0.1 UG/L (ref 0–0.04)
WBC # BLD AUTO: 8.3 10E9/L (ref 4–11)

## 2018-07-08 PROCEDURE — 84100 ASSAY OF PHOSPHORUS: CPT | Performed by: PSYCHIATRY & NEUROLOGY

## 2018-07-08 PROCEDURE — 93005 ELECTROCARDIOGRAM TRACING: CPT

## 2018-07-08 PROCEDURE — 40000225 ZZH STATISTIC SLP WARD VISIT: Performed by: SPEECH-LANGUAGE PATHOLOGIST

## 2018-07-08 PROCEDURE — 96376 TX/PRO/DX INJ SAME DRUG ADON: CPT | Performed by: EMERGENCY MEDICINE

## 2018-07-08 PROCEDURE — 84484 ASSAY OF TROPONIN QUANT: CPT | Performed by: STUDENT IN AN ORGANIZED HEALTH CARE EDUCATION/TRAINING PROGRAM

## 2018-07-08 PROCEDURE — 25000125 ZZHC RX 250: Performed by: STUDENT IN AN ORGANIZED HEALTH CARE EDUCATION/TRAINING PROGRAM

## 2018-07-08 PROCEDURE — 87640 STAPH A DNA AMP PROBE: CPT | Performed by: STUDENT IN AN ORGANIZED HEALTH CARE EDUCATION/TRAINING PROGRAM

## 2018-07-08 PROCEDURE — 86850 RBC ANTIBODY SCREEN: CPT | Performed by: STUDENT IN AN ORGANIZED HEALTH CARE EDUCATION/TRAINING PROGRAM

## 2018-07-08 PROCEDURE — 83036 HEMOGLOBIN GLYCOSYLATED A1C: CPT | Performed by: PSYCHIATRY & NEUROLOGY

## 2018-07-08 PROCEDURE — 93010 ELECTROCARDIOGRAM REPORT: CPT | Performed by: INTERNAL MEDICINE

## 2018-07-08 PROCEDURE — 00000146 ZZHCL STATISTIC GLUCOSE BY METER IP

## 2018-07-08 PROCEDURE — 85027 COMPLETE CBC AUTOMATED: CPT | Performed by: PSYCHIATRY & NEUROLOGY

## 2018-07-08 PROCEDURE — 36415 COLL VENOUS BLD VENIPUNCTURE: CPT | Performed by: STUDENT IN AN ORGANIZED HEALTH CARE EDUCATION/TRAINING PROGRAM

## 2018-07-08 PROCEDURE — 70450 CT HEAD/BRAIN W/O DYE: CPT

## 2018-07-08 PROCEDURE — 85730 THROMBOPLASTIN TIME PARTIAL: CPT | Performed by: PSYCHIATRY & NEUROLOGY

## 2018-07-08 PROCEDURE — 25000128 H RX IP 250 OP 636: Performed by: STUDENT IN AN ORGANIZED HEALTH CARE EDUCATION/TRAINING PROGRAM

## 2018-07-08 PROCEDURE — 20000004 ZZH R&B ICU UMMC

## 2018-07-08 PROCEDURE — 86900 BLOOD TYPING SEROLOGIC ABO: CPT | Performed by: STUDENT IN AN ORGANIZED HEALTH CARE EDUCATION/TRAINING PROGRAM

## 2018-07-08 PROCEDURE — 25000128 H RX IP 250 OP 636

## 2018-07-08 PROCEDURE — 80061 LIPID PANEL: CPT | Performed by: PSYCHIATRY & NEUROLOGY

## 2018-07-08 PROCEDURE — 83735 ASSAY OF MAGNESIUM: CPT | Performed by: PSYCHIATRY & NEUROLOGY

## 2018-07-08 PROCEDURE — 87641 MR-STAPH DNA AMP PROBE: CPT | Performed by: STUDENT IN AN ORGANIZED HEALTH CARE EDUCATION/TRAINING PROGRAM

## 2018-07-08 PROCEDURE — 86901 BLOOD TYPING SEROLOGIC RH(D): CPT | Performed by: STUDENT IN AN ORGANIZED HEALTH CARE EDUCATION/TRAINING PROGRAM

## 2018-07-08 PROCEDURE — 80048 BASIC METABOLIC PNL TOTAL CA: CPT | Performed by: PSYCHIATRY & NEUROLOGY

## 2018-07-08 PROCEDURE — 84484 ASSAY OF TROPONIN QUANT: CPT | Performed by: PSYCHIATRY & NEUROLOGY

## 2018-07-08 PROCEDURE — 25000132 ZZH RX MED GY IP 250 OP 250 PS 637: Performed by: STUDENT IN AN ORGANIZED HEALTH CARE EDUCATION/TRAINING PROGRAM

## 2018-07-08 PROCEDURE — 25000128 H RX IP 250 OP 636: Performed by: EMERGENCY MEDICINE

## 2018-07-08 PROCEDURE — 92610 EVALUATE SWALLOWING FUNCTION: CPT | Mod: GN | Performed by: SPEECH-LANGUAGE PATHOLOGIST

## 2018-07-08 PROCEDURE — 96368 THER/DIAG CONCURRENT INF: CPT | Performed by: EMERGENCY MEDICINE

## 2018-07-08 PROCEDURE — 70551 MRI BRAIN STEM W/O DYE: CPT

## 2018-07-08 PROCEDURE — 85610 PROTHROMBIN TIME: CPT | Performed by: PSYCHIATRY & NEUROLOGY

## 2018-07-08 RX ORDER — PREDNISONE 1 MG/1
2 TABLET ORAL DAILY
Status: DISCONTINUED | OUTPATIENT
Start: 2018-07-08 | End: 2018-07-11 | Stop reason: HOSPADM

## 2018-07-08 RX ORDER — LOSARTAN POTASSIUM 100 MG/1
100 TABLET ORAL DAILY
Status: DISCONTINUED | OUTPATIENT
Start: 2018-07-08 | End: 2018-07-11 | Stop reason: HOSPADM

## 2018-07-08 RX ORDER — FUROSEMIDE 20 MG
20 TABLET ORAL DAILY
Status: DISCONTINUED | OUTPATIENT
Start: 2018-07-08 | End: 2018-07-11 | Stop reason: HOSPADM

## 2018-07-08 RX ORDER — LIDOCAINE 40 MG/G
CREAM TOPICAL
Status: DISCONTINUED | OUTPATIENT
Start: 2018-07-08 | End: 2018-07-11 | Stop reason: HOSPADM

## 2018-07-08 RX ORDER — NALOXONE HYDROCHLORIDE 0.4 MG/ML
.1-.4 INJECTION, SOLUTION INTRAMUSCULAR; INTRAVENOUS; SUBCUTANEOUS
Status: DISCONTINUED | OUTPATIENT
Start: 2018-07-08 | End: 2018-07-11 | Stop reason: HOSPADM

## 2018-07-08 RX ORDER — AMLODIPINE BESYLATE 10 MG/1
10 TABLET ORAL DAILY
Status: DISCONTINUED | OUTPATIENT
Start: 2018-07-08 | End: 2018-07-11 | Stop reason: HOSPADM

## 2018-07-08 RX ORDER — DOCUSATE SODIUM 100 MG/1
100 CAPSULE, LIQUID FILLED ORAL 2 TIMES DAILY
Status: DISCONTINUED | OUTPATIENT
Start: 2018-07-09 | End: 2018-07-11 | Stop reason: HOSPADM

## 2018-07-08 RX ORDER — ASPIRIN 81 MG/1
81 TABLET ORAL EVERY 24 HOURS
Status: DISCONTINUED | OUTPATIENT
Start: 2018-07-09 | End: 2018-07-11 | Stop reason: HOSPADM

## 2018-07-08 RX ORDER — TRAMADOL HYDROCHLORIDE 50 MG/1
50 TABLET ORAL EVERY 6 HOURS PRN
Status: DISCONTINUED | OUTPATIENT
Start: 2018-07-08 | End: 2018-07-11 | Stop reason: HOSPADM

## 2018-07-08 RX ORDER — ESCITALOPRAM OXALATE 10 MG/1
10 TABLET ORAL DAILY
Status: DISCONTINUED | OUTPATIENT
Start: 2018-07-08 | End: 2018-07-11 | Stop reason: HOSPADM

## 2018-07-08 RX ORDER — POTASSIUM CHLORIDE 7.45 MG/ML
10 INJECTION INTRAVENOUS
Status: DISCONTINUED | OUTPATIENT
Start: 2018-07-08 | End: 2018-07-11 | Stop reason: HOSPADM

## 2018-07-08 RX ORDER — POTASSIUM CHLORIDE 750 MG/1
20-40 TABLET, EXTENDED RELEASE ORAL
Status: DISCONTINUED | OUTPATIENT
Start: 2018-07-08 | End: 2018-07-11 | Stop reason: HOSPADM

## 2018-07-08 RX ORDER — ASPIRIN 81 MG/1
81 TABLET, CHEWABLE ORAL DAILY
Status: DISCONTINUED | OUTPATIENT
Start: 2018-07-08 | End: 2018-07-08

## 2018-07-08 RX ORDER — PROCHLORPERAZINE MALEATE 5 MG
5 TABLET ORAL EVERY 6 HOURS PRN
Status: DISCONTINUED | OUTPATIENT
Start: 2018-07-08 | End: 2018-07-11 | Stop reason: HOSPADM

## 2018-07-08 RX ORDER — POTASSIUM CHLORIDE 1.5 G/1.58G
20-40 POWDER, FOR SOLUTION ORAL
Status: DISCONTINUED | OUTPATIENT
Start: 2018-07-08 | End: 2018-07-11 | Stop reason: HOSPADM

## 2018-07-08 RX ORDER — METOCLOPRAMIDE HYDROCHLORIDE 5 MG/ML
5 INJECTION INTRAMUSCULAR; INTRAVENOUS EVERY 6 HOURS PRN
Status: DISCONTINUED | OUTPATIENT
Start: 2018-07-08 | End: 2018-07-11 | Stop reason: HOSPADM

## 2018-07-08 RX ORDER — SODIUM CHLORIDE 9 MG/ML
INJECTION, SOLUTION INTRAVENOUS CONTINUOUS
Status: DISCONTINUED | OUTPATIENT
Start: 2018-07-08 | End: 2018-07-08

## 2018-07-08 RX ORDER — MAGNESIUM SULFATE HEPTAHYDRATE 40 MG/ML
4 INJECTION, SOLUTION INTRAVENOUS EVERY 4 HOURS PRN
Status: DISCONTINUED | OUTPATIENT
Start: 2018-07-08 | End: 2018-07-11 | Stop reason: HOSPADM

## 2018-07-08 RX ORDER — FLUTICASONE PROPIONATE 50 MCG
2 SPRAY, SUSPENSION (ML) NASAL DAILY
Status: DISCONTINUED | OUTPATIENT
Start: 2018-07-08 | End: 2018-07-11 | Stop reason: HOSPADM

## 2018-07-08 RX ORDER — POTASSIUM CL/LIDO/0.9 % NACL 10MEQ/0.1L
10 INTRAVENOUS SOLUTION, PIGGYBACK (ML) INTRAVENOUS
Status: DISCONTINUED | OUTPATIENT
Start: 2018-07-08 | End: 2018-07-11 | Stop reason: HOSPADM

## 2018-07-08 RX ORDER — PROCHLORPERAZINE 25 MG
12.5 SUPPOSITORY, RECTAL RECTAL EVERY 12 HOURS PRN
Status: DISCONTINUED | OUTPATIENT
Start: 2018-07-08 | End: 2018-07-11 | Stop reason: HOSPADM

## 2018-07-08 RX ORDER — GUAIFENESIN 600 MG/1
1200 TABLET, EXTENDED RELEASE ORAL 2 TIMES DAILY
Status: DISCONTINUED | OUTPATIENT
Start: 2018-07-08 | End: 2018-07-11 | Stop reason: HOSPADM

## 2018-07-08 RX ORDER — METOCLOPRAMIDE 5 MG/1
5 TABLET ORAL EVERY 6 HOURS PRN
Status: DISCONTINUED | OUTPATIENT
Start: 2018-07-08 | End: 2018-07-11 | Stop reason: HOSPADM

## 2018-07-08 RX ORDER — SODIUM CHLORIDE 9 MG/ML
INJECTION, SOLUTION INTRAVENOUS
Status: COMPLETED
Start: 2018-07-08 | End: 2018-07-08

## 2018-07-08 RX ORDER — ALBUTEROL SULFATE 90 UG/1
2 AEROSOL, METERED RESPIRATORY (INHALATION) EVERY 6 HOURS PRN
Status: DISCONTINUED | OUTPATIENT
Start: 2018-07-08 | End: 2018-07-11 | Stop reason: HOSPADM

## 2018-07-08 RX ORDER — HYDRALAZINE HYDROCHLORIDE 20 MG/ML
10 INJECTION INTRAMUSCULAR; INTRAVENOUS ONCE
Status: COMPLETED | OUTPATIENT
Start: 2018-07-08 | End: 2018-07-08

## 2018-07-08 RX ORDER — ATORVASTATIN CALCIUM 40 MG/1
40 TABLET, FILM COATED ORAL DAILY
Status: DISCONTINUED | OUTPATIENT
Start: 2018-07-08 | End: 2018-07-11 | Stop reason: HOSPADM

## 2018-07-08 RX ORDER — HYDRALAZINE HYDROCHLORIDE 25 MG/1
50 TABLET, FILM COATED ORAL 2 TIMES DAILY
Status: DISCONTINUED | OUTPATIENT
Start: 2018-07-08 | End: 2018-07-11 | Stop reason: HOSPADM

## 2018-07-08 RX ORDER — POTASSIUM CHLORIDE 750 MG/1
20-40 TABLET, EXTENDED RELEASE ORAL
Status: DISCONTINUED | OUTPATIENT
Start: 2018-07-08 | End: 2018-07-08

## 2018-07-08 RX ORDER — POTASSIUM CHLORIDE 29.8 MG/ML
20 INJECTION INTRAVENOUS
Status: DISCONTINUED | OUTPATIENT
Start: 2018-07-08 | End: 2018-07-11 | Stop reason: HOSPADM

## 2018-07-08 RX ORDER — TRAMADOL HYDROCHLORIDE 50 MG/1
50 TABLET ORAL EVERY 6 HOURS PRN
Status: ON HOLD | COMMUNITY
End: 2019-01-01

## 2018-07-08 RX ORDER — ONDANSETRON 2 MG/ML
4 INJECTION INTRAMUSCULAR; INTRAVENOUS EVERY 6 HOURS PRN
Status: DISCONTINUED | OUTPATIENT
Start: 2018-07-08 | End: 2018-07-11 | Stop reason: HOSPADM

## 2018-07-08 RX ORDER — ONDANSETRON 4 MG/1
4 TABLET, ORALLY DISINTEGRATING ORAL EVERY 6 HOURS PRN
Status: DISCONTINUED | OUTPATIENT
Start: 2018-07-08 | End: 2018-07-11 | Stop reason: HOSPADM

## 2018-07-08 RX ADMIN — LOSARTAN POTASSIUM 100 MG: 100 TABLET, FILM COATED ORAL at 13:55

## 2018-07-08 RX ADMIN — FUROSEMIDE 20 MG: 20 TABLET ORAL at 15:40

## 2018-07-08 RX ADMIN — HYDRALAZINE HYDROCHLORIDE 10 MG: 20 INJECTION INTRAMUSCULAR; INTRAVENOUS at 13:52

## 2018-07-08 RX ADMIN — VITAMIN D, TAB 1000IU (100/BT) 1000 UNITS: 25 TAB at 15:39

## 2018-07-08 RX ADMIN — NICARDIPINE HYDROCHLORIDE 2.5 MG/HR: 0.2 INJECTION, SOLUTION INTRAVENOUS at 00:39

## 2018-07-08 RX ADMIN — HYDRALAZINE HYDROCHLORIDE 50 MG: 25 TABLET ORAL at 19:19

## 2018-07-08 RX ADMIN — SODIUM CHLORIDE: 900 INJECTION, SOLUTION INTRAVENOUS at 01:56

## 2018-07-08 RX ADMIN — ATORVASTATIN CALCIUM 40 MG: 40 TABLET, FILM COATED ORAL at 19:19

## 2018-07-08 RX ADMIN — SODIUM CHLORIDE 100 ML: 9 INJECTION, SOLUTION INTRAVENOUS at 00:23

## 2018-07-08 RX ADMIN — AMLODIPINE BESYLATE 10 MG: 10 TABLET ORAL at 15:39

## 2018-07-08 RX ADMIN — TRAMADOL HYDROCHLORIDE 50 MG: 50 TABLET, COATED ORAL at 17:26

## 2018-07-08 RX ADMIN — NICARDIPINE HYDROCHLORIDE 2.5 MG/HR: 0.2 INJECTION, SOLUTION INTRAVENOUS at 01:15

## 2018-07-08 RX ADMIN — PROCHLORPERAZINE EDISYLATE 5 MG: 5 INJECTION INTRAMUSCULAR; INTRAVENOUS at 00:33

## 2018-07-08 RX ADMIN — SODIUM CHLORIDE: 9 INJECTION, SOLUTION INTRAVENOUS at 01:56

## 2018-07-08 RX ADMIN — FLUTICASONE PROPIONATE 2 SPRAY: 50 SPRAY, METERED NASAL at 15:08

## 2018-07-08 RX ADMIN — ESCITALOPRAM OXALATE 10 MG: 10 TABLET ORAL at 15:40

## 2018-07-08 RX ADMIN — PREDNISONE 2 MG: 1 TABLET ORAL at 16:49

## 2018-07-08 RX ADMIN — DOCUSATE SODIUM 100 MG: 50 LIQUID ORAL at 19:19

## 2018-07-08 ASSESSMENT — ACTIVITIES OF DAILY LIVING (ADL)
TRANSFERRING: 0-->INDEPENDENT
BATHING: 0-->INDEPENDENT
COGNITION: 0 - NO COGNITION ISSUES REPORTED
NUMBER_OF_TIMES_PATIENT_HAS_FALLEN_WITHIN_LAST_SIX_MONTHS: 1
TOILETING: 0-->INDEPENDENT
SWALLOWING: 0-->SWALLOWS FOODS/LIQUIDS WITHOUT DIFFICULTY
AMBULATION: 0-->INDEPENDENT
DRESS: 0-->INDEPENDENT
RETIRED_EATING: 0-->INDEPENDENT
FALL_HISTORY_WITHIN_LAST_SIX_MONTHS: YES
RETIRED_COMMUNICATION: 0-->UNDERSTANDS/COMMUNICATES WITHOUT DIFFICULTY

## 2018-07-08 ASSESSMENT — VISUAL ACUITY
OU: GLASSES

## 2018-07-08 ASSESSMENT — PAIN DESCRIPTION - DESCRIPTORS: DESCRIPTORS: ACHING

## 2018-07-08 NOTE — PLAN OF CARE
Problem: Patient Care Overview  Goal: Plan of Care/Patient Progress Review  Discharge Planner SLP   Patient plan for discharge: Unknown   Current status: Pt seen bedside for swallow evaluation per MD orders.  Pt presents with functional oropharyngeal swallow responses.  Pt tolerated trials of thin liquids, purees, and solid textures with no overt s/s of aspiration.  Pt required increased time for mastication of solid textures likely due to lack of dentition on lower R side.  No residue remaining following swallow response.  Recommend advance diet to dysphagia diet level 3 and thin liquids.  Pt should be alert and upright for intake.  Pt will need to take small bites/sips, pace self, and alternate consistencies.  ST plans to follow for a short course to assess diet tolerance in the settting of possible neurological changes.  Anticipate short course with goals to be met prior to discharge.    Barriers to return to prior living situation: None  Recommendations for discharge: Defer to OT/PT   Rationale for recommendations: Swallow function not impacting discharge disposition        Entered by: Savita Shaw 07/08/2018 11:05 AM

## 2018-07-08 NOTE — PHARMACY-CONSULT NOTE
Pharmacy Stroke Code Response    Pharmacist responded as part of the Stroke Code Team activation to patient care area ED.      Patient weight (in kg): 62.9.   Weight was obtained from ED gurney weight.    The Stroke Team determined that the patient was a candidate for IV alteplase therapy and requested that alteplase be made at 2307.    Dose of alteplase:  A total dose of 56.7 mg was calculated.  This is to be given as a 5.7 mg bolus over 1 minute followed by a 51 mg infusion over 1 hour.  Calculation double check performed by: ED RN    The alteplase bolus was handed off to nursing at 2316.    The bolus was administered at 2317 and the infusion was initiated at 2322.  These were administered in ED.    The pharmacist entered the alteplase bolus and infusion drug orders into [facility].  All remaining orders will be entered by neurology.    Antonino Briceño PharmD.

## 2018-07-08 NOTE — H&P
[facility], [place]      Neurology Stroke Admission    Patient Name: Jennifer Bob  : 1931 MRN#: [id number]    STROKE DATA    Stroke Code:  Time called:  18  Time patient seen:  18  Onset of symptoms:  18  Last known normal (pt's baseline):  18  Head CT read by Samuel Miller at:  18        TPA treatment:  Administered at 2317.  The treatment plan was discussed with and approved by Attending MD prior to administration.  Risks and benefits of tPA were discussed with Patient and Family prior to administration.      Stroke Scales      National Institutes of Health Stroke Scale (at presentation)   NIHSS done at:  time patient seen      Score    Level of consciousness:  (0)   Alert, keenly responsive    LOC questions:  (0)   Answers both questions correctly    LOC commands:  (0)   Performs both tasks correctly    Best gaze:  (0)   Normal    Visual:  (0)   No visual loss    Facial palsy:  (0)   Normal symmetrical movements    Motor arm (left):  (0)   No drift    Motor arm (right):  (0)   No drift    Motor leg (left):  (0)   No drift    Motor leg (right):  (0)   No drift    Limb ataxia:  (1)   Present in one limb    Sensory:  (0)   Normal- no sensory loss    Best language:  (0)   Normal- no aphasia    Dysarthria:  (0)   Normal    Extinction and inattention:  (0)   No abnormality        NIHSS Total Score:  1          Dysphagia Screen  Time of screenin2018 0000  Screening results: Failed screening - strict NPO pending SLP evaluation     ASSESSMENT & PLAN BY PROBLEM     Work-up for Ischemic Stroke (post TPA)      Acute Ischemic Stroke (post tPA) Plan  She has previous subcortical stroke with residual right upper extremity weakness. Her dysmetria is out of proportion to her weakness. This combined with her marked nausea/vomiting and reported dizziness/double vision is concerning for posterior circulation stroke. She has a  chronically occluded right vertebral artery, though has not had marked posterior circulation symptoms prior to past couple weeks. While family did provide further information after decision to give tPA that she has had difficulty with gait specifically on right the past couple weeks, there is a clear sudden onset of her current symptoms at 9:15 pm with right upper extremity dysmetria. She has many risk factors for small vessels disease, and has previous strokes likely 2/2 small vessel disease.  While there is question of h/o a-fib, the patient and family deny this and EKG in the ED showed sinus rhythm. She will require ICU cares post tPA and we will initiated stroke workup.  - Risks and benefits of tPA discussed with the patient and her family  prior to administration  - Admit to Neuro ICU, under Neurocritical Care Team  - Close monitoring and neurochecks for any evidence of hemorrhagic transformation or allergic reaction  - Nicardipine drip to maintain BP < 180/105  - Euthermia, Euglycemia  - Head of bed elevated  - Hold aspirin and pharmacologic DVT prophylaxis for at least 24 hrs post-tPA  - Statin once taking PO or enteral access obtained  - Repeat HCT 24 hrs post-tPA  - MRI/MRA Stroke Protocol tomorrow  - TTE with Bubble Study  - Telemetry, EKG  - Bedside Glucose Monitoring  - A1c, Lipid Panel, Troponin x 3  - PT/OT/SLP  - PM&R  - Stroke Education  - Depression Screen  - Apnea Screen    During initial physical assessment, the plan of care was discussed and developed with patient and family.  Plan of care includes: monitoring in the ICU following tPA and initiation of stroke workup..    Patient was admitted via [facility] ED ([place]).    The patient will be admitted to the Neuro Critical Care/Stroke team..     Other Medical Problems:  #CKD  Creatinine was 0.9 on admission suggesting renal function better than previously thought. Will continue to monitor with daily CBC.    #HTN  Holding home antihypertensives.   Patient is s/p tPA and having aphasia, so will keep nicardipine drip to maintain pressures under 180/105 tonight.    #HLD  Holding home atorvastatin    Fluids/Electrolytes/Nutrition  0.9% sodium chloride @ 75 mL/hr  Avoid hypotonic fluids.    Nutrition: None      Prophylaxis            For VTE Prevention:  - pneumatic compression device    For Acid Suppression:  - GI prophylaxis is not indicated    Code Status  DNR / DNI    HPI  Jennifer Bob is a 86 year old female with h/o left internal capsule/corona radiata stroke in 2013, MI, HLD, and HTN who presented this evening with acute onset nausea/vomiting, double vision, and difficulty maintaining posture. She was talking with her daughter on the phone at 9:15 PM when all of these symptoms came on suddenly. She expressed her concern to her daughter who called 911. The police had to break down the door to her apartment because she had difficulty getting up. She endorses dizziness, but denies vertigo or lightheadedness, attributing much of her symptoms to double vision. Later, the daughters report that she has been having trouble with her balance in recent weeks, particularly with her right leg. She did have a fall on July 1st but did not strike her head. This occurred when she first noticed problems with her right leg. She has not noticed any incoordination in her right upper extremity.    She had a stroke in 2013 of the left corona radiata. She had right sided weakness at that time. 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 There was question of h/o a-fib in the emergency department but the patient and her family are unaware of any previous diagnosis and she is not on anticoagulation. 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 She denies headache, chest pain, palpitations, shortness of breath, diarrhea, dysuria. She has chronic  polyuria.    Pertinent Past Medical/Surgical History  Past Medical History:   Diagnosis Date     Abdominal wall hernia     three hernias at previous incision sites, allergic to mesh so repair not repeated, wears girdle     Anxiety 1/9/2012     ASCVD (arteriosclerotic cardiovascular disease) 1/9/2012     CKD (chronic kidney disease) stage 3, GFR 30-59 ml/min 1/10/2012     Colon polyp     resected     DDD (degenerative disc disease), lumbar 1/10/2012     Hyperlipidemia LDL goal <100 1/9/2012     Hyperlipidemia LDL goal <70 12/16/2013     Hypertension goal BP (blood pressure) < 140/90 1/9/2012     Incontinence of urine 1/9/2012     Left hip pain 1/9/2012     Low back pain 1/9/2012     Seasonal allergies 1/9/2012     STEMI (ST elevation myocardial infarction) (H) 8-    with VF arrest.     Stented coronary artery 8-     TIA (transient ischaemic attack) 1/10/2012       Past Surgical History:   Procedure Laterality Date     abdominal abscess      at incisional site after kristine     adominal hernia repair      had mesh placed, then allergic so it was removed and she wears a girdle     CHOLECYSTECTOMY       CORONARY ARTERY BYPASS  1992       Medications:   Prescriptions Prior to Admission   Medication Sig Dispense Refill Last Dose     amLODIPine (NORVASC) 10 MG tablet Take 1 tablet (10 mg) by mouth daily 90 tablet 1      aspirin 81 MG chewable tablet Take 1 tablet (81 mg) by mouth daily 60 tablet 3 Taking     atorvastatin (LIPITOR) 40 MG tablet Due for appt in March, one tab daily 90 tablet 3      BETA BLOCKER NOT PRESCRIBED, INTENTIONAL, Beta Blocker not prescribed intentionally due to COPD, shortness of breath with atenolol and lopressor  0 Taking     Calcium Citrate-Vitamin D (CALCIUM CITRATE + PO) Take 1 tablet by mouth daily    Taking     Cholecalciferol (VITAMIN D3 PO) Take 1,000 Units by mouth daily   Taking     docusate sodium (COLACE) 100 MG capsule Take 1 capsule by mouth 2 times daily    Taking      escitalopram (LEXAPRO) 10 MG tablet Take 1 tablet (10 mg) by mouth daily Needs appointment before refills. 90 tablet 3      fluticasone (FLONASE) 50 MCG/ACT spray USE TWO SPRAYS IN EACH NOSTRIL EVERY OTHER DAY 16 g 8 Taking     furosemide (LASIX) 20 MG tablet Take 1 tablet (20 mg) by mouth daily 90 tablet 3      guaiFENesin (MUCINEX) 600 MG 12 hr tablet Take 2 tablets (1,200 mg) by mouth 2 times daily 28 tablet  Not Taking     hydrALAZINE (APRESOLINE) 50 MG tablet Take 1 tablet (50 mg) by mouth 2 times daily Takes an additional 50 mg in the afternoon if systolic blood pressure is higher than 160 90 tablet 3      losartan (COZAAR) 100 MG tablet Take 1 tablet (100 mg) by mouth daily 90 tablet 3      Multiple Vitamins-Minerals (MULTIVITAMIN OR) Take 1 tablet by mouth daily    Taking     predniSONE (DELTASONE) 1 MG tablet Take 2 mg by mouth daily   Taking     Saline (OCEAN NASAL SPRAY NA) Administer 1 spray in each nostril up to two times daily as needed   Taking     sodium chloride-sodium bicarb (CLASSIC NETI POT SINUS WASH) 2300-700 MG KIT Mix 1 packet in Neti Pot and administer in each nostril daily as needed   Taking     VENTOLIN  (90 BASE) MCG/ACT Inhaler INHALE 2 PUFFS INTO THE LUNGS EVERY 6 HOURS AS NEEDED 18 g 9 Taking   .    Allergies:   Allergies   Allergen Reactions     Adhesive Tape      blisters     Atenolol      SOB     Lopressor Hct      SOB   .    Family History: No family history on file..    Social History:   Social History   Substance Use Topics     Smoking status: Former Smoker     Smokeless tobacco: Never Used      Comment: quit smoking in 1983     Alcohol use No   .    Tobacco use: Former smoker    ROS:  The 10 point Review of Systems is negative other than noted in the HPI or here.     PHYSICAL EXAMINATION  Vital Signs:  B/P: 162/62,  T: Data Unavailable,  P: 58,  R: 18    General:  patient lying in bed without any acute distress    HEENT:  normocephalic/atraumatic  Cardio:  Bradycardic  with regular rhythm  Pulmonary:  wheezing present  Abdomen:  soft, non-distended  Extremities:  edema present  Skin:  intact     Neurologic  Mental Status:  Slightly somnolent but oriented and following commands appropriately  Cranial Nerves:  EOMI with normal smooth pursuit, facial sensation intact and symmetric, facial movements symmetric, hearing not formally tested but intact to conversation, palate elevation symmetric and uvula midline, no dysarthria, shoulder shrug strong bilaterally, tongue protrusion midline. Left pupil 3 mm, right 2 mm reactive bilaterally.  Motor:  EF, EE, and  all 4+ right and 5 left. Lower extremities 5/5 bilaterally except right ankle dorsiflexion 3/5 on right.  Reflexes:  3+ right bicep and brachioradialis. 2+ left bicep and brachioradialis. 3+ patellar bilaterally. Unable to elicit achilles bilaterally.  Sensory:  Intact to light touch bilaterally upper and lower extremities  Coordination:  FNF compromised out of proportion to weakness on right.  Intact on left. Intact HS bilaterally.  Station/Gait:  unable to test    Labs  Labs and Imaging reviewed and used in developing the plan; pertinent results included.     Lab Results   Component Value Date     (H) 07/07/2018       The patient was discussed with Dr. [place].    Samuel Miller MD  Pager: [phone]

## 2018-07-08 NOTE — PLAN OF CARE
Arrival to Stroke Code: 2210  Stroke Team escalate/de-escalate interventions:labs, Vital signs, neuro checks, CT scan    ED/Bedside Nurse providing handoff: Suzi  Time left for CT:2228    Time Returned to ED/Unit: 2240  ED/Bedside Nurse given handoff to & Time:Jimmy 2249

## 2018-07-08 NOTE — PROGRESS NOTES
Neuroscience Intensive Care Progress Note    2018    Jennifer Bob is a 86 year old year old female with h/o left internal capsule/corona radiata stroke in , MI, HLD, and HTN who presented yesterday with acute onset nausea/vomiting, double vision, and difficulty maintaining posture. Stroke code was activated and tPA was given with marked improvement regarding her visual symptoms, dysmetria and nausea. She has residual right sided weakness since her previous stroke in  due to small infarction in the left corona radiata. 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     Stroke Scales      National Institutes of Health Stroke Scale (at presentation)   NIHSS done at:  time patient seen        Score    Level of consciousness:  (0)   Alert, keenly responsive    LOC questions:  (0)   Answers both questions correctly    LOC commands:  (0)   Performs both tasks correctly    Best gaze:  (0)   Normal    Visual:  (0)   No visual loss    Facial palsy:  (0)   Normal symmetrical movements    Motor arm (left):  (0)   No drift    Motor arm (right):  (0)   No drift    Motor leg (left):  (0)   No drift    Motor leg (right):  (0)   No drift    Limb ataxia:  (0)   No atax    Sensory:  (0)   Normal- no sensory loss    Best language:  (0)   Normal- no aphasia    Dysarthria:  (0)   Normal    Extinction and inattention:  (0)   No abnormality          NIHSS Total Score:  0           Problem List  1- Recurrent stroke  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  3- HTN  4- Hyperlipidemia      24 hour events:  Symptoms of the stroke markedly improved    24 Hour Vital Signs Summary:  Temperatures:  Current - Temp: 98.2  F (36.8  C); Max - Temp  Av.2  F (36.8  C)  Min: 97.5  F (36.4  C)  Max: 98.8  F (37.1  C)  Respiration range: Resp  Av.4  Min: 9  Max: 35  Pulse range: Pulse  Av  Min: 58  Max: 64  Blood pressure range:  Systolic (24hrs), Av , Min:138 , Max:205   ; Diastolic (24hrs), Av, Min:48, Max:170    Pulse oximetry range: SpO2  Av.6 %  Min: 88 %  Max: 100 %    Ventilator Settings  Resp: 18      Intake/Output Summary (Last 24 hours) at 18 0846  Last data filed at 18 0800   Gross per 24 hour   Intake           536.05 ml   Output                0 ml   Net           536.05 ml       BP - Mean:  [] 109    Current Medications:    [START ON 2018] aspirin  81 mg Oral Q24H    Or     [START ON 2018] aspirin  81 mg Per Feeding Tube Q24H       PRN Medications:  lidocaine 4%, lidocaine (buffered or not buffered), magnesium sulfate, - MEDICATION INSTRUCTIONS -, - MEDICATION INSTRUCTIONS -, metoclopramide **OR** metoclopramide, naloxone, ondansetron **OR** ondansetron, potassium chloride, potassium chloride with lidocaine, potassium chloride, potassium chloride, potassium chloride, potassium phosphate (KPHOS) in D5W IV, potassium phosphate (KPHOS) in D5W IV, potassium phosphate (KPHOS) in D5W IV, potassium phosphate (KPHOS) in D5W IV, prochlorperazine **OR** prochlorperazine **OR** prochlorperazine    Infusions:    - MEDICATION INSTRUCTIONS -       - MEDICATION INSTRUCTIONS -       niCARdipine 40 mg in 200 mL 0.9% NaCl Stopped (18 0200)     sodium chloride 75 mL/hr at 18 0732       Allergies   Allergen Reactions     Adhesive Tape      blisters     Atenolol      SOB     Lopressor Hct      SOB       Physical Examination:  /60  Pulse 58  Temp 98.2  F (36.8  C) (Oral)  Resp 18  Wt 58.7 kg (129 lb 6.6 oz)  SpO2 99%  BMI 22.92 kg/m2    General:  patient lying in bed without any acute distress    HEENT:  normocephalic/atraumatic  Cardio:  Bradycardic with regular rhythm  Pulmonary:  wheezing present  Abdomen:  soft, non-distended  Extremities:  edema present  Skin:  intact      Neurologic  Mental Status:  Slightly somnolent but oriented and following commands appropriately  Cranial  Nerves:  EOMI with normal smooth pursuit, facial sensation intact and symmetric, facial movements symmetric, hearing not formally tested but intact to conversation, palate elevation symmetric and uvula midline, no dysarthria, shoulder shrug strong bilaterally, tongue protrusion midline. Pupil reactive bilaterally.  Motor:  EF, EE, and  all 4+ right and 5 left. Lower extremities 5/5 bilaterally except right ankle dorsiflexion 3/5 on right.  Reflexes:  3+ right bicep and brachioradialis. 2+ left bicep and brachioradialis. 3+ patellar bilaterally. Unable to elicit achilles bilaterally.  Sensory:  Intact to light touch bilaterally upper and lower extremities  Coordination:  mild ataxia on the right side by finger to nose and finger tapping tests  Station/Gait:  unable to test    Labs/Studies:  Recent Labs   Lab Test  07/08/18   0323  07/07/18 2222 05/21/18   0941  05/02/18   0936   NA  142  141   --   143   POTASSIUM  4.0  3.4   --   3.9   CHLORIDE  107  107   --   107   CO2  27  26   --   24   ANIONGAP  9  8   --   12   GLC  138*  145*   --   104*   BUN  24  24   --   19   CR  0.90  0.90   --   0.88   JERILYN  8.4*  8.1*   --   9.8   WBC  8.3  8.5  8.0  7.3   RBC  3.82  3.70*  4.06  3.82   HGB  12.7  12.1  13.5  12.9   PLT  142*  123*  168  116*       Recent Labs   Lab Test  07/08/18   0323  07/07/18   2222  04/24/14   1903  10/17/13   0552   INR  1.04  1.00  0.90  0.90   PTT  28   --    --   29         No lab results found in last 7 days.    Imaging:  CTA showed    1. Head CTA demonstrates short segment high-grade stenosis of mixed  left anterior cerebral artery A2 branch, unchanged. Stable occlusion  of distal right vertebral artery. The anterior communicating artery is  patent. Stable hypoplastic left P1 segment with fetal origin of left  PCA, stenotic appearance of left posterior communicating artery, which  is new since MRI dated 4/24/2014. Unchanged tapered narrowing of left  P3 segment. Right posterior  communicating arteries not visualized.   2. Neck CTA demonstrates hypoplastic left vertebral artery. Tapered  narrowing of proximal left internal carotid artery with 63 % stenosis.  Focal 40% stenosis of proximal right internal carotid artery.  3. No evidence of intracranial hemorrhage on the noncontrast head CT.  Sequela of old lacunar infarct of posterior left frontal corona  radiata, corresponding to the infarct demonstrated on MRI dated  10/17/2013.   4. Severe degenerative changes of cervical spine with grade 1  anterolisthesis of C4 on C5 and C6 and C7.     Assessment/Plan  Jennifer Bob is a 86 year old year old female with h/o left internal capsule/corona radiata stroke in 2013, MI, HLD, and HTN who presented yesterday with acute onset nausea/vomiting, double vision, and difficulty maintaining posture s/p tPA. She had marked improvement of her visual symptoms and nausea with very mild residual dysmetria.     # stroke  Restart Asprin after 24 hours of tPA  Get PIA today  Repeat CT head at 11 pm if no bleeding restart her on Asprin   Get and MRI stroke protocol    #CKD  Creatinine was 0.9 on admission suggesting renal function better than previously thought. Will continue to monitor with daily CBC.     #HTN  Restart her home meds (Amlodipine and Losartan) aiming at blood pressure <140/90    #HLD  Restart atorvastatin    # Cardiac  Repeat Troponin every 6 hours until it is back to normal     Fluids/Electrolytes/Nutrition   Stop 0.9% sodium chloride today and   Keep her on dysphagia diet 3    Dispo: discharge her to home after her condition stabelizes     Deneen Sun MD  PGY1

## 2018-07-08 NOTE — ED PROVIDER NOTES
History     Chief Complaint   Patient presents with     One-sided Weakness     HPI  Jennifer Bob is a 86 year old female with a history of A-fib, ASCVD, COPD, HTN, hyperlipidemia, CKD stage III, STEMI, TIA, and chronic diastolic heart failure who presents to the Emergency Department via ambulance as a stroke code. The patient complains of nausea, vomiting, and generalized weakness. Also dysmetria. She tends to lean her head to the left. Acute onset of her symptoms was at 9:30/10pm. The patient was on the phone with her daughter when she started to feel unwell prompting her to call 911. The patient was vomiting when she arrived and it is noted that she was administered 4 mg of Zofran en route to the ED.The patient denies chest pain, shortness of breath, abdominal pain, or headache. The daughter is currently on her way.    Her family patient does have a past medical history significant for stroke and has some residual right-sided weakness with decreased  strength on the right.    I have reviewed the Medications, Allergies, Past Medical and Surgical History, and Social History in the [employer] system.  Past Medical History:   Diagnosis Date     Abdominal wall hernia     three hernias at previous incision sites, allergic to mesh so repair not repeated, wears girdle     Anxiety 1/9/2012     ASCVD (arteriosclerotic cardiovascular disease) 1/9/2012     CKD (chronic kidney disease) stage 3, GFR 30-59 ml/min 1/10/2012     Colon polyp     resected     DDD (degenerative disc disease), lumbar 1/10/2012     Hyperlipidemia LDL goal <100 1/9/2012     Hyperlipidemia LDL goal <70 12/16/2013     Hypertension goal BP (blood pressure) < 140/90 1/9/2012     Incontinence of urine 1/9/2012     Left hip pain 1/9/2012     Low back pain 1/9/2012     Seasonal allergies 1/9/2012     STEMI (ST elevation myocardial infarction) (H) 8-    with VF arrest.     Stented coronary artery 8-     TIA (transient ischaemic attack)  1/10/2012       Past Surgical History:   Procedure Laterality Date     abdominal abscess      at incisional site after kristine     adominal hernia repair      had mesh placed, then allergic so it was removed and she wears a girdle     CHOLECYSTECTOMY       CORONARY ARTERY BYPASS  1992       No family history on file.    Social History   Substance Use Topics     Smoking status: Former Smoker     Smokeless tobacco: Never Used      Comment: quit smoking in 1983     Alcohol use No       Current Facility-Administered Medications   Medication     alteplase (ACTIVASE) infusion 5.66 mg    Followed by     alteplase (ACTIVASE) infusion 50.95 mg    Followed by     0.9% sodium chloride BOLUS     niCARdipine 40 mg in 200 mL 0.9% NaCl (CARDENE) infusion     Current Outpatient Prescriptions   Medication     amLODIPine (NORVASC) 10 MG tablet     aspirin 81 MG chewable tablet     atorvastatin (LIPITOR) 40 MG tablet     BETA BLOCKER NOT PRESCRIBED, INTENTIONAL,     Calcium Citrate-Vitamin D (CALCIUM CITRATE + PO)     Cholecalciferol (VITAMIN D3 PO)     docusate sodium (COLACE) 100 MG capsule     escitalopram (LEXAPRO) 10 MG tablet     fluticasone (FLONASE) 50 MCG/ACT spray     furosemide (LASIX) 20 MG tablet     guaiFENesin (MUCINEX) 600 MG 12 hr tablet     hydrALAZINE (APRESOLINE) 50 MG tablet     losartan (COZAAR) 100 MG tablet     Multiple Vitamins-Minerals (MULTIVITAMIN OR)     predniSONE (DELTASONE) 1 MG tablet     Saline (OCEAN NASAL SPRAY NA)     sodium chloride-sodium bicarb (CLASSIC NETI POT SINUS WASH) 2300-700 MG KIT     VENTOLIN  (90 BASE) MCG/ACT Inhaler        Allergies   Allergen Reactions     Adhesive Tape      blisters     Atenolol      SOB     Lopressor Hct      SOB       Review of Systems   Respiratory: Negative for shortness of breath.    Cardiovascular: Negative for chest pain.   Gastrointestinal: Positive for nausea and vomiting. Negative for abdominal pain.   Neurological: Positive for weakness  (generalized). Negative for headaches.   All other systems reviewed and are negative.      Physical Exam   BP: (!) 205/90  Pulse: 64  Heart Rate: 78  Resp: 20  Weight: 62.9 kg (138 lb 9.6 oz)  SpO2: (!) 88 %      Physical Exam  General: awake, alert, uncomfortable appearing retching.  Leaning towards the left in her bed  Head: normal cephalic  HEENT: pupils equal, conjugate gaze in tact  Neck: Supple  CV: regular rate and rhythm without murmur  Lungs: clear to ascultation  Abd: soft, non-tender, no guarding, no peritoneal signs  EXT: lower extremities without swelling or edema  Neuro: awake, answers questions appropriately. No focal deficits noted.  Patient had dysmetria on her right upper arm.  Mild decreased strength in her right upper compared to her right lower, lower extremities symptoms seem equivalent.  Patient has normal speech.  Normal naming.  Normal thought process      ED Course     ED Course     Procedures             EKG Interpretation:      Interpreted by Dewayne Garcia  Time reviewed: 2216  Symptoms at time of EKG: nausea, vomiting, weakness.    Rhythm: sinus bradycardia  Rate: 50-60  Axis: Normal  Ectopy: premature ventricular contractions (unifocal)  Conduction: right bundle branch block (complete)  ST Segments/ T Waves: T wave inversion III, V1, V2 and V3  Q Waves: none  Comparison to prior: Unchanged from 2,09,2017 though T waves not seenin 11/2/2017    Clinical Impression: T wave inversions seen on some previous EKGs but not most recent               Labs Ordered and Resulted from Time of ED Arrival Up to the Time of Departure from the ED   CREATININE POCT - Abnormal; Notable for the following:        Result Value    GFR Estimate 53 (*)     All other components within normal limits   BASIC METABOLIC PANEL - Abnormal; Notable for the following:     Glucose 145 (*)     GFR Estimate 59 (*)     Calcium 8.1 (*)     All other components within normal limits   CBC WITH PLATELETS - Abnormal; Notable for  the following:     RBC Count 3.70 (*)      (*)     Platelet Count 123 (*)     All other components within normal limits   INR   TROPONIN I   ISTAT INR POCT   TROPONIN POCT            Assessments & Plan (with Medical Decision Making)   Stroke code was called from the field.  Patient was moved to the stabilization cart.  Patient was placed on cardiac monitors and IV access was obtained.  Patient was noted to be hypertensive and actively retching on exam.  She is given additional 4 IV Zofran.  Neurology was at bedside upon patient's arrival.  Please see their note for original NIH score.  Patient had notable dysmetria on her right upper extremity out of proportion to her known weakness.  Patient continues to complain of ongoing retching and ultimately required an additional dose of Reglan here in the emergency department.    Medical record briefly reviewed patient does have a history of STEMI with V. fib arrest.  Did obtain an EKG which did not show signs of acute ST elevation MI.  She has some T wave inversions that have been seen on some previous EKGs but not the most recent.    Stat CT head neck injury with contrast was obtained did not show acute bleed.  At this point neurology discussed concern for acute ischemic stroke with patient.  They went through consent for TPA with both patient and and her family.  Patient chose to proceed with TPA.  She had received 1 dose of hydralazine prior to giving TPA.  Patient was also prepped for a nicardipine drip in anticipation that she will need ongoing IV control of her blood pressures while TPA is infusing.    Patient had negative troponin.  Electrolytes unremarkable CBC unremarkable INR normal.  Patient be admitted to the intensive care unit for monitoring while on TPA infusion.  Her physical exam was unchanged while in the emergency department.  She did ultimately get improvement of her nausea after several doses of Zofran followed by Reglan.    I have reviewed the  nursing notes.    I have reviewed the findings, diagnosis, plan and need for follow up with the patient.    New Prescriptions    No medications on file       Final diagnoses:   Acute ischemic stroke (H)     I, Janny Daugherty, am serving as a trained medical scribe to document services personally performed by Dewayne Garcia MD, based on the provider's statements to me.   IDewayne MD, was physically present and have reviewed and verified the accuracy of this note documented by Janny Daugherty.    7/7/2018   [facility], EMERGENCY DEPARTMENT     Dewayne Garcia MD  07/07/18 7956

## 2018-07-08 NOTE — PLAN OF CARE
Problem: Stroke (Ischemic) (Adult)  Goal: Signs and Symptoms of Listed Potential Problems Will be Absent, Minimized or Managed (Stroke)  Signs and symptoms of listed potential problems will be absent, minimized or managed by discharge/transition of care (reference Stroke (Ischemic) (Adult) CPG).   Outcome: No Change  D/I: Maintaining SBP <180. Pt started on home BP meds after passing swallow evaluation per Speech. No changes in neuro status. Pt lethargic most of the day but easily arousable, oriented x 4. MRI completed. Pt had no c/o pain all day except around 17:30, pt c/o back pain, achy/stiff.. Medicated with PRN Ultram with good relief.   A: SB/SR 50-60s w/ RBBB. Trop increasing. MD aware. Pt had no c/o CP/SOB. On 2L NC, O2 saturating mid 90s.   P: Plan for Head CT tonight and Echo/PIA tomorrow. Pt may eat dinner tonight per MD but NPO after midnight. Will continue to monitor.

## 2018-07-08 NOTE — PLAN OF CARE
Problem: Stroke (Ischemic) (Adult)  Goal: Signs and Symptoms of Listed Potential Problems Will be Absent, Minimized or Managed (Stroke)  Signs and symptoms of listed potential problems will be absent, minimized or managed by discharge/transition of care (reference Stroke (Ischemic) (Adult) CPG).   Outcome: No Change  Neuro- A&Ox4. L pupil is bigger than R at baseline. Brisk and reactive. R side is slightly weaker than L at baseline as well. Denies N/T.   CV- Afebrile. SR with R BBB. HR in the 50s-60s with PACs and PVCs occasionally. Goal to keep SBP <180 and DBP <105. Nicardipine off at this time. Trop mildly elevated this morning. NCC MD notified. EKG done and trop recheck done.   Pulm- On 2L NC. Lungs clear. Denies SOB.  GI- NPO until SLP sees patient for eval. Reports having BM on 7/7.  - Inc of urine at baseline. Inc x3 this shift.  Pain- Denies.    Plan to continue neuro checks per protocol. Order placed for bubble study.

## 2018-07-08 NOTE — PROGRESS NOTES
tPa bolus given at 2317; and infusion started at 2322. Has Nicardipine order to keep SBP<180 and DBP < 105. tPa has not been scanned because the order has not been placed in epic. Doses were verified with the pharmacist. Pt arouses spontaneously to voice. But c/o being tired and sleepy.

## 2018-07-08 NOTE — PLAN OF CARE
Problem: Patient Care Overview  Goal: Plan of Care/Patient Progress Review  PT 4A:  Acknowledge PT consult.  Pt s/p tPA administration and will be on bedrest today.  Will reassess 7/9

## 2018-07-08 NOTE — PROGRESS NOTES
D/I: Resource Float transfer from ED to 4A. Handoff from Nury RN in ED. Handoff to Sumaya OWEN on 4A. Patient transported on monitor with RN. See VS sheet for arrival times.   P: Continue care on 4A.

## 2018-07-08 NOTE — PROGRESS NOTES
07/08/18 1059   General Information   Onset Date 07/07/18   Start of Care Date 07/08/18   Referring Physician Samuel Miller MD    Patient Profile Review/OT: Additional Occupational Profile Info See Profile for full history and prior level of function   Patient/Family Goals Statement Goals not stated; however, pt reported both hunger and thirst.    Swallowing Evaluation Bedside swallow evaluation   Behaviorial Observations WFL (within functional limits)   Mode of current nutrition NPO   Respiratory Status Room air   Comments Jennifre Bob is a 86 year old female with h/o left internal capsule/corona radiata stroke in 2013, MI, HLD, and HTN who presented this evening with acute onset nausea/vomiting, double vision, and difficulty maintaining posture. She was talking with her daughter on the phone at 9:15 PM when all of these symptoms came on suddenly. She expressed her concern to her daughter who called 911. The police had to break down the door to her apartment because she had difficulty getting up. She endorses dizziness, but denies vertigo or lightheadedness, attributing much of her symptoms to double vision. Later, the daughters report that she has been having trouble with her balance in recent weeks, particularly with her right leg. She did have a fall on July 1st but did not strike her head. This occurred when she first noticed problems with her right leg. She has not noticed any incoordination in her right upper extremity.   Clinical Swallow Evaluation   Oral Musculature generally intact   Structural Abnormalities none present   Dentition present and adequate   Mucosal Quality adequate   Mandibular Strength and Mobility intact   Oral Labial Strength and Mobility WFL   Lingual Strength and Mobility WFL   Laryngeal Function Throat clear;Swallow;Cough;Voicing initiated;Dry swallow palpated   Oral Musculature Comments WFL   Additional Documentation Yes   Clinical Swallow Eval: Thin Liquid Texture Trial    Mode of Presentation, Thin Liquids spoon;cup;straw;self-fed;fed by clinician   Volume of Liquid or Food Presented ice chips x2, tsp water x4, 4 oz water via straw and cup    Oral Phase of Swallow WFL   Pharyngeal Phase of Swallow intact   Diagnostic Statement Functional swallow for thin liquids.    Clinical Swallow Eval: Puree Solid Texture Trial   Mode of Presentation, Puree spoon;fed by clinician   Volume of Puree Presented 1 oz applesauce   Oral Phase, Puree WFL   Pharyngeal Phase, Puree intact   Diagnostic Statement Functional swallow for pureed textures.     Clinical Swallow Eval: Solid Food Texture Trial   Mode of Presentation, Solid fed by clinician   Volume of Solid Food Presented 1 mitzy cracker    Oral Phase, Solid WFL   Pharyngeal Phase, Solid intact   Diagnostic Statement Increased time for mastication due, in part, to lack of dentition on lower R side.  No residue remaining after swallow response.     Swallow Compensations   Swallow Compensations Pacing;Reduce amounts;Alternate viscosity of consistencies   Esophageal Phase of Swallow   Patient reports or presents with symptoms of esophageal dysphagia No   General Therapy Interventions   Planned Therapy Interventions Dysphagia Treatment   Dysphagia treatment Modified diet education;Instruction of safe swallow strategies;Compensatory strategies for swallowing   Swallow Eval: Clinical Impressions   Skilled Criteria for Therapy Intervention Skilled criteria met.  Treatment indicated.   Functional Assessment Scale (FAS) 6   Treatment Diagnosis Functional oropharyngeal swallow skills    Diet texture recommendations Thin liquids;Dysphagia diet level 3   Recommended Feeding/Eating Techniques alternate between small bites and sips of food/liquid;maintain upright posture during/after eating for 30 mins;small sips/bites   Demonstrates Need for Referral to Another Service occupational therapy;physical therapy   Therapy Frequency 5 times/wk   Predicted Duration  of Therapy Intervention (days/wks) 1 week   Anticipated Discharge Disposition home w/ assist   Risks and Benefits of Treatment have been explained. Yes   Patient, family and/or staff in agreement with Plan of Care Yes   Clinical Impression Comments Pt seen bedside for swallow evaluation per MD orders.  Pt presents with functional oropharyngeal swallow responses.  Pt tolerated trials of thin liquids, purees, and solid textures with no overt s/s of aspiration.  Pt required increased time for mastication of solid textures likely due to lack of dentition on lower R side.  No residue remaining following swallow response.  Recommend advance diet to dysphagia diet level 3 and thin liquids.  Pt should be alert and upright for intake.  Pt will need to take small bites/sips, pace self, and alternate consistencies.  ST plans to follow for a short course to assess diet tolerance in the settting of possible neurological changes.  Anticipate short course with goals to be met prior to discharge.     Total Evaluation Time   Total Evaluation Time (Minutes) 14

## 2018-07-08 NOTE — PROGRESS NOTES
Patient arrived from ED after receiving tpA. Infusion was started at 2322 and stopped at 0022 and patient arrived to  at 0110. Neuro check done with estelle RN. Patient remains intact but is sleepy. Arouses easily. Has baseline right sided weakness from previous stroke. Will continue to monitor and assess for changes.

## 2018-07-08 NOTE — ED TRIAGE NOTES
Patient BIBA with complaints of weakness with onset about 50 minutes ago while on the phone with her daughter. /120 per ems. Stroke code called.

## 2018-07-08 NOTE — PROGRESS NOTES
Social Work: Assessment with Discharge Plan    Patient Name: Jennifer Bob  : 1931  Age: 86 year old  MRN: [id number]  Risk/Complexity Score:   Completed assessment with: pt. And dtr. Esthela    Presenting Information   Reason for Referral: stroke and d/c planning  Date of intake: 2018  Referral Source: medical team  Decision Maker: pt  Alternate Decision Maker: Esthela  Health Care Directive: Pt. Has one  Living Situation: alone in apt. In [place]. It is a senior high rise but dtr does not think there are any senior-specific services available  Previous Functional Status: completely independent, including driving  Patient and family understanding of hospitalization: no concerns  Cultural/Language/Spiritual Considerations: none identified  Adjustment to Illness: considers herself able to bounce back from health issues before and hopes can again this time    Physical Health  Reason for admission:   1. Acute ischemic stroke (H)      Services Needed/Recommended: PT and OT ordered but unable to see yet d/t bedrest (had TPA in ED)    Mental Health/Chemical Dependency:   Diagnosis: depression  Support/Services in Place: Cognitive [employer]   Services Needed/Recommended: none at this time    Support System  Significant Relationship at Present time:    Family of Origin is available for support: yes - dtr Esthela in [place]. 2 other children out of state. One is retired and plans to come stay with pt. Upon returning home.  Other support available:  Adult grandchildren in area  Current in home services: none  Gaps in Support System: none identified    Provider Information   Primary Care Physician:Torri Fisher        Financial   Income Source: Social Security  Financial Concerns:  None identified  Insurance: [employer]      Discharge Plan   Patient and family discharge goal: unsure at this time as therapies not able to work with pt.  Provided Education on discharge plan: yes  Barriers to discharge: medical  readiness, ablility to participate in therapies    Discharge Recommendations   Disposition: home versus rehab, depending on work with therapies  Transportation Needs: not identified yet      Additional comments   At time of assessment, pt. Had yet to be able to participate in therapies so d/c recs yet identified. Did talk about home versus rehab. Pt. Has not had a rehab stay but know of others who have. 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

## 2018-07-08 NOTE — PLAN OF CARE
Problem: Patient Care Overview  Goal: Plan of Care/Patient Progress Review  OT 4A: Cancel. Per chart review patient with tPA administered, on bedrest for 24 hours until late this PM. Will reschedule evaluation for 7/9 and initiate as appropriate.

## 2018-07-09 ENCOUNTER — APPOINTMENT (OUTPATIENT)
Dept: CARDIOLOGY | Facility: CLINIC | Age: 83
DRG: 061 | End: 2018-07-09
Attending: PSYCHIATRY & NEUROLOGY
Payer: COMMERCIAL

## 2018-07-09 ENCOUNTER — APPOINTMENT (OUTPATIENT)
Dept: SPEECH THERAPY | Facility: CLINIC | Age: 83
DRG: 061 | End: 2018-07-09
Payer: COMMERCIAL

## 2018-07-09 ENCOUNTER — APPOINTMENT (OUTPATIENT)
Dept: OCCUPATIONAL THERAPY | Facility: CLINIC | Age: 83
DRG: 061 | End: 2018-07-09
Attending: STUDENT IN AN ORGANIZED HEALTH CARE EDUCATION/TRAINING PROGRAM
Payer: COMMERCIAL

## 2018-07-09 ENCOUNTER — APPOINTMENT (OUTPATIENT)
Dept: PHYSICAL THERAPY | Facility: CLINIC | Age: 83
DRG: 061 | End: 2018-07-09
Attending: STUDENT IN AN ORGANIZED HEALTH CARE EDUCATION/TRAINING PROGRAM
Payer: COMMERCIAL

## 2018-07-09 LAB
ERYTHROCYTE [DISTWIDTH] IN BLOOD BY AUTOMATED COUNT: 13.7 % (ref 10–15)
HCT VFR BLD AUTO: 37.3 % (ref 35–47)
HGB BLD-MCNC: 11.8 G/DL (ref 11.7–15.7)
INTERPRETATION ECG - MUSE: NORMAL
MCH RBC QN AUTO: 33.2 PG (ref 26.5–33)
MCHC RBC AUTO-ENTMCNC: 31.6 G/DL (ref 31.5–36.5)
MCV RBC AUTO: 105 FL (ref 78–100)
PLATELET # BLD AUTO: 124 10E9/L (ref 150–450)
RBC # BLD AUTO: 3.55 10E12/L (ref 3.8–5.2)
TROPONIN I SERPL-MCNC: 0.1 UG/L (ref 0–0.04)
TROPONIN I SERPL-MCNC: 0.1 UG/L (ref 0–0.04)
WBC # BLD AUTO: 7.7 10E9/L (ref 4–11)

## 2018-07-09 PROCEDURE — 40000141 ZZH STATISTIC PERIPHERAL IV START W/O US GUIDANCE

## 2018-07-09 PROCEDURE — 25000132 ZZH RX MED GY IP 250 OP 250 PS 637: Performed by: PSYCHIATRY & NEUROLOGY

## 2018-07-09 PROCEDURE — 40000193 ZZH STATISTIC PT WARD VISIT

## 2018-07-09 PROCEDURE — 40000133 ZZH STATISTIC OT WARD VISIT

## 2018-07-09 PROCEDURE — 93306 TTE W/DOPPLER COMPLETE: CPT | Mod: 26 | Performed by: INTERNAL MEDICINE

## 2018-07-09 PROCEDURE — 25000128 H RX IP 250 OP 636: Performed by: STUDENT IN AN ORGANIZED HEALTH CARE EDUCATION/TRAINING PROGRAM

## 2018-07-09 PROCEDURE — 92526 ORAL FUNCTION THERAPY: CPT | Mod: GN

## 2018-07-09 PROCEDURE — 36415 COLL VENOUS BLD VENIPUNCTURE: CPT | Performed by: PSYCHIATRY & NEUROLOGY

## 2018-07-09 PROCEDURE — 93306 TTE W/DOPPLER COMPLETE: CPT

## 2018-07-09 PROCEDURE — 97165 OT EVAL LOW COMPLEX 30 MIN: CPT | Mod: GO

## 2018-07-09 PROCEDURE — 36415 COLL VENOUS BLD VENIPUNCTURE: CPT | Performed by: STUDENT IN AN ORGANIZED HEALTH CARE EDUCATION/TRAINING PROGRAM

## 2018-07-09 PROCEDURE — 97161 PT EVAL LOW COMPLEX 20 MIN: CPT | Mod: GP

## 2018-07-09 PROCEDURE — 84484 ASSAY OF TROPONIN QUANT: CPT | Performed by: PSYCHIATRY & NEUROLOGY

## 2018-07-09 PROCEDURE — 25000125 ZZHC RX 250: Performed by: STUDENT IN AN ORGANIZED HEALTH CARE EDUCATION/TRAINING PROGRAM

## 2018-07-09 PROCEDURE — 40000225 ZZH STATISTIC SLP WARD VISIT

## 2018-07-09 PROCEDURE — 84484 ASSAY OF TROPONIN QUANT: CPT | Performed by: STUDENT IN AN ORGANIZED HEALTH CARE EDUCATION/TRAINING PROGRAM

## 2018-07-09 PROCEDURE — 97530 THERAPEUTIC ACTIVITIES: CPT | Mod: GP

## 2018-07-09 PROCEDURE — 97530 THERAPEUTIC ACTIVITIES: CPT | Mod: GO

## 2018-07-09 PROCEDURE — 25000132 ZZH RX MED GY IP 250 OP 250 PS 637: Performed by: STUDENT IN AN ORGANIZED HEALTH CARE EDUCATION/TRAINING PROGRAM

## 2018-07-09 PROCEDURE — 85027 COMPLETE CBC AUTOMATED: CPT | Performed by: PSYCHIATRY & NEUROLOGY

## 2018-07-09 PROCEDURE — 12000008 ZZH R&B INTERMEDIATE UMMC

## 2018-07-09 RX ADMIN — VITAMIN D, TAB 1000IU (100/BT) 1000 UNITS: 25 TAB at 09:02

## 2018-07-09 RX ADMIN — ASPIRIN 81 MG: 81 TABLET, COATED ORAL at 09:02

## 2018-07-09 RX ADMIN — AMLODIPINE BESYLATE 10 MG: 10 TABLET ORAL at 09:02

## 2018-07-09 RX ADMIN — ONDANSETRON 4 MG: 4 TABLET, ORALLY DISINTEGRATING ORAL at 11:09

## 2018-07-09 RX ADMIN — ESCITALOPRAM OXALATE 10 MG: 10 TABLET ORAL at 09:02

## 2018-07-09 RX ADMIN — FUROSEMIDE 20 MG: 20 TABLET ORAL at 09:02

## 2018-07-09 RX ADMIN — DOCUSATE SODIUM 100 MG: 100 CAPSULE, LIQUID FILLED ORAL at 09:02

## 2018-07-09 RX ADMIN — PREDNISONE 2 MG: 1 TABLET ORAL at 09:02

## 2018-07-09 RX ADMIN — DOCUSATE SODIUM 100 MG: 100 CAPSULE, LIQUID FILLED ORAL at 20:46

## 2018-07-09 RX ADMIN — HYDRALAZINE HYDROCHLORIDE 50 MG: 25 TABLET ORAL at 09:02

## 2018-07-09 RX ADMIN — HYDRALAZINE HYDROCHLORIDE 50 MG: 25 TABLET ORAL at 20:46

## 2018-07-09 RX ADMIN — ATORVASTATIN CALCIUM 40 MG: 40 TABLET, FILM COATED ORAL at 20:46

## 2018-07-09 RX ADMIN — LOSARTAN POTASSIUM 100 MG: 100 TABLET, FILM COATED ORAL at 09:02

## 2018-07-09 ASSESSMENT — VISUAL ACUITY
OU: GLASSES
OU: GLASSES;BASELINE
OU: GLASSES;BASELINE
OU: GLASSES
OU: GLASSES;BASELINE
OU: GLASSES

## 2018-07-09 NOTE — PROGRESS NOTES
Neuroscience Intensive Care Progress Note    2018    Jennifer Bob is a 86 year old year old female with h/o left internal capsule/corona radiata stroke in , MI, HLD, and HTN who presented yesterday with acute onset nausea/vomiting, double vision, and difficulty maintaining posture. Stroke code was activated and tPA was given with marked improvement regarding her visual symptoms, dysmetria and nausea. She has residual right sided weakness since her previous stroke in  due to small infarction in the left corona radiata. 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         Stroke Scales      National Institutes of Health Stroke Scale (at presentation)   NIHSS done at:  time patient seen        Score    Level of consciousness:  (0)   Alert, keenly responsive    LOC questions:  (0)   Answers both questions correctly    LOC commands:  (0)   Performs both tasks correctly    Best gaze:  (0)   Normal    Visual:  (0)   No visual loss    Facial palsy:  (0)   Normal symmetrical movements    Motor arm (left):  (0)   No drift    Motor arm (right):  (0)   No drift    Motor leg (left):  (0)   No drift    Motor leg (right):  (0)   No drift    Limb ataxia:  (0)   No atax    Sensory:  (0)   Normal- no sensory loss    Best language:  (0)   Normal- no aphasia    Dysarthria:  (0)   Normal    Extinction and inattention:  (0)   No abnormality          NIHSS Total Score:  0           Problem List  1- Recurrent stroke  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  3- HTN  4- Hyperlipidemia      24 hour events:  Symptoms of the stroke markedly improved    24 Hour Vital Signs Summary:  Temperatures:  Current - Temp: 98.2  F (36.8  C); Max - Temp  Av.2  F (36.8  C)  Min: 97.5  F (36.4  C)  Max: 98.8  F (37.1  C)  Respiration range: Resp  Av.4  Min: 9  Max: 35  Pulse range: Pulse  Av  Min: 58  Max: 64  Blood pressure range:  Systolic (24hrs), Av , Min:138 , Max:205   ; Diastolic (24hrs), Av, Min:48, Max:170    Pulse oximetry range: SpO2  Av.6 %  Min: 88 %  Max: 100 %    Ventilator Settings  Resp: 14      Intake/Output Summary (Last 24 hours) at 18 0846  Last data filed at 18 0800   Gross per 24 hour   Intake           536.05 ml   Output                0 ml   Net           536.05 ml       BP - Mean:  [] 118    Current Medications:    amLODIPine  10 mg Oral Daily     aspirin  81 mg Oral Q24H    Or     aspirin  81 mg Per Feeding Tube Q24H     atorvastatin  40 mg Oral Daily     cholecalciferol (vitamin D3) tablet 1,000 Units  1,000 Units Oral Daily     docusate sodium  100 mg Oral BID     escitalopram  10 mg Oral Daily     fluticasone  2 spray Both Nostrils Daily     furosemide  20 mg Oral Daily     guaiFENesin  1,200 mg Oral BID     hydrALAZINE  50 mg Oral BID     losartan  100 mg Oral Daily     predniSONE  2 mg Oral Daily     sodium chloride (PF)  3 mL Intravenous Q8H       PRN Medications:  albuterol, lidocaine 4%, lidocaine (buffered or not buffered), magnesium sulfate, - MEDICATION INSTRUCTIONS -, - MEDICATION INSTRUCTIONS -, - MEDICATION INSTRUCTIONS -, metoclopramide **OR** metoclopramide, naloxone, ondansetron **OR** ondansetron, potassium chloride, potassium chloride with lidocaine, potassium chloride, potassium chloride, potassium chloride, potassium phosphate (KPHOS) in D5W IV, potassium phosphate (KPHOS) in D5W IV, potassium phosphate (KPHOS) in D5W IV, potassium phosphate (KPHOS) in D5W IV, prochlorperazine **OR** prochlorperazine **OR** prochlorperazine, sodium chloride (PF), traMADol (ULTRAM) tablet 50 mg    Infusions:    - MEDICATION INSTRUCTIONS -       - MEDICATION INSTRUCTIONS -       - MEDICATION INSTRUCTIONS -         Allergies   Allergen Reactions     Adhesive Tape      blisters     Atenolol      SOB     Lopressor Hct      SOB       Physical Examination:  BP (!) 162/99  Pulse 58  Temp  99  F (37.2  C) (Oral)  Resp 14  Wt 58.4 kg (128 lb 12 oz)  SpO2 97%  BMI 22.81 kg/m2    General:  patient lying in bed without any acute distress    HEENT:  normocephalic/atraumatic  Cardio:  Bradycardic with regular rhythm  Pulmonary:  wheezing present  Abdomen:  soft, non-distended  Extremities:  edema present  Skin:  intact      Neurologic  Mental Status:  Slightly somnolent but oriented and following commands appropriately  Cranial Nerves:  EOMI with normal smooth pursuit, facial sensation intact and symmetric, facial movements symmetric, hearing not formally tested but intact to conversation, palate elevation symmetric and uvula midline, no dysarthria, shoulder shrug strong bilaterally, tongue protrusion midline. Pupil reactive bilaterally.  Motor:  EF, EE, and  all 4+ right and 5 left. Lower extremities 5/5 bilaterally except right ankle dorsiflexion 3/5 on right.  Reflexes:  3+ right bicep and brachioradialis. 2+ left bicep and brachioradialis. 3+ patellar bilaterally. Unable to elicit achilles bilaterally.  Sensory:  Intact to light touch bilaterally upper and lower extremities  Coordination:  very mild ataxia on the right side by finger to nose and finger tapping tests  Station/Gait: little deviation to the left     Labs/Studies:  Recent Labs   Lab Test  07/08/18 0323 07/07/18 2222 05/21/18   0941  05/02/18   0936   NA  142  141   --   143   POTASSIUM  4.0  3.4   --   3.9   CHLORIDE  107  107   --   107   CO2  27  26   --   24   ANIONGAP  9  8   --   12   GLC  138*  145*   --   104*   BUN  24  24   --   19   CR  0.90  0.90   --   0.88   JERILYN  8.4*  8.1*   --   9.8   WBC  8.3  8.5  8.0  7.3   RBC  3.82  3.70*  4.06  3.82   HGB  12.7  12.1  13.5  12.9   PLT  142*  123*  168  116*       Recent Labs   Lab Test  07/08/18 0323 07/07/18 2222 04/24/14   1903  10/17/13   0552   INR  1.04  1.00  0.90  0.90   PTT  28   --    --   29         No lab results found in last 7 days.    Imaging:    Mr  Brain For Stroke Limited    Result Date: 7/8/2018  Impression: Multiple foci of restricted diffusion of the cerebellum, mostly right of midline. Findings are consistent with ischemic stroke, possibly embolic in etiology.    Ct Head Neck Angio W/o & W Contrast    Result Date: 7/8/2018    Impression:  1. Head CTA demonstrates short segment high-grade stenosis of left anterior cerebral artery A2 segment, unchanged. Stable occlusion of distal right vertebral artery. The anterior communicating artery is patent. Stable hypoplastic left P1 segment with fetal origin of left PCA. Stenotic appearance of left posterior communicating artery, which is new since MRI dated 4/24/2014. Unchanged tapered narrowing of left P3 segment. Right posterior communicating arteries not visualized. 2. Neck CTA demonstrates hypoplastic right vertebral artery with left-sided dominance. Narrowing of proximal left internal carotid artery with 63 % stenosis. Focal 40% stenosis of proximal right internal carotid artery. 3. No evidence of intracranial hemorrhage on the noncontrast head CT. Sequela of old lacunar infarct of posterior left frontal corona radiata, corresponding to the infarct demonstrated on MRI dated 10/17/2013. 4. Severe degenerative changes of cervical spine with grade 1 anterolisthesis of C4 on C5 and C6 and C7.   Ct Head W/o Contrast    Result Date: 7/9/2018  Impression: Expected evolution of bilateral cerebellar infarcts since 7/7/2018. No intracranial hemorrhage.     Assessment/Plan  Jennifer Bob is a 86 year old year old female with h/o left internal capsule/corona radiata stroke in 2013, MI, HLD, and HTN who presented yesterday with acute onset nausea/vomiting, double vision, and difficulty maintaining posture s/p tPA. She had marked improvement of her visual symptoms and nausea with very mild residual dysmetria. And little deviation to the left while walking     # stroke  On Asprin 81 mg   Get TTE today the descision  will be made after the result of the TTE whether she will need double antiplatelet or Anticoagulant for secondary prevention of stroke  OT team started working with her today  PT still pending   Transfer to floor    #CKD  Creatinine was 0.9 on admission suggesting renal function better than previously thought. Will continue to monitor with daily CBC.     #HTN  Will keep her on Amlodipine, Hydralazine and Losartan and follow the blood pressure. If she the blood pressure continued to be high we will either increase one of her meds or add another one aiming at blood pressure <140/90    #HLD  Continue on atorvastatin    # Cardiac  Troponin level is not concerning so we will stop checking it regularely     Fluids/Electrolytes/Nutrition   Keep her on advanced dysphagia diet 3    Dispo: discharge her to home once we get all the workup for her    Deneen Sun MD  PGY1  [phone]

## 2018-07-09 NOTE — PROGRESS NOTES
07/09/18 1600   Quick Adds   Type of Visit Initial PT Evaluation   Living Environment   Lives With alone   Living Arrangements independent living facility  (senior living apartment)   Number of Stairs to Enter Home 0   Number of Stairs Within Home 0   Stair Railings at Home none   Transportation Available car;family or friend will provide   Living Environment Comment Pt lives alone but reports sister will come to stay with pt at apartment upon d/c.   Self-Care   Usual Activity Tolerance good   Current Activity Tolerance moderate   Regular Exercise no   Equipment Currently Used at Home shower chair;grab bar   Activity/Exercise/Self-Care Comment Pt enjoys playing cards. Reports idnep with ADLs and IADLs   Functional Level Prior   Ambulation 0-->independent   Transferring 0-->independent   Fall history within last six months yes   Number of times patient has fallen within last six months 1   Which of the above functional risks had a recent onset or change? ambulation   Prior Functional Level Comment Fell onto left knee at [facility] but was able to indep recover.   General Information   Onset of Illness/Injury or Date of Surgery - Date 07/07/18   Patient/Family Goals Statement Wants to go home at d/c.   Pertinent History of Current Problem (include personal factors and/or comorbidities that impact the POC) Jennifer Bob is a 86 year old year old female with h/o left internal capsule/corona radiata stroke in 2013, MI, HLD, and HTN who presented yesterday with acute onset nausea/vomiting, double vision, and difficulty maintaining posture. Stroke code was activated and tPA was given with marked improvement regarding her visual symptoms, dysmetria and nausea. She has residual right sided weakness since her previous stroke in 2013 due to small infarction in the left corona radiata. Repeat MRI for possible stroke 2014 showed no acute lesion, but showed chronically occluded right vertebral artery and hypoplastic bilateral  PCAs.    Precautions/Limitations fall precautions   Cognitive Status Examination   Orientation orientation to person, place and time   Level of Consciousness alert   Follows Commands and Answers Questions 100% of the time   Personal Safety and Judgment intact   Memory intact   Pain Assessment   Patient Currently in Pain No   Integumentary/Edema   Integumentary/Edema Comments mild ecchymosis and edema left knee otherwise no deficits.   Posture    Posture Forward head position;Protracted shoulders   Range of Motion (ROM)   ROM Comment WFL as per exam   Strength   Strength Comments R ankle strength 4/5 otherwise 5/5 B LE   Bed Mobility   Bed Mobility Comments Supine>sit SBA with cues   Transfer Skills   Transfer Comments Sit>stand: SBA up to FWW with cues   Gait   Gait Comments Amb 15' with FWW/CGA with cues   Balance   Balance Comments CGA provided for standing statc/dynamic balance for safety wit FWW   Sensory Examination   Sensory Perception no deficits were identified   General Therapy Interventions   Planned Therapy Interventions balance training;bed mobility training;gait training;neuromuscular re-education;strengthening;stretching;transfer training;home program guidelines;progressive activity/exercise   Clinical Impression   Criteria for Skilled Therapeutic Intervention yes, treatment indicated   PT Diagnosis Impaired functional mobility   Influenced by the following impairments Mild strength deficits, generalized deconditioning, fair posture   Functional limitations due to impairments Bed mobility, transfers, gait, balance, endurance   Clinical Presentation Stable/Uncomplicated   Clinical Presentation Rationale Clinical judgement   Clinical Decision Making (Complexity) Low complexity   Therapy Frequency` daily   Predicted Duration of Therapy Intervention (days/wks) 1 week   Anticipated Equipment Needs at Discharge front wheeled walker   Anticipated Discharge Disposition Home with Home Therapy;Home with Assist  "  Risk & Benefits of therapy have been explained Yes   Patient, Family & other staff in agreement with plan of care Yes   [facility] AM-PAC  \"6 Clicks\" V.2 Basic Mobility Inpatient Short Form   1. Turning from your back to your side while in a flat bed without using bedrails? 3 - A Little   2. Moving from lying on your back to sitting on the side of a flat bed without using bedrails? 3 - A Little   3. Moving to and from a bed to a chair (including a wheelchair)? 3 - A Little   4. Standing up from a chair using your arms (e.g., wheelchair, or bedside chair)? 3 - A Little   5. To walk in hospital room? 3 - A Little   6. Climbing 3-5 steps with a railing? 3 - A Little   Basic Mobility Raw Score (Score out of 24.Lower scores equate to lower levels of function) 18   Total Evaluation Time   Total Evaluation Time (Minutes) 10     "

## 2018-07-09 NOTE — PLAN OF CARE
Problem: Patient Care Overview  Goal: Plan of Care/Patient Progress Review  Discharge Planner OT   Patient plan for discharge: Not stated  Current status: Patient seen for OT evaluation and treatment. Patient reports feeling weaker and more unsteady compared to baseline, also has double vision in L peripheral. Patient performs bed mobility mod I to sit EOB, performs STS CGA. Patient with unsteady gait requires min A from OT for ambulation, also demos slight L lateral path deviation. Patient ambulates ~15 ft with BUE on OT's arms. Patient returns to room to trial FWW, c/o nausea. Patient ambulates ~5 ft in room with FWW CGA, demos improved stability. Patient returns to supine in bed mod I.  Barriers to return to prior living situation: Vision, balance  Recommendations for discharge: Home with home vs OP therapy  Rationale for recommendations: Patient has daughters to provide 24 hour supervision and assistance upon d/c from home. Per patient she has OP PT orders for arthritis, has not been due to hospital admission. Patient will benefit from continued in-hospital OT for safety and family training.       Entered by: Rachelle Caceres 07/09/2018 1:58 PM

## 2018-07-09 NOTE — PLAN OF CARE
"Problem: Stroke (Ischemic) (Adult)  Goal: Signs and Symptoms of Listed Potential Problems Will be Absent, Minimized or Managed (Stroke)  Signs and symptoms of listed potential problems will be absent, minimized or managed by discharge/transition of care (reference Stroke (Ischemic) (Adult) CPG).   Outcome: Improving  General: Jennifer reported having a good night and sleeping well. She is pleasant, cooperative, and participating in care.   Neuro: RUE is slightly weaker than TIESHAE. Left pupil > right at baseline. Reported feeling \"wiggly\" upon standing to transfer to the chair this morning. All other neuros intact/unchanged. Denies blurry vision.   Card: HR 50-70; -179. Will continue monitor and treat SBP >180. PTA antihypertensives were restarted today.   Resp: remains on 2L NC, will wean as tolerated.   GI/: DD3 diet with thin liquids; tolerating well, but decreased appetite. Last BM was PTA on 7/7; receiving scheduled colace. Wearing pads d/t urine incontinence. Pt reports wearing pads at home during the night.   Skin: bruising on right forearm.     Plan: patient got up to the chair this morning to eat breakfast. OT will see this am. Will continue to encourage activity. Pt will likely transfer to  later today. Continue neuro checks as ordered.         "

## 2018-07-09 NOTE — PLAN OF CARE
"Problem: Stroke (Ischemic) (Adult)  Goal: Signs and Symptoms of Listed Potential Problems Will be Absent, Minimized or Managed (Stroke)  Signs and symptoms of listed potential problems will be absent, minimized or managed by discharge/transition of care (reference Stroke (Ischemic) (Adult) CPG).   Outcome: Improving  General: Jennifer reported having a good night and sleeping well. She is pleasant, cooperative, and participating in care.   Neuro: RUE is slightly weaker than TIESHAE. Left pupil > right at baseline. Reported feeling \"wiggly\" upon standing to transfer to the chair this morning. All other neuros intact/unchanged. Denies blurry vision.   Card: HR 50-70; 's. Will continue monitor and treat SBP >180. PTA antihypertensives were restarted today.   Resp: remains on 2L NC, will wean as tolerated.   GI/: DD3 diet with thin liquids; tolerating well, but decreased appetite. Last BM was PTA on 7/7; receiving scheduled colace. Wearing pads d/t urine incontinence. Pt reports wearing pads at home during the night.   Skin: bruising on right forearm and left knee area.      Plan: Will continue to encourage activity. Pt transfered from 4A today. Continue neuro checks as ordered. Cont POC.        "

## 2018-07-09 NOTE — PLAN OF CARE
Problem: Stroke (Ischemic) (Adult)  Goal: Signs and Symptoms of Listed Potential Problems Will be Absent, Minimized or Managed (Stroke)  Signs and symptoms of listed potential problems will be absent, minimized or managed by discharge/transition of care (reference Stroke (Ischemic) (Adult) CPG).   Outcome: No Change  Neuro- A&Ox4. L pupil is bigger than R at baseline. Brisk and reactive. R side is slightly weaker than L at baseline as well. Denies N/T.   CV- Afebrile. SR with R BBB. HR in the 50s-60s with PACs and PVCs occasionally. Goal to keep SBP <180 and DBP <105.   Pulm- On 2L NC. Lungs clear. Denies SOB.  GI-Tolerating DD3 diet with thin liquids. No BM.  - Inc of urine at baseline.   Pain- Ultram if needed.    Plan for ECHO today

## 2018-07-09 NOTE — PLAN OF CARE
Problem: Patient Care Overview  Goal: Plan of Care/Patient Progress Review  Discharge Planner PT   Patient plan for discharge: Home with home PT and issued FWW  Current status: Performs all mobility without physical assist. Amb 150' with FWW/CGA. Worked on some mild dual tasking and encouraged OOB as much as possible while in hospital. OK to nap during the day for brain rest, however.  Barriers to return to prior living situation: Mild deconditioning  Recommendations for discharge: Home with assist (dtr coming to stay with pt) and home PT  Rationale for recommendations: Current level of function       Entered by: Gavin Pham 07/09/2018 4:29 PM

## 2018-07-09 NOTE — PROGRESS NOTES
Pt transferred to  from  around 1800 accompanied by writer. Report given to RN and bedside handoff completed. All belongings sent with patient.

## 2018-07-09 NOTE — PLAN OF CARE
Problem: Patient Care Overview  Goal: Plan of Care/Patient Progress Review  Discharge Planner SLP   Patient plan for discharge: not discussed  Current status: The patient was seen for dysphagia f/u this afternoon. Recommend diet advance to regular textures and thin liquids. Recommend the patient self select soft foods, alternate bites/sips, chew carefully, and ensure all oral residue is cleared before taking another bite. SLP to f/u for tolerance with diet upgrade.  Barriers to return to prior living situation: none per SLP  Recommendations for discharge: defer to OT & PT  Rationale for recommendations: do not anticipate ongoing SLP needs after hospital discharge       Entered by: Maria Eugenia Gates 07/09/2018 4:21 PM

## 2018-07-09 NOTE — PROGRESS NOTES
07/09/18 1000   Quick Adds   Type of Visit Initial Occupational Therapy Evaluation   Living Environment   Lives With alone   Living Arrangements apartment   Home Accessibility tub/shower is not walk in   Number of Stairs to Enter Home 0   Number of Stairs Within Home 0   Stair Railings at Home none   Transportation Available car   Self-Care   Dominant Hand right   Usual Activity Tolerance good   Current Activity Tolerance moderate   Regular Exercise no   Equipment Currently Used at Home shower chair;grab bar   Functional Level Prior   Ambulation 0-->independent   Transferring 0-->independent   Toileting 0-->independent   Bathing 0-->independent   Dressing 0-->independent   Eating 0-->independent   Communication 0-->understands/communicates without difficulty   Swallowing 0-->swallows foods/liquids without difficulty   Cognition 0 - no cognition issues reported   Fall history within last six months yes   Number of times patient has fallen within last six months 1   General Information   Onset of Illness/Injury or Date of Surgery - Date 07/07/18   Referring Physician Samuel Miller MD   Patient/Family Goals Statement Return home   Additional Occupational Profile Info/Pertinent History of Current Problem Jennifer Bob is a 86 year old year old female with h/o left internal capsule/corona radiata stroke in 2013, MI, HLD, and HTN who presented yesterday with acute onset nausea/vomiting, double vision, and difficulty maintaining posture s/p tPA   Precautions/Limitations fall precautions   Cognitive Status Examination   Orientation orientation to person, place and time   Level of Consciousness alert   Visual Perception   Visual Perception Comments Pt declines blurry vision, has double vision in L peripheral field with slight eye beating.   Sensory Examination   Sensory Comments Pt reports no change   Range of Motion (ROM)   ROM Quick Adds No deficits were identified   ROM Comment WFL in BUE   Strength    Strength Comments Pt with generalized weakness in BUEs overall 4/5 in all planes   Hand Strength   Hand Strength Comments Slightly decreased per baseline as pt with arthritics   Transfer Skill: Sit to Stand   Level of [place]: Sit/Stand contact guard   Balance   Balance Comments Pt with unsteady gait, L path deviation   Instrumental Activities of Daily Living (IADL)   IADL Comments Pt IND in all IADLs   Activities of Daily Living Analysis   Impairments Contributing to Impaired Activities of Daily Living balance impaired  (vision)   General Therapy Interventions   Planned Therapy Interventions ADL retraining;transfer training   Clinical Impression   Criteria for Skilled Therapeutic Interventions Met yes, treatment indicated   OT Diagnosis Decreased IND in ADLs   Influenced by the following impairments Balance, vision   Assessment of Occupational Performance 1-3 Performance Deficits   Identified Performance Deficits Dressing, bathing, toileting   Clinical Decision Making (Complexity) Low complexity   Therapy Frequency daily   Predicted Duration of Therapy Intervention (days/wks) 1 week   Anticipated Equipment Needs at Discharge (Will continue to assess)   Anticipated Discharge Disposition Home with Home Therapy;Home with Outpatient Therapy   Risks and Benefits of Treatment have been explained. Yes   Patient, Family & other staff in agreement with plan of care Yes   Total Evaluation Time   Total Evaluation Time (Minutes) 8

## 2018-07-10 ENCOUNTER — APPOINTMENT (OUTPATIENT)
Dept: SPEECH THERAPY | Facility: CLINIC | Age: 83
DRG: 061 | End: 2018-07-10
Payer: COMMERCIAL

## 2018-07-10 ENCOUNTER — APPOINTMENT (OUTPATIENT)
Dept: PHYSICAL THERAPY | Facility: CLINIC | Age: 83
DRG: 061 | End: 2018-07-10
Payer: COMMERCIAL

## 2018-07-10 PROCEDURE — 25000125 ZZHC RX 250: Performed by: STUDENT IN AN ORGANIZED HEALTH CARE EDUCATION/TRAINING PROGRAM

## 2018-07-10 PROCEDURE — 12000008 ZZH R&B INTERMEDIATE UMMC

## 2018-07-10 PROCEDURE — 25000132 ZZH RX MED GY IP 250 OP 250 PS 637: Performed by: PSYCHIATRY & NEUROLOGY

## 2018-07-10 PROCEDURE — 25000132 ZZH RX MED GY IP 250 OP 250 PS 637: Performed by: STUDENT IN AN ORGANIZED HEALTH CARE EDUCATION/TRAINING PROGRAM

## 2018-07-10 PROCEDURE — 40000225 ZZH STATISTIC SLP WARD VISIT

## 2018-07-10 PROCEDURE — 40000275 ZZH STATISTIC RCP TIME EA 10 MIN

## 2018-07-10 PROCEDURE — 97116 GAIT TRAINING THERAPY: CPT | Mod: GP | Performed by: REHABILITATION PRACTITIONER

## 2018-07-10 PROCEDURE — 94762 N-INVAS EAR/PLS OXIMTRY CONT: CPT

## 2018-07-10 PROCEDURE — 0296T ZIO PATCH HOLTER: CPT | Performed by: INTERNAL MEDICINE

## 2018-07-10 PROCEDURE — 97530 THERAPEUTIC ACTIVITIES: CPT | Mod: GP | Performed by: REHABILITATION PRACTITIONER

## 2018-07-10 PROCEDURE — 40000193 ZZH STATISTIC PT WARD VISIT: Performed by: REHABILITATION PRACTITIONER

## 2018-07-10 PROCEDURE — 92526 ORAL FUNCTION THERAPY: CPT | Mod: GN

## 2018-07-10 RX ORDER — CLOPIDOGREL BISULFATE 75 MG/1
75 TABLET ORAL DAILY
Qty: 90 TABLET | Refills: 0 | Status: SHIPPED | OUTPATIENT
Start: 2018-07-10 | End: 2018-07-11

## 2018-07-10 RX ORDER — CLOPIDOGREL BISULFATE 75 MG/1
75 TABLET ORAL DAILY
Status: DISCONTINUED | OUTPATIENT
Start: 2018-07-10 | End: 2018-07-11 | Stop reason: HOSPADM

## 2018-07-10 RX ADMIN — DOCUSATE SODIUM 100 MG: 100 CAPSULE, LIQUID FILLED ORAL at 20:35

## 2018-07-10 RX ADMIN — FUROSEMIDE 20 MG: 20 TABLET ORAL at 09:01

## 2018-07-10 RX ADMIN — ATORVASTATIN CALCIUM 40 MG: 40 TABLET, FILM COATED ORAL at 20:35

## 2018-07-10 RX ADMIN — VITAMIN D, TAB 1000IU (100/BT) 1000 UNITS: 25 TAB at 09:01

## 2018-07-10 RX ADMIN — LOSARTAN POTASSIUM 100 MG: 100 TABLET, FILM COATED ORAL at 09:01

## 2018-07-10 RX ADMIN — FLUTICASONE PROPIONATE 2 SPRAY: 50 SPRAY, METERED NASAL at 09:02

## 2018-07-10 RX ADMIN — HYDRALAZINE HYDROCHLORIDE 50 MG: 25 TABLET ORAL at 09:01

## 2018-07-10 RX ADMIN — AMLODIPINE BESYLATE 10 MG: 10 TABLET ORAL at 09:01

## 2018-07-10 RX ADMIN — PREDNISONE 2 MG: 1 TABLET ORAL at 09:02

## 2018-07-10 RX ADMIN — DOCUSATE SODIUM 100 MG: 100 CAPSULE, LIQUID FILLED ORAL at 09:02

## 2018-07-10 RX ADMIN — CLOPIDOGREL 75 MG: 75 TABLET, FILM COATED ORAL at 18:26

## 2018-07-10 RX ADMIN — ESCITALOPRAM OXALATE 10 MG: 10 TABLET ORAL at 09:01

## 2018-07-10 RX ADMIN — ASPIRIN 81 MG: 81 TABLET, COATED ORAL at 09:01

## 2018-07-10 RX ADMIN — HYDRALAZINE HYDROCHLORIDE 50 MG: 25 TABLET ORAL at 20:35

## 2018-07-10 ASSESSMENT — VISUAL ACUITY
OU: GLASSES;BASELINE
OU: GLASSES;BASELINE
OU: BASELINE;GLASSES
OU: GLASSES;BASELINE
OU: BASELINE;GLASSES
OU: GLASSES;BASELINE

## 2018-07-10 NOTE — PROGRESS NOTES
[facility]  Met with pt and daughter Pushpa to discuss plans for HC.  Pt to be discharged home likely 7/10 or 7/11   and has agreed to have [facility] follow with services of SN, PT and OT.  Patient care support center processing referral.  Pt and family verbalized understanding that initial visit is scheduled for 1 to 2 days after discharge.    Pt has 24 hour phone number for [facility] for any questions or concerns.

## 2018-07-10 NOTE — PROGRESS NOTES
Care Coordinator - Discharge Planning    Admission Date/Time:  7/7/2018  Attending MD:  Mejia Garg*     Data  Date of initial CC assessment:  7/10  Chart reviewed, discussed with interdisciplinary team.   Patient was admitted for:   1. Acute ischemic stroke (H)         Assessment   Concerns with insurance coverage for discharge needs: None.  Current Living Situation: Patient lives alone.  Support System: Supportive  Services Involved: None  Transportation at Discharge: Car and Family or friend will provide  Transportation to Medical Appointments:    - Not applicable  Barriers to Discharge: Medical stability     Per care team rounds anticipate that pt may be stable to d/c home today.  Home health PT, OT, RN recommended.    Met with pt and her daughter Pushpa.  Introduced RN CC role.  Reviewed anticipated plan for d/c.  Per pt and her daughter Pushpa, pt other daughter Yesika will be in town tomorrow and plans to stay with pt for a a couple of weeks.  Reviewed/discussed recommendation for home care.  [facility] preferred agency as she has utilized this agency in the past and pt wishes to remain within the [facility].  Confirmed pt PCP, address.  D/c orders updated.       Coordination of Care and Referrals: Provided  patient/family with options for Home Care.  Email sent to [facility]    4691:Notified by bedside RN that pt/family concerned about pending d/c today as they had not received confirmation on plan to address pt oxygen desats as noted concern that staff were in room for ziopatch placement and pt was not aware that she would be discharged with a ziopatch.    Nursing notes that pt desats when she is sleeping.   RN note from earlier today notes pt desated to 83% with activity.   Spoke with Dr. Sun regarding above situation.  Provider needs to confer with her attending and will plan to meet with pt/family to discuss ziopatch as well as possible need for home oxygen setup with portability.  Awaiting  provider plan for d/c plan.     1630: Provider team plans to have pt complete an overnight oxymetry study tonight with anticipated plan for pt to d/c tomorrow.  Provider will meet with pt and her daughter to review status and anticipated plan for d/c.   Met briefly with pt and reviewed that RN CC would f/u in the morning to assist in arranging home oxygen - if needed for pt.   Pt daughter was not present during discussion.  Per pt her daughter Yesika will likely arrive later in the afternoon and her daughter Pushpa would likely transport her home.  Agreed to f/u with pt in the morning.      Plan  Anticipated Discharge Date:  7/10  Anticipated Discharge Plan:  Home with home care    Lesley Joshua RN BSN, PHN Float RN Care Coordinator covering for Donna Padilla RN Care Coordinator  [email or web address]  Pager [phone]  7/10/2018 1:34 PM

## 2018-07-10 NOTE — PLAN OF CARE
Problem: Patient Care Overview  Goal: Plan of Care/Patient Progress Review  Discharge Planner SLP   Patient plan for discharge: home  Current status: The patient is tolerating a regular texture diet and thin liquids with good oropharyngeal swallowing safety. She takes small bites/sips, eats slowly, and appropriately chooses softer foods. SLP to sign off due to goal met.  Barriers to return to prior living situation: none per SLP  Recommendations for discharge: defer to OT & PT  Rationale for recommendations: SLP goal met, no further tx indicated       Entered by: Maria Eugenia Gates 07/10/2018 4:49 PM       Speech Language Therapy Discharge Summary    Reason for therapy discharge:    All goals and outcomes met, no further needs identified.    Progress towards therapy goal(s). See goals on Care Plan in [facility] electronic health record for goal details.  Goals met    Therapy recommendation(s):    No further therapy is recommended.

## 2018-07-10 NOTE — PROGRESS NOTES
Neuroscience Stroke Progress Note    07/10/2018    Jennifer Bob is a 86 year old year old female with h/o left internal capsule/corona radiata stroke in , MI, HLD, and HTN who presented yesterday with acute onset nausea/vomiting, double vision, and difficulty maintaining posture. Stroke code was activated and tPA was given with marked improvement regarding her visual symptoms, dysmetria and nausea. She has residual right sided weakness since her previous stroke in  due to small infarction in the left corona radiata. 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         Stroke Scales      National Institutes of Health Stroke Scale (at presentation)   NIHSS done at:  time patient seen        Score    Level of consciousness:  (0)   Alert, keenly responsive    LOC questions:  (0)   Answers both questions correctly    LOC commands:  (0)   Performs both tasks correctly    Best gaze:  (0)   Normal    Visual:  (0)   No visual loss    Facial palsy:  (0)   Normal symmetrical movements    Motor arm (left):  (0)   No drift    Motor arm (right):  (0)   No drift    Motor leg (left):  (0)   No drift    Motor leg (right):  (0)   No drift    Limb ataxia:  (0)   No atax    Sensory:  (0)   Normal- no sensory loss    Best language:  (0)   Normal- no aphasia    Dysarthria:  (0)   Normal    Extinction and inattention:  (0)   No abnormality          NIHSS Total Score:  0           Problem List  1- Recurrent stroke  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  3- HTN  4- Hyperlipidemia  3- COPD  4- Oxygen desaturation      24 hour events:  Symptoms of the stroke markedly improved    24 Hour Vital Signs Summary:  Temperatures:  Current - Temp: 98.2  F (36.8  C); Max - Temp  Av.2  F (36.8  C)  Min: 97.5  F (36.4  C)  Max: 98.8  F (37.1  C)  Respiration range: Resp  Av.4  Min: 9  Max: 35  Pulse range: Pulse  Av  Min: 58  Max:  64  Blood pressure range: Systolic (24hrs), Av , Min:138 , Max:205   ; Diastolic (24hrs), Av, Min:48, Max:170    Pulse oximetry range: SpO2  Av.6 %  Min: 88 %  Max: 100 %    Ventilator Settings  Resp: 18      Intake/Output Summary (Last 24 hours) at 18 0846  Last data filed at 18 0800   Gross per 24 hour   Intake           536.05 ml   Output                0 ml   Net           536.05 ml       BP - Mean:  [90] 90    Current Medications:    amLODIPine  10 mg Oral Daily     aspirin  81 mg Oral Q24H    Or     aspirin  81 mg Per Feeding Tube Q24H     atorvastatin  40 mg Oral Daily     cholecalciferol (vitamin D3) tablet 1,000 Units  1,000 Units Oral Daily     docusate sodium  100 mg Oral BID     escitalopram  10 mg Oral Daily     fluticasone  2 spray Both Nostrils Daily     furosemide  20 mg Oral Daily     guaiFENesin  1,200 mg Oral BID     hydrALAZINE  50 mg Oral BID     losartan  100 mg Oral Daily     predniSONE  2 mg Oral Daily     sodium chloride (PF)  3 mL Intravenous Q8H       PRN Medications:  albuterol, lidocaine 4%, lidocaine (buffered or not buffered), magnesium sulfate, - MEDICATION INSTRUCTIONS -, - MEDICATION INSTRUCTIONS -, metoclopramide **OR** metoclopramide, naloxone, ondansetron **OR** ondansetron, potassium chloride, potassium chloride with lidocaine, potassium chloride, potassium chloride, potassium chloride, potassium phosphate (KPHOS) in D5W IV, potassium phosphate (KPHOS) in D5W IV, potassium phosphate (KPHOS) in D5W IV, potassium phosphate (KPHOS) in D5W IV, prochlorperazine **OR** prochlorperazine **OR** prochlorperazine, sodium chloride (PF), traMADol (ULTRAM) tablet 50 mg    Infusions:    - MEDICATION INSTRUCTIONS -       - MEDICATION INSTRUCTIONS -         Allergies   Allergen Reactions     Adhesive Tape      blisters     Atenolol      SOB     Lopressor Hct      SOB       Physical Examination:  /57 (BP Location: Right arm)  Pulse 66  Temp 97.9  F (36.6  C)  "(Oral)  Resp 18  Ht 1.575 m (5' 2\")  Wt 58.8 kg (129 lb 9.6 oz)  SpO2 90%  BMI 23.7 kg/m2    General:  patient lying in bed without any acute distress    HEENT:  normocephalic/atraumatic  Cardio:  Bradycardic with regular rhythm  Pulmonary:  wheezing present  Abdomen:  soft, non-distended  Extremities:  edema present  Skin:  intact      Neurologic  Mental Status:  Slightly somnolent but oriented and following commands appropriately  Cranial Nerves:  EOMI with normal smooth pursuit, facial sensation intact and symmetric, facial movements symmetric, hearing not formally tested but intact to conversation, palate elevation symmetric and uvula midline, no dysarthria, shoulder shrug strong bilaterally, tongue protrusion midline. Pupil reactive bilaterally.  Motor:  EF, EE, and  all 4+ right and 5 left. Lower extremities 5/5 bilaterally except right ankle dorsiflexion 3/5 on right.  Reflexes:  3+ right bicep and brachioradialis. 2+ left bicep and brachioradialis. 3+ patellar bilaterally. Unable to elicit achilles bilaterally.  Sensory:  Intact to light touch bilaterally upper and lower extremities  Coordination:  very mild ataxia on the right side by finger to nose and finger tapping tests  Station/Gait: little deviation to the left     Labs/Studies:  Recent Labs   Lab Test  07/08/18 0323 07/07/18 2222 05/21/18   0941  05/02/18   0936   NA  142  141   --   143   POTASSIUM  4.0  3.4   --   3.9   CHLORIDE  107  107   --   107   CO2  27  26   --   24   ANIONGAP  9  8   --   12   GLC  138*  145*   --   104*   BUN  24  24   --   19   CR  0.90  0.90   --   0.88   JERILYN  8.4*  8.1*   --   9.8   WBC  8.3  8.5  8.0  7.3   RBC  3.82  3.70*  4.06  3.82   HGB  12.7  12.1  13.5  12.9   PLT  142*  123*  168  116*       Recent Labs   Lab Test  07/08/18   0323  07/07/18 2222 04/24/14   1903  10/17/13   0552   INR  1.04  1.00  0.90  0.90   PTT  28   --    --   29         No lab results found in last 7 " days.    Imaging:    Mr Brain For Stroke Limited    Result Date: 2018  Impression: Multiple foci of restricted diffusion of the cerebellum, mostly right of midline. Findings are consistent with ischemic stroke, possibly embolic in etiology.    Ct Head Neck Angio W/o & W Contrast    Result Date: 2018    Impression:  1. Head CTA demonstrates short segment high-grade stenosis of left anterior cerebral artery A2 segment, unchanged. Stable occlusion of distal right vertebral artery. The anterior communicating artery is patent. Stable hypoplastic left P1 segment with fetal origin of left PCA. Stenotic appearance of left posterior communicating artery, which is new since MRI dated 2014. Unchanged tapered narrowing of left P3 segment. Right posterior communicating arteries not visualized. 2. Neck CTA demonstrates hypoplastic right vertebral artery with left-sided dominance. Narrowing of proximal left internal carotid artery with 63 % stenosis. Focal 40% stenosis of proximal right internal carotid artery. 3. No evidence of intracranial hemorrhage on the noncontrast head CT. Sequela of old lacunar infarct of posterior left frontal corona radiata, corresponding to the infarct demonstrated on MRI dated 10/17/2013. 4. Severe degenerative changes of cervical spine with grade 1 anterolisthesis of C4 on C5 and C6 and C7.   Ct Head W/o Contrast    Result Date: 2018  Impression: Expected evolution of bilateral cerebellar infarcts since 2018. No intracranial hemorrhage.     Recent Results (from the past 4320 hour(s))   Echocardiogram Complete    Narrative    [id number]  [id number]  [id number]  [id number]^MAUROAIKERON^SANCHO^[facility],[place]  Echocardiography Laboratory  [address] [place] [id number]     Name: TRACY BARBER  MRN: [id number]  : 1931  Study Date: 2018 09:59 AM  Age: 86 yrs  Gender: Female  Patient Location: [facility]  Reason For Study:  CVA  Ordering Physician: SANCHO PRATT  Performed By: Luis Camacho RDCS     BSA: 1.6 m2  Height: 62 in  Weight: 128 lb  BP: 162/99 mmHg  _____________________________________________________________________________  __        Procedure  Complete Portable Echo Adult.  _____________________________________________________________________________  __        Interpretation Summary  Mildly (EF 45-50%) reduced left ventricular function is present.There is basal  inferior and basal inferolateral wall akinesis.  Global right ventricular function is normal.  Mild aortic valve sclerosis is present  The atrial septum is intact as assessed by color Doppler .  Pulmonary hypertension present. Estimated pulmonary artery systolic pressure  is 48 mmHg. The inferior vena cava was normal in size with preserved  respiratory variability.  No pericardial effusion is present.     This study was compared with the study from 2/9/17. Compared to prior study,  LVEF is largely unchanged. PA systolic pressure is measurable and is elevated.  _____________________________________________________________________________  __        Left Ventricle  Global and regional left ventricular function is normal with an EF of 55-60%.  Mild left ventricular dilation is present. There is mild eccentric  hypertrophy. Grade II or moderate diastolic dysfunction. There is basal  inferior and basal inferolateral wall akinesis.     Right Ventricle  The right ventricle is normal size. Global right ventricular function is  normal.     Atria  Severe biatrial enlargement is present. The atrial septum is intact as  assessed by color Doppler .        Mitral Valve  Mitral leaflet thickness is normal. Mild mitral insufficiency is present.     Aortic Valve  The aortic valve is tricuspid. Mild aortic valve sclerosis is present. Trace  aortic insufficiency is present.     Tricuspid Valve  Mild tricuspid insufficiency is present. Estimated pulmonary artery  systolic  pressure is 45 mmHg plus right atrial pressure.     Pulmonic Valve  The pulmonic valve is normal. No PV insufficiency.     Vessels  The aorta root is normal. The inferior vena cava was normal in size with  preserved respiratory variability.     Pericardium  No pericardial effusion is present.        Compared to Previous Study  This study was compared with the study from 17 . Compared to prior study,  LVEF is largely unchanged. Severe LAE remains present.  _____________________________________________________________________________  __     MMode/2D Measurements & Calculations  IVSd: 0.93 cm  LVIDd: 5.4 cm  LVIDs: 4.2 cm  LVPWd: 0.70 cm  FS: 21.6 %  LV mass(C)d: 159.3 grams  LV mass(C)dI: 100.7 grams/m2  Ao root diam: 2.5 cm  asc Aorta Diam: 2.8 cm  LVOT diam: 1.8 cm  LVOT area: 2.5 cm2  LA Volume (BP): 92.7 ml  LA Volume Index (BP): 58.7 ml/m2     RWT: 0.26        Doppler Measurements & Calculations  MV E max arti: 118.3 cm/sec  MV A max arti: 66.3 cm/sec  MV E/A: 1.8  MV dec slope: 870.0 cm/sec2  TR max arti: 322.0 cm/sec  TR max P.6 mmHg  E/E' av.5  Lateral E/e': 29.2  Medial E/e': 17.7     _____________________________________________________________________________  __           Report approved by: Juani Cote 07/10/2018 02:40 PM          Assessment/Plan  Jennifercamille Bob is a 86 year old year old female with h/o left internal capsule/corona radiata stroke in 2013, MI, HLD, and HTN who presented yesterday with acute onset nausea/vomiting, double vision, and difficulty maintaining posture s/p tPA. She had marked improvement of her visual symptoms and nausea with very mild residual disdiadochokinesia and slowness of finger tapping on the right compared to the left and little deviation to the left while walking     # stroke  - On Asprin 81 mg   - TTE showed bilateral severe atrial enlargement and inferior wall akinesia. EF was 45 - 50%. We will add Plavix 75 mg to her medication for 3  months.   - EKG showed right BBB   - Given the results of the TTE and EKG, she will undergo continuous cardiac monitoring at home for possibility of paroxysmal a fib  - OT recommended home disposition with out patient physical therapy  - PT recommended home disposition with home PT and FWW    #CKD  Creatinine was 0.9 on admission suggesting renal function better than previously thought.      #HTN  Will keep her on Amlodipine, Hydralazine and Losartan and follow the blood pressure. Plan of monitoring blood pressure at home and let her PCP adjust her blood pressure medication    #HLD  Continue on atorvastatin    # Cardiac  - Continuous cardiac monitoring for possibility of paroxysmal a fib     # Pulmonary  - Her SpO2 went down to 89% yesterday during sleep. Her apnea screen was 2 concerning for sleep apnea   - Overnight pulse oxymetry   -  Will order PSG to check for sleep apnea as an outpatient      Fluids/Electrolytes/Nutrition   She is on regular diet with thin liquid    Dispo: discharge her to home tomorrow     Deneen Sun MD  PGY1  [phone]

## 2018-07-10 NOTE — CONSULTS
Patient declining stroke education. 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

## 2018-07-10 NOTE — PLAN OF CARE
"Problem: Stroke (Ischemic) (Adult)  Goal: Signs and Symptoms of Listed Potential Problems Will be Absent, Minimized or Managed (Stroke)  Signs and symptoms of listed potential problems will be absent, minimized or managed by discharge/transition of care (reference Stroke (Ischemic) (Adult) CPG).   Outcome: No Change  /59 (BP Location: Left arm)  Pulse 58  Temp 97.7  F (36.5  C) (Oral)  Resp 20  Ht 1.575 m (5' 2\")  Wt 59.8 kg (131 lb 13.4 oz)  SpO2 99%  BMI 24.11 kg/m2. Orthostatic completed. A&Ox4. Neuros with RUE weaker than LUE, L pupil > R at baseline, slight RUE ataxia. Denied pain. Cont cardiac monitor in place. Incontinent of urine, brief in place, needs UA. BS +. PIV SL. Up with A1/walker. Reg. diet. Cont to monitor and with POC.         "

## 2018-07-10 NOTE — PLAN OF CARE
Problem: Patient Care Overview  Goal: Plan of Care/Patient Progress Review  PT - per plan established by the Physical Therapist, according to functional mobility the  discharge recommendation is home with 24Hr assist as needed. Pt is progressing well limited by weakness, fatigue, and ild balance issues. Pt is CGA to min A for all bed mob, and STS. Pt amb up to 150'x 2, once with WW and once no AD but needing slight HHA 50% of the time. Pt demo gait and balance drills with target training for RLE in standing.   Discharge Planner PT   Patient plan for discharge: home with assist.   Current status: see above.   Barriers to return to prior living situation: weakness, balance.   Recommendations for discharge: home with home PT with pt Drt staying with pt 24Hr supervision/ assist as needed.   Rationale for recommendations: pt is progressing well, good support at home, home PT to follow to progress functional IND       Entered by: Suhas Peraza 07/10/2018 11:35 AM

## 2018-07-10 NOTE — PLAN OF CARE
Problem: Patient Care Overview  Goal: Plan of Care/Patient Progress Review  Outcome: Improving  General: Pt admitted to 6A after stroke. Has a hx of stroke dx in the past. Pt is pleasant, cooperative, and participating in care.   Neuro: RUE is slightly weaker than LUE. Left pupil > right at baseline. Steady while walking.   Card: HR 50-70; SBP up to 160's. Will continue monitor and treat SBP >180. Scheduled antihypertensives administered this morning.  Resp: Weaned to nasal cannula this morning, then to RA after pt worked with PT. Pt satted in mid-90's while in chair, but satting in mid to upper 80's to low 90's while in bed.   GI/: Tolerating regular diet with thin liquids. Does not have a big appetite. Pt had 1 medium BM today after morning stool softeners. Wearing pads d/t urine incontinence. Pt reports wearing pads at home during the night.   Skin: Bruising on right forearm and left knee area.   Plan: Pt due for discharge once tolerance w/o O2 is determined. Daughter at bedside, helpful with cares and would like more information about pt discharge needs. Pt and pt's daughter wanted more communication about recent pt imaging results and O2 needs. Continue to monitor and proceed with POC.

## 2018-07-10 NOTE — PROGRESS NOTES
Patient has been assessed for Home Oxygen needs.  Oxygen readings in AM of 7/10/18  *   RA - at rest  Pulse oximetry SPO2: 97 %  *   RA - during activity/with exercise SPO2 83%  *   O2 at  2 liters/minute (at rest) ...SPO2 98 %  *   O2 at  2 liters/minute (during activity/with exercise) ...SPO2 98 %

## 2018-07-10 NOTE — PLAN OF CARE
Problem: Patient Care Overview  Goal: Plan of Care/Patient Progress Review  OT/6A: Pt working with another provider at time of attempt, unable to check back this pm. Will reschedule for 7/11/18

## 2018-07-10 NOTE — DISCHARGE SUMMARY
[facility], [place]    Neurology Stroke Discharge Summary    Date of Admission: 7/7/2018  Date of Discharge: 7/112018    Disposition: Discharged to home  Primary Care Physician: Torri Fisher      Admission Diagnosis:   Acute Ischemic stroke    Discharge Diagnosis:   Ischemic stroke due to arterial atherosclerosis, however, cardio-embolic cause can't be excluded given the presence of enlarged left atrium and akinesia of the inferior wall, for which she will undergo continuous cardiac monitoring    Problem Leading to Hospitalization (from HPI):   Jennifer Bob is a 86 year old year old female with h/o left internal capsule/corona radiata stroke in 2013, MI, HLD, and HTN who presented to the ED with acute onset nausea/vomiting, double vision, and difficulty maintaining posture. Stroke code was activated. She is status post tPA. She has residual right sided weakness since her previous stroke in 2013 due to small infarction in the left corona radiata. 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     Please see H&P dated 7/7/2018 for further details about presentation.    Brief Hospital Course:   Patient presented with presented to the ED with acute onset nausea/vomiting, double vision, and difficulty maintaining posture. Stroke code was activated and tPA was given. After the TPA her symptoms markedly improved with no residual nausea or vomiting or visual changes or postural abnormalities. She only has residual slowing finger tapping test and disdiadochokinesia. Her oxygen saturation dropped to 89% one time for which she got overnight pulse oxymetry that showed desaturation during sleep down to 81%.        Work-up as stated below under Pertinent Investigations.    Etiology is thought to be arterial atherosclerosis, however, she has enlarged left atrium and right bundle branch block  raise the question of cardio embolic cause for which were are going to send her home on cardiac monitoring for the possibility of having paroxsymal a fib      Rehab evaluation: OT and PT. They recommended her to go home with out patient physical therapy and four wheel walker (FWW)    Smoking Cessation: she is ex-smoker    BP Long-term Goal: 140/90 or less    Antithrombotic/Anticoagulant Agent: aspirin 81 mg and Plavix 75 mg for 3 months then Asprin alone after 3 months    Statins: she is on her home dose of atorvastatin 40 mg per day       Hgb A1C Goal: < 7.0    Complications: None.     Other problems addressed during this hospitalization:  - Chronic Kidney disease: Creatinine was 0.9 on admission suggesting renal function better than previously thought  - Hypertension: On her home antihypertensives (Amlodipine, Losartan, Hydralazine)   - Hyperlipidemia: She is on Atorvastatin and her LDL is 57  - COPD: Her SPO2 ranged 81-99% during admission. She had desaturation down to 89% during sleep for which she received oxygen through nasal cannula after that she had overnight oxymetry test done that recorded desaturation down to 81 during sleep. She also had desaturation today during walking down to 87%. This initially was thought to be only during sleep but now we have documented desaturation during activity. X ray was done and D dimer. The xray showed inflated chest and D Dimer was slightly normal. The D Dimer level was not concerning because it can be elevated with acute stroke and also it is not high enough to diagnose PE. She will be referred to internal medicine physician to address the cause of desaturation , do pulmonary function test and repeat the D Dimer.     PERTINENT INVESTIGATIONS    Labs  Lipid Panel:   Recent Labs   Lab Test  07/08/18   0323   07/06/15   1039   CHOL  134   < >  163   HDL  69   < >  72   LDL  57   < >  73   TRIG  40   < >  88   CHOLHDLRATIO   --    --   2.3    < > = values in this interval  not displayed.     A1C:   Lab Results   Component Value Date    A1C 5.9 07/08/2018     INR:   Recent Labs  Lab 07/08/18  0323 07/07/18  2222   INR 1.04 1.00    D Dimer on 7/11/2018 is 1    Coag Panel / Hypercoag Workup: Not indicated  Pending test results: None    Imaging:    Mr Brain For Stroke Limited     Result Date: 7/8/2018  Impression: Multiple foci of restricted diffusion of the cerebellum, mostly right of midline. Findings are consistent with ischemic stroke, possibly embolic in etiology.     Ct Head Neck Angio W/o & W Contrast     Result Date: 7/8/2018     Impression:  1. Head CTA demonstrates short segment high-grade stenosis of left anterior cerebral artery A2 segment, unchanged. Stable occlusion of distal right vertebral artery. The anterior communicating artery is patent. Stable hypoplastic left P1 segment with fetal origin of left PCA. Stenotic appearance of left posterior communicating artery, which is new since MRI dated 4/24/2014. Unchanged tapered narrowing of left P3 segment. Right posterior communicating arteries not visualized. 2. Neck CTA demonstrates hypoplastic right vertebral artery with left-sided dominance. Narrowing of proximal left internal carotid artery with 63 % stenosis. Focal 40% stenosis of proximal right internal carotid artery. 3. No evidence of intracranial hemorrhage on the noncontrast head CT. Sequela of old lacunar infarct of posterior left frontal corona radiata, corresponding to the infarct demonstrated on MRI dated 10/17/2013. 4. Severe degenerative changes of cervical spine with grade 1 anterolisthesis of C4 on C5 and C6 and C7.   Ct Head W/o Contrast : Result Date: 7/9/2018  Impression: Expected evolution of bilateral cerebellar infarcts since 7/7/2018. No intracranial hemorrhage.    Chest xray showed hyper-inflated chest    Endovascular procedure: None     Cardiac Monitoring: Patient had > 24 hrs of cardiac monitor while in hospital.    Findings: No atrial fibrillation  was found.    Sleep Apnea Screen:   Questions/Answers      1. Prior to your stroke, have you been told that you snore? yes    2. Prior to your stroke, have you been told that you struggle to breath while you are sleeping? No    3. Prior to your stroke, do you feel tired and sleepy even after getting a normal night of sleep? yes    Sleep Apnea Screen Findings: Patient has 2 or more symptoms of sleep apnea.  Outpatient sleep study is recommended. and Patient has 0-1 symptoms of sleep apnea.  Further sleep study is not recommended at this time.    Education discussed with: patient on blood pressure management.    During daily rounds, the plan of care is discussed with the patient and it includes adding plavix 75 mg /day to her medications for three months, continuous cardiac monitoring to rule out paroxysmal a fib. Referral to internal medicine physician to address the cause of desaturation. Sleep study will be done as outpatient    PHYSICAL EXAMINATION  Vital Signs:  B/P: 148/59, T: 98.9, P: 66, R: 17    General:  patient sitting up in the chair without any acute distress    HEENT:  normocephalic/atraumatic  Cardio:  Bradycardic with regular rhythm  Pulmonary:  No wheezes, Shallow breathing consistent with COPD  Abdomen:  soft, non-distended  Extremities:  edema present  Skin:  intact       Neurologic  Mental Status: alert and  oriented and following commands appropriately  Cranial Nerves:  EOMI with normal smooth pursuit, facial sensation intact and symmetric, facial movements symmetric, hearing not formally tested but intact to conversation, palate elevation symmetric and uvula midline, no dysarthria, shoulder shrug strong bilaterally, tongue protrusion midline. Pupil reactive bilaterally.  Motor:  EF, EE, and  all 4+ right and 5 left. Lower extremities 5/5 bilaterally except right ankle dorsiflexion 3/5 on right.  Reflexes:  3+ right bicep and brachioradialis. 2+ left bicep and brachioradialis. 3+ patellar  bilaterally. Unable to elicit achilles bilaterally.  Sensory:  Intact to light touch bilaterally upper and lower extremities  Coordination:  very mild ataxia on the right side in the form of slow finger tapping tests and disdiadochokinesia   Station/Gait: normal     National Institutes of Health Stroke Scale (on day of discharge)  NIHSS Total Score: 0    Modified Kimball Scale (on day of discharge): 1-No significant disability despite symptoms; able to carry out all usual duties and activities    Medications    Current Discharge Medication List      CONTINUE these medications which have NOT CHANGED    Details   amLODIPine (NORVASC) 10 MG tablet Take 1 tablet (10 mg) by mouth daily  Qty: 90 tablet, Refills: 1    Associated Diagnoses: Essential hypertension with goal blood pressure less than 140/90      aspirin 81 MG chewable tablet Take 1 tablet (81 mg) by mouth daily  Qty: 60 tablet, Refills: 3    Associated Diagnoses: ASCVD (arteriosclerotic cardiovascular disease)      atorvastatin (LIPITOR) 40 MG tablet Due for appt in March, one tab daily  Qty: 90 tablet, Refills: 3    Associated Diagnoses: Hyperlipidemia LDL goal <100      BETA BLOCKER NOT PRESCRIBED, INTENTIONAL, Beta Blocker not prescribed intentionally due to COPD, shortness of breath with atenolol and lopressor  Refills: 0      Calcium Citrate-Vitamin D (CALCIUM CITRATE + PO) Take 1 tablet by mouth daily       Cholecalciferol (VITAMIN D3 PO) Take 1,000 Units by mouth daily      docusate sodium (COLACE) 100 MG capsule Take 1 capsule by mouth 2 times daily       escitalopram (LEXAPRO) 10 MG tablet Take 1 tablet (10 mg) by mouth daily Needs appointment before refills.  Qty: 90 tablet, Refills: 3    Associated Diagnoses: Anxiety      fluticasone (FLONASE) 50 MCG/ACT spray USE TWO SPRAYS IN EACH NOSTRIL EVERY OTHER DAY  Qty: 16 g, Refills: 8    Associated Diagnoses: Seasonal allergic rhinitis      furosemide (LASIX) 20 MG tablet Take 1 tablet (20 mg) by mouth  daily  Qty: 90 tablet, Refills: 3    Associated Diagnoses: Essential hypertension with goal blood pressure less than 140/90      guaiFENesin (MUCINEX) 600 MG 12 hr tablet Take 2 tablets (1,200 mg) by mouth 2 times daily  Qty: 28 tablet      hydrALAZINE (APRESOLINE) 50 MG tablet Take 1 tablet (50 mg) by mouth 2 times daily Takes an additional 50 mg in the afternoon if systolic blood pressure is higher than 160  Qty: 90 tablet, Refills: 3    Associated Diagnoses: Hypertension goal BP (blood pressure) < 140/90      losartan (COZAAR) 100 MG tablet Take 1 tablet (100 mg) by mouth daily  Qty: 90 tablet, Refills: 3    Associated Diagnoses: Essential hypertension with goal blood pressure less than 140/90      Multiple Vitamins-Minerals (MULTIVITAMIN OR) Take 1 tablet by mouth daily       predniSONE (DELTASONE) 1 MG tablet Take 2 mg by mouth daily      Saline (OCEAN NASAL SPRAY NA) Administer 1 spray in each nostril up to two times daily as needed      sodium chloride-sodium bicarb (CLASSIC NETI POT SINUS WASH) 2300-700 MG KIT Mix 1 packet in Neti Pot and administer in each nostril daily as needed      TRAMADOL HCL PO Take 50 mg by mouth every 6 hours as needed for moderate to severe pain      VENTOLIN  (90 BASE) MCG/ACT Inhaler INHALE 2 PUFFS INTO THE LUNGS EVERY 6 HOURS AS NEEDED  Qty: 18 g, Refills: 9    Associated Diagnoses: Chronic obstructive pulmonary disease, unspecified COPD type (H)             Additional recommendations and follow up:  - Continuous cardiac monitoring (Zio Patch)  - Follow up with Internal medicine physician within few days of discharge to address the cause of desaturation, do pulmonary function test and repeat D Dimer and for blood pressure control  - Sleep study after being discharged to assess for sleep apnea    Patient was seen and discussed with the Attending, Dr. Sterling .    Deneen Werner-Sayed Afeefy  Pager: [phone]

## 2018-07-11 ENCOUNTER — APPOINTMENT (OUTPATIENT)
Dept: GENERAL RADIOLOGY | Facility: CLINIC | Age: 83
DRG: 061 | End: 2018-07-11
Attending: INTERNAL MEDICINE
Payer: COMMERCIAL

## 2018-07-11 ENCOUNTER — APPOINTMENT (OUTPATIENT)
Dept: PHYSICAL THERAPY | Facility: CLINIC | Age: 83
DRG: 061 | End: 2018-07-11
Payer: COMMERCIAL

## 2018-07-11 ENCOUNTER — DOCUMENTATION ONLY (OUTPATIENT)
Dept: NURSING | Facility: CLINIC | Age: 83
End: 2018-07-11

## 2018-07-11 VITALS
RESPIRATION RATE: 16 BRPM | DIASTOLIC BLOOD PRESSURE: 58 MMHG | OXYGEN SATURATION: 92 % | WEIGHT: 129.6 LBS | HEART RATE: 80 BPM | TEMPERATURE: 98.2 F | BODY MASS INDEX: 23.85 KG/M2 | SYSTOLIC BLOOD PRESSURE: 150 MMHG | HEIGHT: 62 IN

## 2018-07-11 LAB
ALBUMIN UR-MCNC: 10 MG/DL
APPEARANCE UR: CLEAR
BILIRUB UR QL STRIP: NEGATIVE
COLOR UR AUTO: YELLOW
GLUCOSE UR STRIP-MCNC: NEGATIVE MG/DL
HGB UR QL STRIP: NEGATIVE
KETONES UR STRIP-MCNC: 10 MG/DL
LEUKOCYTE ESTERASE UR QL STRIP: ABNORMAL
MUCOUS THREADS #/AREA URNS LPF: PRESENT /LPF
NITRATE UR QL: NEGATIVE
PH UR STRIP: 6 PH (ref 5–7)
RBC #/AREA URNS AUTO: <1 /HPF (ref 0–2)
SOURCE: ABNORMAL
SP GR UR STRIP: 1.01 (ref 1–1.03)
SQUAMOUS #/AREA URNS AUTO: 2 /HPF (ref 0–1)
TRANS CELLS #/AREA URNS HPF: <1 /HPF (ref 0–1)
UROBILINOGEN UR STRIP-MCNC: 2 MG/DL (ref 0–2)
WBC #/AREA URNS AUTO: 3 /HPF (ref 0–5)

## 2018-07-11 PROCEDURE — 71045 X-RAY EXAM CHEST 1 VIEW: CPT

## 2018-07-11 PROCEDURE — 40000193 ZZH STATISTIC PT WARD VISIT

## 2018-07-11 PROCEDURE — 25000132 ZZH RX MED GY IP 250 OP 250 PS 637: Performed by: STUDENT IN AN ORGANIZED HEALTH CARE EDUCATION/TRAINING PROGRAM

## 2018-07-11 PROCEDURE — 87086 URINE CULTURE/COLONY COUNT: CPT | Performed by: STUDENT IN AN ORGANIZED HEALTH CARE EDUCATION/TRAINING PROGRAM

## 2018-07-11 PROCEDURE — 81001 URINALYSIS AUTO W/SCOPE: CPT | Performed by: STUDENT IN AN ORGANIZED HEALTH CARE EDUCATION/TRAINING PROGRAM

## 2018-07-11 PROCEDURE — 0298T ZZC EXT ECG > 48HR TO 21 DAY REVIEW AND INTERPRETATN: CPT | Performed by: INTERNAL MEDICINE

## 2018-07-11 PROCEDURE — 97530 THERAPEUTIC ACTIVITIES: CPT | Mod: GP

## 2018-07-11 PROCEDURE — 25000132 ZZH RX MED GY IP 250 OP 250 PS 637: Performed by: PSYCHIATRY & NEUROLOGY

## 2018-07-11 PROCEDURE — 25000125 ZZHC RX 250: Performed by: STUDENT IN AN ORGANIZED HEALTH CARE EDUCATION/TRAINING PROGRAM

## 2018-07-11 PROCEDURE — 85379 FIBRIN DEGRADATION QUANT: CPT | Performed by: INTERNAL MEDICINE

## 2018-07-11 RX ORDER — CLOPIDOGREL BISULFATE 75 MG/1
75 TABLET ORAL DAILY
Qty: 90 TABLET | Refills: 0 | Status: SHIPPED | OUTPATIENT
Start: 2018-07-11 | End: 2019-01-01

## 2018-07-11 RX ADMIN — DOCUSATE SODIUM 100 MG: 100 CAPSULE, LIQUID FILLED ORAL at 08:30

## 2018-07-11 RX ADMIN — AMLODIPINE BESYLATE 10 MG: 10 TABLET ORAL at 08:29

## 2018-07-11 RX ADMIN — FUROSEMIDE 20 MG: 20 TABLET ORAL at 08:30

## 2018-07-11 RX ADMIN — ESCITALOPRAM OXALATE 10 MG: 10 TABLET ORAL at 08:30

## 2018-07-11 RX ADMIN — CLOPIDOGREL 75 MG: 75 TABLET, FILM COATED ORAL at 08:30

## 2018-07-11 RX ADMIN — HYDRALAZINE HYDROCHLORIDE 50 MG: 25 TABLET ORAL at 08:29

## 2018-07-11 RX ADMIN — ASPIRIN 81 MG: 81 TABLET, COATED ORAL at 08:29

## 2018-07-11 RX ADMIN — VITAMIN D, TAB 1000IU (100/BT) 1000 UNITS: 25 TAB at 08:30

## 2018-07-11 RX ADMIN — LOSARTAN POTASSIUM 100 MG: 100 TABLET, FILM COATED ORAL at 08:29

## 2018-07-11 RX ADMIN — PREDNISONE 2 MG: 1 TABLET ORAL at 08:29

## 2018-07-11 RX ADMIN — ALBUTEROL SULFATE 2 PUFF: 90 AEROSOL, METERED RESPIRATORY (INHALATION) at 12:35

## 2018-07-11 ASSESSMENT — VISUAL ACUITY
OU: BASELINE;GLASSES

## 2018-07-11 NOTE — PLAN OF CARE
Problem: Patient Care Overview  Goal: Plan of Care/Patient Progress Review  Outcome: Improving  Admit for stroke. VSS on 2 L NC, SAT upper 90s. Neuros intact ex generalized weakness, left pupil larger then right pupil, RUE 4/5. Denies numbness and tinging. CCM with RBBB and bradycardic at times.  No iv access. Regular diet. Incontinent bladder. Up 1/gb/walker. Denies pain. Continue to monitor.

## 2018-07-11 NOTE — PROGRESS NOTES
1:30pm- Still no new order signed by [employer] certified doctor. Spoke with Lesley, she was waiting on a response back from the doctors, they weren't sure patient would be discharged home on oxygen. She was going to call me to let me know for sure.   3:00pm- Still no word from hospital yet, but the order was cancelled int the account. Spoke with Lesley via [facility], she was waiting on the CT results and she said based on the fact that the order was cancelled she says at this point we can cancel the request for the oxygen. She said they will call in a new referral if something changes.

## 2018-07-11 NOTE — PROGRESS NOTES
Care Coordinator - Discharge Planning    Admission Date/Time:  7/7/2018  Attending MD:  Shalom Christensen*     Data  Date of initial CC assessment:  7/10/18  Chart reviewed, discussed with interdisciplinary team.   Patient was admitted for:   1. Chronic obstructive pulmonary disease, unspecified COPD type (H)    2. Acute ischemic stroke (H)    3. Hyperlipidemia LDL goal <100         Assessment   Full assessment completed in previous note.  Per care team rounds anticipate pt will d/c today.  Current order for home oxygen is for night use only.  Per discussion with Nieves PT pt did desat to upper 80's on RA.  Note pending.  Spoke with Dr. Cordova who will review with her attending and then decide need for portability.  Current home oxygen order is for oxygen use at night only.       Referral made to  Home Medical per protocol.   Home Medical will review and outreach to pt to offer choice.   Home Medical aware that plan for portability is pending.  Email update sent to [facility].    Met with pt and her daughter.  Reviewed anticipated plan for d/c with home medical and home oxygen.  Pt and daughter aware that final plan for home oxygen is currently pending.  Reviewed with pt and daughter that rep from  Home Medical will outreach to pt to offer choice and arrange for home oxygen supplier.      1100:  Spoke with Dr. Cordova regarding plan for home oxygen.  Oxygen order will be continuous with portability with plan to have outpatient sleep study.  Primary dx of COPD.    D/c orders reviewed.  Provider team plans to meet with pt and her daughter this morning.  Anticipate d/c early this afternoon.  VM left for  Home Medical rep regarding oxygen plan/order.    1345:  Spoke with Dr. Sun regarding anticipated plan for home oxygen.  Per provider team has requested a CXR and labs.  Pending results of CXR and labs patient will d/c home and will not require home oxygen.  Provider team would recommend patient f/u  outpatient with her primary care provider.  If concerns noted with CXR and labs team would address plan of care at that time.   Per Dr. Sun team has updated pt and her daughter on plan.  Updated BRAYDEN Morejon.   Updated [facility].  Per Nancy if pt is going to d/c and needs oxygen attending for team will need to sign oxygen orders.  Will continue to monitor.    Coordination of Care and Referrals: Provided patient/family with options for DME and Home Care.      Plan  Anticipated Discharge Date:  7/11  Anticipated Discharge Plan:  Home with home care     Lesley Joshua RN BSN, PHN Float RN Care Coordinator covering for Donna Padilla RN Care Coordinator  [email or web address]  Pager [phone]  7/11/2018 10:41 AM

## 2018-07-11 NOTE — PROVIDER NOTIFICATION
Pt on overnight oximeter study overnight.  When assessed, pt's sats were in the 70's.  Called RT and Dr. Fontaine to clarify at what point supplemental 02 should be applied.  Dr. Fontaine recommended keeping 02 sats above 88% so 2L 02 placed via NC.  Bedside RN, Adonis, gerald.  Will continue to monitor.

## 2018-07-11 NOTE — PROGRESS NOTES
Discharge paperwork given and all questions answered. Zio patch in place prior to discharge. Pt refused stroke education class as she has already been through it. Pt/family and writer went over warning signs of stroke. Pt left with all belongings and walker. D/c with transport to front door, family will drive patient home.

## 2018-07-11 NOTE — PLAN OF CARE
Problem: Patient Care Overview  Goal: Plan of Care/Patient Progress Review  PT 6A Discharge Planner PT   Patient plan for discharge: Return home to independent living apartment. Daughter will be staying with her for awhile.   Current status: Mobility mod(I) with FWW x300ft. Requires O2 to stay WNL with activity - see vitals flowsheet.  Barriers to return to prior living situation: O2sats decrease with activity.   Recommendations for discharge: Home with home therapy   Rationale for recommendations: Skilled PT services to increase cardiovascular endurance, wean from gait assistive device and to wean from O2.        Entered by: Kyara Fisher 07/11/2018 11:03 AM         Patient has been assessed for Home Oxygen needs. Oxygen readings:    *Pulse oximetry (SpO2) = 89% on room air at rest while awake.    *SpO2 improved to 98% on 2liters/minute at rest.    *SpO2 = 85-89% on room air during activity/with exercise.    *SpO2 improved to 94% on 2liters/minute during activity/with exercise.

## 2018-07-11 NOTE — PROGRESS NOTES
Received intake call for home oxygen at 10:20am. Reviewed patient's chart; Patient qualifies under Medicare guidelines and all documentation is in the chart except a good order.   10:46am- left message for care coordinator, Lesley, to return my call as we are missing some things for the oxygen order. She called back at 10:55am. Explained that insurance will not cover nocturnal oxygen under thee dx of sleep apnea, so since the patient has COPD and qualified with activity they should edit the order to reflect an order that will be covered by her insurance. She was going to reach out to the physician and have her call me if she had questions, but they are hpoping to get this all squared away in the next hour.   11:03am- Lesley left voicemail saying the order was edited and they are hoping to discharge the patient between 12-2pm and will write an outpatient sleep study referral. Checked on the order and there is no NPI and no SHALOM indicated. Called Lesley back and left voicemail asking to follow up.  11:13am- Called to offer choice and patient is okay with [facility] setting them up. Began to discuss equipment with patient and she asked me to talk to her daughter Esthela and handed the phone off. Esthela thought the POC would work best, explained we are waiting on the order to be signed, but once that completed we would deliver equipment to bedside, hopefully in the next 1-1.5 hours as they are looking to discharge them between 12-2pm. She understood.   11:33am- Informed Lesley that via the [employer] website the ordering physician is not certified and we need someone else to sign the order. She said she reached out to the physicians to have it signed by the attending

## 2018-07-11 NOTE — PLAN OF CARE
Problem: Patient Care Overview  Goal: Plan of Care/Patient Progress Review  Outcome: Improving  General: Pt admitted to  after stroke. Has a hx of stroke dx in the past. Pt is pleasant, cooperative, and participating in care.   Neuro: RUE is slightly weaker than LUE. Left pupil > right at baseline. Steady while walking.   Card: HR 50-70; SBP up to 160's. Scheduled antihypertensives administered this morning.  Resp: Pt was on nasal cannula overnight. Satting in the upper 80's to mid 90's on RA. PRN albuterol given after pt requested it.   GI/: Tolerating regular diet with thin liquids. Does not have a big appetite. Pt had multiple loose BMs with bowel incontinence today after morning stool softeners. Wearing pads. Pt reports wearing pads at home during the night.   Skin: Bruising on right forearm and left knee area.   Plan: Pt due for discharge once tolerance w/o O2 is determined. Daughter at bedside, helpful with cares and would like more information about pt discharge needs. Due to discharge once oxygen needs are clear and heart monitor applied. Continue to monitor and proceed with POC.

## 2018-07-11 NOTE — PLAN OF CARE
Problem: Patient Care Overview  Goal: Plan of Care/Patient Progress Review  Outcome: Improving  Status: Pt admitted for stroke. Has a hx of stroke dx in the past.   Neuro: RUE is slightly weaker than LUE. L pupil > R at baseline.   Card: SBP up to 160's, gave scheduled hydralazine.   Resp: O2 sats decreased to 80 on RA while napping, restarted on 2L O2 and increased to 97%. Pt started on oximetry study overnight until 0530.   GI/: Regular diet w/ thin liquids, tolerating well. Wears pads d/t urine incontinence.   Skin: Bruising on R forearm and L knee area.   IV: No PIV access   Activity: Up w/1.   Pain: Denies.   Plan: Plan is to d/c tomorrow. Continue to monitor and follow POC.

## 2018-07-11 NOTE — DISCHARGE INSTRUCTIONS
- Use home oxygen during sleep  - Schedule appointment with the PCP or internal medicine specialist to address the cause of desaturation and for tight blood pressure control  - Schedule sleep study     Oxygen Provider:  Arranged through [facility], contact number [phone]. If you have any questions or concerns please call the oxygen company directly.

## 2018-07-12 ENCOUNTER — DOCUMENTATION ONLY (OUTPATIENT)
Dept: CARE COORDINATION | Facility: CLINIC | Age: 83
End: 2018-07-12

## 2018-07-12 ENCOUNTER — CARE COORDINATION (OUTPATIENT)
Dept: CARE COORDINATION | Facility: CLINIC | Age: 83
End: 2018-07-12

## 2018-07-12 NOTE — PROGRESS NOTES
[facility] now requests orders and shares plan of care/discharge summaries for some patients through [employer].  Please REPLY TO THIS MESSAGE OR ROUTE BACK TO THE AUTHOR in order to give authorization for orders when needed.  This is considered a verbal order, you will still receive a faxed copy of orders for signature.  Thank you for your assistance in improving collaboration for our patients.    ORDER    Skilled Nursing   2 week 2  1 week 3  4 as needed     Main focus  Medication education, home safety, disease process education, respiratory assessment, Skin assessment      Physical therapy  1 day 10/1 to eval and treat for balance, strengthening, endurance, mobility, equipment needs    Occupational Therapy  1 day 10/1 to eval and treat for balance, strengthening, endurance, mobility, equipment needs, ADLs, fine motor skills    Speech Therapy  1 day 10/1 to eval and treat for swallowing and communication    Home Health aide  2 week 4 for bathing and personal care    Colin Cheatham RN  [facility]  [phone]

## 2018-07-12 NOTE — PLAN OF CARE
Problem: Patient Care Overview  Goal: Plan of Care/Patient Progress Review  Occupational Therapy Discharge Summary    Reason for therapy discharge:    Discharged to home with home therapy.    Progress towards therapy goal(s). See goals on Care Plan in [facility] record for goal details.  Goals partially met.  Barriers to achieving goals:   discharge from facility.    Therapy recommendation(s):    Continued therapy is recommended.  Rationale/Recommendations:  Home therapy to address endurance and strength, environmental modificaton in home to increase IND in ADLs.

## 2018-07-13 ENCOUNTER — TELEPHONE (OUTPATIENT)
Dept: FAMILY MEDICINE | Facility: CLINIC | Age: 83
End: 2018-07-13

## 2018-07-13 LAB
BACTERIA SPEC CULT: ABNORMAL
Lab: ABNORMAL
SPECIMEN SOURCE: ABNORMAL

## 2018-07-13 NOTE — TELEPHONE ENCOUNTER
.Reason for Call: Request for an order or referral:    Order or referral being requested: Skilled nursing    Date needed:asap    Has the patient been seen by the PCP for this problem? Not Applicable    Additional comments: Colin from [facility] is requesting skilled nursing   2 wk x2  1 wk x3  4 PRN  PT to eval and treat  Occupational therapy to eval and treat  Speech therapy to eval and treat  Home health aide 2 x a wk for 4 weeks to assist with bathing and personal care.    Colin also states system alerted drug interactions as follows:  Aspirin  Plavix  Prednisone   Escitalopram  Losartin  Lasix     Colin stated put orders in epic 7.12.18      Phone number Patient can be reached at:  Other phone number:  [phone]    Best Time:  any    Can we leave a detailed message on this number?  YES    Call taken on 7/13/2018 at 9:00 AM by Tana Wood

## 2018-07-13 NOTE — TELEPHONE ENCOUNTER
Reason for Call: Request for an order or referral:    Order or referral being requested: PT orders    Date needed: as soon as possible    Has the patient been seen by the PCP for this problem? Not Applicable    Additional comments:  Home Care is requesting orders for Physical Therapy 2 x week for 3 weeks. Please assist. Thanks!    Phone number Patient can be reached at:  Other phone number: [phone]    Best Time:  Any    Can we leave a detailed message on this number?  YES    Call taken on 7/13/2018 at 12:49 PM by Niurka Terrell

## 2018-07-13 NOTE — TELEPHONE ENCOUNTER
7/16/18 is hosp f/u appt with pcp.  Gave vo for the home care orders.    Will route the drug interactions to pcp.  BRAYDEN Hudson

## 2018-07-16 ENCOUNTER — OFFICE VISIT (OUTPATIENT)
Dept: FAMILY MEDICINE | Facility: CLINIC | Age: 83
End: 2018-07-16
Payer: COMMERCIAL

## 2018-07-16 ENCOUNTER — TELEPHONE (OUTPATIENT)
Dept: OTHER | Facility: CLINIC | Age: 83
End: 2018-07-16

## 2018-07-16 VITALS
DIASTOLIC BLOOD PRESSURE: 61 MMHG | RESPIRATION RATE: 18 BRPM | SYSTOLIC BLOOD PRESSURE: 139 MMHG | OXYGEN SATURATION: 93 % | HEART RATE: 66 BPM | WEIGHT: 128 LBS | TEMPERATURE: 98.2 F | BODY MASS INDEX: 23.41 KG/M2

## 2018-07-16 DIAGNOSIS — I10 HYPERTENSION GOAL BP (BLOOD PRESSURE) < 140/90: ICD-10-CM

## 2018-07-16 DIAGNOSIS — J44.9 CHRONIC OBSTRUCTIVE PULMONARY DISEASE, UNSPECIFIED COPD TYPE (H): ICD-10-CM

## 2018-07-16 DIAGNOSIS — M79.662 PAIN OF LEFT LOWER LEG: ICD-10-CM

## 2018-07-16 DIAGNOSIS — E78.5 HYPERLIPIDEMIA LDL GOAL <70: ICD-10-CM

## 2018-07-16 DIAGNOSIS — M35.3 PMR (POLYMYALGIA RHEUMATICA) (H): ICD-10-CM

## 2018-07-16 DIAGNOSIS — I69.90 LATE EFFECTS OF CVA (CEREBROVASCULAR ACCIDENT): ICD-10-CM

## 2018-07-16 DIAGNOSIS — M79.605 PAIN OF LEFT LOWER EXTREMITY: Primary | ICD-10-CM

## 2018-07-16 DIAGNOSIS — G47.34 NOCTURNAL OXYGEN DESATURATION: ICD-10-CM

## 2018-07-16 DIAGNOSIS — I25.10 ASCVD (ARTERIOSCLEROTIC CARDIOVASCULAR DISEASE): ICD-10-CM

## 2018-07-16 DIAGNOSIS — Z86.73 HISTORY OF STROKE: Primary | ICD-10-CM

## 2018-07-16 PROCEDURE — 99214 OFFICE O/P EST MOD 30 MIN: CPT | Performed by: FAMILY MEDICINE

## 2018-07-16 NOTE — PATIENT INSTRUCTIONS
1) Schedule with cardiology. You may consider seeing Dr. Lowe at our clinic.   2) Schedule with pulmonology for your COPD  3) Schedule with the vascular specialist for your left leg symptoms.  4) Schedule with the sleep clinic (you may see the pulmonologist prior to scheduling this)  5) Hold off on wearing the compression stockings for now as you have not had any leg swelling. You may discuss this further with the vascular specialist.

## 2018-07-16 NOTE — MR AVS SNAPSHOT
After Visit Summary   7/16/2018    Jennifer Bob    MRN: [id number]           Patient Information     Date Of Birth          12/5/1931        Visit Information        Provider Department      7/16/2018 2:20 PM Torri Fisher MD [facility]        Today's Diagnoses     History of stroke    -  1    Late effects of CVA (cerebrovascular accident)        Chronic obstructive pulmonary disease, unspecified COPD type (H)        ASCVD (arteriosclerotic cardiovascular disease)        Hypertension goal BP (blood pressure) < 140/90        Hyperlipidemia LDL goal <70        PMR (polymyalgia rheumatica) (H)        Nocturnal oxygen desaturation        D-dimer, elevated        Pain of left lower leg          Care Instructions    1) Schedule with cardiology. You may consider seeing Dr. Lowe at our clinic.   2) Schedule with pulmonology for your COPD  3) Schedule with the vascular specialist for your left leg symptoms.  4) Schedule with the sleep clinic (you may see the pulmonologist prior to scheduling this)  5) Hold off on wearing the compression stockings for now as you have not had any leg swelling. You may discuss this further with the vascular specialist.               Follow-ups after your visit        Additional Services     CARDIOLOGY EVAL ADULT REFERRAL       Preferred location:  [facility] [phone]   [email or web address]    Please be aware that coverage of these services is subject to the terms and limitations of your health insurance plan.  Call member services at your health plan with any benefit or coverage questions.      Please bring the following to your appointment:  Any x-rays, CTs or MRIs which have been performed. Contact the facility where they were done to arrange for  prior to your scheduled appointment.    List of current medications  This referral request   Any documents/labs  given to you for this referral            PULMONARY MEDICINE REFERRAL       Your provider has referred you to: [facility] [place] [phone]   [email or web address]  [facility] [place] [phone]   [email or web address]    Please be aware that coverage of these services is subject to the terms and limitations of your health insurance plan.  Call member services at your health plan with any benefit or coverage questions.      Please bring the following with you to your appointment:    (1) Any X-Rays, CTs or MRIs which have been performed.  Contact the facility where they were done to arrange for  prior to your scheduled appointment.    (2) List of current medications   (3) This referral request   (4) Any documents/labs given to you for this referral            SLEEP EVALUATION & MANAGEMENT REFERRAL - formerly [facility] [place] [facility]  [phone] (Age 2 and up)       Please be aware that coverage of these services is subject to the terms and limitations of your health insurance plan.  Call member services at your health plan with any benefit or coverage questions.      Please bring the following to your appointment:    >>   List of current medications   >>   This referral request   >>   Any documents/labs given to you for this referral                VASCULAR REFERRAL       Your provider has referred you to the [facility].    Reason for Referral: Vascular Medicine    Urgency of Referral: Next available    A referral has been sent to our Vascular  Services. If you do not receive a call from them within 24-48 hours you may reach them at [phone].    Please be aware that coverage of these services is subject to the terms and limitations of your health insurance plan.   Call member services at your health plan with any benefit or coverage questions.      Please bring the following with you to your appointment:  (1) Any X-Rays, CTs or MRIs which have been performed.  Contact the facility where they were done to arrange for  prior to your scheduled appointment.  Any new CT, MRI or other procedures ordered by your specialist must be performed at a [place] facility or coordinated by your clinic's referral office.    (2) List of current medications   (3) This referral request   (4) Any documents/labs given to you for this referral                  Your next 10 appointments already scheduled     Jul 19, 2018  4:00 PM CDT   New Visit with Claudia Lowe MD   [facility] ([facility])    [address] [place] [id number] [phone]              Future tests that were ordered for you today     Open Future Orders        Priority Expected Expires Ordered    SLEEP EVALUATION & MANAGEMENT REFERRAL - ADULT -[place] [facility]  [phone] (Age 2 and up) Routine  7/16/2019 7/16/2018            Who to contact     If you have questions or need follow up information about today's clinic visit or your schedule please contact [facility] directly at [phone].  Normal or non-critical lab and imaging results will be communicated to you by MyChart, letter or phone within 4 business days after the clinic has received the results. If you do not hear from us within 7 days, please contact the clinic through MyChart or phone. If you have a critical or abnormal lab result, we will notify you by phone as soon as possible.  Submit refill requests through [email or web address] or call your pharmacy and they will forward the refill request to us. Please allow 3 business days for your refill to be completed.          Additional Information About Your Visit        MyChart Information      [facility] gives you secure access to your electronic health record. If you see a primary care provider, you can also send messages to your care team and make appointments. If you have questions, please call your primary care clinic.  If you do not have a primary care provider, please call [phone] and they will assist you.        Care EveryWhere ID     This is your Care EveryWhere ID. This could be used by other organizations to access your [place] medical records  [id number]        Your Vitals Were     Pulse Temperature Respirations Pulse Oximetry BMI (Body Mass Index)       66 98.2  F (36.8  C) (Oral) 18 93% 23.41 kg/m2        Blood Pressure from Last 3 Encounters:   07/16/18 139/61   07/11/18 150/58   04/30/18 178/60    Weight from Last 3 Encounters:   07/16/18 128 lb (58.1 kg)   07/10/18 129 lb 9.6 oz (58.8 kg)   04/30/18 134 lb 8 oz (61 kg)              We Performed the Following     CARDIOLOGY EVAL ADULT REFERRAL     PULMONARY MEDICINE REFERRAL     VASCULAR REFERRAL          Today's Medication Changes          These changes are accurate as of 7/16/18  3:00 PM.  If you have any questions, ask your nurse or doctor.               Stop taking these medicines if you haven't already. Please contact your care team if you have questions.     MUCINEX 600 MG 12 hr tablet   Generic drug:  guaiFENesin   Stopped by:  Torri Fisher MD                    Primary Care Provider Office Phone # Fax #    Torri Fisher [phone]       [address]  [facility] [address]        Equal Access to Services     [facility] Hospital[facility] : Hadii aad ku hadasho Soomaali, waaxda luqadaha, qaybta kaalmada becky, nicolas bran . So [facility] [phone].    ATENCIÓN: Si habla español, tiene a nagel disposición servicios gratuitos de asistencia lingüística. Llame al [phone].    We comply with applicable federal civil rights laws and [place] laws. We do not discriminate on the basis of race,  color, national origin, age, disability, sex, sexual orientation, or gender identity.            Thank you!     Thank you for choosing [facility]  for your care. Our goal is always to provide you with excellent care. Hearing back from our patients is one way we can continue to improve our services. Please take a few minutes to complete the written survey that you may receive in the mail after your visit with us. Thank you!             Your Updated Medication List - Protect others around you: Learn how to safely use, store and throw away your medicines at www.disposemymeds.org.          This list is accurate as of 7/16/18  3:00 PM.  Always use your most recent med list.                   Brand Name Dispense Instructions for use Diagnosis    amLODIPine 10 MG tablet    NORVASC    90 tablet    Take 1 tablet (10 mg) by mouth daily    Essential hypertension with goal blood pressure less than 140/90       aspirin 81 MG chewable tablet     60 tablet    Take 1 tablet (81 mg) by mouth daily    ASCVD (arteriosclerotic cardiovascular disease)       atorvastatin 40 MG tablet    LIPITOR    90 tablet    Due for appt in March, one tab daily    Hyperlipidemia LDL goal <100       BETA BLOCKER NOT PRESCRIBED (INTENTIONAL)      Beta Blocker not prescribed intentionally due to COPD, shortness of breath with atenolol and lopressor        CALCIUM CITRATE + PO      Take 1 tablet by mouth daily        CLASSIC NETI POT SINUS WASH 2300-700 MG Kit   Generic drug:  sodium chloride-sodium bicarb      Mix 1 packet in Neti Pot and administer in each nostril daily as needed        clopidogrel 75 MG tablet    PLAVIX    90 tablet    Take 1 tablet (75 mg) by mouth daily    Acute ischemic stroke (H)       COLACE 100 MG capsule   Generic drug:  docusate sodium      Take 1 capsule by mouth 2 times daily        escitalopram 10 MG tablet    LEXAPRO    90 tablet    Take 1 tablet (10 mg) by mouth daily Needs appointment before refills.     Anxiety       fluticasone 50 MCG/ACT spray    FLONASE    16 g    USE TWO SPRAYS IN EACH NOSTRIL EVERY OTHER DAY    Seasonal allergic rhinitis       furosemide 20 MG tablet    LASIX    90 tablet    Take 1 tablet (20 mg) by mouth daily    Essential hypertension with goal blood pressure less than 140/90       hydrALAZINE 50 MG tablet    APRESOLINE    90 tablet    Take 1 tablet (50 mg) by mouth 2 times daily Takes an additional 50 mg in the afternoon if systolic blood pressure is higher than 160    Hypertension goal BP (blood pressure) < 140/90       losartan 100 MG tablet    COZAAR    90 tablet    Take 1 tablet (100 mg) by mouth daily    Essential hypertension with goal blood pressure less than 140/90       MULTIVITAMIN PO      Take 1 tablet by mouth daily        OCEAN NASAL SPRAY NA      Administer 1 spray in each nostril up to two times daily as needed        order for DME     1 Units    Equipment being ordered: [employer] (), Treatment Diagnosis: Acute ischemic stroke    Acute ischemic stroke (H)       predniSONE 1 MG tablet    DELTASONE     Take 2 mg by mouth daily        TRAMADOL HCL PO      Take 50 mg by mouth every 6 hours as needed for moderate to severe pain        VENTOLIN  (90 Base) MCG/ACT Inhaler   Generic drug:  albuterol     18 g    INHALE 2 PUFFS INTO THE LUNGS EVERY 6 HOURS AS NEEDED    Chronic obstructive pulmonary disease, unspecified COPD type (H)       VITAMIN D3 PO      Take 1,000 Units by mouth daily

## 2018-07-16 NOTE — TELEPHONE ENCOUNTER
Pt referred to [facility] by Torri Fisher MD for left lower leg pain.    Pt needs to be scheduled for MALINDA w/ exercise (unless scheduled with Dr. Jaimes) and consult with Vascular Medicine.  Will route to scheduling to coordinate an appointment at next available.    SIMON Lugo RN

## 2018-07-16 NOTE — PROGRESS NOTES
SUBJECTIVE:   Jennifer Bob is a 86 year old female who presents to clinic today for the following health issues:           Hospital Follow-up Visit:    Hospital/Nursing Home/IP Rehab Facility: [facility]  Date of Admission: 07/07/2018  Date of Discharge: 07/11/2018  Reason(s) for Admission: Patient states she could not move her head from a side position, possible cerebellum  stroke            Problems taking medications regularly:  None       Medication changes since discharge: Plavix for 90 days and then aspirin       Problems adhering to non-medication therapy:  None     Summary of hospitalization:  [facility] discharge summary reviewed, though the discharge summary has not yet been signed by a physician   Diagnostic Tests/Treatments reviewed.  Follow up needed: none  Other Healthcare Providers Involved in Patient s Care:         Homecare and Specialist appointment - cardiology, pulmonology  Update since discharge: improved.      Post Discharge Medication Reconciliation: discharge medications reconciled, continue medications without change.  Plan of care communicated with patient, family--her daughter is with her today      Coding guidelines for this visit:  Type of Medical   Decision Making Face-to-Face Visit       within 7 Days of discharge Face-to-Face Visit        within 14 days of discharge   Moderate Complexity [phone] [id number]   High Complexity [phone]            She has home PT, OT, RN, home health aid to help her shower and will be having speech therapist coming out as well.   She is getting around pretty well with her walker. Her main issue is her very low toilet in her apartment. OT is working with her on this. She has ordered riser and handlebars to attach to the toilet. Her appetite is getting better. She has been sleeping very well. O2 levels have been ranging 94-96%. She does not seem to be having apneic episodes per her daughter and no snoring. She feels well  "rested in the morning. Overall feels weaker than her usual, but feels this has been improving since her discharge.           From hospital discharge summary 7/10/18:   \"Problem Leading to Hospitalization (from HPI):   Jennifer Bob is a 86 year old year old female with h/o left internal capsule/corona radiata stroke in 2013, MI, HLD, and HTN who presented to the ED with acute onset nausea/vomiting, double vision, and difficulty maintaining posture. Stroke code was activated. She is status post tPA. She has residual right sided weakness since her previous stroke in 2013 due to small infarction in the left corona radiata. 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      Please see H&P dated 7/7/2018 for further details about presentation.     Brief Hospital Course:   Patient presented with presented to the ED with acute onset nausea/vomiting, double vision, and difficulty maintaining posture. Stroke code was activated and tPA was given. After the TPA her symptoms markedly improved with no residual nausea or vomiting or visual changes or postural abnormalities. She only has residual slowing finger tapping test and disdiadochokinesia. Her oxygen saturation dropped to 89% one time for which she got overnight pulse oxymetry that showed desaturation during sleep down to 81%.         Work-up as stated below under Pertinent Investigations.     Etiology is thought to be arterial atherosclerosis, however, she has enlarged left atrium and right bundle branch block raise the question of cardio embolic cause for which were are going to send her home on cardiac monitoring for the possibility of having paroxsymal a fib       Rehab evaluation: OT and PT. They recommended her to go home with out patient physical therapy and four wheel walker (FWW)     Smoking Cessation: she is ex-smoker     BP Long-term Goal: 140/90 or " "less     Antithrombotic/Anticoagulant Agent: aspirin 81 mg and Plavix 75 mg for 3 months then Asprin alone after 3 months     Statins: she is on her home dose of atorvastatin 40 mg per day        Hgb A1C Goal: < 7.0     Complications: None.      Other problems addressed during this hospitalization:  - Chronic Kidney disease: Creatinine was 0.9 on admission suggesting renal function better than previously thought  - Hypertension: On her home antihypertensives (Amlodipine, Losartan, Hydralazine)   - Hyperlipidemia: She is on Atorvastatin and her LDL is 57  - COPD: Her SPO2 ranged 81-99% during admission. She had desaturation down to 89% during sleep for which she received oxygen through nasal cannula after that she had overnight oxymetry test done that recorded desaturation down to 81 during sleep. She also had desaturation today during walking down to 87%. This initially was thought to be only during sleep but now we have documented desaturation during activity. X ray was done and D dimer. The xray showed inflated chest and D Dimer was slightly normal. The D Dimer level was not concerning because it can be elevated with acute stroke and also it is not high enough to diagnose PE. She will be referred to internal medicine physician to address the cause of desaturation , do pulmonary function test and repeat the D Dimer.    \"  Problem list and histories reviewed & adjusted, as indicated.  Additional history: as documented    Patient Active Problem List   Diagnosis     Anxiety     Low back pain     Left hip pain     ASCVD (arteriosclerotic cardiovascular disease)     Incontinence of urine     Hypertension goal BP (blood pressure) < 140/90     Seasonal allergies     Myocardial infarction     DDD (degenerative disc disease), lumbar     Transient cerebral ischemia     CKD (chronic kidney disease) stage 3, GFR 30-59 ml/min     Corns and callosities     Health Care Home     Spinal stenosis, lumbar region, without neurogenic " claudication     Synovial cyst of lumbar facet joint     Acquired spondylolisthesis     Lumbar radicular pain     Stroke (H)     Hypertension     Hyperlipidemia LDL goal <70     Late effects of CVA (cerebrovascular accident)     COPD (chronic obstructive pulmonary disease) (H)     ACP (advance care planning)     RA (rheumatoid arthritis) (H)     Gout     Chronic pain syndrome     Heart failure (H)     Chronic diastolic heart failure (H)     PMR (polymyalgia rheumatica) (H)     Past Surgical History:   Procedure Laterality Date     abdominal abscess      at incisional site after kristine     adominal hernia repair      had mesh placed, then allergic so it was removed and she wears a girdle     CHOLECYSTECTOMY       CORONARY ARTERY BYPASS  1992       Social History   Substance Use Topics     Smoking status: Former Smoker     Smokeless tobacco: Never Used      Comment: quit smoking in 1983     Alcohol use No     History reviewed. No pertinent family history.      Current Outpatient Prescriptions   Medication Sig Dispense Refill     amLODIPine (NORVASC) 10 MG tablet Take 1 tablet (10 mg) by mouth daily 90 tablet 1     aspirin 81 MG chewable tablet Take 1 tablet (81 mg) by mouth daily 60 tablet 3     atorvastatin (LIPITOR) 40 MG tablet Due for appt in March, one tab daily 90 tablet 3     BETA BLOCKER NOT PRESCRIBED, INTENTIONAL, Beta Blocker not prescribed intentionally due to COPD, shortness of breath with atenolol and lopressor  0     Calcium Citrate-Vitamin D (CALCIUM CITRATE + PO) Take 1 tablet by mouth daily        Cholecalciferol (VITAMIN D3 PO) Take 1,000 Units by mouth daily       clopidogrel (PLAVIX) 75 MG tablet Take 1 tablet (75 mg) by mouth daily 90 tablet 0     docusate sodium (COLACE) 100 MG capsule Take 1 capsule by mouth 2 times daily        escitalopram (LEXAPRO) 10 MG tablet Take 1 tablet (10 mg) by mouth daily Needs appointment before refills. 90 tablet 3     fluticasone (FLONASE) 50 MCG/ACT spray USE TWO  SPRAYS IN EACH NOSTRIL EVERY OTHER DAY 16 g 8     furosemide (LASIX) 20 MG tablet Take 1 tablet (20 mg) by mouth daily 90 tablet 3     hydrALAZINE (APRESOLINE) 50 MG tablet Take 1 tablet (50 mg) by mouth 2 times daily Takes an additional 50 mg in the afternoon if systolic blood pressure is higher than 160 90 tablet 3     losartan (COZAAR) 100 MG tablet Take 1 tablet (100 mg) by mouth daily 90 tablet 3     Multiple Vitamins-Minerals (MULTIVITAMIN OR) Take 1 tablet by mouth daily        order for DME Equipment being ordered: Walker [employer] (),  Treatment Diagnosis: Acute ischemic stroke 1 Units 0     predniSONE (DELTASONE) 1 MG tablet Take 2 mg by mouth daily       Saline (OCEAN NASAL SPRAY NA) Administer 1 spray in each nostril up to two times daily as needed       sodium chloride-sodium bicarb (CLASSIC NETI POT SINUS WASH) 2300-700 MG KIT Mix 1 packet in Neti Pot and administer in each nostril daily as needed       TRAMADOL HCL PO Take 50 mg by mouth every 6 hours as needed for moderate to severe pain       VENTOLIN  (90 BASE) MCG/ACT Inhaler INHALE 2 PUFFS INTO THE LUNGS EVERY 6 HOURS AS NEEDED 18 g 9     guaiFENesin (MUCINEX) 600 MG 12 hr tablet Take 2 tablets (1,200 mg) by mouth 2 times daily 28 tablet      Allergies   Allergen Reactions     Adhesive Tape      blisters     Atenolol      SOB     Lopressor Hct      SOB     Recent Labs   Lab Test  07/08/18   0323  07/07/18   2226  07/07/18   2222  05/02/18   0936   02/09/17   1239  02/09/17   0440   09/22/15   1240   04/24/14   2221   10/17/13   0552   A1C  5.9*   --    --    --    --    --    --    --    --    --   5.6   --   5.6   LDL  57   --    --   67   --   66   --    < >   --    < >   --    --   85   HDL  69   --    --   72   --   87   --    < >   --    < >   --    --   59   TRIG  40   --    --   71   --   80   --    < >   --    < >   --    --   107   ALT   --    --    --   29   --   56*  49   < >  27   --    --    < >   --    CR  0.90   --   0.90   0.88   < >   --   0.93   < >  1.04   < >   --    < >  1.00   GFRESTIMATED  59*  53*  59*  61   < >   --   57*  47*   < >  51*   < >   --    < >  53*   GFRESTBLACK  72  64  71  73   < >   --   69  57*   < >  61   < >   --    < >  64   POTASSIUM  4.0   --   3.4  3.9   < >   --   3.8   < >  3.8   < >   --    < >  3.7   TSH   --    --    --    --    --   1.66   --    --   1.78   --    --    --    --     < > = values in this interval not displayed.      BP Readings from Last 3 Encounters:   07/16/18 139/61   07/11/18 150/58   04/30/18 178/60    Wt Readings from Last 3 Encounters:   07/16/18 128 lb (58.1 kg)   07/10/18 129 lb 9.6 oz (58.8 kg)   04/30/18 134 lb 8 oz (61 kg)                    Reviewed and updated as needed this visit by clinical staff       Reviewed and updated as needed this visit by Provider         ROS:     E: NEGATIVE for vision changes   R: NEGATIVE for SOB  CV: NEGATIVE for chest pain   N: NEGATIVE for weakness, dizziness or paresthesias or headache     OBJECTIVE:     /61 (BP Location: Left arm, Patient Position: Sitting, Cuff Size: Adult Regular)  Pulse 66  Temp 98.2  F (36.8  C) (Oral)  Resp 18  Wt 128 lb (58.1 kg)  SpO2 93%  BMI 23.41 kg/m2  Body mass index is 23.41 kg/(m^2).  GENERAL:  alert and no distress, using wheeled walker for ambulation  RESP: lungs clear to auscultation - no rales, rhonchi or wheezes  CV: regular rate and rhythm, normal S1 S2   Neuro: slow finger tapping on the right   Ext: no calf ttp, mild peripheral edema, she has compression stockings on today that are very tight at the top.     Diagnostic Test Results:  No results found for this or any previous visit (from the past 24 hour(s)).  Results for orders placed or performed during the hospital encounter of 07/07/18   CT Head Neck Angio w/o & w Contrast    Narrative    CTA ANGIOGRAM HEAD NECK 7/7/2018 10:51 PM    Head CT without contrast  CT angiogram of the neck   CT angiogram of the base of the brain with  contrast  Reconstruction by the Radiologist on the 3D workstation    History:  Nausea, vomiting;     Comparison:  MRI evidence for 4/24/2014.      Technique:  HEAD CT:  Using multidetector thin collimation helical acquisition  technique, axial, coronal and sagittal CT images from the skull base  to the vertex were obtained without intravenous contrast.   HEAD and NECK CTA: During rapid bolus intravenous injection of  nonionic contrast material, axial images were obtained using thin  collimation multidetector helical technique from the base of the skull  through the [place]. This CT angiogram data was reconstructed  at thin intervals with mild overlap. Images were sent to the [employer]  workstation, and 3D reconstructions were obtained. The axial source  images, multiplanar reformations, 3D reconstructions in both maximum  intensity projection display and volume rendered models were reviewed,  with reconstructions performed by the technologist and the  radiologist.    Contrast: 75 mL [id number]    Findings:  Head CT: There is no evidence of intracranial hemorrhage, mass effect,  or midline shift. Gray/white matter differentiation in both cerebral  hemispheres is preserved. There is mild patchy low attenuation within  the periventricular and supraventricular white matter of both cerebral  hemispheres, nonspecific but most likely representing chronic small  vessel ischemic disease given the patient's age. Ventricles are  proportionate to the cerebral sulci and there is mild cerebral volume  loss. Sequela of old lacunar infarct of posterior left frontal corona  radiata corresponding to infarct demonstrated on the MRI dated  10/17/2013.    Head CTA demonstrates short segment high-grade stenosis of left  anterior cerebral artery A2 segment, unchanged. Stable occlusion of  distal right vertebral artery. The anterior communicating artery is  patent. Stable hypoplastic left P1 segment with functional fetal  origin of  left PCA, mildly stenotic appearance of left posterior  communicating artery, which is new since MRI dated 4/24/2014.  Unchanged tapered narrowing of left P3 segment. Right posterior  communicating artery is not visualized.     Neck CTA demonstrates dominant left vertebral artery. Redemonstration  of 63% stenosis of proximal left internal carotid artery. Mild  atherosclerotic calcification of proximal right internal carotid  artery with approximately 40% stenosis. Atherosclerotic plaque at the  origin of the left common carotid artery. Calcified and noncalcified  atherosclerotic plaques of aortic arch.     Severe degenerative changes of cervical spine with grade 1  anterolisthesis of C4 on C5 and C6 and C7. No significant spinal canal  narrowing. Multilevel bilateral moderate neural foraminal narrowing.      Impression    Impression:    1. Head CTA demonstrates short segment high-grade stenosis of left  anterior cerebral artery A2 segment, unchanged. Stable occlusion of  distal right vertebral artery. The anterior communicating artery is  patent. Stable hypoplastic left P1 segment with fetal origin of left  PCA. Stenotic appearance of left posterior communicating artery, which  is new since MRI dated 4/24/2014. Unchanged tapered narrowing of left  P3 segment. Right posterior communicating arteries not visualized.   2. Neck CTA demonstrates hypoplastic right vertebral artery with  left-sided dominance. Narrowing of proximal left internal carotid  artery with 63 % stenosis. Focal 40% stenosis of proximal right  internal carotid artery.  3. No evidence of intracranial hemorrhage on the noncontrast head CT.  Sequela of old lacunar infarct of posterior left frontal corona  radiata, corresponding to the infarct demonstrated on MRI dated  10/17/2013.   4. Severe degenerative changes of cervical spine with grade 1  anterolisthesis of C4 on C5 and C6 and C7.    I have personally reviewed the examination and initial  interpretation  and I agree with the findings.    EVARISTO BARR MD   MR Brain for Stroke Limited    Narrative    MRI brain without contrast    Provided History:  RUE dysmetria, nausea, vomiting; .    Comparison:  7/7/2018      Technique:   Axial and sagittal diffusion-weighted (with ADC map), axial FLAIR, and  axial susceptibility-weighted images of the brain were obtained  without the administration of intravenous contrast.    Findings:   Diffusion weighted images demonstrate multiple foci of restricted  diffusion in the superior cerebellum. The largest focus measures  approximately 1.2 x 1.4 cm. Additionally, there are several small foci  of restricted diffusion in the inferior cerebellum on the right.  Furthermore, there is 1 focus of restricted diffusion within the left  cerebellum inferiorly. Superimposed T2 hyperintensities within the  corona radiata and centrum semiovale white matter, consistent with  chronic small vessel ischemic disease. Several chronic focal  subcortical white matter infarcts seen bilaterally in the centrum  semiovale and corona radiata. Mild, generalized cerebral volume loss.  The ventricles are proportional to the cerebral sulci. No intracranial  mass. No hydrocephalus. No intracranial hemorrhage.      Impression    Impression:  Multiple foci of restricted diffusion of the cerebellum, mostly right  of midline. Findings are consistent with ischemic stroke, possibly  embolic in etiology.    [Result: Cerebellar infarcts]    Finding was identified on 7/8/2018 5:08 PM.     Dr. Shanae Sauceda was contacted by Dr. Lackey at 7/8/2018 5:18 PM and  verbalized understanding of the urgent finding.     I have personally reviewed the examination and initial interpretation  and I agree with the findings.    EVARISTO BARR MD   CT Head w/o Contrast    Narrative    CT HEAD W/O CONTRAST 7/8/2018 10:48 PM    Provided History: Follow up stroke tPA    Comparison: Brain MR 7/8/2018, CT 7/7/2018,  10/17/2013.    Technique: Using multidetector thin collimation helical acquisition  technique, axial, coronal and sagittal CT images from the skull base  to the vertex were obtained without intravenous contrast.     Findings:    Expected evolution of multiple patchy and wedge-shaped foci of acute  infarction within both cerebellar hemispheres since 7/7/2018. Stable  chronic infarcts within the left caudate body and left frontal corona  radiata. No intracranial hemorrhage or definite new infarct. Stable  mild leukoaraiosis and generalized parenchymal volume loss.    Ventricles are not enlarged out of proportion to the cerebral sulci.  No mass effect, midline shift or abnormal extra axial fluid  collection. Calvarium is intact. Paranasal sinuses and mastoid air  cells are clear.          Impression    Impression:   Expected evolution of bilateral cerebellar infarcts since 7/7/2018. No  intracranial hemorrhage.    I have personally reviewed the examination and initial interpretation  and I agree with the findings.    TANIYA LOWE MD   XR Chest Port 1 View    Narrative    Exam:  Chest X-ray 7/11/2018 1:46 PM    History: concern for hypoxia;     Comparison: CT 2/9/2017    Findings: AP portable chest x-ray 85 degrees. Median sternotomy wires  and mediastinal surgical clips. Atherosclerotic calcifications of the  aortic arch. The trachea is midline. Enlarged cardiac silhouette.  Bilateral small pleural effusions with associated bibasilar opacities.  Left hilar calcified lymph nodes. No pneumothorax. Mild pulmonary  vascular congestion. The upper abdomen is unremarkable. No acute  osseous abnormality.      Impression    Impression:   1. Small bilateral pleural effusions with associated bibasilar  opacities, atelectasis and/or consolidation.  2. Cardiomegaly with mild pulmonary edema.    I have personally reviewed the examination and initial interpretation  and I agree with the findings.    LUCINA YUNG MD    Creatinine POCT   Result Value Ref Range    Creatinine 1.0 0.52 - 1.04 mg/dL    GFR Estimate 53 (L) >60 mL/min/1.7m2    GFR Estimate If Black 64 >60 mL/min/1.7m2   Basic metabolic panel   Result Value Ref Range    Sodium 141 133 - 144 mmol/L    Potassium 3.4 3.4 - 5.3 mmol/L    Chloride 107 94 - 109 mmol/L    Carbon Dioxide 26 20 - 32 mmol/L    Anion Gap 8 3 - 14 mmol/L    Glucose 145 (H) 70 - 99 mg/dL    Urea Nitrogen 24 7 - 30 mg/dL    Creatinine 0.90 0.52 - 1.04 mg/dL    GFR Estimate 59 (L) >60 mL/min/1.7m2    GFR Estimate If Black 71 >60 mL/min/1.7m2    Calcium 8.1 (L) 8.5 - 10.1 mg/dL   CBC with platelets   Result Value Ref Range    WBC 8.5 4.0 - 11.0 10e9/L    RBC Count 3.70 (L) 3.8 - 5.2 10e12/L    Hemoglobin 12.1 11.7 - 15.7 g/dL    Hematocrit 38.4 35.0 - 47.0 %     (H) 78 - 100 fl    MCH 32.7 26.5 - 33.0 pg    MCHC 31.5 31.5 - 36.5 g/dL    RDW 13.4 10.0 - 15.0 %    Platelet Count 123 (L) 150 - 450 10e9/L   INR   Result Value Ref Range    INR 1.00 0.86 - 1.14   Troponin I   Result Value Ref Range    Troponin I ES 0.015 0.000 - 0.045 ug/L   Glucose by meter   Result Value Ref Range    Glucose 150 (H) 70 - 99 mg/dL   UA with Microscopic reflex to Culture   Result Value Ref Range    Color Urine Yellow     Appearance Urine Clear     Glucose Urine Negative NEG^Negative mg/dL    Bilirubin Urine Negative NEG^Negative    Ketones Urine 10 (A) NEG^Negative mg/dL    Specific Gravity Urine 1.015 1.003 - 1.035    Blood Urine Negative NEG^Negative    pH Urine 6.0 5.0 - 7.0 pH    Protein Albumin Urine 10 (A) NEG^Negative mg/dL    Urobilinogen mg/dL 2.0 0.0 - 2.0 mg/dL    Nitrite Urine Negative NEG^Negative    Leukocyte Esterase Urine Large (A) NEG^Negative    Source Catheterized Urine     WBC Urine 3 0 - 5 /HPF    RBC Urine <1 0 - 2 /HPF    Squamous Epithelial /HPF Urine 2 (H) 0 - 1 /HPF    Transitional Epi <1 0 - 1 /HPF    Mucous Urine Present (A) NEG^Negative /LPF   CBC with platelets   Result Value Ref Range     WBC 8.3 4.0 - 11.0 10e9/L    RBC Count 3.82 3.8 - 5.2 10e12/L    Hemoglobin 12.7 11.7 - 15.7 g/dL    Hematocrit 39.3 35.0 - 47.0 %     (H) 78 - 100 fl    MCH 33.2 (H) 26.5 - 33.0 pg    MCHC 32.3 31.5 - 36.5 g/dL    RDW 13.4 10.0 - 15.0 %    Platelet Count 142 (L) 150 - 450 10e9/L   Troponin I   Result Value Ref Range    Troponin I ES 0.071 (H) 0.000 - 0.045 ug/L   Basic metabolic panel   Result Value Ref Range    Sodium 142 133 - 144 mmol/L    Potassium 4.0 3.4 - 5.3 mmol/L    Chloride 107 94 - 109 mmol/L    Carbon Dioxide 27 20 - 32 mmol/L    Anion Gap 9 3 - 14 mmol/L    Glucose 138 (H) 70 - 99 mg/dL    Urea Nitrogen 24 7 - 30 mg/dL    Creatinine 0.90 0.52 - 1.04 mg/dL    GFR Estimate 59 (L) >60 mL/min/1.7m2    GFR Estimate If Black 72 >60 mL/min/1.7m2    Calcium 8.4 (L) 8.5 - 10.1 mg/dL   Hemoglobin A1c   Result Value Ref Range    Hemoglobin A1C 5.9 (H) 0 - 5.6 %   INR   Result Value Ref Range    INR 1.04 0.86 - 1.14   Partial thromboplastin time   Result Value Ref Range    PTT 28 22 - 37 sec   Lipid panel: Fasting   Result Value Ref Range    Cholesterol 134 <200 mg/dL    Triglycerides 40 <150 mg/dL    HDL Cholesterol 69 >49 mg/dL    LDL Cholesterol Calculated 57 <100 mg/dL    Non HDL Cholesterol 65 <130 mg/dL   Glucose by meter   Result Value Ref Range    Glucose 139 (H) 70 - 99 mg/dL   Magnesium   Result Value Ref Range    Magnesium 1.9 1.6 - 2.3 mg/dL   Phosphorus   Result Value Ref Range    Phosphorus 4.0 2.5 - 4.5 mg/dL   Troponin I   Result Value Ref Range    Troponin I ES 0.086 (H) 0.000 - 0.045 ug/L   Troponin I   Result Value Ref Range    Troponin I ES 0.091 (H) 0.000 - 0.045 ug/L   Glucose by meter   Result Value Ref Range    Glucose 147 (H) 70 - 99 mg/dL   Glucose by meter   Result Value Ref Range    Glucose 96 70 - 99 mg/dL   Troponin I   Result Value Ref Range    Troponin I ES 0.101 (H) 0.000 - 0.045 ug/L   Glucose by meter   Result Value Ref Range    Glucose 110 (H) 70 - 99 mg/dL   Troponin I    Result Value Ref Range    Troponin I ES 0.095 (H) 0.000 - 0.045 ug/L   Glucose by meter   Result Value Ref Range    Glucose 124 (H) 70 - 99 mg/dL   CBC with platelets   Result Value Ref Range    WBC 7.7 4.0 - 11.0 10e9/L    RBC Count 3.55 (L) 3.8 - 5.2 10e12/L    Hemoglobin 11.8 11.7 - 15.7 g/dL    Hematocrit 37.3 35.0 - 47.0 %     (H) 78 - 100 fl    MCH 33.2 (H) 26.5 - 33.0 pg    MCHC 31.6 31.5 - 36.5 g/dL    RDW 13.7 10.0 - 15.0 %    Platelet Count 124 (L) 150 - 450 10e9/L   Troponin I   Result Value Ref Range    Troponin I ES 0.097 (H) 0.000 - 0.045 ug/L   Troponin I   Result Value Ref Range    Troponin I ES 0.104 (H) 0.000 - 0.045 ug/L   EKG 12 lead   Result Value Ref Range    Interpretation ECG Click View Image link to view waveform and result    EKG 12-lead, tracing only   Result Value Ref Range    Interpretation ECG Click View Image link to view waveform and result    Zio Patch Holter    Narrative    UNIT 6A [facility] EAST [facility]  [address] [address] [phone]  2018      Patient:  Tracy Bob  Chart: [id number]  :  1931  Age:  86 year old  Sex:  female       Procedure:  ZioPatch Monitor.        Technician performing hook-up:  [place]     [facility] IP Consult    Narrative    Klisch, Christine M, RN     7/10/2018 10:14 AM  Patient declining stroke education. 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   ISTAT INR POCT   Result Value Ref Range    ISTAT INR <0.9 0.86 - 1.14   Troponin POCT   Result Value Ref Range    Troponin I 0.02 0.00 - 0.10 ug/L   Echocardiogram Complete    Narrative    [id number]  [id number]  [id number]  [id number]^ALKUWAITI^[facility]HAM[facility],[place]  [facility]  [address] [place] [address]     Name: TRACY BOB  MRN: [id number]  : 1931  Study Date: 2018 09:59 AM  Age: 86 yrs  Gender: Female  Patient Location:  [id number]  Reason For Study: CVA  Ordering Physician: SANCHO PRATT  Performed By: Luis Camacho RDCS     BSA: 1.6 m2  Height: 62 in  Weight: 128 lb  BP: 162/99 mmHg  _____________________________________________________________________________  __        Procedure  Complete Portable Echo Adult.  _____________________________________________________________________________  __        Interpretation Summary  Mildly (EF 45-50%) reduced left ventricular function is present.There is basal  inferior and basal inferolateral wall akinesis.  Global right ventricular function is normal.  Mild aortic valve sclerosis is present  The atrial septum is intact as assessed by color Doppler .  Pulmonary hypertension present. Estimated pulmonary artery systolic pressure  is 48 mmHg. The inferior vena cava was normal in size with preserved  respiratory variability.  No pericardial effusion is present.     This study was compared with the study from 2/9/17. Compared to prior study,  LVEF is largely unchanged. PA systolic pressure is measurable and is elevated.  _____________________________________________________________________________  __        Left Ventricle  Global and regional left ventricular function is normal with an EF of 55-60%.  Mild left ventricular dilation is present. There is mild eccentric  hypertrophy. Grade II or moderate diastolic dysfunction. There is basal  inferior and basal inferolateral wall akinesis.     Right Ventricle  The right ventricle is normal size. Global right ventricular function is  normal.     Atria  Severe biatrial enlargement is present. The atrial septum is intact as  assessed by color Doppler .        Mitral Valve  Mitral leaflet thickness is normal. Mild mitral insufficiency is present.     Aortic Valve  The aortic valve is tricuspid. Mild aortic valve sclerosis is present. Trace  aortic insufficiency is present.     Tricuspid Valve  Mild tricuspid insufficiency is present. Estimated  pulmonary artery systolic  pressure is 45 mmHg plus right atrial pressure.     Pulmonic Valve  The pulmonic valve is normal. No PV insufficiency.     Vessels  The aorta root is normal. The inferior vena cava was normal in size with  preserved respiratory variability.     Pericardium  No pericardial effusion is present.        Compared to Previous Study  This study was compared with the study from 17 . Compared to prior study,  LVEF is largely unchanged. Severe LAE remains present.  _____________________________________________________________________________  __     MMode/2D Measurements & Calculations  IVSd: 0.93 cm  LVIDd: 5.4 cm  LVIDs: 4.2 cm  LVPWd: 0.70 cm  FS: 21.6 %  LV mass(C)d: 159.3 grams  LV mass(C)dI: 100.7 grams/m2  Ao root diam: 2.5 cm  asc Aorta Diam: 2.8 cm  LVOT diam: 1.8 cm  LVOT area: 2.5 cm2  LA Volume (BP): 92.7 ml  LA Volume Index (BP): 58.7 ml/m2     RWT: 0.26        Doppler Measurements & Calculations  MV E max arti: 118.3 cm/sec  MV A max arti: 66.3 cm/sec  MV E/A: 1.8  MV dec slope: 870.0 cm/sec2  TR max arti: 322.0 cm/sec  TR max P.6 mmHg  E/E' av.5  Lateral E/e': 29.2  Medial E/e': 17.7     _____________________________________________________________________________  __           Report approved by: Juani Cote 07/10/2018 02:40 PM      ABO/Rh type and screen   Result Value Ref Range    ABO B     RH(D) Pos     Antibody Screen Neg     Test Valid Only At          [facility]    Specimen Expires 2018    Methicillin Resist/Sens S. aureus PCR   Result Value Ref Range    Specimen Description Nares     Methicillin Resist/Sens S. aureus PCR Negative NEG^Negative   Urine Culture Aerobic Bacterial   Result Value Ref Range    Specimen Description Catheterized Urine     Special Requests Specimen received in preservative     Culture Micro 10,000 to 50,000 colonies/mL  Gram negative rods   (A)     Culture Micro (A)      <10,000  colonies/mL  Strain 2  Gram negative rods      Culture Micro <10,000 colonies/mL  Gram positive cocci   (A)     Culture Micro (A)      10,000 to 50,000 colonies/mL  Strain 2  Gram positive cocci      Culture Micro Susceptibility testing not routinely done        ASSESSMENT/PLAN:     Jennifer Bob is a 86 year old female with h/o left internal capsule/corona radiata stroke in 2013, MI, HLD, and HTN who presented to the ED with acute onset nausea/vomiting, double vision, and difficulty maintaining posture. Stroke code was activated. She is status post tPA. She has residual right sided weakness since her previous stroke in 2013 due to small infarction in the left corona radiata. 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  After the TPA her symptoms markedly improved with no residual nausea or vomiting or visual changes or postural abnormalities. She only has residual slowing finger tapping test and disdiadochokinesia.    1. History of stroke  2. Late effects of CVA (cerebrovascular accident)  Some residual right sided weakness present. Residual slow finger tapping  Doing well with home therapies PT/OT currently   Continues on ASA 81mg daily and plavix 75mg daily for 3 months, then was instructed to transition to aspirin alone.       3. Chronic obstructive pulmonary disease, unspecified COPD type (H)  Overnight desaturation in hospital to 81%, desat to 87% with activity.   Rare COPD flare or need to use albuterol, though she does have albuterol to use.   Schedule with pulmonary medicine for further evaluation and recommendations, PFTs  - PULMONARY MEDICINE REFERRAL    4. ASCVD (arteriosclerotic cardiovascular disease)     - CARDIOLOGY EVAL ADULT REFERRAL    5. Hypertension goal BP (blood pressure) < 140/90  Controlled, no changes today   Continues on amlodipine 10mg daily, furosemide 20mg daily, hydralazine  50mg twice daily, losartan 100mg daily    6. Hyperlipidemia LDL goal <70  Continues on atorvastatin 40mg daily     7. PMR (polymyalgia rheumatica) (H)  Continues on prednisone. Followed by rheumatology     8. Nocturnal oxygen desaturation     - SLEEP EVALUATION & MANAGEMENT REFERRAL - ADULT -[facility] [place] [facility]  [phone] (Age 2 and up); Future    9.Pain of left lower leg  Symptoms consistent with claudication with calf pain walking 200-300 feet, resolving at rest. No pain today.   - VASCULAR REFERRAL     D-dimer reported to have been checked on 7/11/18 with a result of 1, but I see no results in the computer indicating a d-dimer test was done.           Patient Instructions   1) Schedule with cardiology. You may consider seeing Dr. Lowe at our clinic.   2) Schedule with pulmonology for your COPD  3) Schedule with the vascular specialist for your left leg symptoms.  4) Schedule with the sleep clinic (you may see the pulmonologist prior to scheduling this)  5) Hold off on wearing the compression stockings for now as you have not had any leg swelling. You may discuss this further with the vascular specialist.           My clinical findings of activity intolerance, post hospital weakness and decreased strength/endurnace support the fact that the patient is homebound and in need of requested services.   I certify that the patient is homebound because of the taxing effort require to leave home. The patient only tolerates infrequent outings of short duration and requires assistance to leave home for safety.      Torri Fisher MD   [facility]

## 2018-07-19 ENCOUNTER — CARE COORDINATION (OUTPATIENT)
Dept: NEUROLOGY | Facility: CLINIC | Age: 83
End: 2018-07-19

## 2018-07-19 ENCOUNTER — OFFICE VISIT (OUTPATIENT)
Dept: CARDIOLOGY | Facility: CLINIC | Age: 83
End: 2018-07-19
Payer: COMMERCIAL

## 2018-07-19 VITALS
SYSTOLIC BLOOD PRESSURE: 160 MMHG | OXYGEN SATURATION: 96 % | WEIGHT: 127.5 LBS | RESPIRATION RATE: 28 BRPM | HEART RATE: 64 BPM | DIASTOLIC BLOOD PRESSURE: 60 MMHG | BODY MASS INDEX: 23.32 KG/M2

## 2018-07-19 DIAGNOSIS — E78.5 HYPERLIPIDEMIA LDL GOAL <100: ICD-10-CM

## 2018-07-19 DIAGNOSIS — I10 ESSENTIAL HYPERTENSION WITH GOAL BLOOD PRESSURE LESS THAN 140/90: ICD-10-CM

## 2018-07-19 DIAGNOSIS — I10 HYPERTENSION GOAL BP (BLOOD PRESSURE) < 140/90: ICD-10-CM

## 2018-07-19 DIAGNOSIS — I69.90 LATE EFFECTS OF CVA (CEREBROVASCULAR ACCIDENT): ICD-10-CM

## 2018-07-19 DIAGNOSIS — Z95.1 HISTORY OF CORONARY ARTERY BYPASS GRAFT: Primary | ICD-10-CM

## 2018-07-19 PROCEDURE — 99205 OFFICE O/P NEW HI 60 MIN: CPT | Performed by: INTERNAL MEDICINE

## 2018-07-19 RX ORDER — ATORVASTATIN CALCIUM 40 MG/1
TABLET, FILM COATED ORAL
Qty: 90 TABLET | Refills: 0 | Status: SHIPPED | OUTPATIENT
Start: 2018-07-19 | End: 2018-08-29

## 2018-07-19 RX ORDER — FUROSEMIDE 20 MG
TABLET ORAL
Qty: 90 TABLET | Refills: 3 | Status: SHIPPED | OUTPATIENT
Start: 2018-07-19 | End: 2019-01-01

## 2018-07-19 NOTE — MR AVS SNAPSHOT
"              After Visit Summary   2018    Jennifer Bob    MRN: [id number]           Patient Information     Date Of Birth          1931        Visit Information        Provider Department      2018 4:00 PM Claudia Lowe MD [facility]        Today's Diagnoses     History of coronary artery bypass graft    -  1    Hypertension goal BP (blood pressure) < 140/90        Late effects of CVA (cerebrovascular accident)          Care Instructions    You were seen today in the Cardiovascular Clinic at [facility].     Cardiology Providers you saw during your visit: Dr. Claudia Lowe    Diagnosis:  Coronary artery disease, stroke    Results: discussed with patient      Orders:   none    Medication Changes:   none    Recommendations:   As above    Follow-up:  1 year       Please feel free to call me with any questions or concerns.        Questions and [phone].   First press #1 for the [employer] and then press #3 for \"Medical Questions\" to reach us Cardiology Nurses.      On Call Cardiologist for after hours or on weekends: [phone]   option #4 and ask to speak to the on-call Cardiologist.          If you need a medication refill please contact your pharmacy.  Please allow 3 business days for your refill to be completed.            Follow-ups after your visit        Additional Services     Follow-Up with Cardiologist                 Your next 10 appointments already scheduled     2018 12:50 PM CDT   New Visit with Jl Jaimes MD   [facility] ([facility])    [address] [place]  [facility] [address] [phone]              Future tests that were ordered for you today     Open Future Orders        Priority Expected Expires Ordered    Follow-Up with Cardiologist Routine 2019            Who to contact     If you " have questions or need follow up information about today's clinic visit or your schedule please contact [facility]   [facility] directly at [phone].  Normal or non-critical lab and imaging results will be communicated to you by Categoricalhart, letter or phone within 4 business days after the clinic has received the results. If you do not hear from us within 7 days, please contact the clinic through [employer] or phone. If you have a critical or abnormal lab result, we will notify you by phone as soon as possible.  Submit refill requests through [employer] or call your pharmacy and they will forward the refill request to us. Please allow 3 business days for your refill to be completed.          Additional Information About Your Visit        Green[employer] Information     [employer] gives you secure access to your electronic health record. If you see a primary care provider, you can also send messages to your care team and make appointments. If you have questions, please call your primary care clinic.  If you do not have a primary care provider, please call [phone] and they will assist you.        Care EveryWhere ID     This is your Care EveryWhere ID. This could be used by other organizations to access your [facility] medical records  [id number]        Your Vitals Were     Pulse Respirations Pulse Oximetry BMI (Body Mass Index)          64 28 96% 23.32 kg/m2         Blood Pressure from Last 3 Encounters:   07/19/18 160/60   07/16/18 139/61   07/11/18 150/58    Weight from Last 3 Encounters:   07/19/18 57.8 kg (127 lb 8 oz)   07/16/18 58.1 kg (128 lb)   07/10/18 58.8 kg (129 lb 9.6 oz)                 Today's Medication Changes          These changes are accurate as of 7/19/18  4:52 PM.  If you have any questions, ask your nurse or doctor.               These medicines have changed or have updated prescriptions.        Dose/Directions    * atorvastatin 40 MG tablet   Commonly known as:  LIPITOR   This  may have changed:  Another medication with the same name was added. Make sure you understand how and when to take each.   Used for:  Hyperlipidemia LDL goal <100   Changed by:  Torri Fisher MD        Due for appt in March, one tab daily   Quantity:  90 tablet   Refills:  3       * atorvastatin 40 MG tablet   Commonly known as:  LIPITOR   This may have changed:  You were already taking a medication with the same name, and this prescription was added. Make sure you understand how and when to take each.   Used for:  Hyperlipidemia LDL goal <100   Changed by:  Torri Fisher MD        TAKE ONE TABLET BY MOUTH EVERY DAY   Quantity:  90 tablet   Refills:  0       * furosemide 20 MG tablet   Commonly known as:  LASIX   This may have changed:  Another medication with the same name was added. Make sure you understand how and when to take each.   Used for:  Essential hypertension with goal blood pressure less than 140/90   Changed by:  Torri Fisher MD        Dose:  20 mg   Take 1 tablet (20 mg) by mouth daily   Quantity:  90 tablet   Refills:  3       * furosemide 20 MG tablet   Commonly known as:  LASIX   This may have changed:  You were already taking a medication with the same name, and this prescription was added. Make sure you understand how and when to take each.   Used for:  Essential hypertension with goal blood pressure less than 140/90   Changed by:  Torri Fisher MD        TAKE ONE TABLET BY MOUTH EVERY DAY   Quantity:  90 tablet   Refills:  3       * Notice:  This list has 4 medication(s) that are the same as other medications prescribed for you. Read the directions carefully, and ask your doctor or other care provider to review them with you.         Where to get your medicines      These medications were sent to [place], [place] - [address]  [address] [facility] [address]     Phone:  [phone]     atorvastatin 40 MG tablet    furosemide 20 MG tablet                 Primary Care Provider Office Phone # Fax #    Torri Fisher [phone] [address]  [facility] [address]        Equal Access to Services     GONZALO KAY : Hadii aad ku hadtabithachuckie Rayna, waaxda luqadaha, qaybta kaalmada laurada, nicolas lemos laMichelleronald cintron. So [facility] [phone].    ATENCIÓN: Si habla español, tiene a nagel disposición servicios gratuitos de asistencia lingüística. Llame al [phone].    We comply with applicable federal civil rights laws and [place] laws. We do not discriminate on the basis of race, color, national origin, age, disability, sex, sexual orientation, or gender identity.            Thank you!     Thank you for choosing [facility]  for your care. Our goal is always to provide you with excellent care. Hearing back from our patients is one way we can continue to improve our services. Please take a few minutes to complete the written survey that you may receive in the mail after your visit with us. Thank you!             Your Updated Medication List - Protect others around you: Learn how to safely use, store and throw away your medicines at www.disposemymeds.org.          This list is accurate as of 7/19/18  4:52 PM.  Always use your most recent med list.                   Brand Name Dispense Instructions for use Diagnosis    amLODIPine 10 MG tablet    NORVASC    90 tablet    Take 1 tablet (10 mg) by mouth daily    Essential hypertension with goal blood pressure less than 140/90       aspirin 81 MG chewable tablet     60 tablet    Take 1 tablet (81 mg) by mouth daily    ASCVD (arteriosclerotic cardiovascular disease)       * atorvastatin 40 MG tablet    LIPITOR    90 tablet    Due for appt in March, one tab daily    Hyperlipidemia LDL goal <100       * atorvastatin 40 MG tablet    LIPITOR    90 tablet    TAKE ONE TABLET BY MOUTH EVERY DAY    Hyperlipidemia LDL goal <100       BETA BLOCKER NOT  PRESCRIBED (INTENTIONAL)      Beta Blocker not prescribed intentionally due to COPD, shortness of breath with atenolol and lopressor        CALCIUM CITRATE + PO      Take 1 tablet by mouth daily        CLASSIC NETI POT SINUS WASH 2300-700 MG Kit   Generic drug:  sodium chloride-sodium bicarb      Mix 1 packet in Neti Pot and administer in each nostril daily as needed        clopidogrel 75 MG tablet    PLAVIX    90 tablet    Take 1 tablet (75 mg) by mouth daily    Acute ischemic stroke (H)       COLACE 100 MG capsule   Generic drug:  docusate sodium      Take 1 capsule by mouth 2 times daily        escitalopram 10 MG tablet    LEXAPRO    90 tablet    Take 1 tablet (10 mg) by mouth daily Needs appointment before refills.    Anxiety       fluticasone 50 MCG/ACT spray    FLONASE    16 g    USE TWO SPRAYS IN EACH NOSTRIL EVERY OTHER DAY    Seasonal allergic rhinitis       * furosemide 20 MG tablet    LASIX    90 tablet    Take 1 tablet (20 mg) by mouth daily    Essential hypertension with goal blood pressure less than 140/90       * furosemide 20 MG tablet    LASIX    90 tablet    TAKE ONE TABLET BY MOUTH EVERY DAY    Essential hypertension with goal blood pressure less than 140/90       hydrALAZINE 50 MG tablet    APRESOLINE    90 tablet    Take 1 tablet (50 mg) by mouth 2 times daily Takes an additional 50 mg in the afternoon if systolic blood pressure is higher than 160    Hypertension goal BP (blood pressure) < 140/90       losartan 100 MG tablet    COZAAR    90 tablet    Take 1 tablet (100 mg) by mouth daily    Essential hypertension with goal blood pressure less than 140/90       MULTIVITAMIN PO      Take 1 tablet by mouth daily        OCEAN NASAL SPRAY NA      Administer 1 spray in each nostril up to two times daily as needed        order for DME     1 Units    Equipment being ordered: Walker Wheels (), Treatment Diagnosis: Acute ischemic stroke    Acute ischemic stroke (H)       predniSONE 1 MG tablet     DELTASONE     Take 2 mg by mouth daily        TRAMADOL HCL PO      Take 50 mg by mouth every 6 hours as needed for moderate to severe pain        VENTOLIN  (90 Base) MCG/ACT Inhaler   Generic drug:  albuterol     18 g    INHALE 2 PUFFS INTO THE LUNGS EVERY 6 HOURS AS NEEDED    Chronic obstructive pulmonary disease, unspecified COPD type (H)       VITAMIN D3 PO      Take 1,000 Units by mouth daily        * Notice:  This list has 4 medication(s) that are the same as other medications prescribed for you. Read the directions carefully, and ask your doctor or other care provider to review them with you.

## 2018-07-19 NOTE — PATIENT INSTRUCTIONS
"You were seen today in the Cardiovascular Clinic at [facility].     Cardiology Providers you saw during your visit: Dr. Claudia Lowe    Diagnosis:  Coronary artery disease, stroke    Results: discussed with patient      Orders:   none    Medication Changes:   none    Recommendations:   As above    Follow-up:  1 year       Please feel free to call me with any questions or concerns.        Questions and [phone].   First press #1 for the [employer] and then press #3 for \"Medical Questions\" to reach us Cardiology Nurses.      On Call Cardiologist for after hours or on weekends: [phone]   option #4 and ask to speak to the on-call Cardiologist.          If you need a medication refill please contact your pharmacy.  Please allow 3 business days for your refill to be completed.    "

## 2018-07-19 NOTE — LETTER
7/19/2018      RE: Jennifer Bob  [address]   [address]  [facility] [address]       Dear Colleague,    Thank you for the opportunity to participate in the care of your patient, Jennifer Bob, at the [facility]. Please see a copy of my visit note below.    I am delighted to see Jennifer Bob in consultation for coronary artery disease.    History of Present Illness:  As you know, the patient is a 86 year old  Female with a h/o CAD s/p CABG, stroke in 2013 with mild residual right hand weakness. She was seeing Dr. Adrian annually, last time 7/2017. She was hospitalized 7/7/18 with another embolic stroke and received tPA, minimal residual deficits. A ziopatch monitor was placed prior to discharge, and plavix 3 months was added to her regimen by neurology.    She is with her daughter today. She lives alone and is very independent, can do all her own household chores without limitations. Since hospital discharge she's been more tired than usual, but denies chest pressure, dyspnea, dizziness, palpitations, syncope.    The following portions of the patient's history were reviewed and updated as appropriate: allergies, current medications, past family history, past medical history, past social history, past surgical history, and the problem list.    Past Medical History:  1. CAD. CABG 1992;  MI with VF arrest, s/p PCI SVG to RCA graft 8/29/09  ([place])  2. Hypertension ~ home  - 140/150 , occasionally 160-180mmHg  3. Hyperlipidemia  4. COPD. PFT 2014 showed reduced FEV1.  5. Hernia repair  6. Cholecystectomy  7. Ischemic stroke 2013; recent hospital admission 7/7/18 with another stroke, received tPA, residual right weakness    Medications:   Amlodipine 10 every day  Aspirin 81 every day  Plavix 75 qd  Atorvastatin 40 every day  Calcium  Vitamin D  Lexapro 10 every day  Lasix 20 every  "day  Hydralazine 50 bid  Losartan 100 every day  Prednisone 2 every day  Tramadol       Allergies:    Allergies   Allergen Reactions     Adhesive Tape      blisters     Atenolol      SOB     Lopressor Hct      SOB         Family History: no CAD    Psychosocial history:  reports that she has quit smoking. She has never used smokeless tobacco. She reports that she does not drink alcohol or use illicit drugs.    Review of systems:   Cardiovascular: No palpitations, chest pain, shortness of breath at rest, dyspnea with exertion, orthopnea, paroxysmal nocturia dyspnea, nocturia, dizziness, syncope.    In addition,   Constitutional: No change in weight, sleep or appetite.  Normal energy.  No fever or chills  Eyes: Negative for vision changes or eye problems  ENT: No problems with ears, nose or throat.  No difficulty swallowing.  Resp: No coughing, wheezing or shortness of breath  GI: No nausea, vomiting,  heartburn, abdominal pain, diarrhea, constipation or change in bowel habits  : No urinary frequency or dysuria, bladder or kidney problems  Musculoskeletal: No significant muscle or joint pains  Neurologic: No headaches, numbness, tingling, weakness, problems with balance or coordination  Psychiatric: No problems with anxiety, depression or mental health  Heme/immune/allergy: No history of bleeding or clotting problems or anemia.  No allergies or immune system problems  Integumentary: No rashes,worrisome lesions or skin problems      Physical examination  Vitals: 160/60,HR 64 bpm  BMI= 5'3\", 58 kg, BMI 23    Constitutional: In general, the patient is a pleasant female in no apparent distress.    Eyes: PERRLA.  EOMI.  Sclerae white, not injected.  ENT/mouth: Normiocephalic and atraumatic.  Nares clear.  Pharynx without erythema or exudate.  Dentition intact.  No adenopathy.  No thyromegaly. Carotids +2/2 bilaterally with bruits.  No jugular venous distension.   Card/Vasc: The PMI is in the 5th ICS in the midclavicular " line. There is no heave. Regular rate and rhythm. Normal S1, S2. No murmur, rub, click, or gallop. Pulses are normal bilaterally throughout. No peripheral edema.  Respiratory: Clear to asculation.  No ronchi, wheezes, rales.  No dullness to percussion.   GI: Abdomen is soft, nontender, nondistended. No organomegaly. No AAA.  No bruits.   Integument: No significant bruises or rashes  Neurological: The neurological examination reveal a patient who was oriented to person, place, and time.    Psych: Normal  Heme/Lymph/Immun: no significant adenopathy    I have reviewed the following labs/imaging:  Echo 7/7/18: EF 45-50%, basal inferior/inferolateral AK, normal RV. PASp 48mmHg. IVC normal. No effusion. EREN 58.7 ml/m2  Echo 2/9/17: EF 40-45%, concentric LVH, inferior AK, normal RV. LAE.  Nuclear stress test 5/30/14: inferolateral HK with ischemia  Labs 7/8/18: cholesterol 134, HDL 69, LDL 57, TG 40, K 4.0, cr 0.90, hgb 11, plt 124k  CT angio head/neck 7/7/18: PHAN 40%, LIC 60%    I have personally and independently reviewed the following:  EKG 7/8/18: sinus 60 bpm, normal NM, RBBB    Assessment :  1. CAD/CABG/Vein graft PCI. She has no cardiovascular symptoms. She had only been on aspirin a day, plavix just added for 3 months only post recent stroke. Can consider continuing plavix indefinitely. EF stable. No CHF signs or symptoms.She has not been on beta blockers due to COPD.  2. CVA, embolic, ? Source. Ziopatch monitor pre hospital discharge, she has another week. I explained to her and her daughter that the goal of the monitor is to look for atrial fibrillation as potential cause, and if found, anticoagulation with warfarin or DOAC would be recommended.  3. Hypertension. She states that when her SBP is ~ 120mmHg she gets very sleepy. Her BP usually is ~ 140-150. For now after stroke, will not aggressively lower BP.  4. COPD. Lungs clear today. On prednisone and inhalers.      Plan:  1. Continue aspirin and plavix  2.  Continue all current medications  3. I will review Ziopatch monitor after completion    The patient is to return 1 year. The patient understood the treatment plan as outlined above.  There were no barriers to learning.      Claudia Lowe MD     Please do not hesitate to contact me if you have any questions/concerns.     Sincerely,     Claudia Lowe MD

## 2018-07-19 NOTE — TELEPHONE ENCOUNTER
"Requested Prescriptions   Pending Prescriptions Disp Refills     atorvastatin (LIPITOR) 40 MG tablet [Pharmacy Med Name: ATORVASTATIN CALCIUM 40MG TABS]  Last Written Prescription Date:  4/30/18  Last Fill Quantity: 90 TABLET,  # refills: 3   Last office visit: 7/16/2018 with prescribing provider:  HOUSE   Future Office Visit:     90 tablet 0     Sig: TAKE ONE TABLET BY MOUTH EVERY DAY    Statins Protocol Passed    7/19/2018  2:49 PM       Passed - LDL on file in past 12 months    Recent Labs   Lab Test  07/08/18   0323   LDL  57            Passed - No abnormal creatine kinase in past 12 months    No lab results found.            Passed - Recent (12 mo) or future (30 days) visit within the authorizing provider's specialty    Patient had office visit in the last 12 months or has a visit in the next 30 days with authorizing provider or within the authorizing provider's specialty.  See \"Patient Info\" tab in inbasket, or \"Choose Columns\" in Meds & Orders section of the refill encounter.           Passed - Patient is age 18 or older       Passed - No active pregnancy on record       Passed - No positive pregnancy test in past 12 months        furosemide (LASIX) 20 MG tablet [Pharmacy Med Name: FUROSEMIDE 20MG TABS]  Last Written Prescription Date:  4/30/18  Last Fill Quantity: 90 TABLET,  # refills: 3   Last office visit: 7/16/2018 with prescribing provider:  HOUSE   Future Office Visit:     90 tablet 3     Sig: TAKE ONE TABLET BY MOUTH EVERY DAY    Diuretics (Including Combos) Protocol Passed    7/19/2018  2:49 PM       Passed - Blood pressure under 140/90 in past 12 months    BP Readings from Last 3 Encounters:   07/16/18 139/61   07/11/18 150/58   04/30/18 178/60                Passed - Recent (12 mo) or future (30 days) visit within the authorizing provider's specialty    Patient had office visit in the last 12 months or has a visit in the next 30 days with authorizing provider or within the authorizing provider's " "specialty.  See \"Patient Info\" tab in inbasket, or \"Choose Columns\" in Meds & Orders section of the refill encounter.           Passed - Patient is age 18 or older       Passed - No active pregancy on record       Passed - Normal serum creatinine on file in past 12 months    Recent Labs   Lab Test  07/08/18   0323   CR  0.90             Passed - Normal serum potassium on file in past 12 months    Recent Labs   Lab Test  07/08/18   0323   POTASSIUM  4.0                   Passed - Normal serum sodium on file in past 12 months    Recent Labs   Lab Test  07/08/18   0323   NA  142             Passed - No positive pregnancy test in past 12 months          "

## 2018-07-19 NOTE — PROGRESS NOTES
I am delighted to see Jennifer Godoyjere in consultation for coronary artery disease.    History of Present Illness:  As you know, the patient is a 86 year old  Female with a h/o CAD s/p CABG, stroke in 2013 with mild residual right hand weakness. She was seeing Dr. Adrian annually, last time 7/2017. She was hospitalized 7/7/18 with another embolic stroke and received tPA, minimal residual deficits. A ziopatch monitor was placed prior to discharge, and plavix 3 months was added to her regimen by neurology.    She is with her daughter today. She lives alone and is very independent, can do all her own household chores without limitations. Since hospital discharge she's been more tired than usual, but denies chest pressure, dyspnea, dizziness, palpitations, syncope.    The following portions of the patient's history were reviewed and updated as appropriate: allergies, current medications, past family history, past medical history, past social history, past surgical history, and the problem list.    Past Medical History:  1. CAD. CABG 1992;  MI with VF arrest, s/p PCI SVG to RCA graft 8/29/09  ([place])  2. Hypertension ~ home  - 140/150 , occasionally 160-180mmHg  3. Hyperlipidemia  4. COPD. PFT 2014 showed reduced FEV1.  5. Hernia repair  6. Cholecystectomy  7. Ischemic stroke 2013; recent hospital admission 7/7/18 with another stroke, received tPA, residual right weakness    Medications:   Amlodipine 10 every day  Aspirin 81 every day  Plavix 75 qd  Atorvastatin 40 every day  Calcium  Vitamin D  Lexapro 10 every day  Lasix 20 every day  Hydralazine 50 bid  Losartan 100 every day  Prednisone 2 every day  Tramadol       Allergies:    Allergies   Allergen Reactions     Adhesive Tape      blisters     Atenolol      SOB     Lopressor Hct      SOB         Family History: no CAD    Psychosocial history:  reports that she has quit smoking. She has never used smokeless tobacco. She reports that she does not drink  "alcohol or use illicit drugs.    Review of systems:   Cardiovascular: No palpitations, chest pain, shortness of breath at rest, dyspnea with exertion, orthopnea, paroxysmal nocturia dyspnea, nocturia, dizziness, syncope.    In addition,   Constitutional: No change in weight, sleep or appetite.  Normal energy.  No fever or chills  Eyes: Negative for vision changes or eye problems  ENT: No problems with ears, nose or throat.  No difficulty swallowing.  Resp: No coughing, wheezing or shortness of breath  GI: No nausea, vomiting,  heartburn, abdominal pain, diarrhea, constipation or change in bowel habits  : No urinary frequency or dysuria, bladder or kidney problems  Musculoskeletal: No significant muscle or joint pains  Neurologic: No headaches, numbness, tingling, weakness, problems with balance or coordination  Psychiatric: No problems with anxiety, depression or mental health  Heme/immune/allergy: No history of bleeding or clotting problems or anemia.  No allergies or immune system problems  Integumentary: No rashes,worrisome lesions or skin problems      Physical examination  Vitals: 160/60,HR 64 bpm  BMI= 5'3\", 58 kg, BMI 23    Constitutional: In general, the patient is a pleasant female in no apparent distress.    Eyes: PERRLA.  EOMI.  Sclerae white, not injected.  ENT/mouth: Normiocephalic and atraumatic.  Nares clear.  Pharynx without erythema or exudate.  Dentition intact.  No adenopathy.  No thyromegaly. Carotids +2/2 bilaterally with bruits.  No jugular venous distension.   Card/Vasc: The PMI is in the 5th ICS in the midclavicular line. There is no heave. Regular rate and rhythm. Normal S1, S2. No murmur, rub, click, or gallop. Pulses are normal bilaterally throughout. No peripheral edema.  Respiratory: Clear to asculation.  No ronchi, wheezes, rales.  No dullness to percussion.   GI: Abdomen is soft, nontender, nondistended. No organomegaly. No AAA.  No bruits.   Integument: No significant bruises or " rashes  Neurological: The neurological examination reveal a patient who was oriented to person, place, and time.    Psych: Normal  Heme/Lymph/Immun: no significant adenopathy    I have reviewed the following labs/imaging:  Echo 7/7/18: EF 45-50%, basal inferior/inferolateral AK, normal RV. PASp 48mmHg. IVC normal. No effusion. EREN 58.7 ml/m2  Echo 2/9/17: EF 40-45%, concentric LVH, inferior AK, normal RV. LAE.  Nuclear stress test 5/30/14: inferolateral HK with ischemia  Labs 7/8/18: cholesterol 134, HDL 69, LDL 57, TG 40, K 4.0, cr 0.90, hgb 11, plt 124k  CT angio head/neck 7/7/18: PHAN 40%, LIC 60%    I have personally and independently reviewed the following:  EKG 7/8/18: sinus 60 bpm, normal MN, RBBB    Assessment :  1. CAD/CABG/Vein graft PCI. She has no cardiovascular symptoms. She had only been on aspirin a day, plavix just added for 3 months only post recent stroke. Can consider continuing plavix indefinitely. EF stable. No CHF signs or symptoms.She has not been on beta blockers due to COPD.  2. CVA, embolic, ? Source. Ziopatch monitor pre hospital discharge, she has another week. I explained to her and her daughter that the goal of the monitor is to look for atrial fibrillation as potential cause, and if found, anticoagulation with warfarin or DOAC would be recommended.  3. Hypertension. She states that when her SBP is ~ 120mmHg she gets very sleepy. Her BP usually is ~ 140-150. For now after stroke, will not aggressively lower BP.  4. COPD. Lungs clear today. On prednisone and inhalers.      Plan:  1. Continue aspirin and plavix  2. Continue all current medications  3. I will review Ziopatch monitor after completion    The patient is to return 1 year. The patient understood the treatment plan as outlined above.  There were no barriers to learning.      Claudia Lwoe MD     Asked to comment on cardiovascular status prior to endovascular intervention of left SFA occlusion. I saw patient last month. No symptoms.  EF 45-50%, stable. Ziopatch showed sinus, no AFib. She is at moderate risk for cardiovascular events for low risk procedure. OK to proceed without additional cardiac tests.  Claudia Lowe MD  8/30/18

## 2018-07-19 NOTE — PROGRESS NOTES
Stroke Center Discharge Coordination Note     Responsible Attending physician: Dr. Garg      Operation performed: None     Date of Discharge: 7/11/18     Discharge Diagnosis: Ischemic stroke due to arterial atherosclerosis, however, cardio-embolic cause can't be excluded given the presence of enlarged left atrium and akinesia of the inferior wall, for which she will undergo continuous cardiac monitoring     Brief Hospital Course: Patient presented with presented to the ED with acute onset nausea/vomiting, double vision, and difficulty maintaining posture. Stroke code was activated and tPA was given. After the TPA her symptoms markedly improved with no residual nausea or vomiting or visual changes or postural abnormalities. She only has residual slowing finger tapping test and disdiadochokinesia. Her oxygen saturation dropped to 89% one time for which she got overnight pulse oxymetry that showed desaturation during sleep down to 81%.    Discharge to: Home    Current Status:                  New s/s stroke; Trouble w/speech,thinking,processing:  No                               Incision site:  No                               Diabetic:  No                               Pain Management:  No    Receiving Therapy;Where:  PT/OT; [place]    Falls:  No    Driving;Working:  No; No                 ADL's:  Patient states she needs some help with bathing and some cooking. Her daughter is staying with her and helping her with this. Able to dress herself and made breakfast this morning.                                General: Patient states she is feeling pretty good.    What new medications did you start in the hospital and how are you taking those? (compare this to AVS to confirm patient taking correctly) Started plavix - Plavix 75 mg for 3 months then Asprin alone after 3 months. Patient is aware of this and will discontinue ad advised. All other medications as PTA. Taking all medications as prescribed.     Follow up  plan:  - Continuous cardiac monitoring (Zio Patch)  - Follow up with Internal medicine physician within few days of discharge to address the cause of desaturation, do pulmonary function test and repeat D Dimer and for blood pressure control  - Sleep study after being discharged to assess for sleep apnea  PT/OT referral    Patient is aware of the plan. Patient states she is to follow-up with pulmonology as well. She will call today and schedule with [facility] [phone]. She has seen her primary care provider twice since discharge. Appointment today with cardiology, which she is aware of and plans to attend. Message sent to determine stroke follow-up. Will have scheduling reach out when this has been determined. Patient aware and in agreement. Patient has no questions at this time. Contact information provided and encouraged to call with questions/concerns.    Addendum: patient may follow-up with general neurology. Message sent to scheduling to contact patient to make appointment.       Connect to [place] [facility] (Free service that connects stroke survivors with resources)? Declined

## 2018-07-20 DIAGNOSIS — I10 HYPERTENSION GOAL BP (BLOOD PRESSURE) < 140/90: ICD-10-CM

## 2018-07-20 NOTE — TELEPHONE ENCOUNTER
"Requested Prescriptions   Pending Prescriptions Disp Refills     hydrALAZINE (APRESOLINE) 50 MG tablet  Last Written Prescription Date:  4/30/2018  Last Fill Quantity: 90 tabs,  # refills: 3   Last office visit: 7/16/2018 with prescribing provider:   House  Future Office Visit:     90 tablet 3     Sig: Take 1 tablet (50 mg) by mouth 2 times daily Takes an additional 50 mg in the afternoon if systolic blood pressure is higher than 160    Vasodilators Failed    7/20/2018  2:55 PM       Failed - Most recent BP less than 140/90 on record    BP Readings from Last 3 Encounters:   07/19/18 160/60   07/16/18 139/61   07/11/18 150/58                Passed - Most recent encounter is not a hospital encounter. Patient has recent (12 mos) or future (1 mos) visit with authorizing provider's specialty    Patient's most recent encounter is NOT a hospital encounter and has had an office visit in the last 12 months or has a visit in the next 30 days with authorizing provider or within the authorizing provider's specialty.      See \"Patient Info\" tab in inbasket, or \"Choose Columns\" in Meds & Orders section of the refill encounter.      If most recent encounter is a hospital encounter AND the patient does NOT have an appointment scheduled with the authorizing provider or authorizing provider's specialty within the next 30 days, forward refill to authorizing provider for medication review.         Passed - Patient is of age 18 years or older       Passed - Patient is not pregnant       Passed - Patient has not had a positive pregnancy test within the past 12 months          "

## 2018-07-25 RX ORDER — HYDRALAZINE HYDROCHLORIDE 50 MG/1
50 TABLET, FILM COATED ORAL 2 TIMES DAILY
Qty: 90 TABLET | Refills: 3 | Status: ON HOLD | OUTPATIENT
Start: 2018-07-25 | End: 2019-01-01

## 2018-07-25 NOTE — TELEPHONE ENCOUNTER
Routing refill request to provider for review/approval because:  BP's elevated. See below. Failed protocol.     Ramya Zayas RN -- [facility] [place] [employer]

## 2018-07-26 ENCOUNTER — OFFICE VISIT (OUTPATIENT)
Dept: OTHER | Facility: CLINIC | Age: 83
End: 2018-07-26
Attending: INTERNAL MEDICINE
Payer: COMMERCIAL

## 2018-07-26 VITALS
BODY MASS INDEX: 23.23 KG/M2 | SYSTOLIC BLOOD PRESSURE: 179 MMHG | HEART RATE: 73 BPM | DIASTOLIC BLOOD PRESSURE: 63 MMHG | OXYGEN SATURATION: 96 % | WEIGHT: 127 LBS

## 2018-07-26 DIAGNOSIS — I73.9 PVD (PERIPHERAL VASCULAR DISEASE) (H): Primary | ICD-10-CM

## 2018-07-26 PROCEDURE — 99204 OFFICE O/P NEW MOD 45 MIN: CPT | Mod: ZP | Performed by: INTERNAL MEDICINE

## 2018-07-26 PROCEDURE — G0463 HOSPITAL OUTPT CLINIC VISIT: HCPCS

## 2018-07-26 NOTE — MR AVS SNAPSHOT
After Visit Summary   7/26/2018    Jennifer Bob    MRN: [id number]           Patient Information     Date Of Birth          12/5/1931        Visit Information        Provider Department      7/26/2018 12:50 PM Jl Jaimes MD [facility] [place]      Today's Diagnoses     PVD (peripheral vascular disease) (H)    -  1       Follow-ups after your visit        Your next 10 appointments already scheduled     Aug 06, 2018 12:30 PM CDT   [facility]OWER [facility]TREM[facility] D[facility] [place] [address] [facility] ([facility])    [address]  [place] [address]   [phone]           Please bring a list of your medicines (including vitamins, minerals and over-the-counter drugs). Also, tell your doctor about any allergies you may have. Wear comfortable clothes and leave your valuables at home.  You do not need to do anything special to prepare for your exam.  Please call the Imaging Department at your exam site with any questions.            Aug 06, 2018  1:30 PM CDT   US MALINDA DOPPLER NO EXERCISE 1-2 LVLS BILAT with [address] [facility] ([facility])    [address]  [place] [address]   [phone]           Please bring a list of your medicines (including vitamins, minerals and over-the-counter drugs). Also, tell your doctor about any allergies you may have. Wear comfortable clothes and leave your valuables at home.  No caffeine or tobacco for 1 hour prior to exam.  Please call the Imaging Department at your exam site with any questions.            Aug 08, 2018 11:20 AM CDT   Return Visit with Jl Jaimes MD   [facility] ([facility])    [address]  [place] [address] [phone]               Future tests that were ordered for you today     Open Future Orders        Priority Expected Expires Ordered    US Lower Extremity Arterial Duplex Bilateral Routine 7/26/2018 7/26/2019 7/26/2018    US MALINDA Doppler No Exercise Routine 7/26/2018 7/26/2019 7/26/2018            Who to contact     If you have questions or need follow up information about today's clinic visit or your schedule please contact [facility] [place] directly at [phone].  Normal or non-critical lab and imaging results will be communicated to you by Crispy D[employer]en Pixelshart, letter or phone within 4 business days after the clinic has received the results. If you do not hear from us within 7 days, please contact the clinic through Highfivet or phone. If you have a critical or abnormal lab result, we will notify you by phone as soon as possible.  Submit refill requests through Apps4All or call your pharmacy and they will forward the refill request to us. Please allow 3 business days for your refill to be completed.          Additional Information About Your Visit        Apps4All Information     Apps4All gives you secure access to your electronic health record. If you see a primary care provider, you can also send messages to your care team and make appointments. If you have questions, please call your primary care clinic.  If you do not have a primary care provider, please call [phone] and they will assist you.        Care EveryWhere ID     This is your Care EveryWhere ID. This could be used by other organizations to access your [facility] medical records  [id number]        Your Vitals Were     Pulse Pulse Oximetry Breastfeeding? BMI (Body Mass Index)          73 96% No 23.23 kg/m2         Blood Pressure from Last 3 Encounters:   07/26/18 179/63   07/19/18 160/60   07/16/18 139/61    Weight from Last 3 Encounters:   07/26/18 127 lb (57.6 kg)   07/19/18 127 lb 8 oz (57.8 kg)   07/16/18 128 lb (58.1 kg)               Primary Care Provider Office  Phone # Fax #    Torri Fisher [phone]       [address]  [facility] [address]        Equal Access to Services     GONZALO KAY : Hadii aad ku [address], wasterlingda luqadaha, qaybta kaalmada becky, nicolas stovermain wittzohra lemos yina cintron. So [facility] [phone].    ATENCIÓN: Si habla español, tiene a nagel disposición servicios gratuitos de asistencia lingüística. Llame al [phone].    We comply with applicable federal civil rights laws and [place] laws. We do not discriminate on the basis of race, color, national origin, age, disability, sex, sexual orientation, or gender identity.            Thank you!     Thank you for choosing [facility] [place]  for your care. Our goal is always to provide you with excellent care. Hearing back from our patients is one way we can continue to improve our services. Please take a few minutes to complete the written survey that you may receive in the mail after your visit with us. Thank you!             Your Updated Medication List - Protect others around you: Learn how to safely use, store and throw away your medicines at www.disposemymeds.org.          This list is accurate as of 7/26/18  1:33 PM.  Always use your most recent med list.                   Brand Name Dispense Instructions for use Diagnosis    amLODIPine 10 MG tablet    NORVASC    90 tablet    Take 1 tablet (10 mg) by mouth daily    Essential hypertension with goal blood pressure less than 140/90       aspirin 81 MG chewable tablet     60 tablet    Take 1 tablet (81 mg) by mouth daily    ASCVD (arteriosclerotic cardiovascular disease)       * atorvastatin 40 MG tablet    LIPITOR    90 tablet    Due for appt in March, one tab daily    Hyperlipidemia LDL goal <100       * atorvastatin 40 MG tablet    LIPITOR    90 tablet    TAKE ONE TABLET BY MOUTH EVERY DAY    Hyperlipidemia LDL goal <100       BETA BLOCKER NOT PRESCRIBED (INTENTIONAL)      Beta Blocker not  prescribed intentionally due to COPD, shortness of breath with atenolol and lopressor        CALCIUM CITRATE + PO      Take 1 tablet by mouth daily        CLASSIC NETI POT SINUS WASH 2300-700 MG Kit   Generic drug:  sodium chloride-sodium bicarb      Mix 1 packet in Neti Pot and administer in each nostril daily as needed        clopidogrel 75 MG tablet    PLAVIX    90 tablet    Take 1 tablet (75 mg) by mouth daily    Acute ischemic stroke (H)       COLACE 100 MG capsule   Generic drug:  docusate sodium      Take 1 capsule by mouth 2 times daily        escitalopram 10 MG tablet    LEXAPRO    90 tablet    Take 1 tablet (10 mg) by mouth daily Needs appointment before refills.    Anxiety       fluticasone 50 MCG/ACT spray    FLONASE    16 g    USE TWO SPRAYS IN EACH NOSTRIL EVERY OTHER DAY    Seasonal allergic rhinitis       * furosemide 20 MG tablet    LASIX    90 tablet    Take 1 tablet (20 mg) by mouth daily    Essential hypertension with goal blood pressure less than 140/90       * furosemide 20 MG tablet    LASIX    90 tablet    TAKE ONE TABLET BY MOUTH EVERY DAY    Essential hypertension with goal blood pressure less than 140/90       hydrALAZINE 50 MG tablet    APRESOLINE    90 tablet    Take 1 tablet (50 mg) by mouth 2 times daily Takes an additional 50 mg in the afternoon if systolic blood pressure is higher than 160    Hypertension goal BP (blood pressure) < 140/90       losartan 100 MG tablet    COZAAR    90 tablet    Take 1 tablet (100 mg) by mouth daily    Essential hypertension with goal blood pressure less than 140/90       MULTIVITAMIN PO      Take 1 tablet by mouth daily        OCEAN NASAL SPRAY NA      Administer 1 spray in each nostril up to two times daily as needed        order for DME     1 Units    Equipment being ordered: Walker Wheels (), Treatment Diagnosis: Acute ischemic stroke    Acute ischemic stroke (H)       predniSONE 1 MG tablet    DELTASONE     Take 2 mg by mouth daily         TRAMADOL HCL PO      Take 50 mg by mouth every 6 hours as needed for moderate to severe pain        VENTOLIN  (90 Base) MCG/ACT Inhaler   Generic drug:  albuterol     18 g    INHALE 2 PUFFS INTO THE LUNGS EVERY 6 HOURS AS NEEDED    Chronic obstructive pulmonary disease, unspecified COPD type (H)       VITAMIN D3 PO      Take 1,000 Units by mouth daily        * Notice:  This list has 4 medication(s) that are the same as other medications prescribed for you. Read the directions carefully, and ask your doctor or other care provider to review them with you.

## 2018-07-26 NOTE — PROGRESS NOTES
Vascular Medicine Consultation     Chief Complaint   Claudication    Date of Admission:  (Not on file)    Jennifer Bob is a 86 year old female who was admitted on (Not on file). I was asked to see the patient for pain left lower extremity with exercise.    Code Status    Full code    Reason for Consult   Reason for consult: I was asked by PCP to evaluate this patient for pain left lower extremity.    Primary Care Physician   Torri Fisher MD      History is obtained from the patient    History of Present Illness   Jennifer Bob is a 86 year old female who presents with pain left lower extremity with exercise, patient complained of pain she described the pain as dull aching pain, involving her left lower extremity below the knee, pain is 10 out of 10 when she walks if she walks for half a block she would get the pain she has to stop for 1-2 minutes and then she can resume walking then she will have the pain again, patient mentioned that the pain has been stable and is been the same with the same walking distance for the past few months and if there is affecting her life style, patient was recently discharged from the hospital with a history of CVA, patient is an ex-smoker stopped smoking in 1992 after she had her CABG, 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   Past Medical History:   Diagnosis Date     Abdominal wall hernia     three hernias  at previous incision sites, allergic to mesh so repair not repeated, wears girdle     Anxiety 1/9/2012     ASCVD (arteriosclerotic cardiovascular disease) 1/9/2012     CKD (chronic kidney disease) stage 3, GFR 30-59 ml/min 1/10/2012     Colon polyp     resected     DDD (degenerative disc disease), lumbar 1/10/2012     Hyperlipidemia LDL goal <100 1/9/2012     Hyperlipidemia LDL goal <70 12/16/2013     Hypertension goal BP (blood pressure) < 140/90 1/9/2012     Incontinence of urine 1/9/2012     Left hip pain 1/9/2012     Low back pain 1/9/2012     Seasonal allergies 1/9/2012     STEMI (ST elevation myocardial infarction) (H) 8-    with VF arrest.     Stented coronary artery 8-     TIA (transient ischaemic attack) 1/10/2012       Past Surgical History   I have reviewed this patient's surgical history and updated it with pertinent information if needed.  Past Surgical History:   Procedure Laterality Date     abdominal abscess      at incisional site after kristine     adominal hernia repair      had mesh placed, then allergic so it was removed and she wears a girdle     CHOLECYSTECTOMY       CORONARY ARTERY BYPASS  1992       Prior to Admission Medications   Cannot display prior to admission medications because the patient has not been admitted in this contact.     Allergies   Allergies   Allergen Reactions     Adhesive Tape      blisters     Atenolol      SOB     Lopressor Hct      SOB       Social History   I have reviewed this patient's social history and updated it with pertinent information if needed. Jennifer Bob  reports that she has quit smoking. She has never used smokeless tobacco. She reports that she does not drink alcohol or use illicit drugs.    Family History   I have reviewed this patient's family history and updated it with pertinent information if needed.   No family history on file.    Review of Systems   The 10 point Review of Systems is negative other than noted in the HPI or  here.     Physical Exam       BP: 179/63 Pulse: 73     SpO2: 96 %      Vital Signs with Ranges  Pulse:  [65-73] 73  BP: (168-179)/(63) 179/63  SpO2:  [96 %] 96 %  127 lbs 0 oz    Constitutional: awake, alert, cooperative, no apparent distress, and appears stated age  Eyes: Lids and lashes normal, pupils equal, round and reactive to light, extra ocular muscles intact, sclera clear, conjunctiva normal  ENT: normocepalic, without obvious abnormality, oropharynx pink and moist  Hematologic / Lymphatic: no lymphadenopathy  Respiratory: No increased work of breathing, good air exchange, clear to auscultation bilaterally, no crackles or wheezing  Cardiovascular: regular rate and rhythm, normal S1 and S2 and no murmur noted  GI: Normal bowel sounds, soft, non-distended, non-tender  Skin: no redness, warmth, or swelling, no rashes and no lesions  Musculoskeletal: There is no redness, warmth, or swelling of the joints.  Full range of motion noted.  Motor strength is 5 out of 5 all extremities bilaterally.  Tone is normal.  Neurologic: Awake, alert, oriented to name, place and time.  Cranial nerves II-XII are grossly intact.  Motor is 5 out of 5 bilaterally.    Neuropsychiatric:  Normal affect, memory, insight.  Pulses: Palpable DP pulses bilateral and is +1 could not feel PT pulses bilaterally  Palpable popliteal pulses bilateral +1  Palpable femoral pulses +1  . No carotid bruits appreciated.     Data   Most Recent 3 CBC's:  Recent Labs   Lab Test  07/09/18   0313  07/08/18   0323  07/07/18   2222   WBC  7.7  8.3  8.5   HGB  11.8  12.7  12.1   MCV  105*  103*  104*   PLT  124*  142*  123*     Most Recent 3 BMP's:  Recent Labs   Lab Test  07/08/18   0323  07/07/18   2222  05/02/18   0936   NA  142  141  143   POTASSIUM  4.0  3.4  3.9   CHLORIDE  107  107  107   CO2  27  26  24   BUN  24  24  19   CR  0.90  0.90  0.88   ANIONGAP  9  8  12   JERILYN  8.4*  8.1*  9.8   GLC  138*  145*  104*     Most Recent 2 LFT's:  Recent Labs    Lab Test  05/02/18   0936  02/09/17   1239   AST  24  63*   ALT  29  56*   ALKPHOS  60  87   BILITOTAL  0.6  0.8     Most Recent Cholesterol Panel:  Recent Labs   Lab Test  07/08/18   0323   CHOL  134   LDL  57   HDL  69   TRIG  40     Most Recent TSH and T4:  Recent Labs   Lab Test  02/09/17   1239   TSH  1.66     Most Recent 6 glucoses:  Recent Labs   Lab Test  07/08/18   0323  07/07/18   2222  05/02/18   0936  05/08/17   1126  04/05/17   0856  02/22/17   0928   GLC  138*  145*  104*  98  90  122*     Most Recent Urinalysis:  Recent Labs   Lab Test  07/11/18   0945   06/19/12   1114   COLOR  Yellow   < >  Yellow   APPEARANCE  Clear   < >  Clear   URINEGLC  Negative   < >  Negative   URINEBILI  Negative   < >  Negative   URINEKETONE  10*   < >  Negative   SG  1.015   < >  1.015   UBLD  Negative   < >  Negative   URINEPH  6.0   < >  7.0   PROTEIN  10*   < >  Negative   UROBILINOGEN   --    --   0.2   NITRITE  Negative   < >  Negative   LEUKEST  Large*   < >  Negative   RBCU  <1   < >   --    WBCU  3   < >   --     < > = values in this interval not displayed.     Most Recent ESR & CRP:  Recent Labs   Lab Test  09/22/15   1240   SED  22   CRP  52.0*         Assessment & Plan   (I73.9) PVD (peripheral vascular disease) (H)  (primary encounter diagnosis)  Comment: Left lower extremity claudication, based on her clinical exam and the history most probably claudication is vascular in nature yet given her age neurological claudication cannot be excluded 100% will do the following  Plan: US Lower Extremity Arterial Duplex Bilateral,         US MALINDA Doppler No Exercise              Summary: We will see the patient once test results are available    Jl Jaimes MD

## 2018-07-26 NOTE — NURSING NOTE
"Jennifer Bob is a 86 year old female who presents for:  Chief Complaint   Patient presents with     Consult     New - referred by Torri Fisher MD for left lower leg pain. Records in EPIC.        Vitals:    Vitals:    07/26/18 1256 07/26/18 1258   BP: 168/63 179/63   BP Location: Right arm Left arm   Patient Position: Chair Chair   Cuff Size: Adult Regular Adult Regular   Pulse: 65 73   SpO2: 96%    Weight: 127 lb (57.6 kg)        BMI:  Estimated body mass index is 23.23 kg/(m^2) as calculated from the following:    Height as of 7/9/18: 5' 2\" (1.575 m).    Weight as of this encounter: 127 lb (57.6 kg).    Pain Score:  Data Unavailable        Ramya Henriquez"

## 2018-07-27 ENCOUNTER — TELEPHONE (OUTPATIENT)
Dept: FAMILY MEDICINE | Facility: CLINIC | Age: 83
End: 2018-07-27

## 2018-07-27 NOTE — TELEPHONE ENCOUNTER
Reason for Call:  Home Health Care    Nieves with [facility] called regarding (reason for call): calling to let Dr Fisher know that they are discontinuing home health aide for patient    Orders are needed for this patient.     PT:     OT:     Skilled Nursing:     Pt Provider: Dr Fisher    Phone Number Homecare Nurse can be reached at: [phone]    Can we leave a detailed message on this number? YES    Phone number patient can be reached at:     Best Time:     Call taken on 7/27/2018 at 1:43 PM by Jasmine Isbell

## 2018-08-06 ENCOUNTER — HOSPITAL ENCOUNTER (OUTPATIENT)
Dept: ULTRASOUND IMAGING | Facility: CLINIC | Age: 83
Discharge: HOME | End: 2018-08-06
Attending: INTERNAL MEDICINE | Admitting: INTERNAL MEDICINE
Payer: COMMERCIAL

## 2018-08-06 ENCOUNTER — HOSPITAL ENCOUNTER (OUTPATIENT)
Dept: ULTRASOUND IMAGING | Facility: CLINIC | Age: 83
End: 2018-08-06
Attending: INTERNAL MEDICINE
Payer: COMMERCIAL

## 2018-08-06 DIAGNOSIS — I73.9 PVD (PERIPHERAL VASCULAR DISEASE) (H): ICD-10-CM

## 2018-08-06 PROCEDURE — 93925 LOWER EXTREMITY STUDY: CPT

## 2018-08-06 PROCEDURE — 93922 UPR/L XTREMITY ART 2 LEVELS: CPT

## 2018-08-08 ENCOUNTER — OFFICE VISIT (OUTPATIENT)
Dept: OTHER | Facility: CLINIC | Age: 83
End: 2018-08-08
Attending: INTERNAL MEDICINE
Payer: COMMERCIAL

## 2018-08-08 VITALS
OXYGEN SATURATION: 95 % | DIASTOLIC BLOOD PRESSURE: 88 MMHG | WEIGHT: 130.4 LBS | SYSTOLIC BLOOD PRESSURE: 197 MMHG | HEART RATE: 67 BPM | BODY MASS INDEX: 23.85 KG/M2

## 2018-08-08 DIAGNOSIS — I70.212 ATHEROSCLEROSIS OF NATIVE ARTERY OF LEFT LOWER EXTREMITY WITH INTERMITTENT CLAUDICATION (H): Primary | ICD-10-CM

## 2018-08-08 DIAGNOSIS — M79.605 PAIN OF LEFT LOWER EXTREMITY: ICD-10-CM

## 2018-08-08 DIAGNOSIS — I73.9 PVD (PERIPHERAL VASCULAR DISEASE) (H): Primary | ICD-10-CM

## 2018-08-08 PROCEDURE — 99214 OFFICE O/P EST MOD 30 MIN: CPT | Mod: ZP | Performed by: INTERNAL MEDICINE

## 2018-08-08 PROCEDURE — G0463 HOSPITAL OUTPT CLINIC VISIT: HCPCS

## 2018-08-08 NOTE — PROGRESS NOTES
Vascular Medicine Progress Note     Jennifer Bob is a 86 year old female patient is here for follow-up on her MALINDA and Doppler arterial ultrasound    Interval History   Patient still complaining of claudication very prominent on the left lower extremity affecting her daily activities  Results of MALINDA and Doppler ultrasound are shown below    Physical Exam       BP: 197/88 Pulse: 67     SpO2: 95 %      Vitals:    08/08/18 1144   Weight: 130 lb 6.4 oz (59.1 kg)     Vital Signs with Ranges  Pulse:  [67] 67  BP: (197)/(88) 197/88  SpO2:  [95 %] 95 %  [unfilled]    Constitutional: awake, alert, cooperative, no apparent distress, and appears stated age  Eyes: Lids and lashes normal, pupils equal, round and reactive to light, extra ocular muscles intact, sclera clear, conjunctiva normal  ENT: normocepalic, without obvious abnormality, oropharynx pink and moist  Hematologic / Lymphatic: no lymphadenopathy  Respiratory: No increased work of breathing, good air exchange, clear to auscultation bilaterally, no crackles or wheezing  Cardiovascular: regular rate and rhythm, normal S1 and S2 and no murmur noted  GI: Normal bowel sounds, soft, non-distended, non-tender  Skin: no redness, warmth, or swelling, no rashes and no lesions  Musculoskeletal: There is no redness, warmth, or swelling of the joints.  Full range of motion noted.  Motor strength is 5 out of 5 all extremities bilaterally.  Tone is normal.  Neurologic: Awake, alert, oriented to name, place and time.  Cranial nerves II-XII are grossly intact.  Motor is 5 out of 5 bilaterally.    Neuropsychiatric:  Normal affect, memory, insight.  Pulses: Same as previous exam. No carotid bruits appreciated.     Medications         Data   No results found for this or any previous visit (from the past 24 hour(s)).  IMPRESSION:  1. Mild arterial insufficiency in the right lower extremity.  2. At least moderate arterial insufficiency in the left lower  extremity,  possibly severe.    IMPRESSION:  1. Scattered atherosclerotic plaque and diffusely biphasic waveforms  in the right lower extremity, though no visible occlusion or  significant stenosis.  2. 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 Given  extensive plaque throughout the common femoral artery, suspect  significant disease at that location, as well. Common femoral arterial  monophasic waveforms also suggests inflow disease. Artery is patent in  the left lower extremity beyond the mid SFA level.      Assessment & Plan   No diagnosis found.      Summary: We will referred the patient to IR service for endovascular intervention of the left SFA    Jl Jaimes MD

## 2018-08-08 NOTE — NURSING NOTE
"Jennifer Bob is a 86 year old female who presents for:  Chief Complaint   Patient presents with     RECHECK     F/U to ultrasounds         Vitals:    Vitals:    08/08/18 1144   BP: 197/88   BP Location: Right arm   Patient Position: Chair   Cuff Size: Adult Regular   Pulse: 67   SpO2: 95%   Weight: 130 lb 6.4 oz (59.1 kg)       BMI:  Estimated body mass index is 23.85 kg/(m^2) as calculated from the following:    Height as of 7/9/18: 5' 2\" (1.575 m).    Weight as of this encounter: 130 lb 6.4 oz (59.1 kg).    Pain Score:  Data Unavailable        Kiley Angelo MA      "

## 2018-08-08 NOTE — MR AVS SNAPSHOT
After Visit Summary   8/8/2018    Jennifer Bob    MRN: [id number]           Patient Information     Date Of Birth          12/5/1931        Visit Information        Provider Department      8/8/2018 11:20 AM Jl Jaimes MD [facility]      Today's Diagnoses     PVD (peripheral vascular disease) (H)    -  1    Pain of left lower extremity           Follow-ups after your visit        Follow-up notes from your care team     Return in about 4 weeks (around 9/5/2018).      Who to contact     If you have questions or need follow up information about today's clinic visit or your schedule please contact [facility] directly at [phone].  Normal or non-critical lab and imaging results will be communicated to you by MyChart, letter or phone within 4 business days after the clinic has received the results. If you do not hear from us within 7 days, please contact the clinic through [employer] or phone. If you have a critical or abnormal lab result, we will notify you by phone as soon as possible.  Submit refill requests through [email or web address] or call your pharmacy and they will forward the refill request to us. Please allow 3 business days for your refill to be completed.          Additional Information About Your Visit        MyChart Information     [email or web address] gives you secure access to your electronic health record. If you see a primary care provider, you can also send messages to your care team and make appointments. If you have questions, please call your primary care clinic.  If you do not have a primary care provider, please call [phone] and they will assist you.        Care EveryWhere ID     This is your Care EveryWhere ID. This could be used by other organizations to access your [facility] medical records  [id number]        Your Vitals Were     Pulse Pulse Oximetry Breastfeeding? BMI (Body Mass Index)           67 95% No 23.85 kg/m2         Blood Pressure from Last 3 Encounters:   08/08/18 197/88   07/26/18 179/63   07/19/18 160/60    Weight from Last 3 Encounters:   08/08/18 130 lb 6.4 oz (59.1 kg)   07/26/18 127 lb (57.6 kg)   07/19/18 127 lb 8 oz (57.8 kg)              Today, you had the following     No orders found for display       Primary Care Provider Office Phone # Fax #    Torri Fisher [phone]       [address]  [facility] [address]        Equal Access to Services     John Douglas French CenterFELICIA : [address], wasterlingda bobby, qaybta kaalmada becky, nicolas bran . So [facility] [phone].    ATENCIÓN: Si habla español, tiene a nagel disposición servicios gratuitos de asistencia lingüística. LlSt. [facility] [phone].    We comply with applicable federal civil rights laws and [place] laws. We do not discriminate on the basis of race, color, national origin, age, disability, sex, sexual orientation, or gender identity.            Thank you!     Thank you for choosing [facility] [place]  for your care. Our goal is always to provide you with excellent care. Hearing back from our patients is one way we can continue to improve our services. Please take a few minutes to complete the written survey that you may receive in the mail after your visit with us. Thank you!             Your Updated Medication List - Protect others around you: Learn how to safely use, store and throw away your medicines at www.disposemymeds.org.          This list is accurate as of 8/8/18 12:01 PM.  Always use your most recent med list.                   Brand Name Dispense Instructions for use Diagnosis    amLODIPine 10 MG tablet    NORVASC    90 tablet    Take 1 tablet (10 mg) by mouth daily    Essential hypertension with goal blood pressure less than 140/90       aspirin 81 MG chewable tablet     60 tablet    Take 1 tablet (81 mg) by mouth daily    ASCVD  (arteriosclerotic cardiovascular disease)       * atorvastatin 40 MG tablet    LIPITOR    90 tablet    Due for appt in March, one tab daily    Hyperlipidemia LDL goal <100       * atorvastatin 40 MG tablet    LIPITOR    90 tablet    TAKE ONE TABLET BY MOUTH EVERY DAY    Hyperlipidemia LDL goal <100       BETA BLOCKER NOT PRESCRIBED (INTENTIONAL)      Beta Blocker not prescribed intentionally due to COPD, shortness of breath with atenolol and lopressor        CALCIUM CITRATE + PO      Take 1 tablet by mouth daily        CLASSIC NETI POT SINUS WASH 2300-700 MG Kit   Generic drug:  sodium chloride-sodium bicarb      Mix 1 packet in Neti Pot and administer in each nostril daily as needed        clopidogrel 75 MG tablet    PLAVIX    90 tablet    Take 1 tablet (75 mg) by mouth daily    Acute ischemic stroke (H)       COLACE 100 MG capsule   Generic drug:  docusate sodium      Take 1 capsule by mouth 2 times daily        escitalopram 10 MG tablet    LEXAPRO    90 tablet    Take 1 tablet (10 mg) by mouth daily Needs appointment before refills.    Anxiety       fluticasone 50 MCG/ACT spray    FLONASE    16 g    USE TWO SPRAYS IN EACH NOSTRIL EVERY OTHER DAY    Seasonal allergic rhinitis       * furosemide 20 MG tablet    LASIX    90 tablet    Take 1 tablet (20 mg) by mouth daily    Essential hypertension with goal blood pressure less than 140/90       * furosemide 20 MG tablet    LASIX    90 tablet    TAKE ONE TABLET BY MOUTH EVERY DAY    Essential hypertension with goal blood pressure less than 140/90       hydrALAZINE 50 MG tablet    APRESOLINE    90 tablet    Take 1 tablet (50 mg) by mouth 2 times daily Takes an additional 50 mg in the afternoon if systolic blood pressure is higher than 160    Hypertension goal BP (blood pressure) < 140/90       losartan 100 MG tablet    COZAAR    90 tablet    Take 1 tablet (100 mg) by mouth daily    Essential hypertension with goal blood pressure less than 140/90       MULTIVITAMIN PO       Take 1 tablet by mouth daily        OCEAN NASAL SPRAY NA      Administer 1 spray in each nostril up to two times daily as needed        order for DME     1 Units    Equipment being ordered: Walker Wheels (), Treatment Diagnosis: Acute ischemic stroke    Acute ischemic stroke (H)       predniSONE 1 MG tablet    DELTASONE     Take 2 mg by mouth daily        TRAMADOL HCL PO      Take 50 mg by mouth every 6 hours as needed for moderate to severe pain        VENTOLIN  (90 Base) MCG/ACT Inhaler   Generic drug:  albuterol     18 g    INHALE 2 PUFFS INTO THE LUNGS EVERY 6 HOURS AS NEEDED    Chronic obstructive pulmonary disease, unspecified COPD type (H)       VITAMIN D3 PO      Take 1,000 Units by mouth daily        * Notice:  This list has 4 medication(s) that are the same as other medications prescribed for you. Read the directions carefully, and ask your doctor or other care provider to review them with you.

## 2018-08-09 ENCOUNTER — TELEPHONE (OUTPATIENT)
Dept: OTHER | Facility: CLINIC | Age: 83
End: 2018-08-09

## 2018-08-09 NOTE — TELEPHONE ENCOUNTER
Type of surgery: LEFT LOWER EXTREMITY ANGIOGRAM, POSSIBLE INTERVENTION  Location of surgery: [facility]  Date and time of surgery: 9/10/18 AT 8:00  Surgeon: DR CATRACHITO HECK  Pre-Op Appt Date: UNKNOWN  Post-Op Appt Date: UNKNOWN   Packet sent out: MAILED TO PT 8/9/18  Pre-cert/Authorization completed:  SENT FOR PA SUBMISSION  Date: 8/9/18 Diamond [place] -  at [facility]

## 2018-08-14 RX ORDER — LIDOCAINE 40 MG/G
CREAM TOPICAL
Status: CANCELLED | OUTPATIENT
Start: 2018-08-14

## 2018-08-29 ENCOUNTER — OFFICE VISIT (OUTPATIENT)
Dept: FAMILY MEDICINE | Facility: CLINIC | Age: 83
End: 2018-08-29
Payer: COMMERCIAL

## 2018-08-29 VITALS
WEIGHT: 128.25 LBS | TEMPERATURE: 98 F | HEIGHT: 62 IN | HEART RATE: 64 BPM | DIASTOLIC BLOOD PRESSURE: 65 MMHG | OXYGEN SATURATION: 97 % | BODY MASS INDEX: 23.6 KG/M2 | SYSTOLIC BLOOD PRESSURE: 191 MMHG

## 2018-08-29 DIAGNOSIS — I50.32 CHRONIC DIASTOLIC HEART FAILURE (H): ICD-10-CM

## 2018-08-29 DIAGNOSIS — E78.5 HYPERLIPIDEMIA LDL GOAL <100: ICD-10-CM

## 2018-08-29 DIAGNOSIS — N18.30 CKD (CHRONIC KIDNEY DISEASE) STAGE 3, GFR 30-59 ML/MIN (H): ICD-10-CM

## 2018-08-29 DIAGNOSIS — F41.9 ANXIETY: ICD-10-CM

## 2018-08-29 DIAGNOSIS — M48.061 SPINAL STENOSIS, LUMBAR REGION, WITHOUT NEUROGENIC CLAUDICATION: ICD-10-CM

## 2018-08-29 DIAGNOSIS — M35.3 PMR (POLYMYALGIA RHEUMATICA) (H): ICD-10-CM

## 2018-08-29 DIAGNOSIS — Z01.818 PRE-OP EXAM: Primary | ICD-10-CM

## 2018-08-29 DIAGNOSIS — J44.9 CHRONIC OBSTRUCTIVE PULMONARY DISEASE, UNSPECIFIED COPD TYPE (H): ICD-10-CM

## 2018-08-29 DIAGNOSIS — I25.10 ASCVD (ARTERIOSCLEROTIC CARDIOVASCULAR DISEASE): ICD-10-CM

## 2018-08-29 DIAGNOSIS — Z86.73 HISTORY OF TIA (TRANSIENT ISCHEMIC ATTACK) AND STROKE: ICD-10-CM

## 2018-08-29 DIAGNOSIS — I10 ESSENTIAL HYPERTENSION WITH GOAL BLOOD PRESSURE LESS THAN 140/90: ICD-10-CM

## 2018-08-29 DIAGNOSIS — I70.212 ATHEROSCLEROSIS OF NATIVE ARTERIES OF EXTREMITIES WITH INTERMITTENT CLAUDICATION, LEFT LEG (H): ICD-10-CM

## 2018-08-29 LAB — HGB BLD-MCNC: 13.7 G/DL (ref 11.7–15.7)

## 2018-08-29 PROCEDURE — 99215 OFFICE O/P EST HI 40 MIN: CPT | Performed by: FAMILY MEDICINE

## 2018-08-29 PROCEDURE — 80048 BASIC METABOLIC PNL TOTAL CA: CPT | Performed by: FAMILY MEDICINE

## 2018-08-29 PROCEDURE — 36415 COLL VENOUS BLD VENIPUNCTURE: CPT | Performed by: FAMILY MEDICINE

## 2018-08-29 PROCEDURE — 85018 HEMOGLOBIN: CPT | Performed by: FAMILY MEDICINE

## 2018-08-29 RX ORDER — ESCITALOPRAM OXALATE 10 MG/1
10 TABLET ORAL DAILY
Qty: 90 TABLET | Refills: 3 | Status: ON HOLD | OUTPATIENT
Start: 2018-08-29 | End: 2018-09-10

## 2018-08-29 RX ORDER — ATORVASTATIN CALCIUM 40 MG/1
TABLET, FILM COATED ORAL
Qty: 90 TABLET | Refills: 3 | Status: ON HOLD | OUTPATIENT
Start: 2018-08-29 | End: 2019-01-01

## 2018-08-29 RX ORDER — AMLODIPINE BESYLATE 10 MG/1
10 TABLET ORAL DAILY
Qty: 90 TABLET | Refills: 1 | Status: SHIPPED | OUTPATIENT
Start: 2018-08-29 | End: 2019-01-01

## 2018-08-29 RX ORDER — LOSARTAN POTASSIUM 100 MG/1
100 TABLET ORAL DAILY
Qty: 90 TABLET | Refills: 3 | Status: SHIPPED | OUTPATIENT
Start: 2018-08-29 | End: 2019-01-01

## 2018-08-29 NOTE — PATIENT INSTRUCTIONS
Before Your Surgery      Call your surgeon if there is any change in your health. This includes signs of a cold or flu (such as a sore throat, runny nose, cough, rash or fever).    Do not smoke, drink alcohol or take over the counter medicine (unless your surgeon or primary care doctor tells you to) for the 24 hours before and after surgery.    If you take prescribed drugs: Follow your doctor s orders about which medicines to take and which to stop until after surgery.    Eating and drinking prior to surgery: follow the instructions from your surgeon    Take a shower or bath the night before surgery. Use the soap your surgeon gave you to gently clean your skin. If you do not have soap from your surgeon, use your regular soap. Do not shave or scrub the surgery site.  Wear clean pajamas and have clean sheets on your bed.      1) Follow the instructions you were given to continue your aspirin and stop your plavix on Friday 9/7/18, 3 days prior to your procedure.   2) Do not take your losartan on the morning of your procedure.   3) Call your rheumatologist to find out if he has specific instructions for the prednisone around surgery.   4) We will let you know if you need to see Dr. Lowe before surgery.

## 2018-08-29 NOTE — PROGRESS NOTES
[facility][note 3]  [address] [place] [address] [phone]  Dept: [phone]    PRE-OP EVALUATION:  Today's date: 2018    Jennifer Bob (: 1931) presents for pre-operative evaluation assessment as requested by Dr. Raymundo Wilkes.  She requires evaluation and anesthesia risk assessment prior to undergoing surgery/procedure for treatment of Stent in the left thigh .    Fax number for surgical facility: [phone]  Primary Physician: Torri Fisher  Type of Anesthesia Anticipated: to be determined    Patient has a Health Care Directive or Living Will:  YES     Preop Questions 2018   Who is doing your surgery? raymundo Wilkes   What are you having done? stent in left thigh   Date of Surgery/Procedure: 2018   Facility or Hospital where procedure/surgery will be performed: Critical access hospital   1.  Do you have a history of Heart attack, stroke, stent, coronary bypass surgery, or other heart surgery? YES  - all but valve replacement    2.  Do you ever have any pain or discomfort in your chest? No   3.  Do you have a history of  Heart Failure? YES - congestive heart failure    4.   Are you troubled by shortness of breath when:  walking on a level surface, or up a slight hill, or at night? No   5.  Do you currently have a cold, bronchitis or other respiratory infection? No   6.  Do you have a cough, shortness of breath, or wheezing? YES - at times, COPD    7.  Do you sometimes get pains in the calves of your legs when you walk? YES - when she walks yes    8. Do you or anyone in your family have previous history of blood clots? No   9.  Do you or does anyone in your family have a serious bleeding problem such as prolonged bleeding following surgeries or cuts? No   10. Have you ever had problems with anemia or been told to take iron pills? No   11. Have you had any abnormal blood loss such as black, tarry or bloody stools, or abnormal vaginal bleeding? No   12. Have  you ever had a blood transfusion? YES - a long time ago    13. Have you or any of your relatives ever had problems with anesthesia? No   14. Do you have sleep apnea, excessive snoring or daytime drowsiness? YES - daytime drowsiness    15. Do you have any prosthetic heart valves? No   16. Do you have prosthetic joints? No   17. Is there any chance that you may be pregnant? No         HPI:     HPI related to upcoming procedure: Jennifer Bob is a 86 year old female who presents today for preop for endovascular intervention of the left SFA  She has significant claudication in the left lower extremity affecting her daily activities. Her vascular specialist referred her to IR for angiogram with possible endovascular intervention of the left SFA. She is able to walk only 100ft before stopping due to calf pain.    This morning, when she checked her blood pressure it was 151/58 before medication.     See problem list for active medical problems.  Problems all longstanding and stable, except as noted/documented.  See ROS for pertinent symptoms related to these conditions.                                                                                                                                                          .  CAD - She has a h/o CAD s/p CABG, stroke in 2013 with mild residual right hand weakness. She was seeing Dr. Adrian annually, last time 7/2017. She was hospitalized 7/7/18 with another embolic stroke and received tPA, minimal residual deficits. A ziopatch monitor was placed prior to discharge, and plavix 3 months was added to her regimen by neurology.     She was seen by Dr. Lowe on 7/19/19 and was tired post hospitalization, but otherwise stable at that time and had a plan for yearly follow up.                               COPD - Patient has a longstanding history of COPD . Patient has been doing well overall.                                                                                                           .  HYPERLIPIDEMIA - Patient has a long history of significant Hyperlipidemia requiring medication for treatment with recent good control.                                                                                                                              .  HYPERTENSION - Patient has longstanding history of HTN , currently denies any symptoms referable to elevated blood pressure.  Blood pressure readings have been in normal range at home, but clinic readings are frequently elevated. Current medication regimen is as listed below. Patient denies any side effects of medication. She does report occasional home bp readings into the 90s systolic.             She has a history of ischemic stroke in 2013. She was recently hospitalized on 7/7/2018 with another stroke, received TPA, and has residual right weakness.   She was started on Plavix for 3 months post stroke and her cardiologist suggested considering continuation of Plavix indefinitely.  She continues on aspirin as well.                She continues prednisone for PMR and is followed by her rheumatologist.                                                      MEDICAL HISTORY:     Patient Active Problem List    Diagnosis Date Noted     Stroke (H) 10/17/2013     Priority: High     Myocardial infarction 08/29/2009     Priority: High     Per [facility] outside records (see scanned records) ST segment inferior wall MI treated with bare metal stent placement to vein graft to RCA in 8/09 with otherwise good revascularization. The acute episode was complicated by ventricular fibrillation       PMR (polymyalgia rheumatica) (H) 04/30/2018     Priority: Medium     Chronic diastolic heart failure (H) 03/14/2017     Priority: Medium     Heart failure (H) 02/09/2017     Priority: Medium     Chronic pain syndrome 08/22/2016     Priority: Medium     Patient is followed by Data Unavailable for ongoing prescription of pain medication.  All refills should be  approved by this provider, or covering partner.    Medication(s): norco.   Maximum quantity per month: 30  Clinic visit frequency required: Q 3 months     Controlled substance agreement:  Encounter-Level CSA:     There are no encounter-level csa.        Pain Clinic evaluation in the past: No    DIRE Total Score(s):  No flowsheet data found.    Last [facility] website verification:  none   [email or web address]             RA (rheumatoid arthritis) (H) 09/22/2015     Priority: Medium     History of Rheumatoid arthritis diagnosed in 1970s when she had finger pain. The symptoms went away and she never had any medication for this.        Gout 09/22/2015     Priority: Medium     Diagnosed in the 1970s, no episodes since       ACP (advance care planning) 08/31/2015     Priority: Medium     Advance Care Planning 8/31/2015: ACP Review and Resources Provided:  Reviewed chart for advance care plan.  Jennifer Bob has no plan PRIOR DNR/DNI code status on file inactive on 04/25/2014. Provided with information. Confirmed code status reflects current choices pending further ACP discussions. Documented legally designated decision maker(s). NATHANAEL Skelton  FPA Advance Care Planning   [phone]  August 31, 2015               COPD (chronic obstructive pulmonary disease) (H) 10/22/2014     Priority: Medium     Late effects of CVA (cerebrovascular accident) 02/03/2014     Priority: Medium     Hyperlipidemia LDL goal <70 12/16/2013     Priority: Medium     Hypertension 12/04/2013     Priority: Medium     Spinal stenosis, lumbar region, without neurogenic claudication 05/03/2012     Priority: Medium     Synovial cyst of lumbar facet joint 05/03/2012     Priority: Medium     Acquired spondylolisthesis 05/03/2012     Priority: Medium     Lumbar radicular pain 05/03/2012     Priority: Medium     Health Care Home 02/09/2012     Priority: Medium     FPA [employer] for .  Cee Fleming  RN-GLENNN, [facility]  [phone]   DX V65.8 REPLACED WITH [facility] (04/08/2013)       Corns and callosities 01/12/2012     Priority: Medium     DDD (degenerative disc disease), lumbar 01/10/2012     Priority: Medium     Transient cerebral ischemia 01/10/2012     Priority: Medium     According to outside records.   Diagnosis updated by automated process. Provider to review and confirm.       CKD (chronic kidney disease) stage 3, GFR 30-59 ml/min 01/10/2012     Priority: Medium     Anxiety 01/09/2012     Priority: Medium     Max #60 xanax per month  CSA signed 10/30/12       Low back pain 01/09/2012     Priority: Medium     Max #60  vicodin per month  CSA signed 10/30/12         Left hip pain 01/09/2012     Priority: Medium     ASCVD (arteriosclerotic cardiovascular disease) 01/09/2012     Priority: Medium     History of MI in 2009, had one stent placed in. Also had triple bypass surgery in 1992.         Incontinence of urine 01/09/2012     Priority: Medium     Hypertension goal BP (blood pressure) < 140/90 01/09/2012     Priority: Medium     Avoid aggressive treatment. Goal SBP <180       Seasonal allergies 01/09/2012     Priority: Medium      Past Medical History:   Diagnosis Date     Abdominal wall hernia     three hernias at previous incision sites, allergic to mesh so repair not repeated, wears girdle     Anxiety 1/9/2012     ASCVD (arteriosclerotic cardiovascular disease) 1/9/2012     CKD (chronic kidney disease) stage 3, GFR 30-59 ml/min 1/10/2012     Colon polyp     resected     DDD (degenerative disc disease), lumbar 1/10/2012     Hyperlipidemia LDL goal <100 1/9/2012     Hyperlipidemia LDL goal <70 12/16/2013     Hypertension goal BP (blood pressure) < 140/90 1/9/2012     Incontinence of urine 1/9/2012     Left hip pain 1/9/2012     Low back pain 1/9/2012     Seasonal allergies 1/9/2012     STEMI (ST elevation myocardial infarction) (H) 8-    with VF arrest.     Stented coronary artery  8-     TIA (transient ischaemic attack) 1/10/2012     Past Surgical History:   Procedure Laterality Date     abdominal abscess      at incisional site after kristine     adominal hernia repair      had mesh placed, then allergic so it was removed and she wears a girdle     CHOLECYSTECTOMY       CORONARY ARTERY BYPASS  1992     Current Outpatient Prescriptions   Medication Sig Dispense Refill     amLODIPine (NORVASC) 10 MG tablet Take 1 tablet (10 mg) by mouth daily 90 tablet 1     aspirin 81 MG chewable tablet Take 1 tablet (81 mg) by mouth daily 60 tablet 3     atorvastatin (LIPITOR) 40 MG tablet Due for appt in March, one tab daily 90 tablet 3     BETA BLOCKER NOT PRESCRIBED, INTENTIONAL, Beta Blocker not prescribed intentionally due to COPD, shortness of breath with atenolol and lopressor  0     Calcium Citrate-Vitamin D (CALCIUM CITRATE + PO) Take 1 tablet by mouth daily        Cholecalciferol (VITAMIN D3 PO) Take 1,000 Units by mouth daily       clopidogrel (PLAVIX) 75 MG tablet Take 1 tablet (75 mg) by mouth daily 90 tablet 0     docusate sodium (COLACE) 100 MG capsule Take 1 capsule by mouth 2 times daily        escitalopram (LEXAPRO) 10 MG tablet Take 1 tablet (10 mg) by mouth daily Needs appointment before refills. 90 tablet 3     fluticasone (FLONASE) 50 MCG/ACT spray USE TWO SPRAYS IN EACH NOSTRIL EVERY OTHER DAY 16 g 8     furosemide (LASIX) 20 MG tablet TAKE ONE TABLET BY MOUTH EVERY DAY 90 tablet 3     hydrALAZINE (APRESOLINE) 50 MG tablet Take 1 tablet (50 mg) by mouth 2 times daily Takes an additional 50 mg in the afternoon if systolic blood pressure is higher than 160 90 tablet 3     losartan (COZAAR) 100 MG tablet Take 1 tablet (100 mg) by mouth daily 90 tablet 3     Multiple Vitamins-Minerals (MULTIVITAMIN OR) Take 1 tablet by mouth daily        predniSONE (DELTASONE) 1 MG tablet Take 2 mg by mouth daily       Saline (OCEAN NASAL SPRAY NA) Administer 1 spray in each nostril up to two times  "daily as needed       sodium chloride-sodium bicarb (CLASSIC NETI POT SINUS WASH) 2300-700 MG KIT Mix 1 packet in Neti Pot and administer in each nostril daily as needed       TRAMADOL HCL PO Take 50 mg by mouth every 6 hours as needed for moderate to severe pain       VENTOLIN  (90 BASE) MCG/ACT Inhaler INHALE 2 PUFFS INTO THE LUNGS EVERY 6 HOURS AS NEEDED 18 g 9     [DISCONTINUED] amLODIPine (NORVASC) 10 MG tablet Take 1 tablet (10 mg) by mouth daily 90 tablet 1     [DISCONTINUED] atorvastatin (LIPITOR) 40 MG tablet TAKE ONE TABLET BY MOUTH EVERY DAY 90 tablet 0     [DISCONTINUED] atorvastatin (LIPITOR) 40 MG tablet Due for appt in March, one tab daily 90 tablet 3     [DISCONTINUED] escitalopram (LEXAPRO) 10 MG tablet Take 1 tablet (10 mg) by mouth daily Needs appointment before refills. 90 tablet 3     [DISCONTINUED] furosemide (LASIX) 20 MG tablet Take 1 tablet (20 mg) by mouth daily 90 tablet 3     [DISCONTINUED] losartan (COZAAR) 100 MG tablet Take 1 tablet (100 mg) by mouth daily 90 tablet 3     OTC products: Aspirin    Allergies   Allergen Reactions     Adhesive Tape      blisters     Atenolol      SOB     Lopressor Hct      SOB      Latex Allergy: NO    Social History   Substance Use Topics     Smoking status: Former Smoker     Smokeless tobacco: Never Used      Comment: quit smoking in 1983     Alcohol use No     History   Drug Use No       REVIEW OF SYSTEMS:    ROS: 10 point ROS neg other than the symptoms noted above in the HPI.     EXAM:   /65  Pulse 64  Temp 98  F (36.7  C) (Oral)  Ht 5' 2\" (1.575 m)  Wt 128 lb 4 oz (58.2 kg)  SpO2 97%  BMI 23.46 kg/m2    GENERAL APPEARANCE:  alert and no distress     EYES: EOMI, PERRL     HENT: ear canals and TM's normal and nose and mouth without ulcers or lesions     NECK: no adenopathy, no asymmetry, masses, or scars and thyroid normal to palpation     RESP: lungs clear to auscultation - no rales, rhonchi or wheezes     CV: regular rates and " rhythm, normal S1 S2, no S3 or S4 and no murmur, click or rub     ABDOMEN:  soft, nontender, no HSM or masses and bowel sounds normal     MS: extremities normal- no gross deformities noted     SKIN: no suspicious lesions or rashes     NEURO:   mentation intact and speech normal     PSYCH: mentation appears normal. and affect normal/bright     LYMPHATICS: No cervical adenopathy    DIAGNOSTICS:        Recent Labs   Lab Test  07/09/18   0313  07/08/18   0323  07/07/18   2222   04/24/14   2221   HGB  11.8  12.7  12.1   < >  13.4   PLT  124*  142*  123*   < >  136*   INR   --   1.04  1.00   --    --    NA   --   142  141   < >   --    POTASSIUM   --   4.0  3.4   < >   --    CR   --   0.90  0.90   < >   --    A1C   --   5.9*   --    --   5.6    < > = values in this interval not displayed.      Results for orders placed or performed in visit on 08/29/18   Hemoglobin   Result Value Ref Range    Hemoglobin 13.7 11.7 - 15.7 g/dL    ULTRASOUND ANKLE-BRACHIAL INDEX DOPPLER NO EXERCISE, 1-2 LEVELS,  BILATERAL  8/6/2018 1:20 PM     HISTORY:  86-year-old patient with left leg pain while walking.     COMPARISON: None.     FINDINGS: Resting MALINDA in the right lower extremity 0.76 and 0.45 in  the left lower extremity. Distal waveforms in the right lower  extremity are biphasic/triphasic and monophasic in the left lower  extremity.         IMPRESSION:  1. Mild arterial insufficiency in the right lower extremity.  2. At least moderate arterial insufficiency in the left lower  extremity, possibly severe.     CATRACHITO HECK MD    Echocardiogram Complete   Order: [id number]   Status:  Edited Result - FINAL   Visible to patient:  Yes (MyChart) Next appt:  09/10/2018 at 08:00 AM in Radiology. ([facility] INTERVENTIONAL ROOM 1)   Details   Reading Physician Reading Date Result Priority   Carina Young MD 7/10/2018    Narrative         [id number]  [id number]  [id number]  [id number]^ALKUWAIKERON^MOHAMMED^[facility]  [place]  Echocardiography Laboratory  [address] [place] [address]     Name: TRACY BARBER  MRN: [id number]  : 1931  Study Date: 2018 09:59 AM  Age: 86 yrs  Gender: Female  Patient Location: [facility]  Reason For Study: CVA  Ordering Physician: SANCHO PRATT  Performed By: Luis Camacho RDCS     BSA: 1.6 m2  Height: 62 in  Weight: 128 lb  BP: 162/99 mmHg  _____________________________________________________________________________  __        Procedure  Complete Portable Echo Adult.  _____________________________________________________________________________  __        Interpretation Summary  Mildly (EF 45-50%) reduced left ventricular function is present.There is basal  inferior and basal inferolateral wall akinesis.  Global right ventricular function is normal.  Mild aortic valve sclerosis is present  The atrial septum is intact as assessed by color Doppler .  Pulmonary hypertension present. Estimated pulmonary artery systolic pressure  is 48 mmHg. The inferior vena cava was normal in size with preserved  respiratory variability.  No pericardial effusion is present.     This study was compared with the study from 17. Compared to prior study,  LVEF is largely unchanged. PA systolic pressure is measurable and is elevated.  _____________________________________________________________________________  __        Left Ventricle  Global and regional left ventricular function is normal with an EF of 55-60%.  Mild left ventricular dilation is present. There is mild eccentric  hypertrophy. Grade II or moderate diastolic dysfunction. There is basal  inferior and basal inferolateral wall akinesis.     Right Ventricle  The right ventricle is normal size. Global right ventricular function is  normal.     Atria  Severe biatrial enlargement is present. The atrial septum is intact as  assessed by color Doppler .        Mitral Valve  Mitral leaflet thickness is normal.  Mild mitral insufficiency is present.     Aortic Valve  The aortic valve is tricuspid. Mild aortic valve sclerosis is present. Trace  aortic insufficiency is present.     Tricuspid Valve  Mild tricuspid insufficiency is present. Estimated pulmonary artery systolic  pressure is 45 mmHg plus right atrial pressure.     Pulmonic Valve  The pulmonic valve is normal. No PV insufficiency.     Vessels  The aorta root is normal. The inferior vena cava was normal in size with  preserved respiratory variability.     Pericardium  No pericardial effusion is present.        Compared to Previous Study  This study was compared with the study from 17 . Compared to prior study,  LVEF is largely unchanged. Severe LAE remains present.  _____________________________________________________________________________  __     MMode/2D Measurements & Calculations  IVSd: 0.93 cm  LVIDd: 5.4 cm  LVIDs: 4.2 cm  LVPWd: 0.70 cm  FS: 21.6 %  LV mass(C)d: 159.3 grams  LV mass(C)dI: 100.7 grams/m2  Ao root diam: 2.5 cm  asc Aorta Diam: 2.8 cm  LVOT diam: 1.8 cm  LVOT area: 2.5 cm2  LA Volume (BP): 92.7 ml  LA Volume Index (BP): 58.7 ml/m2     RWT: 0.26        Doppler Measurements & Calculations  MV E max arti: 118.3 cm/sec  MV A max arti: 66.3 cm/sec  MV E/A: 1.8  MV dec slope: 870.0 cm/sec2  TR max arti: 322.0 cm/sec  TR max P.6 mmHg  E/E' av.5  Lateral E/e': 29.2  Medial E/e': 17.7     _____________________________________________________________________________  __           Report approved by: Juani Cote 07/10/2018 02:40 PM         Specimen Collected: 18  9:59 AM Last Resulted: 18  9:59 AM Order Details View Encounter Lab and Collection Details Routing Result History                      IMPRESSION:   Reason for surgery/procedure: atherosclerosis of left leg with intermittent claudication  Diagnosis/reason for consult: preop for LLE angiogram with possible intervention    The proposed surgical procedure is considered  INTERMEDIATE risk.    REVISED CARDIAC RISK INDEX  The patient has the following serious cardiovascular risks for perioperative complications such as (MI, PE, VFib and 3  AV Block):  Coronary Artery Disease (MI, positive stress test, angina, Qs on EKG)  Congestive Heart Failure (pulmonary edema, PND, s3 paige, CXR with pulmonary congestion, basilar rales)  Cerebrovascular Disease (TIA or CVA)  INTERPRETATION: 3 risks: Class IV (high risk - >11% complication rate)    The patient has the following additional risks for perioperative complications:  No identified additional risks      ICD-10-CM    1. Pre-op exam Z01.818    2. Claudication (H) I73.9    3. Essential hypertension with goal blood pressure less than 140/90 I10 amLODIPine (NORVASC) 10 MG tablet     BETA BLOCKER NOT PRESCRIBED, INTENTIONAL,     losartan (COZAAR) 100 MG tablet   4. Hyperlipidemia LDL goal <100 E78.5 atorvastatin (LIPITOR) 40 MG tablet   5. Anxiety F41.9 escitalopram (LEXAPRO) 10 MG tablet   6. CKD (chronic kidney disease) stage 3, GFR 30-59 ml/min N18.3    7. History of TIA (transient ischemic attack) and stroke Z86.73    8. ASCVD (arteriosclerotic cardiovascular disease) I25.10 BETA BLOCKER NOT PRESCRIBED, INTENTIONAL,   9. Spinal stenosis, lumbar region, without neurogenic claudication M48.061    10. Chronic obstructive pulmonary disease, unspecified COPD type (H) J44.9    11. Chronic diastolic heart failure (H) I50.32    12. PMR (polymyalgia rheumatica) (H) M35.3        RECOMMENDATIONS:     --Consult hospital rounder / IM to assist post-op medical management    Cardiovascular Risk  Beta blockers contraindicated due to intolerance  She was recently seen by her cardiologist, Dr. Lowe, on 7/19/18 and was stable at that time. I have sent a note to Dr. Lowe asking whether she recommends an additional cardiology follow up for preop evaluation.     Pulmonary Risk  Incentive spirometry post op  Respiratory Therapy (Respiratory Care IP Consult)  post  op  NG tube decompression if abdominal distension or significant vomiting       --Patient is to take all scheduled medications on the day of surgery EXCEPT for modifications listed below.    Anticoagulant or Antiplatelet Medication Use  ASPIRIN: she has been instructed by Dr. Angulo to continue taking her aspirin  PLAVIX: she has been instructed by Dr. Angulo to stop taking her plavix 3 days prior to her procedure, on Friday 9/7/8        Chronic Corticosteroid Use  She is on chronic prednisone therapy at 2mg daily for PMR. Stress dose steroids are not indicated due to low dose of prednisone. Stress dose steroids would be recommended for chronic higher dose steroid use in last 3 months (e.g. >3 weeks of predisone 20 mg or daily prednisone 5 mg)  She will discuss the plan for continuing prednisone with her rheumatologist.     ACE Inhibitor or Angiotensin Receptor Blocker (ARB) Use  Ace inhibitor or Angiotensin Receptor Blocker (ARB) and should HOLD this medication for the 24 hours prior to surgery. She will stop the losartan on 9/9/18 (she will use her prn hydralazine to treat elevated blood pressure on the morning of the procedure)       APPROVAL GIVEN to proceed with proposed procedure, without further diagnostic evaluation       Signed Electronically by: Torri Fisher MD    Copy of this evaluation report is provided to requesting physician.    [facility] Preop Guidelines    Revised Cardiac Risk Index

## 2018-08-29 NOTE — PROGRESS NOTES
Excellent! Please call or sent a [employer] message if you have any questions. Torri Fisher M.D.

## 2018-08-29 NOTE — Clinical Note
Hi Dr. Lowe,  I saw Jennifer for preop today for left lower extremity angiogram with possible intervention and she is doing well. Normally, I would have folks also see their cardiologist for preop clearance. You just saw her last month. Would you want her to return to see you for additional clearance, or would you be willing to make note that she is clear for surgery from a cardiac standpoint? If you would like her to see you, could you send this to our nurse to schedule? Let me know what you think. Thanks, Torri

## 2018-08-29 NOTE — LETTER
September 4, 2018      Jennifer Bob  [address]     [facility] [address]        Dear ,    We are writing to inform you of your test results.    Your lab results are stable. Please let us know if you have any questions.     Resulted Orders   Hemoglobin   Result Value Ref Range    Hemoglobin 13.7 11.7 - 15.7 g/dL   Basic metabolic panel   Result Value Ref Range    Sodium 143 133 - 144 mmol/L    Potassium 4.4 3.4 - 5.3 mmol/L    Chloride 105 94 - 109 mmol/L    Carbon Dioxide 28 20 - 32 mmol/L    Anion Gap 10 3 - 14 mmol/L    Glucose 114 (H) 70 - 99 mg/dL      Comment:      Non Fasting    Urea Nitrogen 24 7 - 30 mg/dL    Creatinine 0.96 0.52 - 1.04 mg/dL    GFR Estimate 55 (L) >60 mL/min/1.7m2      Comment:      Non  GFR Calc    GFR Estimate If Black 67 >60 mL/min/1.7m2      Comment:       GFR Calc    Calcium 8.7 8.5 - 10.1 mg/dL     If you have any questions or concerns, please call the clinic at the number listed above.     Sincerely,    Torri Fisher MD/nr

## 2018-08-29 NOTE — MR AVS SNAPSHOT
After Visit Summary   8/29/2018    Jennifer Bob    MRN: [id number]           Patient Information     Date Of Birth          12/5/1931        Visit Information        Provider Department      8/29/2018 10:00 AM Torri Fisher MD [facility]        Today's Diagnoses     Pre-op exam    -  1    Claudication (H)        Essential hypertension with goal blood pressure less than 140/90        Hyperlipidemia LDL goal <100        Anxiety        CKD (chronic kidney disease) stage 3, GFR 30-59 ml/min        History of TIA (transient ischemic attack) and stroke        ASCVD (arteriosclerotic cardiovascular disease)        Spinal stenosis, lumbar region, without neurogenic claudication        Chronic obstructive pulmonary disease, unspecified COPD type (H)        Chronic diastolic heart failure (H)        PMR (polymyalgia rheumatica) (H)          Care Instructions      Before Your Surgery      Call your surgeon if there is any change in your health. This includes signs of a cold or flu (such as a sore throat, runny nose, cough, rash or fever).    Do not smoke, drink alcohol or take over the counter medicine (unless your surgeon or primary care doctor tells you to) for the 24 hours before and after surgery.    If you take prescribed drugs: Follow your doctor s orders about which medicines to take and which to stop until after surgery.    Eating and drinking prior to surgery: follow the instructions from your surgeon    Take a shower or bath the night before surgery. Use the soap your surgeon gave you to gently clean your skin. If you do not have soap from your surgeon, use your regular soap. Do not shave or scrub the surgery site.  Wear clean pajamas and have clean sheets on your bed.      1) Follow the instructions you were given to continue your aspirin and stop your plavix on Friday 9/7/18, 3 days prior to your procedure.   2) Do not take your losartan on the morning of your procedure.   3)  Call your rheumatologist to find out if he has specific instructions for the prednisone around surgery.   4) We will let you know if you need to see Dr. Lowe before surgery.           Follow-ups after your visit        Your next 10 appointments already scheduled     Sep 10, 2018  8:00 AM CDT   (Arrive by 6:30 AM)   IR LOWER EXTREMITY ANGIOGRAM LEFT with SHIR1   [facility] Interventional Radiology ([facility])    [address] [place] [id number] [phone]           The day before the exam:   You may eat your regular diet.   You are encouraged to drink at least 8 eight ounce glasses of clear liquids.   Drink no alcoholic beverages for 24 hours before or after the exam.  The day of the exam:   Do not eat any solid food or milk products for 6 hours prior to the exam. You may drink clear liquids until 2 hours prior to the exam. Clear liquids include the following: water, Jell-O, clear broth, apple juice or any non-carbonated drink that you can see through (no pop!)   The morning of the exam you may take medications as directed with a sip of water.  You should not have received barium (x-ray contrast) within 48 hours of this exam.  Please wear loose clothing, such as a sweat suit or jogging clothes. Avoid snaps, zippers and other metal. We may ask you to undress and put on a hospital gown.  Please bring any scans or X-rays taken at other hospitals, if similar tests were done. Also bring a list of your medicines, including vitamins, minerals and over-the-counter drugs. It is safest to leave personal items at home.  Someone will need to drive you to and from the hospital.  Tell your doctor:   If you have allergies to x-ray contrast or iodine.   If you are or may be pregnant.   If you are taking Coumadin (or any other blood thinners) 5 days prior to the exam for any special instructions.   If you are diabetic to determine if your insulin needs have to be adjusted for the exam. Your  doctor will:   Need to do a history and physical within 30 days before this procedure.   Determine whether you need a 12 lead EKG   Determine which medications (if any) that should not be taken the morning of the exam.   Obtain necessary laboratory tests prior to the exam (creatinine, Hgb/Hct, platelet count, and INR).  Following the exam:   You will need to remain on complete bedrest for up to 6 hours. The nurse will monitor your vital signs, puncture site, and the pulses and temperature of the arm or leg that was punctured.   If you were given sedation, you cannot drive for 24 hours after the procedure, and an adult must be with you until then.  If you have any questions, please call the Imaging Department where you will have your exam. Directions, parking instructions, and other information are available on our website, [email or web address].              Who to contact     If you have questions or need follow up information about today's clinic visit or your schedule please contact [facility] directly at [phone].  Normal or non-critical lab and imaging results will be communicated to you by [employer], letter or phone within 4 business days after the clinic has received the results. If you do not hear from us within 7 days, please contact the clinic through [employer] or phone. If you have a critical or abnormal lab result, we will notify you by phone as soon as possible.  Submit refill requests through [employer] or call your pharmacy and they will forward the refill request to us. Please allow 3 business days for your refill to be completed.          Additional Information About Your Visit        [employer] Information     [employer] gives you secure access to your electronic health record. If you see a primary care provider, you can also send messages to your care team and make appointments. If you have questions, please call your primary care clinic.  If you do not have a primary care provider, please  "call [phone] and they will assist you.        Care EveryWhere ID     This is your Care EveryWhere ID. This could be used by other organizations to access your [facility] medical records  [id number]        Your Vitals Were     Pulse Temperature Height Pulse Oximetry BMI (Body Mass Index)       64 98  F (36.7  C) (Oral) 5' 2\" (1.575 m) 97% 23.46 kg/m2        Blood Pressure from Last 3 Encounters:   08/29/18 191/65   08/08/18 197/88   07/26/18 179/63    Weight from Last 3 Encounters:   08/29/18 128 lb 4 oz (58.2 kg)   08/08/18 130 lb 6.4 oz (59.1 kg)   07/26/18 127 lb (57.6 kg)              Today, you had the following     No orders found for display         Today's Medication Changes          These changes are accurate as of 8/29/18 10:52 AM.  If you have any questions, ask your nurse or doctor.               These medicines have changed or have updated prescriptions.        Dose/Directions    atorvastatin 40 MG tablet   Commonly known as:  LIPITOR   This may have changed:  Another medication with the same name was removed. Continue taking this medication, and follow the directions you see here.   Used for:  Hyperlipidemia LDL goal <100   Changed by:  Torri Fisher MD        Due for appt in March, one tab daily   Quantity:  90 tablet   Refills:  3       furosemide 20 MG tablet   Commonly known as:  LASIX   This may have changed:  Another medication with the same name was removed. Continue taking this medication, and follow the directions you see here.   Used for:  Essential hypertension with goal blood pressure less than 140/90   Changed by:  Torri Fisher MD        TAKE ONE TABLET BY MOUTH EVERY DAY   Quantity:  90 tablet   Refills:  3            Where to get your medicines      These medications were sent to [facility] [place], [place] - [address]  [address], [facility] [address]     Phone:  [phone]     amLODIPine 10 MG tablet    atorvastatin 40 MG tablet    escitalopram " 10 MG tablet    losartan 100 MG tablet         Some of these will need a paper prescription and others can be bought over the counter.  Ask your nurse if you have questions.     You don't need a prescription for these medications     BETA BLOCKER NOT PRESCRIBED (INTENTIONAL)                Primary Care Provider Office Phone # Fax #    Torri Fisher [phone] [address]  [facility] [address]        Equal Access to Services     GONZALO KAY : Hadii aad ku hadasho Soomaali, waaxda luqadaha, qaybta kaalmada adeegyada, waxay idiin hayaan adeeg kharash la'aan ah. So [facility] [phone].    ATENCIÓN: Si muna hrecules, tiene a nagel disposición servicios gratuitos de asistencia lingüística. Llame al [phone].    We comply with applicable federal civil rights laws and [place] laws. We do not discriminate on the basis of race, color, national origin, age, disability, sex, sexual orientation, or gender identity.            Thank you!     Thank you for choosing [facility]  for your care. Our goal is always to provide you with excellent care. Hearing back from our patients is one way we can continue to improve our services. Please take a few minutes to complete the written survey that you may receive in the mail after your visit with us. Thank you!             Your Updated Medication List - Protect others around you: Learn how to safely use, store and throw away your medicines at www.disposemymeds.org.          This list is accurate as of 8/29/18 10:52 AM.  Always use your most recent med list.                   Brand Name Dispense Instructions for use Diagnosis    amLODIPine 10 MG tablet    NORVASC    90 tablet    Take 1 tablet (10 mg) by mouth daily    Essential hypertension with goal blood pressure less than 140/90       aspirin 81 MG chewable tablet     60 tablet    Take 1 tablet (81 mg) by mouth daily    ASCVD (arteriosclerotic cardiovascular disease)       atorvastatin 40 MG  tablet    LIPITOR    90 tablet    Due for appt in March, one tab daily    Hyperlipidemia LDL goal <100       BETA BLOCKER NOT PRESCRIBED (INTENTIONAL)      Beta Blocker not prescribed intentionally due to COPD, shortness of breath with atenolol and lopressor    Essential hypertension with goal blood pressure less than 140/90, ASCVD (arteriosclerotic cardiovascular disease)       CALCIUM CITRATE + PO      Take 1 tablet by mouth daily        CLASSIC NETI POT SINUS WASH 2300-700 MG Kit   Generic drug:  sodium chloride-sodium bicarb      Mix 1 packet in Neti Pot and administer in each nostril daily as needed        clopidogrel 75 MG tablet    PLAVIX    90 tablet    Take 1 tablet (75 mg) by mouth daily    Acute ischemic stroke (H)       COLACE 100 MG capsule   Generic drug:  docusate sodium      Take 1 capsule by mouth 2 times daily        escitalopram 10 MG tablet    LEXAPRO    90 tablet    Take 1 tablet (10 mg) by mouth daily Needs appointment before refills.    Anxiety       fluticasone 50 MCG/ACT spray    FLONASE    16 g    USE TWO SPRAYS IN EACH NOSTRIL EVERY OTHER DAY    Seasonal allergic rhinitis       furosemide 20 MG tablet    LASIX    90 tablet    TAKE ONE TABLET BY MOUTH EVERY DAY    Essential hypertension with goal blood pressure less than 140/90       hydrALAZINE 50 MG tablet    APRESOLINE    90 tablet    Take 1 tablet (50 mg) by mouth 2 times daily Takes an additional 50 mg in the afternoon if systolic blood pressure is higher than 160    Hypertension goal BP (blood pressure) < 140/90       losartan 100 MG tablet    COZAAR    90 tablet    Take 1 tablet (100 mg) by mouth daily    Essential hypertension with goal blood pressure less than 140/90       MULTIVITAMIN PO      Take 1 tablet by mouth daily        OCEAN NASAL SPRAY NA      Administer 1 spray in each nostril up to two times daily as needed        predniSONE 1 MG tablet    DELTASONE     Take 2 mg by mouth daily        TRAMADOL HCL PO      Take 50 mg by  mouth every 6 hours as needed for moderate to severe pain        VENTOLIN  (90 Base) MCG/ACT inhaler   Generic drug:  albuterol     18 g    INHALE 2 PUFFS INTO THE LUNGS EVERY 6 HOURS AS NEEDED    Chronic obstructive pulmonary disease, unspecified COPD type (H)       VITAMIN D3 PO      Take 1,000 Units by mouth daily

## 2018-08-30 LAB
ANION GAP SERPL CALCULATED.3IONS-SCNC: 10 MMOL/L (ref 3–14)
BUN SERPL-MCNC: 24 MG/DL (ref 7–30)
CALCIUM SERPL-MCNC: 8.7 MG/DL (ref 8.5–10.1)
CHLORIDE SERPL-SCNC: 105 MMOL/L (ref 94–109)
CO2 SERPL-SCNC: 28 MMOL/L (ref 20–32)
CREAT SERPL-MCNC: 0.96 MG/DL (ref 0.52–1.04)
GFR SERPL CREATININE-BSD FRML MDRD: 55 ML/MIN/1.7M2
GLUCOSE SERPL-MCNC: 114 MG/DL (ref 70–99)
POTASSIUM SERPL-SCNC: 4.4 MMOL/L (ref 3.4–5.3)
SODIUM SERPL-SCNC: 143 MMOL/L (ref 133–144)

## 2018-09-10 ENCOUNTER — APPOINTMENT (OUTPATIENT)
Dept: INTERVENTIONAL RADIOLOGY/VASCULAR | Facility: CLINIC | Age: 83
End: 2018-09-10
Attending: INTERNAL MEDICINE
Payer: COMMERCIAL

## 2018-09-10 ENCOUNTER — HOSPITAL ENCOUNTER (OUTPATIENT)
Facility: CLINIC | Age: 83
Discharge: HOME | End: 2018-09-10
Attending: RADIOLOGY | Admitting: RADIOLOGY
Payer: COMMERCIAL

## 2018-09-10 ENCOUNTER — APPOINTMENT (OUTPATIENT)
Dept: ULTRASOUND IMAGING | Facility: CLINIC | Age: 83
End: 2018-09-10
Attending: RADIOLOGY
Payer: COMMERCIAL

## 2018-09-10 VITALS
WEIGHT: 127.6 LBS | BODY MASS INDEX: 23.48 KG/M2 | SYSTOLIC BLOOD PRESSURE: 168 MMHG | TEMPERATURE: 97.7 F | HEART RATE: 79 BPM | RESPIRATION RATE: 16 BRPM | DIASTOLIC BLOOD PRESSURE: 58 MMHG | OXYGEN SATURATION: 91 % | HEIGHT: 62 IN

## 2018-09-10 DIAGNOSIS — I70.212 ATHEROSCLEROSIS OF NATIVE ARTERY OF LEFT LOWER EXTREMITY WITH INTERMITTENT CLAUDICATION (H): ICD-10-CM

## 2018-09-10 LAB
APTT PPP: 28 SEC (ref 22–37)
CHOLEST SERPL-MCNC: 142 MG/DL
CREAT SERPL-MCNC: 0.97 MG/DL (ref 0.52–1.04)
ERYTHROCYTE [DISTWIDTH] IN BLOOD BY AUTOMATED COUNT: 13.6 % (ref 10–15)
GFR SERPL CREATININE-BSD FRML MDRD: 54 ML/MIN/1.7M2
HBA1C MFR BLD: 5.6 % (ref 0–5.6)
HCT VFR BLD AUTO: 41.3 % (ref 35–47)
HDLC SERPL-MCNC: 62 MG/DL
HGB BLD-MCNC: 13.5 G/DL (ref 11.7–15.7)
INR PPP: 0.98 (ref 0.86–1.14)
LDLC SERPL CALC-MCNC: 62 MG/DL
MCH RBC QN AUTO: 32.9 PG (ref 26.5–33)
MCHC RBC AUTO-ENTMCNC: 32.7 G/DL (ref 31.5–36.5)
MCV RBC AUTO: 101 FL (ref 78–100)
NONHDLC SERPL-MCNC: 80 MG/DL
PLATELET # BLD AUTO: 161 10E9/L (ref 150–450)
RBC # BLD AUTO: 4.1 10E12/L (ref 3.8–5.2)
TRIGL SERPL-MCNC: 89 MG/DL
WBC # BLD AUTO: 8.3 10E9/L (ref 4–11)

## 2018-09-10 PROCEDURE — 27210845 ZZH DEVICE INFLATION CR5

## 2018-09-10 PROCEDURE — 25500064 ZZH RX 255 OP 636: Performed by: RADIOLOGY

## 2018-09-10 PROCEDURE — 40000853 ZZH STATISTIC ANGIOGRAM, STENT, VERTEBRO PLASTY

## 2018-09-10 PROCEDURE — 25000128 H RX IP 250 OP 636: Performed by: RADIOLOGY

## 2018-09-10 PROCEDURE — 25000128 H RX IP 250 OP 636

## 2018-09-10 PROCEDURE — 85610 PROTHROMBIN TIME: CPT | Performed by: RADIOLOGY

## 2018-09-10 PROCEDURE — 27210808 ZZH SHEATH CR7

## 2018-09-10 PROCEDURE — C1769 GUIDE WIRE: HCPCS

## 2018-09-10 PROCEDURE — 27210886 ZZH ACCESSORY CR5

## 2018-09-10 PROCEDURE — 27210906 ZZH KIT CR8

## 2018-09-10 PROCEDURE — 85730 THROMBOPLASTIN TIME PARTIAL: CPT | Performed by: RADIOLOGY

## 2018-09-10 PROCEDURE — 37223 IR LOWER EXTREMITY ANGIOGRAM BILATERAL: CPT | Mod: 50

## 2018-09-10 PROCEDURE — 83036 HEMOGLOBIN GLYCOSYLATED A1C: CPT | Performed by: RADIOLOGY

## 2018-09-10 PROCEDURE — 82565 ASSAY OF CREATININE: CPT | Performed by: RADIOLOGY

## 2018-09-10 PROCEDURE — 93880 EXTRACRANIAL BILAT STUDY: CPT

## 2018-09-10 PROCEDURE — 27210804 ZZH SHEATH CR3

## 2018-09-10 PROCEDURE — 85027 COMPLETE CBC AUTOMATED: CPT | Performed by: RADIOLOGY

## 2018-09-10 PROCEDURE — 25000125 ZZHC RX 250

## 2018-09-10 PROCEDURE — 36415 COLL VENOUS BLD VENIPUNCTURE: CPT | Performed by: RADIOLOGY

## 2018-09-10 PROCEDURE — 27210742 ZZH CATH CR1

## 2018-09-10 PROCEDURE — 80061 LIPID PANEL: CPT | Performed by: RADIOLOGY

## 2018-09-10 PROCEDURE — 37224 ZZHC REVASCULARIZE FEM/POP ARTERY, ANGIOPLASTY: CPT

## 2018-09-10 RX ORDER — NALOXONE HYDROCHLORIDE 0.4 MG/ML
.1-.4 INJECTION, SOLUTION INTRAMUSCULAR; INTRAVENOUS; SUBCUTANEOUS
Status: DISCONTINUED | OUTPATIENT
Start: 2018-09-10 | End: 2018-09-10 | Stop reason: HOSPADM

## 2018-09-10 RX ORDER — FLUMAZENIL 0.1 MG/ML
0.2 INJECTION, SOLUTION INTRAVENOUS
Status: DISCONTINUED | OUTPATIENT
Start: 2018-09-10 | End: 2018-09-10 | Stop reason: HOSPADM

## 2018-09-10 RX ORDER — FENTANYL CITRATE 50 UG/ML
25-50 INJECTION, SOLUTION INTRAMUSCULAR; INTRAVENOUS EVERY 5 MIN PRN
Status: DISCONTINUED | OUTPATIENT
Start: 2018-09-10 | End: 2018-09-10 | Stop reason: HOSPADM

## 2018-09-10 RX ORDER — NICOTINE POLACRILEX 4 MG
15-30 LOZENGE BUCCAL
Status: DISCONTINUED | OUTPATIENT
Start: 2018-09-10 | End: 2018-09-10 | Stop reason: HOSPADM

## 2018-09-10 RX ORDER — LIDOCAINE HYDROCHLORIDE 10 MG/ML
1-30 INJECTION, SOLUTION EPIDURAL; INFILTRATION; INTRACAUDAL; PERINEURAL
Status: COMPLETED | OUTPATIENT
Start: 2018-09-10 | End: 2018-09-10

## 2018-09-10 RX ORDER — FENTANYL CITRATE 50 UG/ML
INJECTION, SOLUTION INTRAMUSCULAR; INTRAVENOUS
Status: DISCONTINUED
Start: 2018-09-10 | End: 2018-09-10 | Stop reason: HOSPADM

## 2018-09-10 RX ORDER — SODIUM CHLORIDE 9 MG/ML
INJECTION, SOLUTION INTRAVENOUS CONTINUOUS
Status: DISCONTINUED | OUTPATIENT
Start: 2018-09-10 | End: 2018-09-10 | Stop reason: HOSPADM

## 2018-09-10 RX ORDER — FENTANYL CITRATE 50 UG/ML
INJECTION, SOLUTION INTRAMUSCULAR; INTRAVENOUS
Status: COMPLETED
Start: 2018-09-10 | End: 2018-09-10

## 2018-09-10 RX ORDER — HYDRALAZINE HYDROCHLORIDE 20 MG/ML
10 INJECTION INTRAMUSCULAR; INTRAVENOUS EVERY 10 MIN PRN
Status: DISCONTINUED | OUTPATIENT
Start: 2018-09-10 | End: 2018-09-10 | Stop reason: HOSPADM

## 2018-09-10 RX ORDER — HEPARIN SODIUM 1000 [USP'U]/ML
INJECTION, SOLUTION INTRAVENOUS; SUBCUTANEOUS
Status: COMPLETED
Start: 2018-09-10 | End: 2018-09-10

## 2018-09-10 RX ORDER — DEXTROSE MONOHYDRATE 25 G/50ML
25-50 INJECTION, SOLUTION INTRAVENOUS
Status: DISCONTINUED | OUTPATIENT
Start: 2018-09-10 | End: 2018-09-10 | Stop reason: HOSPADM

## 2018-09-10 RX ORDER — HYDRALAZINE HYDROCHLORIDE 20 MG/ML
INJECTION INTRAMUSCULAR; INTRAVENOUS
Status: COMPLETED
Start: 2018-09-10 | End: 2018-09-10

## 2018-09-10 RX ORDER — LIDOCAINE HYDROCHLORIDE 10 MG/ML
INJECTION, SOLUTION INFILTRATION; PERINEURAL
Status: DISCONTINUED
Start: 2018-09-10 | End: 2018-09-10 | Stop reason: HOSPADM

## 2018-09-10 RX ORDER — IODIXANOL 320 MG/ML
50 INJECTION, SOLUTION INTRAVASCULAR ONCE
Status: COMPLETED | OUTPATIENT
Start: 2018-09-10 | End: 2018-09-10

## 2018-09-10 RX ORDER — ACETAMINOPHEN 500 MG
500 TABLET ORAL EVERY 6 HOURS PRN
Status: DISCONTINUED | OUTPATIENT
Start: 2018-09-10 | End: 2018-09-10 | Stop reason: HOSPADM

## 2018-09-10 RX ORDER — HEPARIN SODIUM 1000 [USP'U]/ML
500-6000 INJECTION, SOLUTION INTRAVENOUS; SUBCUTANEOUS
Status: COMPLETED | OUTPATIENT
Start: 2018-09-10 | End: 2018-09-10

## 2018-09-10 RX ADMIN — HEPARIN SODIUM 2000 UNITS: 1000 INJECTION, SOLUTION INTRAVENOUS; SUBCUTANEOUS at 09:12

## 2018-09-10 RX ADMIN — FENTANYL CITRATE 25 MCG: 50 INJECTION, SOLUTION INTRAMUSCULAR; INTRAVENOUS at 10:00

## 2018-09-10 RX ADMIN — FENTANYL CITRATE 25 MCG: 50 INJECTION, SOLUTION INTRAMUSCULAR; INTRAVENOUS at 09:17

## 2018-09-10 RX ADMIN — HYDRALAZINE HYDROCHLORIDE 10 MG: 20 INJECTION INTRAMUSCULAR; INTRAVENOUS at 10:20

## 2018-09-10 RX ADMIN — MIDAZOLAM HYDROCHLORIDE 0.5 MG: 1 INJECTION, SOLUTION INTRAMUSCULAR; INTRAVENOUS at 08:38

## 2018-09-10 RX ADMIN — MIDAZOLAM HYDROCHLORIDE 0.5 MG: 1 INJECTION, SOLUTION INTRAMUSCULAR; INTRAVENOUS at 10:10

## 2018-09-10 RX ADMIN — MIDAZOLAM HYDROCHLORIDE 0.5 MG: 1 INJECTION, SOLUTION INTRAMUSCULAR; INTRAVENOUS at 08:21

## 2018-09-10 RX ADMIN — FENTANYL CITRATE 25 MCG: 50 INJECTION, SOLUTION INTRAMUSCULAR; INTRAVENOUS at 08:22

## 2018-09-10 RX ADMIN — FENTANYL CITRATE 25 MCG: 50 INJECTION, SOLUTION INTRAMUSCULAR; INTRAVENOUS at 10:10

## 2018-09-10 RX ADMIN — MIDAZOLAM HYDROCHLORIDE 0.5 MG: 1 INJECTION, SOLUTION INTRAMUSCULAR; INTRAVENOUS at 08:27

## 2018-09-10 RX ADMIN — LIDOCAINE HYDROCHLORIDE 5 ML: 10 INJECTION, SOLUTION INFILTRATION; PERINEURAL at 10:23

## 2018-09-10 RX ADMIN — FENTANYL CITRATE 25 MCG: 50 INJECTION, SOLUTION INTRAMUSCULAR; INTRAVENOUS at 09:45

## 2018-09-10 RX ADMIN — FENTANYL CITRATE 25 MCG: 50 INJECTION, SOLUTION INTRAMUSCULAR; INTRAVENOUS at 08:42

## 2018-09-10 RX ADMIN — MIDAZOLAM HYDROCHLORIDE 0.5 MG: 1 INJECTION, SOLUTION INTRAMUSCULAR; INTRAVENOUS at 09:17

## 2018-09-10 RX ADMIN — IODIXANOL 100 ML: 320 INJECTION, SOLUTION INTRAVASCULAR at 10:47

## 2018-09-10 RX ADMIN — MIDAZOLAM HYDROCHLORIDE 0.5 MG: 1 INJECTION, SOLUTION INTRAMUSCULAR; INTRAVENOUS at 09:31

## 2018-09-10 RX ADMIN — MIDAZOLAM HYDROCHLORIDE 0.5 MG: 1 INJECTION, SOLUTION INTRAMUSCULAR; INTRAVENOUS at 09:45

## 2018-09-10 RX ADMIN — HEPARIN SODIUM 10000 UNITS: 10000 INJECTION, SOLUTION INTRAVENOUS; SUBCUTANEOUS at 08:53

## 2018-09-10 RX ADMIN — MIDAZOLAM HYDROCHLORIDE 0.5 MG: 1 INJECTION, SOLUTION INTRAMUSCULAR; INTRAVENOUS at 09:59

## 2018-09-10 RX ADMIN — FENTANYL CITRATE 25 MCG: 50 INJECTION, SOLUTION INTRAMUSCULAR; INTRAVENOUS at 08:28

## 2018-09-10 RX ADMIN — MIDAZOLAM HYDROCHLORIDE 0.5 MG: 1 INJECTION, SOLUTION INTRAMUSCULAR; INTRAVENOUS at 09:00

## 2018-09-10 RX ADMIN — FENTANYL CITRATE 25 MCG: 50 INJECTION, SOLUTION INTRAMUSCULAR; INTRAVENOUS at 09:31

## 2018-09-10 RX ADMIN — LIDOCAINE HYDROCHLORIDE 5 ML: 10 INJECTION, SOLUTION EPIDURAL; INFILTRATION; INTRACAUDAL; PERINEURAL at 10:23

## 2018-09-10 RX ADMIN — FENTANYL CITRATE 25 MCG: 50 INJECTION, SOLUTION INTRAMUSCULAR; INTRAVENOUS at 09:00

## 2018-09-10 NOTE — PROGRESS NOTES
Discharged per w/c to home with daughter. Discharge instructions were given by Esthela BLACK (R) groin has remained stable. Ambulated, ate and voided. Carotid ultrasound complete. IV dc'd.

## 2018-09-10 NOTE — PROGRESS NOTES
Patient for lower leg angiogram on left. Pt states she can walk about 100 feet and then the lower leg starts to be painful. Pulses present. Contrast instructions discussed and given to patient. Dr Todd here to discuss and consent. Patient readied for procedure.

## 2018-09-10 NOTE — IP AVS SNAPSHOT
MRN:[id number]                      After Visit Summary   9/10/2018    Jennifer Bob    MRN: [id number]           Visit Information        Department      9/10/2018  6:12 AM [facility]          Review of your medicines      UNREVIEWED medicines. Ask your doctor about these medicines        Dose / Directions    amLODIPine 10 MG tablet   Commonly known as:  NORVASC   Used for:  Essential hypertension with goal blood pressure less than 140/90        Dose:  10 mg   Take 1 tablet (10 mg) by mouth daily   Quantity:  90 tablet   Refills:  1       aspirin 81 MG chewable tablet   Used for:  ASCVD (arteriosclerotic cardiovascular disease)        Dose:  81 mg   Take 1 tablet (81 mg) by mouth daily   Quantity:  60 tablet   Refills:  3       atorvastatin 40 MG tablet   Commonly known as:  LIPITOR   Used for:  Hyperlipidemia LDL goal <100        Due for appt in March, one tab daily   Quantity:  90 tablet   Refills:  3       BETA BLOCKER NOT PRESCRIBED (INTENTIONAL)   Used for:  Essential hypertension with goal blood pressure less than 140/90, ASCVD (arteriosclerotic cardiovascular disease)        Beta Blocker not prescribed intentionally due to COPD, shortness of breath with atenolol and lopressor   Refills:  0       CALCIUM CITRATE + PO        Dose:  1 tablet   Take 1 tablet by mouth daily   Refills:  0       CLASSIC NETI POT SINUS WASH 2300-700 MG Kit   Generic drug:  sodium chloride-sodium bicarb        Mix 1 packet in Neti Pot and administer in each nostril daily as needed   Refills:  0       clopidogrel 75 MG tablet   Commonly known as:  PLAVIX   Used for:  Acute ischemic stroke (H)        Dose:  75 mg   Take 1 tablet (75 mg) by mouth daily   Quantity:  90 tablet   Refills:  0       COLACE 100 MG capsule   Generic drug:  docusate sodium        Dose:  1 capsule   Take 1 capsule by mouth 2 times daily   Refills:  0       fluticasone 50 MCG/ACT spray   Commonly known as:  FLONASE    Used for:  Seasonal allergic rhinitis        USE TWO SPRAYS IN EACH NOSTRIL EVERY OTHER DAY   Quantity:  16 g   Refills:  8       furosemide 20 MG tablet   Commonly known as:  LASIX   Used for:  Essential hypertension with goal blood pressure less than 140/90        TAKE ONE TABLET BY MOUTH EVERY DAY   Quantity:  90 tablet   Refills:  3       hydrALAZINE 50 MG tablet   Commonly known as:  APRESOLINE   Used for:  Hypertension goal BP (blood pressure) < 140/90        Dose:  50 mg   Take 1 tablet (50 mg) by mouth 2 times daily Takes an additional 50 mg in the afternoon if systolic blood pressure is higher than 160   Quantity:  90 tablet   Refills:  3       losartan 100 MG tablet   Commonly known as:  COZAAR   Used for:  Essential hypertension with goal blood pressure less than 140/90        Dose:  100 mg   Take 1 tablet (100 mg) by mouth daily   Quantity:  90 tablet   Refills:  3       MULTIVITAMIN PO        Dose:  1 tablet   Take 1 tablet by mouth daily   Refills:  0       OCEAN NASAL SPRAY NA        Administer 1 spray in each nostril up to two times daily as needed   Refills:  0       predniSONE 1 MG tablet   Commonly known as:  DELTASONE        Dose:  2 mg   Take 2 mg by mouth daily   Refills:  0       TRAMADOL HCL PO        Dose:  50 mg   Take 50 mg by mouth every 6 hours as needed for moderate to severe pain   Refills:  0       VENTOLIN  (90 Base) MCG/ACT inhaler   Used for:  Chronic obstructive pulmonary disease, unspecified COPD type (H)   Generic drug:  albuterol        INHALE 2 PUFFS INTO THE LUNGS EVERY 6 HOURS AS NEEDED   Quantity:  18 g   Refills:  9       VITAMIN D3 PO        Dose:  1000 Units   Take 1,000 Units by mouth daily   Refills:  0                Protect others around you: Learn how to safely use, store and throw away your medicines at www.disposemymeds.org.         Follow-ups after your visit         Care Instructions        Further instructions from your care team       Peripheral  Angiogram Discharge Instructions - Femoral    After you go home:      Have an adult stay with you until tomorrow.    Drink extra fluids for 2 days.    You may resume your normal diet.    No smoking       For 24 hours - due to the sedation you received:    Relax and take it easy.    Do NOT make any important or legal decisions.    Do NOT drive or operate machines at home or at work.    Do NOT drink alcohol.    Care of  Groin Puncture Site:      For the first 24 hrs - check the puncture site every 1-2 hours while awake.    For 2 days, when you cough, sneeze, laugh or move your bowels, hold your hand over the groin puncture site and press firmly on/above the site    Remove the bandaid after 24 hours. If there is minor oozing, apply another bandaid and remove it after 12 hours.    It is normal to have a small bruise or pea size lump at the sites.    You may shower tomorrow.  Do NOT take a bath, or use a hot tub or pool for at least 3 days. Do NOT scrub the site. Do not use lotion or powder near the puncture site.    Activity - For 2 days:    Groin site:        No stooping or squatting    Do NOT do any heavy activity such as exercise, lifting, or straining.     No housework, yard work or any activity that make you sweat    Do NOT lift more than 10 pounds    Bleeding:    If you start bleeding from the site in your groin:      Lie down flat and press firmly on/above the site for 10 minutes.    Once bleeding stops, lay flat for 2 hours.     [facility] as soon as you can.    Call 911 right away if you have heavy bleeding or bleeding that does not stop.    Medicines:      If you are taking an antiplatelet medication such as Plavix, do not stop taking it until you talk to your provider.       Take your medications, including blood thinners, unless your provider tells you not to.    If you have stopped any medicines, check with your provider about when to restart them.     Follow Up  "Appointments:      Follow up with [facility] as directed.    Call the clinic if:      You have increased pain or a large or growing hard lump around the site.    The site is red, swollen, hot or tender.    Blood or fluid is draining from the site.    You have chills or a fever greater than 101 F (38 C).    Your  leg feels numb, cool or changes color.    You have hives, a rash or unusual itching.    New pain in the back or belly that you cannot control with Tylenol.    Any questions or concerns.    Other Instructions:       you received a stent - carry your stent card with you at all times.      If you have questions or your original symptoms do not improve, call:         [facility] @ [phone]           Additional Information About Your Visit        MyChart Information     [facility] gives you secure access to your electronic health record. If you see a primary care provider, you can also send messages to your care team and make appointments. If you have questions, please call your primary care clinic.  If you do not have a primary care provider, please call [phone] and they will assist you.        Care EveryWhere ID     This is your Care EveryWhere ID. This could be used by other organizations to access your [facility] medical records  [id number]        Your Vitals Were     Blood Pressure Pulse Temperature Respirations Height Weight    178/65 79 97.7  F (36.5  C) (Oral) 16 1.575 m (5' 2\") 57.9 kg (127 lb 9.6 oz)    Pulse Oximetry BMI (Body Mass Index)                97% 23.34 kg/m2           Primary Care Provider Office Phone # Fax #    Torri Fisher [phone]      Equal Access to Services     [facility]: Hadii tex gutierrez hadasho Soivonali, waaxda luqadaha, qaybta kaalmada becky, nicolas cintron. So [facility] [phone].    ATENCIÓN: Si habla español, tiene a nagel disposición servicios gratuitos de asistencia lingüística. Llame al " [phone].    We comply with applicable federal civil rights laws and [place] laws. We do not discriminate on the basis of race, color, national origin, age, disability, sex, sexual orientation, or gender identity.            Thank you!     Thank you for choosing [facility] for your care. Our goal is always to provide you with excellent care. Hearing back from our patients is one way we can continue to improve our services. Please take a few minutes to complete the written survey that you may receive in the mail after you visit with us. Thank you!             Medication List: This is a list of all your medications and when to take them. Check marks below indicate your daily home schedule. Keep this list as a reference.      Medications           Morning Afternoon Evening Bedtime As Needed    amLODIPine 10 MG tablet   Commonly known as:  NORVASC   Take 1 tablet (10 mg) by mouth daily                                aspirin 81 MG chewable tablet   Take 1 tablet (81 mg) by mouth daily                                atorvastatin 40 MG tablet   Commonly known as:  LIPITOR   Due for appt in March, one tab daily                                BETA BLOCKER NOT PRESCRIBED (INTENTIONAL)   Beta Blocker not prescribed intentionally due to COPD, shortness of breath with atenolol and lopressor                                CALCIUM CITRATE + PO   Take 1 tablet by mouth daily                                CLASSIC NETI POT SINUS WASH 2300-700 MG Kit   Mix 1 packet in Neti Pot and administer in each nostril daily as needed   Generic drug:  sodium chloride-sodium bicarb                                clopidogrel 75 MG tablet   Commonly known as:  PLAVIX   Take 1 tablet (75 mg) by mouth daily                                COLACE 100 MG capsule   Take 1 capsule by mouth 2 times daily   Generic drug:  docusate sodium                                fluticasone 50 MCG/ACT spray   Commonly known as:  FLONASE   USE TWO SPRAYS IN EACH  NOSTRIL EVERY OTHER DAY                                furosemide 20 MG tablet   Commonly known as:  LASIX   TAKE ONE TABLET BY MOUTH EVERY DAY                                hydrALAZINE 50 MG tablet   Commonly known as:  APRESOLINE   Take 1 tablet (50 mg) by mouth 2 times daily Takes an additional 50 mg in the afternoon if systolic blood pressure is higher than 160                                losartan 100 MG tablet   Commonly known as:  COZAAR   Take 1 tablet (100 mg) by mouth daily                                MULTIVITAMIN PO   Take 1 tablet by mouth daily                                OCEAN NASAL SPRAY NA   Administer 1 spray in each nostril up to two times daily as needed                                predniSONE 1 MG tablet   Commonly known as:  DELTASONE   Take 2 mg by mouth daily                                TRAMADOL HCL PO   Take 50 mg by mouth every 6 hours as needed for moderate to severe pain                                VENTOLIN  (90 Base) MCG/ACT inhaler   INHALE 2 PUFFS INTO THE LUNGS EVERY 6 HOURS AS NEEDED   Generic drug:  albuterol                                VITAMIN D3 PO   Take 1,000 Units by mouth daily

## 2018-09-10 NOTE — DISCHARGE INSTRUCTIONS
Peripheral Angiogram Discharge Instructions - Femoral    After you go home:      Have an adult stay with you until tomorrow.    Drink extra fluids for 2 days.    You may resume your normal diet.    No smoking       For 24 hours - due to the sedation you received:    Relax and take it easy.    Do NOT make any important or legal decisions.    Do NOT drive or operate machines at home or at work.    Do NOT drink alcohol.    Care of  Groin Puncture Site:      For the first 24 hrs - check the puncture site every 1-2 hours while awake.    For 2 days, when you cough, sneeze, laugh or move your bowels, hold your hand over the groin puncture site and press firmly on/above the site    Remove the bandaid after 24 hours. If there is minor oozing, apply another bandaid and remove it after 12 hours.    It is normal to have a small bruise or pea size lump at the sites.    You may shower tomorrow.  Do NOT take a bath, or use a hot tub or pool for at least 3 days. Do NOT scrub the site. Do not use lotion or powder near the puncture site.    Activity - For 2 days:    Groin site:        No stooping or squatting    Do NOT do any heavy activity such as exercise, lifting, or straining.     No housework, yard work or any activity that make you sweat    Do NOT lift more than 10 pounds    Bleeding:    If you start bleeding from the site in your groin:      Lie down flat and press firmly on/above the site for 10 minutes.    Once bleeding stops, lay flat for 2 hours.     Call the [facility] as soon as you can.    Call 911 right away if you have heavy bleeding or bleeding that does not stop.    Medicines:      If you are taking an antiplatelet medication such as Plavix, do not stop taking it until you talk to your provider.       Take your medications, including blood thinners, unless your provider tells you not to.    If you have stopped any medicines, check with your provider about when to restart them.     Follow Up  Appointments:      Follow [facility] with [facility] as directed.    Call the clinic if:      You have increased pain or a large or growing hard lump around the site.    The site is red, swollen, hot or tender.    Blood or fluid is draining from the site.    You have chills or a fever greater than 101 F (38 C).    Your  leg feels numb, cool or changes color.    You have hives, a rash or unusual itching.    New pain in the back or belly that you cannot control with Tylenol.    Any questions or concerns.    Other Instructions:       you received a stent - carry your stent card with you at all times.      If you have questions or your original symptoms do not improve, call:         [facility] @ [phone]

## 2018-09-10 NOTE — PROGRESS NOTES
Returned from IR at 1100. Awake but sleepy. Right pulses palpable. Left pulses - DP is dopplered but PT remains absent. Continuing oxygen to maintain saturations.  1400 - Pulses now dopplered on PT as well as DP on left. BP coming down slowly. Taking food and fluids. Discharge instructions reviewed with patient and daughter. Questions answered. Bedrest for 2 more hours. Dr Todd here to talk with patient and family.

## 2018-09-10 NOTE — CONSULTS
Short distance non diabetic non smoking claudicant with LDL < 70 on Lipitor already. No formal Vascular Medicine consult warranted presently. Please call [phone] if our input is required.

## 2018-09-10 NOTE — IP AVS SNAPSHOT
[place] [address]    [place] [address]    Phone:  [phone]                                       After Visit Summary   9/10/2018    Jennifer Bob    MRN: [id number]           After Visit Summary Signature Page     I have received my discharge instructions, and my questions have been answered. I have discussed any challenges I see with this plan with the nurse or doctor.    ..........................................................................................................................................  Patient/Patient Representative Signature      ..........................................................................................................................................  Patient Representative Print Name and Relationship to Patient    ..................................................               ................................................  Date                                            Time    ..........................................................................................................................................  Reviewed by Signature/Title    ...................................................              ..............................................  Date                                                            Time          22EPIC Rev 08/18

## 2018-09-10 NOTE — IR NOTE
Interventional Radiology Intra-procedural Nursing Note    Patient Name: Jennifer Bob  Medical Record Number: [id number]  Today's Date: September 10, 2018    Start Time: 0837  End of procedure time: 1017  Procedure: pelvic angiogram, left lower extremity angiogram, stenting bilateral iliacs, angioplasty  Report given to: care suites rn  Time pt departs:  1050  Other Notes:  Pt tolerated well. No complications. Hypertensive at beginning and throughout entirety of procedure 170-190s/60s, operating MD aware, 10mg Iv hydralazine given just prior to 6f sheath removal from right femoral, removed at 1025, manual pressure was applied, site is c/d/i. Palpable pulses right dp/pt, doppler left dp/pt  Sedation totals 4.5mg Versed, 225 Fentanyl Iv.   2000 units Heparin Iv given intra-procedure.     Virginia Burgos RN

## 2018-09-11 ENCOUNTER — TELEPHONE (OUTPATIENT)
Dept: OTHER | Facility: CLINIC | Age: 83
End: 2018-09-11

## 2018-09-11 ENCOUNTER — TELEPHONE (OUTPATIENT)
Dept: FAMILY MEDICINE | Facility: CLINIC | Age: 83
End: 2018-09-11

## 2018-09-11 DIAGNOSIS — R09.89 OTHER SPECIFIED SYMPTOMS AND SIGNS INVOLVING THE CIRCULATORY AND RESPIRATORY SYSTEMS: ICD-10-CM

## 2018-09-11 DIAGNOSIS — I73.9 PAD (PERIPHERAL ARTERY DISEASE) (H): Primary | ICD-10-CM

## 2018-09-11 NOTE — TELEPHONE ENCOUNTER
Pt's daughter, Pushpa, was calling with pt.  She had a carotid ultrasound done yest.  They wanted to compare the % to testing done in 7/7/2018.  Gave info from the CT done in 7/7/18.    Pt wanted the info to make decisions for future care.  BRAYDEN Hudson

## 2018-09-11 NOTE — TELEPHONE ENCOUNTER
Spoke with Jennifer and her daughter.  No concerns regarding left leg.  She does state that she had pain in her right foot last night.  This is new for her. It does seemed to be getting better but she also took a Tramadol to help with the pain.  Puncture site is dry, no swelling or drainage.  Spoke with the daughter, she will continue to monitor the right foot.  She states, the foot is warm and pink.   Patient also had a carotid ultrasound done yesterday.  Per daughter pt had a stroke last July 2018.  She was taken to the [facility], she treated with neuro but was not seen by a vascular surgeon.  Dr. Todd and Dr. Jaimes recommends the patient be seen with a vascular surgeon.  Daughter will discuss with Jennifer and let us know if she would like us to set up a consult.  We will attempt to coordinate on the same day as 1 mo follow up with Dr. Todd.  Flori Hernandez RN  IR nurse clinician  [phone]  US CAROTID BILATERAL 9/10/2018 5:41 PM     HISTORY: High risk for carotid disease.     COMPARISON: None.     FINDINGS:      Right side:   On the grayscale images, there is mixed plaque with associated  shadowing at the carotid bifurcation extending into the internal and  external carotid arteries.  Spectral waveform analysis was performed. Peak systolic and diastolic  velocities in the right internal carotid artery are 135 and 28 cm/s.  Per sonographic criteria, degree of stenosis in the right internal  carotid artery is 50-69%.  Flow in the right vertebral artery is antegrade.      Left side:   On the grayscale images, there is mixed plaque with associated  shadowing at the carotid bifurcation extending into the internal  carotid artery.  Spectral waveform analysis was performed. Peak systolic and diastolic   velocities in the left internal carotid artery are 294 and 66 cm/s.  The ratio between the peak systolic velocities in the internal carotid  artery and common carotid artery is 4.5. Per sonographic  criteria,  degree of stenosis in the left internal carotid artery is greater than  or equal to 70%.  Flow in the left vertebral artery is antegrade.          IMPRESSION: Per sonographic criteria, there is a 50-69% stenosis in  the right internal carotid artery and a greater than or equal to 70%  stenosis in the left internal carotid artery.     Degree of stenosis measured relative to the diameter of the normal  internal carotid artery per NASCET or NASCET type criteria     MICAELA ACOSTA MD

## 2018-09-12 ENCOUNTER — TELEPHONE (OUTPATIENT)
Dept: OTHER | Facility: CLINIC | Age: 83
End: 2018-09-12

## 2018-09-12 DIAGNOSIS — M79.671 RIGHT FOOT PAIN: Primary | ICD-10-CM

## 2018-09-12 NOTE — TELEPHONE ENCOUNTER
Spoke with daughter who declined appt offered tomorrow morning due to work schedule.  She requested appt on Friday, September 14th. Kianna Moss,

## 2018-09-12 NOTE — TELEPHONE ENCOUNTER
Daughter called, patient is still having pain on the bottom of her right foot which is new since angiogram on 9/10 with Dr. Todd.  Contacted Dr. Todd, recommend patient have ultrasound of right foot and MALINDA of the right leg.  Daughter agreed to plan.  Will route to  to contact daughter to schedule either today or tomorrow.  Will call daughter with the results.  Flori Hernandez RN  IR nurse clinician  [phone]

## 2018-09-14 ENCOUNTER — HOSPITAL ENCOUNTER (OUTPATIENT)
Dept: ULTRASOUND IMAGING | Facility: CLINIC | Age: 83
Discharge: HOME | End: 2018-09-14
Attending: RADIOLOGY | Admitting: RADIOLOGY
Payer: COMMERCIAL

## 2018-09-14 ENCOUNTER — TELEPHONE (OUTPATIENT)
Dept: OTHER | Facility: CLINIC | Age: 83
End: 2018-09-14

## 2018-09-14 ENCOUNTER — HOSPITAL ENCOUNTER (OUTPATIENT)
Dept: ULTRASOUND IMAGING | Facility: CLINIC | Age: 83
End: 2018-09-14
Attending: RADIOLOGY
Payer: COMMERCIAL

## 2018-09-14 DIAGNOSIS — M79.671 RIGHT FOOT PAIN: ICD-10-CM

## 2018-09-14 PROCEDURE — 93926 LOWER EXTREMITY STUDY: CPT | Mod: RT

## 2018-09-14 PROCEDURE — 93922 UPR/L XTREMITY ART 2 LEVELS: CPT

## 2018-09-14 NOTE — TELEPHONE ENCOUNTER
Discussed results of today's ultrasound results.  Right foot pain is unlikely due to arterial flow.  Jennifer has biphasic to triphasic waveforms in the dorsal pedis and posterior tibial artery.  Resting indices .80 PT .63 DP  Daughter states the pain does get better when Jennifer takes a pain pill and the pain is in the arch of her foot and there is mild swelling today.   Instructed to continue to monitor, take Tramadol as prescribed and continue PMD if pain doesn't subside in the next couple days.   Reviewed ultrasound results with Dr. Jaimes, agreed to plan.  Flori Hernandez RN  IR nurse clinician  [phone]

## 2018-09-17 ENCOUNTER — TELEPHONE (OUTPATIENT)
Dept: OTHER | Facility: CLINIC | Age: 83
End: 2018-09-17

## 2018-09-17 NOTE — TELEPHONE ENCOUNTER
Called and spoke with daughter Pushpa.  Pain in the right foot is improving some.  Not as a severe as before however still present.  According to daughter, Jennifer wants to see if it gets better before consult with primary.  Instructed to call if concerns.  Flori Hernandez RN  IR nurse clinician  [phone]

## 2018-09-20 ENCOUNTER — MEDICAL CORRESPONDENCE (OUTPATIENT)
Dept: HEALTH INFORMATION MANAGEMENT | Facility: CLINIC | Age: 83
End: 2018-09-20

## 2018-09-24 ENCOUNTER — TRANSFERRED RECORDS (OUTPATIENT)
Dept: HEALTH INFORMATION MANAGEMENT | Facility: CLINIC | Age: 83
End: 2018-09-24

## 2018-10-01 ENCOUNTER — OFFICE VISIT (OUTPATIENT)
Dept: OTHER | Facility: CLINIC | Age: 83
End: 2018-10-01
Attending: SURGERY
Payer: COMMERCIAL

## 2018-10-01 VITALS — HEART RATE: 64 BPM | DIASTOLIC BLOOD PRESSURE: 60 MMHG | SYSTOLIC BLOOD PRESSURE: 180 MMHG

## 2018-10-01 DIAGNOSIS — I65.22 CAROTID STENOSIS, ASYMPTOMATIC, LEFT: Primary | ICD-10-CM

## 2018-10-01 PROCEDURE — G0463 HOSPITAL OUTPT CLINIC VISIT: HCPCS

## 2018-10-01 PROCEDURE — 99204 OFFICE O/P NEW MOD 45 MIN: CPT | Mod: ZP | Performed by: SURGERY

## 2018-10-01 NOTE — MR AVS SNAPSHOT
After Visit Summary   10/1/2018    Jennifer Bob    MRN: [id number]           Patient Information     Date Of Birth          12/5/1931        Visit Information        Provider Department      10/1/2018 11:00 AM Tip Denise MD [facility]      Today's Diagnoses     Carotid stenosis, asymptomatic, left    -  1      Care Instructions    Patient to follow up with Primary Care provider regarding elevated blood pressure.          Follow-ups after your visit        Your next 10 appointments already scheduled     Oct 01, 2018  1:00 PM CDT   Return Visit with Walter Todd MD   [facility] ([facility])    [address]  [facility] [address] [phone]              Who to contact     If you have questions or need follow up information about today's clinic visit or your schedule please contact [facility] directly at [phone].  Normal or non-critical lab and imaging results will be communicated to you by [employer], letter or phone within 4 business days after the clinic has received the results. If you do not hear from us within 7 days, please contact the clinic through [employer] or phone. If you have a critical or abnormal lab result, we will notify you by phone as soon as possible.  Submit refill requests through Pensqr or call your pharmacy and they will forward the refill request to us. Please allow 3 business days for your refill to be completed.          Additional Information About Your Visit        [employer] Information     P[employer] gives you secure access to your electronic health record. If you see a primary care provider, you can also send messages to your care team and make appointments. If you have questions, please call your primary care clinic.  If you do not have a primary care provider, please call  [phone] and they will assist you.        Care EveryWhere ID     This is your Care EveryWhere ID. This could be used by other organizations to access your [place] medical records  [id number]        Your Vitals Were     Pulse Breastfeeding?                64 No           Blood Pressure from Last 3 Encounters:   10/01/18 180/60   09/10/18 168/58   08/29/18 191/65    Weight from Last 3 Encounters:   09/10/18 127 lb 9.6 oz (57.9 kg)   08/29/18 128 lb 4 oz (58.2 kg)   08/08/18 130 lb 6.4 oz (59.1 kg)              Today, you had the following     No orders found for display       Primary Care Provider Office Phone # Fax #    Torri Fisher [phone] [address]  [facility] [address]        Equal Access to Services     PRO [facility]ELIC[facility] : Hadii tex gutierrez hadasho Soomaali, waaxda luqadaha, qaybta kaalmada adezohrayada, nicolas bran . So [facility] [phone].    ATENCIÓN: Si habla español, tiene a nagel disposición servicios gratuitos de asistencia lingüística. Llame al [phone].    We comply with applicable federal civil rights laws and [place] laws. We do not discriminate on the basis of race, color, national origin, age, disability, sex, sexual orientation, or gender identity.            Thank you!     Thank you for choosing [facility] [place]  for your care. Our goal is always to provide you with excellent care. Hearing back from our patients is one way we can continue to improve our services. Please take a few minutes to complete the written survey that you may receive in the mail after your visit with us. Thank you!             Your Updated Medication List - Protect others around you: Learn how to safely use, store and throw away your medicines at www.disposemymeds.org.          This list is accurate as of 10/1/18 12:05 PM.  Always use your most recent med list.                   Brand Name Dispense Instructions for use Diagnosis    amLODIPine 10 MG  tablet    NORVASC    90 tablet    Take 1 tablet (10 mg) by mouth daily    Essential hypertension with goal blood pressure less than 140/90       aspirin 81 MG chewable tablet     60 tablet    Take 1 tablet (81 mg) by mouth daily    ASCVD (arteriosclerotic cardiovascular disease)       atorvastatin 40 MG tablet    LIPITOR    90 tablet    Due for appt in March, one tab daily    Hyperlipidemia LDL goal <100       BETA BLOCKER NOT PRESCRIBED (INTENTIONAL)      Beta Blocker not prescribed intentionally due to COPD, shortness of breath with atenolol and lopressor    Essential hypertension with goal blood pressure less than 140/90, ASCVD (arteriosclerotic cardiovascular disease)       CALCIUM CITRATE + PO      Take 1 tablet by mouth daily        CLASSIC NETI POT SINUS WASH 2300-700 MG Kit   Generic drug:  sodium chloride-sodium bicarb      Mix 1 packet in Neti Pot and administer in each nostril daily as needed        clopidogrel 75 MG tablet    PLAVIX    90 tablet    Take 1 tablet (75 mg) by mouth daily    Acute ischemic stroke (H)       COLACE 100 MG capsule   Generic drug:  docusate sodium      Take 1 capsule by mouth 2 times daily        fluticasone 50 MCG/ACT spray    FLONASE    16 g    USE TWO SPRAYS IN EACH NOSTRIL EVERY OTHER DAY    Seasonal allergic rhinitis       furosemide 20 MG tablet    LASIX    90 tablet    TAKE ONE TABLET BY MOUTH EVERY DAY    Essential hypertension with goal blood pressure less than 140/90       hydrALAZINE 50 MG tablet    APRESOLINE    90 tablet    Take 1 tablet (50 mg) by mouth 2 times daily Takes an additional 50 mg in the afternoon if systolic blood pressure is higher than 160    Hypertension goal BP (blood pressure) < 140/90       losartan 100 MG tablet    COZAAR    90 tablet    Take 1 tablet (100 mg) by mouth daily    Essential hypertension with goal blood pressure less than 140/90       MULTIVITAMIN PO      Take 1 tablet by mouth daily        OCEAN NASAL SPRAY NA      Administer 1  spray in each nostril up to two times daily as needed        predniSONE 1 MG tablet    DELTASONE     Take 2 mg by mouth daily        TRAMADOL HCL PO      Take 50 mg by mouth every 6 hours as needed for moderate to severe pain        VENTOLIN  (90 Base) MCG/ACT inhaler   Generic drug:  albuterol     18 g    INHALE 2 PUFFS INTO THE LUNGS EVERY 6 HOURS AS NEEDED    Chronic obstructive pulmonary disease, unspecified COPD type (H)       VITAMIN D3 PO      Take 1,000 Units by mouth daily

## 2018-10-01 NOTE — LETTER
[facility] [place] [address]  [place] [address]  Phone: [phone]  Fax: [phone]      2018    RE: Jennifer Bob, : 1931      Mrs. Bob is an 86-year-old female who is been referred to us for further evaluation of carotid stenosis.  She tells me that in  she experienced a cerebrovascular accident where she was left with weakness of the right upper extremity.  That has resolved since that time.  In 2018 she was taken to the emergency department with right upper extremity dysmetria, nausea and vomiting.      MRI showed multiple foci of restricted diffusion in the superior cerebellum. CTA of the head and neck at the time showed stenosis a little over 40% on the right internal carotid artery.  Left side was a little over 60%.     There was an ultrasonography performed in 2018.  And I have copied the results below.      US CAROTID BILATERAL 9/10/2018 5:41 PM      HISTORY: High risk for carotid disease.      COMPARISON: None.      FINDINGS:       Right side:   On the grayscale images, there is mixed plaque with associated  shadowing at the carotid bifurcation extending into the internal and  external carotid arteries.  Spectral waveform analysis was performed. Peak systolic and diastolic  velocities in the right internal carotid artery are 135 and 28 cm/s.  Per sonographic criteria, degree of stenosis in the right internal  carotid artery is 50-69%.  Flow in the right vertebral artery is antegrade.       Left side:   On the grayscale images, there is mixed plaque with associated  shadowing at the carotid bifurcation extending into the internal  carotid artery.  Spectral waveform analysis was performed. Peak systolic and diastolic   velocities in the left internal carotid artery are 294 and 66 cm/s.  The ratio between the peak systolic velocities in the internal carotid  artery and common carotid artery is 4.5. Per sonographic  criteria,  degree of stenosis in the left internal carotid artery is greater than  or equal to 70%.  Flow in the left vertebral artery is antegrade.           IMPRESSION: Per sonographic criteria, there is a 50-69% stenosis in  the right internal carotid artery and a greater than or equal to 70%  stenosis in the left internal carotid artery.      Degree of stenosis measured relative to the diameter of the normal  internal carotid artery per NASCET or NASCET type criteria      MICAELA ACOSTA MD     Her past medical history is notable for coronary artery disease, ST elevation MI, hypertension, hyperlipidemia, the degenerative lumbar disc disease, chronic kidney disease and anxiety.     Past surgical history is positive for coronary artery bypass grafting, cholecystectomy, multiple mesh repairs of ventral abdominal hernias.     She quit smoking in 1983.     On examination: She appears comfortable and she is in no acute distress.  Vital signs are reviewed.  Her initial blood pressure in the right arm was 206 x 71 mmHg and in the left arm was 202 x 75 mmHg.  HEENT: Head is atraumatic and normocephalic.  There is no weakness in her neck musculature.  Eyes: Extraocular motions are intact, sclerae are anicteric.  Mental: Alert and oriented x4.  Judgment and insight are excellent.  Cardiac: Regular rate and rhythm, S1 plus S2 plus 3 out of 6 systolic murmur.  Chest: Clear to auscultation bilaterally.  Abdomen: Soft and nontender.  Ventral hernia is visible.  Vascular: Bilateral carotid bruits most likely conducted sounds from the systolic murmur.  3+ bilateral radial pulses.     Diagnosis: Mild asymptomatic right carotid artery stenosis and moderate asymptomatic left carotid artery stenosis.     Plan: I explained the pathophysiology of disease to her and her daughter in detail.  Patient is on aspirin, Plavix, statin and beta-blocker.  This finding on the MRI was on the cerebellum.  CT angiogram which is considered more  accurate shows 40% stenosis on the right and 60% on the left carotid bulb.  I do not think surgical management is advised and certainly not worth the risk over medical management at this point.  We will repeat carotid duplex sonography in one years time.      Sincerely, Tip Denise MD        [facility] [address] [place] [address]  [facility] [address]  Phone: [phone]  Fax: [phone]

## 2018-10-01 NOTE — PROGRESS NOTES
Mrs. Bob is an 86-year-old female who is been referred to us for further evaluation of carotid stenosis.  She tells me that in 2013 she experienced a cerebrovascular accident where she was left with weakness of the right upper extremity.  That has resolved since that time.  In July 2018 she was taken to the emergency department with right upper extremity dysmetria, nausea and vomiting.     MRI showed multiple foci of restricted diffusion in the superior cerebellum. CTA of the head and neck at the time showed stenosis a little over 40% on the right internal carotid artery.  Left side was a little over 60%.    There was an ultrasonography performed in September 2018.  And I have copied the results below.     US CAROTID BILATERAL 9/10/2018 5:41 PM     HISTORY: High risk for carotid disease.     COMPARISON: None.     FINDINGS:      Right side:   On the grayscale images, there is mixed plaque with associated  shadowing at the carotid bifurcation extending into the internal and  external carotid arteries.  Spectral waveform analysis was performed. Peak systolic and diastolic  velocities in the right internal carotid artery are 135 and 28 cm/s.  Per sonographic criteria, degree of stenosis in the right internal  carotid artery is 50-69%.  Flow in the right vertebral artery is antegrade.      Left side:   On the grayscale images, there is mixed plaque with associated  shadowing at the carotid bifurcation extending into the internal  carotid artery.  Spectral waveform analysis was performed. Peak systolic and diastolic   velocities in the left internal carotid artery are 294 and 66 cm/s.  The ratio between the peak systolic velocities in the internal carotid  artery and common carotid artery is 4.5. Per sonographic criteria,  degree of stenosis in the left internal carotid artery is greater than  or equal to 70%.  Flow in the left vertebral artery is antegrade.          IMPRESSION: Per sonographic criteria, there is a  50-69% stenosis in  the right internal carotid artery and a greater than or equal to 70%  stenosis in the left internal carotid artery.     Degree of stenosis measured relative to the diameter of the normal  internal carotid artery per NASCET or NASCET type criteria     MICAELA ACOSTA MD    Her past medical history is notable for coronary artery disease, ST elevation MI, hypertension, hyperlipidemia, the degenerative lumbar disc disease, chronic kidney disease and anxiety.    Past surgical history is positive for coronary artery bypass grafting, cholecystectomy, multiple mesh repairs of ventral abdominal hernias.    She quit smoking in 1983.    On examination: She appears comfortable and she is in no acute distress.  Vital signs are reviewed.  Her initial blood pressure in the right arm was 206 x 71 mmHg and in the left arm was 202 x 75 mmHg.  HEENT: Head is atraumatic and normocephalic.  There is no weakness in her neck musculature.  Eyes: Extraocular motions are intact, sclerae are anicteric.  Mental: Alert and oriented x4.  Judgment and insight are excellent.  Cardiac: Regular rate and rhythm, S1 plus S2 plus 3 out of 6 systolic murmur.  Chest: Clear to auscultation bilaterally.  Abdomen: Soft and nontender.  Ventral hernia is visible.  Vascular: Bilateral carotid bruits most likely conducted sounds from the systolic murmur.  3+ bilateral radial pulses.    Diagnosis: Mild asymptomatic right carotid artery stenosis and moderate asymptomatic left carotid artery stenosis.    Plan: I explained the pathophysiology of disease to her and her daughter in detail.  Patient is on aspirin, Plavix, statin and beta-blocker.  This finding on the MRI was on the cerebellum.  CT angiogram which is considered more accurate shows 40% stenosis on the right and 60% on the left carotid bulb.  I do not think surgical management is advised and certainly not worth the risk over medical management at this point.  We will repeat carotid  duplex sonography in one years time.

## 2018-10-01 NOTE — NURSING NOTE
"Jennifer Bob is a 86 year old female who presents for:  Chief Complaint   Patient presents with     Consult     New consult for carotid stenosis.  Ultrasound in [facility]-referred by Dr. Todd. [facility] 09/12/18 *pt scheduled w/[facility] to coordinate w/IR appts, if pt needs to r/s, can schedule w/any surgeon*        Vitals:    Vitals:    10/01/18 1054 10/01/18 1055 10/01/18 1124   BP: (!) 206/71 (!) 202/75 180/60   BP Location: Right arm Left arm Left arm   Patient Position: Chair Chair Chair   Cuff Size: Adult Regular Adult Regular Adult Regular   Pulse: 67 64        BMI:  Estimated body mass index is 23.34 kg/(m^2) as calculated from the following:    Height as of 9/10/18: 5' 2\" (1.575 m).    Weight as of 9/10/18: 127 lb 9.6 oz (57.9 kg).    Pain Score:  Data Unavailable        Kiley Angelo MA      "

## 2018-10-02 DIAGNOSIS — R09.89 OTHER SPECIFIED SYMPTOMS AND SIGNS INVOLVING THE CIRCULATORY AND RESPIRATORY SYSTEMS: ICD-10-CM

## 2018-10-02 DIAGNOSIS — I73.9 PAD (PERIPHERAL ARTERY DISEASE) (H): Primary | ICD-10-CM

## 2018-10-02 DIAGNOSIS — J30.2 SEASONAL ALLERGIC RHINITIS: ICD-10-CM

## 2018-10-02 NOTE — TELEPHONE ENCOUNTER
"Requested Prescriptions   Pending Prescriptions Disp Refills     fluticasone (FLONASE) 50 MCG/ACT spray [Pharmacy Med Name: FLUTICASONE 50MCG NASAL SPRAY]  Last Written Prescription Date:  1/26/2018  Last Fill Quantity: 16g,  # refills: 8   Last Office Visit: 8/29/2018   Future Office Visit:      16 g 3     Sig: USE TWO SPRAYS IN EACH NOSTRIL EVERY OTHER DAY    Inhaled Steroids Protocol Passed    10/2/2018  9:36 AM       Passed - Patient is age 12 or older       Passed - Recent (12 mo) or future (30 days) visit within the authorizing provider's specialty    Patient had office visit in the last 12 months or has a visit in the next 30 days with authorizing provider or within the authorizing provider's specialty.  See \"Patient Info\" tab in inbasket, or \"Choose Columns\" in Meds & Orders section of the refill encounter.              "

## 2018-10-03 RX ORDER — FLUTICASONE PROPIONATE 50 MCG
SPRAY, SUSPENSION (ML) NASAL
Qty: 16 G | Refills: 3 | Status: SHIPPED | OUTPATIENT
Start: 2018-10-03 | End: 2019-01-01

## 2018-10-03 NOTE — TELEPHONE ENCOUNTER
Prescription approved per [facility] Protocol.    RACHNA Davis, BSN, RN

## 2018-10-11 ENCOUNTER — TELEPHONE (OUTPATIENT)
Dept: NEUROLOGY | Facility: CLINIC | Age: 83
End: 2018-10-11

## 2018-10-11 NOTE — TELEPHONE ENCOUNTER
Patient was contacted to complete the mRS questionnaire. Patient's score was 0. Patient denies any concerns and will follow up as previously directed. Patient has contact information and was instructed to call with any new concerns.    Nicolas Baxter MA

## 2018-10-21 DIAGNOSIS — E78.5 HYPERLIPIDEMIA LDL GOAL <100: ICD-10-CM

## 2018-10-22 RX ORDER — ATORVASTATIN CALCIUM 40 MG/1
TABLET, FILM COATED ORAL
Qty: 0.01 TABLET | Refills: 0 | OUTPATIENT
Start: 2018-10-22

## 2018-10-22 NOTE — TELEPHONE ENCOUNTER
"Requested Prescriptions   Pending Prescriptions Disp Refills     atorvastatin (LIPITOR) 40 MG tablet [Pharmacy Med Name: ATORVASTATIN CALCIUM 40MG TABS]  Last Written Prescription Date:  8/29/2018  Last Fill Quantity: 90 tablet,  # refills: 3   Last Office Visit: 8/29/2018   Future Office Visit:      90 tablet 0     Sig: TAKE ONE TABLET BY MOUTH EVERY DAY    Statins Protocol Passed    10/21/2018 11:22 AM       Passed - LDL on file in past 12 months    Recent Labs   Lab Test  09/10/18   0724   LDL  62            Passed - No abnormal creatine kinase in past 12 months    No lab results found.            Passed - Recent (12 mo) or future (30 days) visit within the authorizing provider's specialty    Patient had office visit in the last 12 months or has a visit in the next 30 days with authorizing provider or within the authorizing provider's specialty.  See \"Patient Info\" tab in inbasket, or \"Choose Columns\" in Meds & Orders section of the refill encounter.             Passed - Patient is age 18 or older       Passed - No active pregnancy on record       Passed - No positive pregnancy test in past 12 months          "

## 2018-10-22 NOTE — TELEPHONE ENCOUNTER
Refused Prescriptions:                       Disp   Refills    atorvastatin (LIPITOR) 40 MG tablet [Pharm*0.01 t*0        Sig: TAKE ONE TABLET BY MOUTH EVERY DAY  Refused By: JAMIL GÓMEZ  Reason for Refusal: Duplicate      Closing encounter - no further actions needed at this time    Jamil Gómez RN

## 2018-10-29 ENCOUNTER — TELEPHONE (OUTPATIENT)
Dept: OTHER | Facility: CLINIC | Age: 83
End: 2018-10-29

## 2018-10-29 NOTE — TELEPHONE ENCOUNTER
Spoke with Jennifer.  Symptoms are about he same.  She has problems when she walks.  Left foot is pink and warm and she denies rest pain.  She states she is working with her rheumatologist to help with the symptoms.  Will follow up in a few months.  Instructed to call if symptoms worsens.  Flori Hernandez RN  IR nurse clinician  [phone]

## 2018-11-15 ENCOUNTER — TRANSFERRED RECORDS (OUTPATIENT)
Dept: HEALTH INFORMATION MANAGEMENT | Facility: CLINIC | Age: 83
End: 2018-11-15

## 2019-01-01 ENCOUNTER — ANCILLARY PROCEDURE (OUTPATIENT)
Dept: CARDIOLOGY | Facility: CLINIC | Age: 84
End: 2019-01-01
Attending: NURSE PRACTITIONER
Payer: COMMERCIAL

## 2019-01-01 ENCOUNTER — ANTICOAGULATION THERAPY VISIT (OUTPATIENT)
Dept: NURSING | Facility: CLINIC | Age: 84
End: 2019-01-01
Payer: COMMERCIAL

## 2019-01-01 ENCOUNTER — ANCILLARY PROCEDURE (OUTPATIENT)
Dept: CARDIOLOGY | Facility: CLINIC | Age: 84
DRG: 291 | End: 2019-01-01
Attending: NURSE PRACTITIONER
Payer: COMMERCIAL

## 2019-01-01 ENCOUNTER — ANCILLARY PROCEDURE (OUTPATIENT)
Dept: CARDIOLOGY | Facility: CLINIC | Age: 84
End: 2019-01-01
Attending: INTERNAL MEDICINE
Payer: COMMERCIAL

## 2019-01-01 ENCOUNTER — OFFICE VISIT (OUTPATIENT)
Dept: CARDIOLOGY | Facility: CLINIC | Age: 84
End: 2019-01-01
Payer: COMMERCIAL

## 2019-01-01 ENCOUNTER — APPOINTMENT (OUTPATIENT)
Dept: CARDIOLOGY | Facility: CLINIC | Age: 84
End: 2019-01-01
Attending: PHYSICIAN ASSISTANT
Payer: COMMERCIAL

## 2019-01-01 ENCOUNTER — MEDICAL CORRESPONDENCE (OUTPATIENT)
Dept: HEALTH INFORMATION MANAGEMENT | Facility: CLINIC | Age: 84
End: 2019-01-01

## 2019-01-01 ENCOUNTER — TELEPHONE (OUTPATIENT)
Dept: FAMILY MEDICINE | Facility: CLINIC | Age: 84
End: 2019-01-01

## 2019-01-01 ENCOUNTER — APPOINTMENT (OUTPATIENT)
Dept: CARDIOLOGY | Facility: CLINIC | Age: 84
DRG: 260 | End: 2019-01-01
Payer: COMMERCIAL

## 2019-01-01 ENCOUNTER — APPOINTMENT (OUTPATIENT)
Dept: MEDSURG UNIT | Facility: CLINIC | Age: 84
End: 2019-01-01
Attending: INTERNAL MEDICINE
Payer: COMMERCIAL

## 2019-01-01 ENCOUNTER — PATIENT OUTREACH (OUTPATIENT)
Dept: CARE COORDINATION | Facility: CLINIC | Age: 84
End: 2019-01-01

## 2019-01-01 ENCOUNTER — TELEPHONE (OUTPATIENT)
Dept: CARDIOLOGY | Facility: CLINIC | Age: 84
End: 2019-01-01

## 2019-01-01 ENCOUNTER — DOCUMENTATION ONLY (OUTPATIENT)
Dept: OTHER | Facility: CLINIC | Age: 84
End: 2019-01-01

## 2019-01-01 ENCOUNTER — OFFICE VISIT (OUTPATIENT)
Dept: FAMILY MEDICINE | Facility: CLINIC | Age: 84
End: 2019-01-01
Payer: COMMERCIAL

## 2019-01-01 ENCOUNTER — ANCILLARY PROCEDURE (OUTPATIENT)
Dept: CARDIOLOGY | Facility: CLINIC | Age: 84
DRG: 308 | End: 2019-01-01
Attending: EMERGENCY MEDICINE
Payer: COMMERCIAL

## 2019-01-01 ENCOUNTER — HOSPITAL ENCOUNTER (INPATIENT)
Facility: CLINIC | Age: 84
LOS: 2 days | Discharge: HOME | DRG: 308 | End: 2019-02-18
Attending: EMERGENCY MEDICINE | Admitting: INTERNAL MEDICINE
Payer: COMMERCIAL

## 2019-01-01 ENCOUNTER — APPOINTMENT (OUTPATIENT)
Dept: OCCUPATIONAL THERAPY | Facility: CLINIC | Age: 84
DRG: 308 | End: 2019-01-01
Attending: EMERGENCY MEDICINE
Payer: COMMERCIAL

## 2019-01-01 ENCOUNTER — ANCILLARY PROCEDURE (OUTPATIENT)
Dept: CARDIOLOGY | Facility: CLINIC | Age: 84
End: 2019-01-01
Payer: COMMERCIAL

## 2019-01-01 ENCOUNTER — HOSPITAL ENCOUNTER (INPATIENT)
Facility: CLINIC | Age: 84
LOS: 3 days | Discharge: HOSPICE | DRG: 291 | End: 2019-10-13
Attending: EMERGENCY MEDICINE | Admitting: INTERNAL MEDICINE
Payer: COMMERCIAL

## 2019-01-01 ENCOUNTER — CARE COORDINATION (OUTPATIENT)
Dept: CARDIOLOGY | Facility: CLINIC | Age: 84
End: 2019-01-01

## 2019-01-01 ENCOUNTER — TRANSFERRED RECORDS (OUTPATIENT)
Dept: HEALTH INFORMATION MANAGEMENT | Facility: CLINIC | Age: 84
End: 2019-01-01

## 2019-01-01 ENCOUNTER — APPOINTMENT (OUTPATIENT)
Dept: NUCLEAR MEDICINE | Facility: CLINIC | Age: 84
DRG: 242 | End: 2019-01-01
Payer: COMMERCIAL

## 2019-01-01 ENCOUNTER — APPOINTMENT (OUTPATIENT)
Dept: OCCUPATIONAL THERAPY | Facility: CLINIC | Age: 84
DRG: 308 | End: 2019-01-01
Attending: INTERNAL MEDICINE
Payer: COMMERCIAL

## 2019-01-01 ENCOUNTER — APPOINTMENT (OUTPATIENT)
Dept: CARDIOLOGY | Facility: CLINIC | Age: 84
DRG: 242 | End: 2019-01-01
Payer: COMMERCIAL

## 2019-01-01 ENCOUNTER — DOCUMENTATION ONLY (OUTPATIENT)
Dept: CARDIOLOGY | Facility: CLINIC | Age: 84
End: 2019-01-01

## 2019-01-01 ENCOUNTER — ALLIED HEALTH/NURSE VISIT (OUTPATIENT)
Dept: PHARMACY | Facility: CLINIC | Age: 84
End: 2019-01-01
Payer: COMMERCIAL

## 2019-01-01 ENCOUNTER — APPOINTMENT (OUTPATIENT)
Dept: GENERAL RADIOLOGY | Facility: CLINIC | Age: 84
DRG: 242 | End: 2019-01-01
Payer: COMMERCIAL

## 2019-01-01 ENCOUNTER — APPOINTMENT (OUTPATIENT)
Dept: GENERAL RADIOLOGY | Facility: CLINIC | Age: 84
DRG: 260 | End: 2019-01-01
Payer: COMMERCIAL

## 2019-01-01 ENCOUNTER — HEALTH MAINTENANCE LETTER (OUTPATIENT)
Age: 84
End: 2019-01-01

## 2019-01-01 ENCOUNTER — APPOINTMENT (OUTPATIENT)
Dept: GENERAL RADIOLOGY | Facility: CLINIC | Age: 84
End: 2019-01-01
Attending: EMERGENCY MEDICINE
Payer: COMMERCIAL

## 2019-01-01 ENCOUNTER — APPOINTMENT (OUTPATIENT)
Dept: GENERAL RADIOLOGY | Facility: CLINIC | Age: 84
DRG: 291 | End: 2019-01-01
Attending: EMERGENCY MEDICINE
Payer: COMMERCIAL

## 2019-01-01 ENCOUNTER — ANTICOAGULATION THERAPY VISIT (OUTPATIENT)
Dept: FAMILY MEDICINE | Facility: CLINIC | Age: 84
End: 2019-01-01

## 2019-01-01 ENCOUNTER — HOSPITAL ENCOUNTER (INPATIENT)
Facility: CLINIC | Age: 84
LOS: 2 days | Discharge: HOME | DRG: 260 | End: 2019-01-24
Attending: EMERGENCY MEDICINE | Admitting: INTERNAL MEDICINE
Payer: COMMERCIAL

## 2019-01-01 ENCOUNTER — APPOINTMENT (OUTPATIENT)
Dept: CT IMAGING | Facility: CLINIC | Age: 84
DRG: 260 | End: 2019-01-01
Payer: COMMERCIAL

## 2019-01-01 ENCOUNTER — APPOINTMENT (OUTPATIENT)
Dept: LAB | Facility: CLINIC | Age: 84
End: 2019-01-01
Attending: INTERNAL MEDICINE
Payer: COMMERCIAL

## 2019-01-01 ENCOUNTER — DOCUMENTATION ONLY (OUTPATIENT)
Dept: CARE COORDINATION | Facility: CLINIC | Age: 84
End: 2019-01-01

## 2019-01-01 ENCOUNTER — HOSPITAL ENCOUNTER (INPATIENT)
Facility: CLINIC | Age: 84
LOS: 4 days | Discharge: HOME | DRG: 242 | End: 2019-01-19
Attending: HOSPITALIST | Admitting: HOSPITALIST
Payer: COMMERCIAL

## 2019-01-01 ENCOUNTER — HOSPITAL ENCOUNTER (OUTPATIENT)
Facility: CLINIC | Age: 84
Setting detail: OBSERVATION
Discharge: HOME | End: 2019-03-28
Attending: EMERGENCY MEDICINE | Admitting: INTERNAL MEDICINE
Payer: COMMERCIAL

## 2019-01-01 ENCOUNTER — OFFICE VISIT (OUTPATIENT)
Dept: CARDIOLOGY | Facility: CLINIC | Age: 84
DRG: 291 | End: 2019-01-01
Attending: INTERNAL MEDICINE
Payer: COMMERCIAL

## 2019-01-01 ENCOUNTER — ANTICOAGULATION THERAPY VISIT (OUTPATIENT)
Dept: FAMILY MEDICINE | Facility: CLINIC | Age: 84
End: 2019-01-01
Payer: COMMERCIAL

## 2019-01-01 ENCOUNTER — APPOINTMENT (OUTPATIENT)
Dept: GENERAL RADIOLOGY | Facility: CLINIC | Age: 84
DRG: 308 | End: 2019-01-01
Attending: EMERGENCY MEDICINE
Payer: COMMERCIAL

## 2019-01-01 ENCOUNTER — HOSPITAL ENCOUNTER (OUTPATIENT)
Facility: CLINIC | Age: 84
Discharge: HOME | End: 2019-07-08
Attending: INTERNAL MEDICINE | Admitting: INTERNAL MEDICINE
Payer: COMMERCIAL

## 2019-01-01 ENCOUNTER — APPOINTMENT (OUTPATIENT)
Dept: GENERAL RADIOLOGY | Facility: CLINIC | Age: 84
DRG: 291 | End: 2019-01-01
Attending: NURSE PRACTITIONER
Payer: COMMERCIAL

## 2019-01-01 VITALS
DIASTOLIC BLOOD PRESSURE: 84 MMHG | HEART RATE: 87 BPM | BODY MASS INDEX: 21.58 KG/M2 | SYSTOLIC BLOOD PRESSURE: 148 MMHG | TEMPERATURE: 97.7 F | WEIGHT: 118 LBS | OXYGEN SATURATION: 95 %

## 2019-01-01 VITALS
OXYGEN SATURATION: 95 % | BODY MASS INDEX: 18.82 KG/M2 | DIASTOLIC BLOOD PRESSURE: 82 MMHG | HEART RATE: 60 BPM | WEIGHT: 102.3 LBS | SYSTOLIC BLOOD PRESSURE: 170 MMHG | HEIGHT: 62 IN

## 2019-01-01 VITALS
OXYGEN SATURATION: 93 % | SYSTOLIC BLOOD PRESSURE: 151 MMHG | WEIGHT: 122.6 LBS | DIASTOLIC BLOOD PRESSURE: 89 MMHG | BODY MASS INDEX: 22.56 KG/M2 | TEMPERATURE: 98 F | HEIGHT: 62 IN | HEART RATE: 100 BPM | RESPIRATION RATE: 18 BRPM

## 2019-01-01 VITALS
TEMPERATURE: 98.9 F | WEIGHT: 119.25 LBS | BODY MASS INDEX: 21.94 KG/M2 | HEART RATE: 90 BPM | DIASTOLIC BLOOD PRESSURE: 86 MMHG | RESPIRATION RATE: 16 BRPM | OXYGEN SATURATION: 94 % | SYSTOLIC BLOOD PRESSURE: 154 MMHG | HEIGHT: 62 IN

## 2019-01-01 VITALS
DIASTOLIC BLOOD PRESSURE: 81 MMHG | WEIGHT: 110 LBS | HEIGHT: 62 IN | SYSTOLIC BLOOD PRESSURE: 152 MMHG | HEART RATE: 80 BPM | RESPIRATION RATE: 18 BRPM | TEMPERATURE: 97.8 F | BODY MASS INDEX: 20.24 KG/M2 | OXYGEN SATURATION: 93 %

## 2019-01-01 VITALS
HEART RATE: 67 BPM | BODY MASS INDEX: 17.44 KG/M2 | SYSTOLIC BLOOD PRESSURE: 133 MMHG | DIASTOLIC BLOOD PRESSURE: 62 MMHG | OXYGEN SATURATION: 93 % | WEIGHT: 94.8 LBS | RESPIRATION RATE: 20 BRPM | HEIGHT: 62 IN | TEMPERATURE: 98.2 F

## 2019-01-01 VITALS
BODY MASS INDEX: 20.39 KG/M2 | DIASTOLIC BLOOD PRESSURE: 108 MMHG | SYSTOLIC BLOOD PRESSURE: 172 MMHG | OXYGEN SATURATION: 95 % | TEMPERATURE: 97.9 F | HEART RATE: 122 BPM | WEIGHT: 111.5 LBS

## 2019-01-01 VITALS
DIASTOLIC BLOOD PRESSURE: 74 MMHG | WEIGHT: 116 LBS | OXYGEN SATURATION: 96 % | HEART RATE: 90 BPM | RESPIRATION RATE: 14 BRPM | SYSTOLIC BLOOD PRESSURE: 155 MMHG | TEMPERATURE: 98.4 F | BODY MASS INDEX: 21.22 KG/M2

## 2019-01-01 VITALS
RESPIRATION RATE: 16 BRPM | DIASTOLIC BLOOD PRESSURE: 78 MMHG | BODY MASS INDEX: 20.48 KG/M2 | WEIGHT: 111.3 LBS | SYSTOLIC BLOOD PRESSURE: 153 MMHG | HEART RATE: 105 BPM | TEMPERATURE: 98.2 F | HEIGHT: 62 IN | OXYGEN SATURATION: 93 %

## 2019-01-01 VITALS
TEMPERATURE: 98.8 F | OXYGEN SATURATION: 92 % | DIASTOLIC BLOOD PRESSURE: 73 MMHG | RESPIRATION RATE: 16 BRPM | SYSTOLIC BLOOD PRESSURE: 131 MMHG | BODY MASS INDEX: 20.89 KG/M2 | HEART RATE: 93 BPM | WEIGHT: 113.5 LBS | HEIGHT: 62 IN

## 2019-01-01 VITALS
RESPIRATION RATE: 24 BRPM | HEIGHT: 63 IN | HEART RATE: 105 BPM | TEMPERATURE: 98.2 F | OXYGEN SATURATION: 95 % | BODY MASS INDEX: 19.67 KG/M2 | DIASTOLIC BLOOD PRESSURE: 84 MMHG | WEIGHT: 111 LBS | SYSTOLIC BLOOD PRESSURE: 141 MMHG

## 2019-01-01 VITALS
OXYGEN SATURATION: 92 % | RESPIRATION RATE: 16 BRPM | WEIGHT: 117.7 LBS | HEIGHT: 62 IN | DIASTOLIC BLOOD PRESSURE: 72 MMHG | SYSTOLIC BLOOD PRESSURE: 130 MMHG | HEART RATE: 106 BPM | TEMPERATURE: 98.4 F | BODY MASS INDEX: 21.66 KG/M2

## 2019-01-01 VITALS
BODY MASS INDEX: 21.63 KG/M2 | DIASTOLIC BLOOD PRESSURE: 86 MMHG | OXYGEN SATURATION: 95 % | HEART RATE: 84 BPM | TEMPERATURE: 98.3 F | SYSTOLIC BLOOD PRESSURE: 145 MMHG | WEIGHT: 118.25 LBS

## 2019-01-01 VITALS — DIASTOLIC BLOOD PRESSURE: 71 MMHG | SYSTOLIC BLOOD PRESSURE: 133 MMHG | HEART RATE: 102 BPM

## 2019-01-01 DIAGNOSIS — I48.91 ATRIAL FIBRILLATION WITH RVR (H): ICD-10-CM

## 2019-01-01 DIAGNOSIS — J30.2 SEASONAL ALLERGIC RHINITIS, UNSPECIFIED TRIGGER: ICD-10-CM

## 2019-01-01 DIAGNOSIS — I63.9 CEREBROVASCULAR ACCIDENT (CVA), UNSPECIFIED MECHANISM (H): ICD-10-CM

## 2019-01-01 DIAGNOSIS — I48.91 NEW ONSET ATRIAL FIBRILLATION (H): ICD-10-CM

## 2019-01-01 DIAGNOSIS — I69.90 LATE EFFECTS OF CVA (CEREBROVASCULAR ACCIDENT): ICD-10-CM

## 2019-01-01 DIAGNOSIS — M35.3 PMR (POLYMYALGIA RHEUMATICA) (H): ICD-10-CM

## 2019-01-01 DIAGNOSIS — I48.21 PERMANENT ATRIAL FIBRILLATION (H): ICD-10-CM

## 2019-01-01 DIAGNOSIS — J44.9 CHRONIC OBSTRUCTIVE PULMONARY DISEASE, UNSPECIFIED COPD TYPE (H): Primary | ICD-10-CM

## 2019-01-01 DIAGNOSIS — I10 ESSENTIAL HYPERTENSION WITH GOAL BLOOD PRESSURE LESS THAN 140/90: ICD-10-CM

## 2019-01-01 DIAGNOSIS — I48.91 ATRIAL FIBRILLATION WITH RVR (H): Primary | ICD-10-CM

## 2019-01-01 DIAGNOSIS — I48.19 PERSISTENT ATRIAL FIBRILLATION (H): Primary | ICD-10-CM

## 2019-01-01 DIAGNOSIS — M54.16 LUMBAR RADICULAR PAIN: ICD-10-CM

## 2019-01-01 DIAGNOSIS — J43.8 OTHER EMPHYSEMA (H): Primary | ICD-10-CM

## 2019-01-01 DIAGNOSIS — Z86.73 HISTORY OF CVA (CEREBROVASCULAR ACCIDENT): ICD-10-CM

## 2019-01-01 DIAGNOSIS — I50.32 CHRONIC DIASTOLIC HEART FAILURE (H): Primary | ICD-10-CM

## 2019-01-01 DIAGNOSIS — Z95.0 CARDIAC PACEMAKER IN SITU: ICD-10-CM

## 2019-01-01 DIAGNOSIS — I10 ESSENTIAL HYPERTENSION: ICD-10-CM

## 2019-01-01 DIAGNOSIS — Z95.1 S/P CABG (CORONARY ARTERY BYPASS GRAFT): ICD-10-CM

## 2019-01-01 DIAGNOSIS — M06.9 RHEUMATOID ARTHRITIS, INVOLVING UNSPECIFIED SITE, UNSPECIFIED RHEUMATOID FACTOR PRESENCE: ICD-10-CM

## 2019-01-01 DIAGNOSIS — I50.20 SYSTOLIC HEART FAILURE, UNSPECIFIED HF CHRONICITY (H): ICD-10-CM

## 2019-01-01 DIAGNOSIS — I48.91 ATRIAL FIBRILLATION, RAPID (H): ICD-10-CM

## 2019-01-01 DIAGNOSIS — R07.89 ATYPICAL CHEST PAIN: ICD-10-CM

## 2019-01-01 DIAGNOSIS — J44.9 CHRONIC OBSTRUCTIVE PULMONARY DISEASE, UNSPECIFIED COPD TYPE (H): ICD-10-CM

## 2019-01-01 DIAGNOSIS — E78.5 HYPERLIPIDEMIA LDL GOAL <100: ICD-10-CM

## 2019-01-01 DIAGNOSIS — K59.00 CONSTIPATION, UNSPECIFIED CONSTIPATION TYPE: Primary | ICD-10-CM

## 2019-01-01 DIAGNOSIS — I10 BENIGN ESSENTIAL HYPERTENSION: ICD-10-CM

## 2019-01-01 DIAGNOSIS — Z98.890 HX OF ATRIOVENTRICULAR NODE ABLATION: ICD-10-CM

## 2019-01-01 DIAGNOSIS — J44.1 COPD EXACERBATION (H): Primary | ICD-10-CM

## 2019-01-01 DIAGNOSIS — K59.04 CHRONIC IDIOPATHIC CONSTIPATION: ICD-10-CM

## 2019-01-01 DIAGNOSIS — I63.9 STROKE (H): ICD-10-CM

## 2019-01-01 DIAGNOSIS — M85.80 OSTEOPENIA, UNSPECIFIED LOCATION: ICD-10-CM

## 2019-01-01 DIAGNOSIS — F41.9 ANXIETY: ICD-10-CM

## 2019-01-01 DIAGNOSIS — Z95.1 HISTORY OF CORONARY ARTERY BYPASS GRAFT: ICD-10-CM

## 2019-01-01 DIAGNOSIS — R06.01 ORTHOPNEA: ICD-10-CM

## 2019-01-01 DIAGNOSIS — T82.9XXA PACEMAKER COMPLICATIONS: ICD-10-CM

## 2019-01-01 DIAGNOSIS — J30.2 SEASONAL ALLERGIES: ICD-10-CM

## 2019-01-01 DIAGNOSIS — I49.9 SINUS NODE ARRHYTHMIA: ICD-10-CM

## 2019-01-01 DIAGNOSIS — I10 HYPERTENSION GOAL BP (BLOOD PRESSURE) < 140/90: ICD-10-CM

## 2019-01-01 DIAGNOSIS — J44.9 COPD (CHRONIC OBSTRUCTIVE PULMONARY DISEASE) (H): Primary | ICD-10-CM

## 2019-01-01 DIAGNOSIS — N18.30 CKD (CHRONIC KIDNEY DISEASE) STAGE 3, GFR 30-59 ML/MIN (H): ICD-10-CM

## 2019-01-01 DIAGNOSIS — I48.20 CHRONIC ATRIAL FIBRILLATION (H): Primary | ICD-10-CM

## 2019-01-01 DIAGNOSIS — I48.21 PERMANENT ATRIAL FIBRILLATION (H): Primary | ICD-10-CM

## 2019-01-01 DIAGNOSIS — I50.23 ACUTE ON CHRONIC SYSTOLIC HEART FAILURE (H): ICD-10-CM

## 2019-01-01 DIAGNOSIS — I50.9 ACUTE ON CHRONIC HEART FAILURE, UNSPECIFIED HEART FAILURE TYPE (H): Primary | ICD-10-CM

## 2019-01-01 DIAGNOSIS — I50.9 CHF (CONGESTIVE HEART FAILURE) (H): Primary | ICD-10-CM

## 2019-01-01 DIAGNOSIS — M10.9 GOUT, UNSPECIFIED CAUSE, UNSPECIFIED CHRONICITY, UNSPECIFIED SITE: ICD-10-CM

## 2019-01-01 DIAGNOSIS — Z79.01 LONG TERM (CURRENT) USE OF ANTICOAGULANTS: ICD-10-CM

## 2019-01-01 DIAGNOSIS — Z95.0 PRESENCE OF CARDIAC RESYNCHRONIZATION THERAPY PACEMAKER (CRT-P): ICD-10-CM

## 2019-01-01 DIAGNOSIS — I25.10 ASCVD (ARTERIOSCLEROTIC CARDIOVASCULAR DISEASE): ICD-10-CM

## 2019-01-01 DIAGNOSIS — I50.9 CHF (CONGESTIVE HEART FAILURE) (H): ICD-10-CM

## 2019-01-01 DIAGNOSIS — R07.9 CHEST PAIN: Primary | ICD-10-CM

## 2019-01-01 DIAGNOSIS — I48.19 PERSISTENT ATRIAL FIBRILLATION (H): ICD-10-CM

## 2019-01-01 DIAGNOSIS — Z95.0 CARDIAC RESYNCHRONIZATION THERAPY PACEMAKER (CRT-P) IN PLACE: ICD-10-CM

## 2019-01-01 DIAGNOSIS — I50.9 CONGESTIVE HEART FAILURE, UNSPECIFIED HF CHRONICITY, UNSPECIFIED HEART FAILURE TYPE (H): ICD-10-CM

## 2019-01-01 DIAGNOSIS — Z86.73 HISTORY OF STROKE: ICD-10-CM

## 2019-01-01 DIAGNOSIS — I50.22 CHRONIC SYSTOLIC HEART FAILURE (H): ICD-10-CM

## 2019-01-01 DIAGNOSIS — K59.00 CONSTIPATION, UNSPECIFIED CONSTIPATION TYPE: ICD-10-CM

## 2019-01-01 DIAGNOSIS — F32.A DEPRESSION, UNSPECIFIED DEPRESSION TYPE: ICD-10-CM

## 2019-01-01 DIAGNOSIS — M48.061 SPINAL STENOSIS, LUMBAR REGION, WITHOUT NEUROGENIC CLAUDICATION: ICD-10-CM

## 2019-01-01 DIAGNOSIS — I65.29 CAROTID STENOSIS: Primary | ICD-10-CM

## 2019-01-01 DIAGNOSIS — I50.32 CHRONIC DIASTOLIC HEART FAILURE (H): ICD-10-CM

## 2019-01-01 DIAGNOSIS — I63.412 CEREBROVASCULAR ACCIDENT (CVA) DUE TO EMBOLISM OF LEFT MIDDLE CEREBRAL ARTERY (H): ICD-10-CM

## 2019-01-01 DIAGNOSIS — I50.21 ACUTE SYSTOLIC CONGESTIVE HEART FAILURE (H): ICD-10-CM

## 2019-01-01 DIAGNOSIS — Z79.899 ON AMIODARONE THERAPY: Primary | ICD-10-CM

## 2019-01-01 DIAGNOSIS — I50.9 ACUTE ON CHRONIC CONGESTIVE HEART FAILURE, UNSPECIFIED HEART FAILURE TYPE (H): ICD-10-CM

## 2019-01-01 DIAGNOSIS — Z51.5 END OF LIFE CARE: ICD-10-CM

## 2019-01-01 DIAGNOSIS — E78.5 HYPERLIPIDEMIA LDL GOAL <70: ICD-10-CM

## 2019-01-01 DIAGNOSIS — I63.412 CEREBROVASCULAR ACCIDENT (CVA) DUE TO EMBOLISM OF LEFT MIDDLE CEREBRAL ARTERY (H): Primary | ICD-10-CM

## 2019-01-01 DIAGNOSIS — Z95.0 CARDIAC PACEMAKER IN SITU: Primary | ICD-10-CM

## 2019-01-01 LAB
ALBUMIN SERPL-MCNC: 3.5 G/DL (ref 3.4–5)
ALBUMIN UR-MCNC: NEGATIVE MG/DL
ALP SERPL-CCNC: 75 U/L (ref 40–150)
ALT SERPL W P-5'-P-CCNC: 76 U/L (ref 0–50)
ANION GAP SERPL CALCULATED.3IONS-SCNC: 10 MMOL/L (ref 3–14)
ANION GAP SERPL CALCULATED.3IONS-SCNC: 4 MMOL/L (ref 3–14)
ANION GAP SERPL CALCULATED.3IONS-SCNC: 4 MMOL/L (ref 3–14)
ANION GAP SERPL CALCULATED.3IONS-SCNC: 5 MMOL/L (ref 3–14)
ANION GAP SERPL CALCULATED.3IONS-SCNC: 6 MMOL/L (ref 3–14)
ANION GAP SERPL CALCULATED.3IONS-SCNC: 6 MMOL/L (ref 3–14)
ANION GAP SERPL CALCULATED.3IONS-SCNC: 7 MMOL/L (ref 3–14)
ANION GAP SERPL CALCULATED.3IONS-SCNC: 7 MMOL/L (ref 3–14)
ANION GAP SERPL CALCULATED.3IONS-SCNC: 8 MMOL/L (ref 3–14)
ANION GAP SERPL CALCULATED.3IONS-SCNC: 9 MMOL/L (ref 3–14)
APPEARANCE UR: CLEAR
APTT PPP: 32 SEC (ref 22–37)
AST SERPL W P-5'-P-CCNC: 57 U/L (ref 0–45)
BACTERIA SPEC CULT: NORMAL
BASOPHILS # BLD AUTO: 0 10E9/L (ref 0–0.2)
BASOPHILS NFR BLD AUTO: 0.4 %
BASOPHILS NFR BLD AUTO: 0.4 %
BASOPHILS NFR BLD AUTO: 0.5 %
BASOPHILS NFR BLD AUTO: 0.6 %
BASOPHILS NFR BLD AUTO: 0.6 %
BASOPHILS NFR BLD AUTO: 0.7 %
BILIRUB SERPL-MCNC: 1.2 MG/DL (ref 0.2–1.3)
BILIRUB UR QL STRIP: NEGATIVE
BUN SERPL-MCNC: 15 MG/DL (ref 7–30)
BUN SERPL-MCNC: 15 MG/DL (ref 7–30)
BUN SERPL-MCNC: 17 MG/DL (ref 7–30)
BUN SERPL-MCNC: 17 MG/DL (ref 7–30)
BUN SERPL-MCNC: 19 MG/DL (ref 7–30)
BUN SERPL-MCNC: 20 MG/DL (ref 7–30)
BUN SERPL-MCNC: 20 MG/DL (ref 7–30)
BUN SERPL-MCNC: 23 MG/DL (ref 7–30)
BUN SERPL-MCNC: 25 MG/DL (ref 7–30)
BUN SERPL-MCNC: 28 MG/DL (ref 7–30)
BUN SERPL-MCNC: 29 MG/DL (ref 7–30)
BUN SERPL-MCNC: 31 MG/DL (ref 7–30)
BUN SERPL-MCNC: 34 MG/DL (ref 7–30)
CALCIUM SERPL-MCNC: 7.9 MG/DL (ref 8.5–10.1)
CALCIUM SERPL-MCNC: 8.3 MG/DL (ref 8.5–10.1)
CALCIUM SERPL-MCNC: 8.4 MG/DL (ref 8.5–10.1)
CALCIUM SERPL-MCNC: 8.7 MG/DL (ref 8.5–10.1)
CALCIUM SERPL-MCNC: 8.7 MG/DL (ref 8.5–10.1)
CALCIUM SERPL-MCNC: 8.8 MG/DL (ref 8.5–10.1)
CALCIUM SERPL-MCNC: 8.8 MG/DL (ref 8.5–10.1)
CALCIUM SERPL-MCNC: 9 MG/DL (ref 8.5–10.1)
CALCIUM SERPL-MCNC: 9.2 MG/DL (ref 8.5–10.1)
CHLORIDE SERPL-SCNC: 102 MMOL/L (ref 94–109)
CHLORIDE SERPL-SCNC: 103 MMOL/L (ref 94–109)
CHLORIDE SERPL-SCNC: 104 MMOL/L (ref 94–109)
CHLORIDE SERPL-SCNC: 104 MMOL/L (ref 94–109)
CHLORIDE SERPL-SCNC: 105 MMOL/L (ref 94–109)
CHLORIDE SERPL-SCNC: 106 MMOL/L (ref 94–109)
CHLORIDE SERPL-SCNC: 106 MMOL/L (ref 94–109)
CHLORIDE SERPL-SCNC: 108 MMOL/L (ref 94–109)
CHLORIDE SERPL-SCNC: 109 MMOL/L (ref 94–109)
CHLORIDE SERPL-SCNC: 113 MMOL/L (ref 94–109)
CHLORIDE SERPL-SCNC: 99 MMOL/L (ref 94–109)
CO2 BLDCOV-SCNC: 30 MMOL/L (ref 21–28)
CO2 SERPL-SCNC: 27 MMOL/L (ref 20–32)
CO2 SERPL-SCNC: 27 MMOL/L (ref 20–32)
CO2 SERPL-SCNC: 28 MMOL/L (ref 20–32)
CO2 SERPL-SCNC: 29 MMOL/L (ref 20–32)
CO2 SERPL-SCNC: 30 MMOL/L (ref 20–32)
CO2 SERPL-SCNC: 31 MMOL/L (ref 20–32)
CO2 SERPL-SCNC: 33 MMOL/L (ref 20–32)
COLOR UR AUTO: ABNORMAL
CREAT BLD-MCNC: 1.3 MG/DL (ref 0.52–1.04)
CREAT SERPL-MCNC: 0.94 MG/DL (ref 0.52–1.04)
CREAT SERPL-MCNC: 0.96 MG/DL (ref 0.52–1.04)
CREAT SERPL-MCNC: 1 MG/DL (ref 0.52–1.04)
CREAT SERPL-MCNC: 1.01 MG/DL (ref 0.52–1.04)
CREAT SERPL-MCNC: 1.01 MG/DL (ref 0.52–1.04)
CREAT SERPL-MCNC: 1.03 MG/DL (ref 0.52–1.04)
CREAT SERPL-MCNC: 1.04 MG/DL (ref 0.52–1.04)
CREAT SERPL-MCNC: 1.06 MG/DL (ref 0.52–1.04)
CREAT SERPL-MCNC: 1.06 MG/DL (ref 0.52–1.04)
CREAT SERPL-MCNC: 1.1 MG/DL (ref 0.52–1.04)
CREAT SERPL-MCNC: 1.12 MG/DL (ref 0.52–1.04)
CREAT SERPL-MCNC: 1.17 MG/DL (ref 0.52–1.04)
CREAT SERPL-MCNC: 1.17 MG/DL (ref 0.52–1.04)
CREAT SERPL-MCNC: 1.2 MG/DL (ref 0.52–1.04)
CREAT SERPL-MCNC: 1.21 MG/DL (ref 0.52–1.04)
CREAT SERPL-MCNC: 1.25 MG/DL (ref 0.52–1.04)
CREAT UR-MCNC: 54 MG/DL
DIFFERENTIAL METHOD BLD: ABNORMAL
DIFFERENTIAL METHOD BLD: NORMAL
DIGOXIN SERPL-MCNC: 0.8 UG/L (ref 0.5–2)
DLCOCOR-%PRED-PRE: 39 %
DLCOCOR-PRE: 7.11 ML/MIN/MMHG
DLCOUNC-%PRED-PRE: 39 %
DLCOUNC-PRE: 7.11 ML/MIN/MMHG
DLCOUNC-PRED: 18.07 ML/MIN/MMHG
EOSINOPHIL # BLD AUTO: 0 10E9/L (ref 0–0.7)
EOSINOPHIL # BLD AUTO: 0.1 10E9/L (ref 0–0.7)
EOSINOPHIL NFR BLD AUTO: 0.6 %
EOSINOPHIL NFR BLD AUTO: 1.3 %
EOSINOPHIL NFR BLD AUTO: 1.3 %
EOSINOPHIL NFR BLD AUTO: 1.4 %
EOSINOPHIL NFR BLD AUTO: 1.4 %
EOSINOPHIL NFR BLD AUTO: 2 %
ERV-%PRED-PRE: 92 %
ERV-PRE: 0.7 L
ERV-PRED: 0.75 L
ERYTHROCYTE [DISTWIDTH] IN BLOOD BY AUTOMATED COUNT: 13.9 % (ref 10–15)
ERYTHROCYTE [DISTWIDTH] IN BLOOD BY AUTOMATED COUNT: 13.9 % (ref 10–15)
ERYTHROCYTE [DISTWIDTH] IN BLOOD BY AUTOMATED COUNT: 14 % (ref 10–15)
ERYTHROCYTE [DISTWIDTH] IN BLOOD BY AUTOMATED COUNT: 14.2 % (ref 10–15)
ERYTHROCYTE [DISTWIDTH] IN BLOOD BY AUTOMATED COUNT: 14.2 % (ref 10–15)
ERYTHROCYTE [DISTWIDTH] IN BLOOD BY AUTOMATED COUNT: 14.5 % (ref 10–15)
ERYTHROCYTE [DISTWIDTH] IN BLOOD BY AUTOMATED COUNT: 14.6 % (ref 10–15)
ERYTHROCYTE [DISTWIDTH] IN BLOOD BY AUTOMATED COUNT: 14.6 % (ref 10–15)
ERYTHROCYTE [DISTWIDTH] IN BLOOD BY AUTOMATED COUNT: 15.2 % (ref 10–15)
ERYTHROCYTE [DISTWIDTH] IN BLOOD BY AUTOMATED COUNT: 15.3 % (ref 10–15)
ERYTHROCYTE [DISTWIDTH] IN BLOOD BY AUTOMATED COUNT: 15.5 % (ref 10–15)
ERYTHROCYTE [DISTWIDTH] IN BLOOD BY AUTOMATED COUNT: 15.6 % (ref 10–15)
EXPTIME-PRE: 6.5 SEC
FEF2575-%PRED-POST: 56 %
FEF2575-%PRED-PRE: 44 %
FEF2575-POST: 0.73 L/SEC
FEF2575-PRE: 0.57 L/SEC
FEF2575-PRED: 1.29 L/SEC
FEFMAX-%PRED-PRE: 66 %
FEFMAX-PRE: 2.67 L/SEC
FEFMAX-PRED: 4 L/SEC
FEV1-%PRED-PRE: 63 %
FEV1-PRE: 1.04 L
FEV1FEV6-PRE: 66 %
FEV1FEV6-PRED: 77 %
FEV1FVC-PRE: 66 %
FEV1FVC-PRED: 77 %
FEV1SVC-PRE: 61 %
FEV1SVC-PRED: 61 %
FIFMAX-PRE: 2.5 L/SEC
FRCPLETH-%PRED-PRE: 106 %
FRCPLETH-PRE: 2.88 L
FRCPLETH-PRED: 2.7 L
FVC-%PRED-PRE: 73 %
FVC-PRE: 1.56 L
FVC-PRED: 2.13 L
GFR SERPL CREATININE-BSD FRML MDRD: 39 ML/MIN/{1.73_M2}
GFR SERPL CREATININE-BSD FRML MDRD: 39 ML/MIN/{1.73_M2}
GFR SERPL CREATININE-BSD FRML MDRD: 40 ML/MIN/{1.73_M2}
GFR SERPL CREATININE-BSD FRML MDRD: 41 ML/MIN/{1.73_M2}
GFR SERPL CREATININE-BSD FRML MDRD: 42 ML/MIN/{1.73_M2}
GFR SERPL CREATININE-BSD FRML MDRD: 42 ML/MIN/{1.73_M2}
GFR SERPL CREATININE-BSD FRML MDRD: 44 ML/MIN/{1.73_M2}
GFR SERPL CREATININE-BSD FRML MDRD: 45 ML/MIN/{1.73_M2}
GFR SERPL CREATININE-BSD FRML MDRD: 47 ML/MIN/{1.73_M2}
GFR SERPL CREATININE-BSD FRML MDRD: 47 ML/MIN/{1.73_M2}
GFR SERPL CREATININE-BSD FRML MDRD: 48 ML/MIN/{1.73_M2}
GFR SERPL CREATININE-BSD FRML MDRD: 49 ML/MIN/{1.73_M2}
GFR SERPL CREATININE-BSD FRML MDRD: 50 ML/MIN/{1.73_M2}
GFR SERPL CREATININE-BSD FRML MDRD: 50 ML/MIN/{1.73_M2}
GFR SERPL CREATININE-BSD FRML MDRD: 51 ML/MIN/{1.73_M2}
GFR SERPL CREATININE-BSD FRML MDRD: 53 ML/MIN/{1.73_M2}
GFR SERPL CREATININE-BSD FRML MDRD: 54 ML/MIN/{1.73_M2}
GLUCOSE SERPL-MCNC: 103 MG/DL (ref 70–99)
GLUCOSE SERPL-MCNC: 107 MG/DL (ref 70–99)
GLUCOSE SERPL-MCNC: 108 MG/DL (ref 70–99)
GLUCOSE SERPL-MCNC: 111 MG/DL (ref 70–99)
GLUCOSE SERPL-MCNC: 114 MG/DL (ref 70–99)
GLUCOSE SERPL-MCNC: 114 MG/DL (ref 70–99)
GLUCOSE SERPL-MCNC: 115 MG/DL (ref 70–99)
GLUCOSE SERPL-MCNC: 117 MG/DL (ref 70–99)
GLUCOSE SERPL-MCNC: 120 MG/DL (ref 70–99)
GLUCOSE SERPL-MCNC: 126 MG/DL (ref 70–99)
GLUCOSE SERPL-MCNC: 137 MG/DL (ref 70–99)
GLUCOSE SERPL-MCNC: 80 MG/DL (ref 70–99)
GLUCOSE SERPL-MCNC: 83 MG/DL (ref 70–99)
GLUCOSE SERPL-MCNC: 92 MG/DL (ref 70–99)
GLUCOSE SERPL-MCNC: 98 MG/DL (ref 70–99)
GLUCOSE UR STRIP-MCNC: NEGATIVE MG/DL
HCT VFR BLD AUTO: 37.2 % (ref 35–47)
HCT VFR BLD AUTO: 39.5 % (ref 35–47)
HCT VFR BLD AUTO: 40.2 % (ref 35–47)
HCT VFR BLD AUTO: 40.7 % (ref 35–47)
HCT VFR BLD AUTO: 41.1 % (ref 35–47)
HCT VFR BLD AUTO: 41.7 % (ref 35–47)
HCT VFR BLD AUTO: 43.3 % (ref 35–47)
HCT VFR BLD AUTO: 43.9 % (ref 35–47)
HCT VFR BLD AUTO: 44.5 % (ref 35–47)
HCT VFR BLD AUTO: 44.6 % (ref 35–47)
HCT VFR BLD AUTO: 46.1 % (ref 35–47)
HCT VFR BLD AUTO: 47.2 % (ref 35–47)
HGB BLD-MCNC: 11.9 G/DL (ref 11.7–15.7)
HGB BLD-MCNC: 12.6 G/DL (ref 11.7–15.7)
HGB BLD-MCNC: 12.7 G/DL (ref 11.7–15.7)
HGB BLD-MCNC: 12.9 G/DL (ref 11.7–15.7)
HGB BLD-MCNC: 13.4 G/DL (ref 11.7–15.7)
HGB BLD-MCNC: 13.4 G/DL (ref 11.7–15.7)
HGB BLD-MCNC: 13.8 G/DL (ref 11.7–15.7)
HGB BLD-MCNC: 13.9 G/DL (ref 11.7–15.7)
HGB BLD-MCNC: 13.9 G/DL (ref 11.7–15.7)
HGB BLD-MCNC: 14.3 G/DL (ref 11.7–15.7)
HGB BLD-MCNC: 14.6 G/DL (ref 11.7–15.7)
HGB BLD-MCNC: 15.2 G/DL (ref 11.7–15.7)
HGB UR QL STRIP: NEGATIVE
IC-%PRED-PRE: 53 %
IC-PRE: 1.01 L
IC-PRED: 1.89 L
IMM GRANULOCYTES # BLD: 0 10E9/L (ref 0–0.4)
IMM GRANULOCYTES NFR BLD: 0.1 %
IMM GRANULOCYTES NFR BLD: 0.2 %
IMM GRANULOCYTES NFR BLD: 0.4 %
INR POINT OF CARE: 1.6 (ref 0.86–1.14)
INR POINT OF CARE: 1.7 (ref 0.86–1.14)
INR POINT OF CARE: 1.7 (ref 0.86–1.14)
INR POINT OF CARE: 1.8 (ref 0.86–1.14)
INR POINT OF CARE: 2 (ref 0.86–1.14)
INR POINT OF CARE: 2.5 (ref 0.86–1.14)
INR POINT OF CARE: 2.7 (ref 0.86–1.14)
INR POINT OF CARE: 2.8 (ref 0.86–1.14)
INR POINT OF CARE: 3.4 (ref 0.86–1.14)
INR POINT OF CARE: 4 (ref 0.86–1.14)
INR PPP: 1.08 (ref 0.86–1.14)
INR PPP: 1.09 (ref 0.86–1.14)
INR PPP: 1.47 (ref 0.86–1.14)
INR PPP: 1.98 (ref 0.86–1.14)
INR PPP: 2.06 (ref 0.86–1.14)
INR PPP: 2.08 (ref 0.86–1.14)
INR PPP: 2.2 (ref 0.86–1.14)
INR PPP: 2.23 (ref 0.86–1.14)
INR PPP: 2.82 (ref 0.86–1.14)
INTERPRETATION ECG - MUSE: NORMAL
KETONES UR STRIP-MCNC: NEGATIVE MG/DL
LACTATE BLD-SCNC: 1.3 MMOL/L (ref 0.7–2)
LACTATE BLD-SCNC: 1.4 MMOL/L (ref 0.7–2.1)
LEUKOCYTE ESTERASE UR QL STRIP: NEGATIVE
LYMPHOCYTES # BLD AUTO: 0.6 10E9/L (ref 0.8–5.3)
LYMPHOCYTES # BLD AUTO: 0.8 10E9/L (ref 0.8–5.3)
LYMPHOCYTES # BLD AUTO: 1 10E9/L (ref 0.8–5.3)
LYMPHOCYTES # BLD AUTO: 1 10E9/L (ref 0.8–5.3)
LYMPHOCYTES # BLD AUTO: 1.1 10E9/L (ref 0.8–5.3)
LYMPHOCYTES # BLD AUTO: 1.2 10E9/L (ref 0.8–5.3)
LYMPHOCYTES NFR BLD AUTO: 11.2 %
LYMPHOCYTES NFR BLD AUTO: 14.3 %
LYMPHOCYTES NFR BLD AUTO: 14.5 %
LYMPHOCYTES NFR BLD AUTO: 18.1 %
LYMPHOCYTES NFR BLD AUTO: 19.5 %
LYMPHOCYTES NFR BLD AUTO: 22.2 %
Lab: NORMAL
MAGNESIUM SERPL-MCNC: 1.7 MG/DL (ref 1.6–2.3)
MAGNESIUM SERPL-MCNC: 1.8 MG/DL (ref 1.6–2.3)
MAGNESIUM SERPL-MCNC: 1.8 MG/DL (ref 1.6–2.3)
MAGNESIUM SERPL-MCNC: 1.9 MG/DL (ref 1.6–2.3)
MAGNESIUM SERPL-MCNC: 2.1 MG/DL (ref 1.6–2.3)
MAGNESIUM SERPL-MCNC: 2.1 MG/DL (ref 1.6–2.3)
MAGNESIUM SERPL-MCNC: 2.4 MG/DL (ref 1.6–2.3)
MAGNESIUM SERPL-MCNC: 2.6 MG/DL (ref 1.6–2.3)
MCH RBC QN AUTO: 31.2 PG (ref 26.5–33)
MCH RBC QN AUTO: 31.5 PG (ref 26.5–33)
MCH RBC QN AUTO: 31.5 PG (ref 26.5–33)
MCH RBC QN AUTO: 31.7 PG (ref 26.5–33)
MCH RBC QN AUTO: 31.9 PG (ref 26.5–33)
MCH RBC QN AUTO: 31.9 PG (ref 26.5–33)
MCH RBC QN AUTO: 32.1 PG (ref 26.5–33)
MCH RBC QN AUTO: 32.2 PG (ref 26.5–33)
MCH RBC QN AUTO: 32.3 PG (ref 26.5–33)
MCH RBC QN AUTO: 32.6 PG (ref 26.5–33)
MCH RBC QN AUTO: 32.6 PG (ref 26.5–33)
MCH RBC QN AUTO: 32.8 PG (ref 26.5–33)
MCHC RBC AUTO-ENTMCNC: 31.2 G/DL (ref 31.5–36.5)
MCHC RBC AUTO-ENTMCNC: 31.2 G/DL (ref 31.5–36.5)
MCHC RBC AUTO-ENTMCNC: 31.7 G/DL (ref 31.5–36.5)
MCHC RBC AUTO-ENTMCNC: 31.7 G/DL (ref 31.5–36.5)
MCHC RBC AUTO-ENTMCNC: 31.9 G/DL (ref 31.5–36.5)
MCHC RBC AUTO-ENTMCNC: 31.9 G/DL (ref 31.5–36.5)
MCHC RBC AUTO-ENTMCNC: 32 G/DL (ref 31.5–36.5)
MCHC RBC AUTO-ENTMCNC: 32.1 G/DL (ref 31.5–36.5)
MCHC RBC AUTO-ENTMCNC: 32.2 G/DL (ref 31.5–36.5)
MCHC RBC AUTO-ENTMCNC: 32.6 G/DL (ref 31.5–36.5)
MCV RBC AUTO: 100 FL (ref 78–100)
MCV RBC AUTO: 100 FL (ref 78–100)
MCV RBC AUTO: 101 FL (ref 78–100)
MCV RBC AUTO: 102 FL (ref 78–100)
MCV RBC AUTO: 98 FL (ref 78–100)
MCV RBC AUTO: 99 FL (ref 78–100)
MCV RBC AUTO: 99 FL (ref 78–100)
MDC_IDC_EPISODE_DTM: NORMAL
MDC_IDC_EPISODE_DURATION: 12 S
MDC_IDC_EPISODE_DURATION: 14 S
MDC_IDC_EPISODE_DURATION: 14 S
MDC_IDC_EPISODE_DURATION: 15 S
MDC_IDC_EPISODE_DURATION: 16 S
MDC_IDC_EPISODE_DURATION: 18 S
MDC_IDC_EPISODE_DURATION: 19 S
MDC_IDC_EPISODE_DURATION: 30 S
MDC_IDC_EPISODE_ID: NORMAL
MDC_IDC_EPISODE_TYPE: NORMAL
MDC_IDC_LEAD_IMPLANT_DT: NORMAL
MDC_IDC_LEAD_LOCATION: NORMAL
MDC_IDC_LEAD_LOCATION_DETAIL_1: NORMAL
MDC_IDC_LEAD_MFG: NORMAL
MDC_IDC_LEAD_MODEL: NORMAL
MDC_IDC_LEAD_POLARITY_TYPE: NORMAL
MDC_IDC_LEAD_SERIAL: NORMAL
MDC_IDC_MSMT_BATTERY_DTM: NORMAL
MDC_IDC_MSMT_BATTERY_REMAINING_LONGEVITY: 102 MO
MDC_IDC_MSMT_BATTERY_REMAINING_LONGEVITY: 114 MO
MDC_IDC_MSMT_BATTERY_REMAINING_LONGEVITY: 120 MO
MDC_IDC_MSMT_BATTERY_REMAINING_LONGEVITY: 126 MO
MDC_IDC_MSMT_BATTERY_REMAINING_LONGEVITY: 90 MO
MDC_IDC_MSMT_BATTERY_REMAINING_LONGEVITY: 90 MO
MDC_IDC_MSMT_BATTERY_REMAINING_LONGEVITY: 96 MO
MDC_IDC_MSMT_BATTERY_REMAINING_LONGEVITY: 96 MO
MDC_IDC_MSMT_BATTERY_REMAINING_PERCENTAGE: 100 %
MDC_IDC_MSMT_BATTERY_REMAINING_PERCENTAGE: 96 %
MDC_IDC_MSMT_BATTERY_STATUS: NORMAL
MDC_IDC_MSMT_LEADCHNL_LV_IMPEDANCE_VALUE: 495 OHM
MDC_IDC_MSMT_LEADCHNL_LV_IMPEDANCE_VALUE: 512 OHM
MDC_IDC_MSMT_LEADCHNL_LV_IMPEDANCE_VALUE: 527 OHM
MDC_IDC_MSMT_LEADCHNL_LV_IMPEDANCE_VALUE: 536 OHM
MDC_IDC_MSMT_LEADCHNL_LV_IMPEDANCE_VALUE: 543 OHM
MDC_IDC_MSMT_LEADCHNL_LV_IMPEDANCE_VALUE: 559 OHM
MDC_IDC_MSMT_LEADCHNL_LV_IMPEDANCE_VALUE: 579 OHM
MDC_IDC_MSMT_LEADCHNL_LV_IMPEDANCE_VALUE: 623 OHM
MDC_IDC_MSMT_LEADCHNL_LV_IMPEDANCE_VALUE: 685 OHM
MDC_IDC_MSMT_LEADCHNL_LV_PACING_THRESHOLD_AMPLITUDE: 0.5 V
MDC_IDC_MSMT_LEADCHNL_LV_PACING_THRESHOLD_AMPLITUDE: 0.5 V
MDC_IDC_MSMT_LEADCHNL_LV_PACING_THRESHOLD_AMPLITUDE: 0.6 V
MDC_IDC_MSMT_LEADCHNL_LV_PACING_THRESHOLD_AMPLITUDE: 0.6 V
MDC_IDC_MSMT_LEADCHNL_LV_PACING_THRESHOLD_AMPLITUDE: 0.7 V
MDC_IDC_MSMT_LEADCHNL_LV_PACING_THRESHOLD_AMPLITUDE: 0.7 V
MDC_IDC_MSMT_LEADCHNL_LV_PACING_THRESHOLD_AMPLITUDE: 0.9 V
MDC_IDC_MSMT_LEADCHNL_LV_PACING_THRESHOLD_AMPLITUDE: 0.9 V
MDC_IDC_MSMT_LEADCHNL_LV_PACING_THRESHOLD_PULSEWIDTH: 0.5 MS
MDC_IDC_MSMT_LEADCHNL_LV_SENSING_INTR_AMPL: 12.3 MV
MDC_IDC_MSMT_LEADCHNL_LV_SENSING_INTR_AMPL: 15.1 MV
MDC_IDC_MSMT_LEADCHNL_LV_SENSING_INTR_AMPL: 21.6 MV
MDC_IDC_MSMT_LEADCHNL_LV_SENSING_INTR_AMPL: 22.2 MV
MDC_IDC_MSMT_LEADCHNL_LV_SENSING_INTR_AMPL: 25 MV
MDC_IDC_MSMT_LEADCHNL_RA_IMPEDANCE_VALUE: 3000 OHM
MDC_IDC_MSMT_LEADCHNL_RV_IMPEDANCE_VALUE: 700 OHM
MDC_IDC_MSMT_LEADCHNL_RV_IMPEDANCE_VALUE: 718 OHM
MDC_IDC_MSMT_LEADCHNL_RV_IMPEDANCE_VALUE: 720 OHM
MDC_IDC_MSMT_LEADCHNL_RV_IMPEDANCE_VALUE: 745 OHM
MDC_IDC_MSMT_LEADCHNL_RV_IMPEDANCE_VALUE: 749 OHM
MDC_IDC_MSMT_LEADCHNL_RV_IMPEDANCE_VALUE: 754 OHM
MDC_IDC_MSMT_LEADCHNL_RV_IMPEDANCE_VALUE: 796 OHM
MDC_IDC_MSMT_LEADCHNL_RV_IMPEDANCE_VALUE: 815 OHM
MDC_IDC_MSMT_LEADCHNL_RV_IMPEDANCE_VALUE: 846 OHM
MDC_IDC_MSMT_LEADCHNL_RV_PACING_THRESHOLD_AMPLITUDE: 0.6 V
MDC_IDC_MSMT_LEADCHNL_RV_PACING_THRESHOLD_AMPLITUDE: 0.7 V
MDC_IDC_MSMT_LEADCHNL_RV_PACING_THRESHOLD_AMPLITUDE: 0.8 V
MDC_IDC_MSMT_LEADCHNL_RV_PACING_THRESHOLD_AMPLITUDE: 1 V
MDC_IDC_MSMT_LEADCHNL_RV_PACING_THRESHOLD_AMPLITUDE: 1 V
MDC_IDC_MSMT_LEADCHNL_RV_PACING_THRESHOLD_PULSEWIDTH: 0.4 MS
MDC_IDC_MSMT_LEADCHNL_RV_SENSING_INTR_AMPL: 10.3 MV
MDC_IDC_MSMT_LEADCHNL_RV_SENSING_INTR_AMPL: 10.8 MV
MDC_IDC_MSMT_LEADCHNL_RV_SENSING_INTR_AMPL: 14.5 MV
MDC_IDC_MSMT_LEADCHNL_RV_SENSING_INTR_AMPL: 25 MV
MDC_IDC_PG_IMPLANT_DTM: NORMAL
MDC_IDC_PG_MFG: NORMAL
MDC_IDC_PG_MODEL: NORMAL
MDC_IDC_PG_SERIAL: NORMAL
MDC_IDC_PG_TYPE: NORMAL
MDC_IDC_SESS_CLINIC_NAME: NORMAL
MDC_IDC_SESS_DTM: NORMAL
MDC_IDC_SESS_TYPE: NORMAL
MDC_IDC_SET_BRADY_AT_MODE_SWITCH_MODE: NORMAL
MDC_IDC_SET_BRADY_AT_MODE_SWITCH_RATE: 170 {BEATS}/MIN
MDC_IDC_SET_BRADY_LOWRATE: 60 {BEATS}/MIN
MDC_IDC_SET_BRADY_LOWRATE: 70 {BEATS}/MIN
MDC_IDC_SET_BRADY_LOWRATE: 80 {BEATS}/MIN
MDC_IDC_SET_BRADY_LOWRATE: 80 {BEATS}/MIN
MDC_IDC_SET_BRADY_MAX_SENSOR_RATE: 130 {BEATS}/MIN
MDC_IDC_SET_BRADY_MODE: NORMAL
MDC_IDC_SET_BRADY_PAV_DELAY_HIGH: 180 MS
MDC_IDC_SET_BRADY_PAV_DELAY_LOW: 180 MS
MDC_IDC_SET_BRADY_SAV_DELAY_LOW: 120 MS
MDC_IDC_SET_CRT_LVRV_DELAY: 30 MS
MDC_IDC_SET_CRT_PACED_CHAMBERS: NORMAL
MDC_IDC_SET_LEADCHNL_LV_PACING_AMPLITUDE: 3.5 V
MDC_IDC_SET_LEADCHNL_LV_PACING_ANODE_ELECTRODE_1: NORMAL
MDC_IDC_SET_LEADCHNL_LV_PACING_ANODE_LOCATION_1: NORMAL
MDC_IDC_SET_LEADCHNL_LV_PACING_CAPTURE_MODE: NORMAL
MDC_IDC_SET_LEADCHNL_LV_PACING_CATHODE_ELECTRODE_1: NORMAL
MDC_IDC_SET_LEADCHNL_LV_PACING_CATHODE_LOCATION_1: NORMAL
MDC_IDC_SET_LEADCHNL_LV_PACING_POLARITY: NORMAL
MDC_IDC_SET_LEADCHNL_LV_PACING_PULSEWIDTH: 0.5 MS
MDC_IDC_SET_LEADCHNL_LV_SENSING_ADAPTATION_MODE: NORMAL
MDC_IDC_SET_LEADCHNL_LV_SENSING_ANODE_ELECTRODE_1: NORMAL
MDC_IDC_SET_LEADCHNL_LV_SENSING_ANODE_LOCATION_1: NORMAL
MDC_IDC_SET_LEADCHNL_LV_SENSING_CATHODE_ELECTRODE_1: NORMAL
MDC_IDC_SET_LEADCHNL_LV_SENSING_CATHODE_LOCATION_1: NORMAL
MDC_IDC_SET_LEADCHNL_LV_SENSING_POLARITY: NORMAL
MDC_IDC_SET_LEADCHNL_LV_SENSING_SENSITIVITY: 2.5 MV
MDC_IDC_SET_LEADCHNL_RA_PACING_ANODE_ELECTRODE_1: NORMAL
MDC_IDC_SET_LEADCHNL_RA_PACING_ANODE_LOCATION_1: NORMAL
MDC_IDC_SET_LEADCHNL_RA_PACING_CAPTURE_MODE: NORMAL
MDC_IDC_SET_LEADCHNL_RA_PACING_CATHODE_ELECTRODE_1: NORMAL
MDC_IDC_SET_LEADCHNL_RA_PACING_CATHODE_LOCATION_1: NORMAL
MDC_IDC_SET_LEADCHNL_RA_PACING_POLARITY: NORMAL
MDC_IDC_SET_LEADCHNL_RA_SENSING_ADAPTATION_MODE: NORMAL
MDC_IDC_SET_LEADCHNL_RA_SENSING_ANODE_ELECTRODE_1: NORMAL
MDC_IDC_SET_LEADCHNL_RA_SENSING_ANODE_LOCATION_1: NORMAL
MDC_IDC_SET_LEADCHNL_RA_SENSING_CATHODE_ELECTRODE_1: NORMAL
MDC_IDC_SET_LEADCHNL_RA_SENSING_CATHODE_LOCATION_1: NORMAL
MDC_IDC_SET_LEADCHNL_RA_SENSING_POLARITY: NORMAL
MDC_IDC_SET_LEADCHNL_RA_SENSING_SENSITIVITY: 0.75 MV
MDC_IDC_SET_LEADCHNL_RV_PACING_AMPLITUDE: 2 V
MDC_IDC_SET_LEADCHNL_RV_PACING_AMPLITUDE: 2 V
MDC_IDC_SET_LEADCHNL_RV_PACING_AMPLITUDE: 2.4 V
MDC_IDC_SET_LEADCHNL_RV_PACING_AMPLITUDE: 2.4 V
MDC_IDC_SET_LEADCHNL_RV_PACING_AMPLITUDE: 2.5 V
MDC_IDC_SET_LEADCHNL_RV_PACING_ANODE_ELECTRODE_1: NORMAL
MDC_IDC_SET_LEADCHNL_RV_PACING_ANODE_LOCATION_1: NORMAL
MDC_IDC_SET_LEADCHNL_RV_PACING_CAPTURE_MODE: NORMAL
MDC_IDC_SET_LEADCHNL_RV_PACING_CATHODE_ELECTRODE_1: NORMAL
MDC_IDC_SET_LEADCHNL_RV_PACING_CATHODE_LOCATION_1: NORMAL
MDC_IDC_SET_LEADCHNL_RV_PACING_POLARITY: NORMAL
MDC_IDC_SET_LEADCHNL_RV_PACING_PULSEWIDTH: 0.4 MS
MDC_IDC_SET_LEADCHNL_RV_SENSING_ADAPTATION_MODE: NORMAL
MDC_IDC_SET_LEADCHNL_RV_SENSING_ANODE_ELECTRODE_1: NORMAL
MDC_IDC_SET_LEADCHNL_RV_SENSING_ANODE_LOCATION_1: NORMAL
MDC_IDC_SET_LEADCHNL_RV_SENSING_CATHODE_ELECTRODE_1: NORMAL
MDC_IDC_SET_LEADCHNL_RV_SENSING_CATHODE_LOCATION_1: NORMAL
MDC_IDC_SET_LEADCHNL_RV_SENSING_POLARITY: NORMAL
MDC_IDC_SET_LEADCHNL_RV_SENSING_SENSITIVITY: 2.5 MV
MDC_IDC_SET_ZONE_DETECTION_INTERVAL: 375 MS
MDC_IDC_SET_ZONE_TYPE: NORMAL
MDC_IDC_SET_ZONE_VENDOR_TYPE: NORMAL
MDC_IDC_STAT_BRADY_DTM_END: NORMAL
MDC_IDC_STAT_BRADY_DTM_START: NORMAL
MDC_IDC_STAT_BRADY_RA_PERCENT_PACED: 0 %
MDC_IDC_STAT_BRADY_RV_PERCENT_PACED: 100 %
MDC_IDC_STAT_BRADY_RV_PERCENT_PACED: 71 %
MDC_IDC_STAT_BRADY_RV_PERCENT_PACED: 72 %
MDC_IDC_STAT_BRADY_RV_PERCENT_PACED: 72 %
MDC_IDC_STAT_BRADY_RV_PERCENT_PACED: 73 %
MDC_IDC_STAT_BRADY_RV_PERCENT_PACED: 73 %
MDC_IDC_STAT_BRADY_RV_PERCENT_PACED: 78 %
MDC_IDC_STAT_BRADY_RV_PERCENT_PACED: 99 %
MDC_IDC_STAT_CRT_DTM_END: NORMAL
MDC_IDC_STAT_CRT_DTM_START: NORMAL
MDC_IDC_STAT_CRT_LV_PERCENT_PACED: 100 %
MDC_IDC_STAT_CRT_LV_PERCENT_PACED: 71 %
MDC_IDC_STAT_CRT_LV_PERCENT_PACED: 71 %
MDC_IDC_STAT_CRT_LV_PERCENT_PACED: 72 %
MDC_IDC_STAT_CRT_LV_PERCENT_PACED: 73 %
MDC_IDC_STAT_CRT_LV_PERCENT_PACED: 73 %
MDC_IDC_STAT_CRT_LV_PERCENT_PACED: 78 %
MDC_IDC_STAT_CRT_LV_PERCENT_PACED: 99 %
MDC_IDC_STAT_CRT_PERCENT_PACED: 72 %
MDC_IDC_STAT_EPISODE_RECENT_COUNT: 0
MDC_IDC_STAT_EPISODE_RECENT_COUNT: 21
MDC_IDC_STAT_EPISODE_RECENT_COUNT: 22
MDC_IDC_STAT_EPISODE_RECENT_COUNT: 5
MDC_IDC_STAT_EPISODE_RECENT_COUNT_DTM_END: NORMAL
MDC_IDC_STAT_EPISODE_RECENT_COUNT_DTM_START: NORMAL
MDC_IDC_STAT_EPISODE_TOTAL_COUNT: 0
MDC_IDC_STAT_EPISODE_TOTAL_COUNT: 22
MDC_IDC_STAT_EPISODE_TOTAL_COUNT: 27
MDC_IDC_STAT_EPISODE_TOTAL_COUNT_DTM_END: NORMAL
MDC_IDC_STAT_EPISODE_TYPE: NORMAL
MDC_IDC_STAT_EPISODE_VENDOR_TYPE: NORMAL
MICROALBUMIN UR-MCNC: 48 MG/L
MICROALBUMIN/CREAT UR: 90.13 MG/G CR (ref 0–25)
MONOCYTES # BLD AUTO: 0.4 10E9/L (ref 0–1.3)
MONOCYTES # BLD AUTO: 0.4 10E9/L (ref 0–1.3)
MONOCYTES # BLD AUTO: 0.5 10E9/L (ref 0–1.3)
MONOCYTES # BLD AUTO: 0.6 10E9/L (ref 0–1.3)
MONOCYTES # BLD AUTO: 0.7 10E9/L (ref 0–1.3)
MONOCYTES # BLD AUTO: 0.7 10E9/L (ref 0–1.3)
MONOCYTES NFR BLD AUTO: 10.8 %
MONOCYTES NFR BLD AUTO: 12.1 %
MONOCYTES NFR BLD AUTO: 6.7 %
MONOCYTES NFR BLD AUTO: 7.6 %
MONOCYTES NFR BLD AUTO: 9.6 %
MONOCYTES NFR BLD AUTO: 9.7 %
MUCOUS THREADS #/AREA URNS LPF: PRESENT /LPF
NEUTROPHILS # BLD AUTO: 3.5 10E9/L (ref 1.6–8.3)
NEUTROPHILS # BLD AUTO: 4 10E9/L (ref 1.6–8.3)
NEUTROPHILS # BLD AUTO: 4 10E9/L (ref 1.6–8.3)
NEUTROPHILS # BLD AUTO: 4.1 10E9/L (ref 1.6–8.3)
NEUTROPHILS # BLD AUTO: 4.2 10E9/L (ref 1.6–8.3)
NEUTROPHILS # BLD AUTO: 5 10E9/L (ref 1.6–8.3)
NEUTROPHILS NFR BLD AUTO: 63 %
NEUTROPHILS NFR BLD AUTO: 68.8 %
NEUTROPHILS NFR BLD AUTO: 70.9 %
NEUTROPHILS NFR BLD AUTO: 73.8 %
NEUTROPHILS NFR BLD AUTO: 74.2 %
NEUTROPHILS NFR BLD AUTO: 80.5 %
NITRATE UR QL: NEGATIVE
NRBC # BLD AUTO: 0 10*3/UL
NRBC BLD AUTO-RTO: 0 /100
NT-PROBNP SERPL-MCNC: 4503 PG/ML (ref 0–1800)
NT-PROBNP SERPL-MCNC: 4929 PG/ML (ref 0–1800)
NT-PROBNP SERPL-MCNC: 5402 PG/ML (ref 0–1800)
NT-PROBNP SERPL-MCNC: 9279 PG/ML (ref 0–1800)
PCO2 BLDV: 44 MM HG (ref 40–50)
PH BLDV: 7.44 PH (ref 7.32–7.43)
PH UR STRIP: 7.5 PH (ref 5–7)
PLATELET # BLD AUTO: 120 10E9/L (ref 150–450)
PLATELET # BLD AUTO: 122 10E9/L (ref 150–450)
PLATELET # BLD AUTO: 123 10E9/L (ref 150–450)
PLATELET # BLD AUTO: 125 10E9/L (ref 150–450)
PLATELET # BLD AUTO: 126 10E9/L (ref 150–450)
PLATELET # BLD AUTO: 128 10E9/L (ref 150–450)
PLATELET # BLD AUTO: 132 10E9/L (ref 150–450)
PLATELET # BLD AUTO: 134 10E9/L (ref 150–450)
PLATELET # BLD AUTO: 136 10E9/L (ref 150–450)
PLATELET # BLD AUTO: 139 10E9/L (ref 150–450)
PLATELET # BLD AUTO: 151 10E9/L (ref 150–450)
PLATELET # BLD AUTO: 162 10E9/L (ref 150–450)
PO2 BLDV: 41 MM HG (ref 25–47)
POTASSIUM SERPL-SCNC: 3.4 MMOL/L (ref 3.4–5.3)
POTASSIUM SERPL-SCNC: 3.6 MMOL/L (ref 3.4–5.3)
POTASSIUM SERPL-SCNC: 3.6 MMOL/L (ref 3.4–5.3)
POTASSIUM SERPL-SCNC: 3.7 MMOL/L (ref 3.4–5.3)
POTASSIUM SERPL-SCNC: 3.8 MMOL/L (ref 3.4–5.3)
POTASSIUM SERPL-SCNC: 4 MMOL/L (ref 3.4–5.3)
POTASSIUM SERPL-SCNC: 4.3 MMOL/L (ref 3.4–5.3)
POTASSIUM SERPL-SCNC: 4.3 MMOL/L (ref 3.4–5.3)
POTASSIUM SERPL-SCNC: 4.4 MMOL/L (ref 3.4–5.3)
POTASSIUM SERPL-SCNC: 4.4 MMOL/L (ref 3.4–5.3)
POTASSIUM SERPL-SCNC: 4.5 MMOL/L (ref 3.4–5.3)
POTASSIUM SERPL-SCNC: 4.6 MMOL/L (ref 3.4–5.3)
PROT SERPL-MCNC: 6.7 G/DL (ref 6.8–8.8)
RBC # BLD AUTO: 3.68 10E12/L (ref 3.8–5.2)
RBC # BLD AUTO: 3.92 10E12/L (ref 3.8–5.2)
RBC # BLD AUTO: 4.03 10E12/L (ref 3.8–5.2)
RBC # BLD AUTO: 4.05 10E12/L (ref 3.8–5.2)
RBC # BLD AUTO: 4.11 10E12/L (ref 3.8–5.2)
RBC # BLD AUTO: 4.2 10E12/L (ref 3.8–5.2)
RBC # BLD AUTO: 4.23 10E12/L (ref 3.8–5.2)
RBC # BLD AUTO: 4.32 10E12/L (ref 3.8–5.2)
RBC # BLD AUTO: 4.36 10E12/L (ref 3.8–5.2)
RBC # BLD AUTO: 4.46 10E12/L (ref 3.8–5.2)
RBC # BLD AUTO: 4.63 10E12/L (ref 3.8–5.2)
RBC # BLD AUTO: 4.79 10E12/L (ref 3.8–5.2)
RBC #/AREA URNS AUTO: 0 /HPF (ref 0–2)
RVPLETH-%PRED-PRE: 94 %
RVPLETH-PRE: 2.18 L
RVPLETH-PRED: 2.31 L
SAO2 % BLDV FROM PO2: 78 %
SODIUM SERPL-SCNC: 136 MMOL/L (ref 133–144)
SODIUM SERPL-SCNC: 139 MMOL/L (ref 133–144)
SODIUM SERPL-SCNC: 140 MMOL/L (ref 133–144)
SODIUM SERPL-SCNC: 140 MMOL/L (ref 133–144)
SODIUM SERPL-SCNC: 141 MMOL/L (ref 133–144)
SODIUM SERPL-SCNC: 142 MMOL/L (ref 133–144)
SODIUM SERPL-SCNC: 145 MMOL/L (ref 133–144)
SODIUM SERPL-SCNC: 145 MMOL/L (ref 133–144)
SOURCE: ABNORMAL
SP GR UR STRIP: 1.01 (ref 1–1.03)
SPECIMEN SOURCE: NORMAL
SQUAMOUS #/AREA URNS AUTO: <1 /HPF (ref 0–1)
T4 SERPL-MCNC: 8.4 UG/DL (ref 4.5–13.9)
TLCPLETH-%PRED-PRE: 80 %
TLCPLETH-PRE: 3.9 L
TLCPLETH-PRED: 4.86 L
TROPONIN I SERPL-MCNC: 0.04 UG/L (ref 0–0.04)
TROPONIN I SERPL-MCNC: 0.05 UG/L (ref 0–0.04)
TROPONIN I SERPL-MCNC: 0.06 UG/L (ref 0–0.04)
TROPONIN I SERPL-MCNC: 0.07 UG/L (ref 0–0.04)
TROPONIN I SERPL-MCNC: 0.08 UG/L (ref 0–0.04)
TROPONIN I SERPL-MCNC: 0.08 UG/L (ref 0–0.04)
TROPONIN I SERPL-MCNC: 0.09 UG/L (ref 0–0.04)
TROPONIN I SERPL-MCNC: 0.09 UG/L (ref 0–0.04)
TROPONIN I SERPL-MCNC: 0.11 UG/L (ref 0–0.04)
TROPONIN I SERPL-MCNC: 0.12 UG/L (ref 0–0.04)
TROPONIN I SERPL-MCNC: 0.12 UG/L (ref 0–0.04)
TSH SERPL DL<=0.005 MIU/L-ACNC: 2.18 MU/L (ref 0.4–4)
TSH SERPL DL<=0.005 MIU/L-ACNC: 2.19 MU/L (ref 0.4–4)
UROBILINOGEN UR STRIP-MCNC: NORMAL MG/DL (ref 0–2)
VA-%PRED-PRE: 61 %
VA-PRE: 2.74 L
VC-%PRED-PRE: 64 %
VC-PRE: 1.71 L
VC-PRED: 2.64 L
WBC # BLD AUTO: 4.6 10E9/L (ref 4–11)
WBC # BLD AUTO: 4.7 10E9/L (ref 4–11)
WBC # BLD AUTO: 5.3 10E9/L (ref 4–11)
WBC # BLD AUTO: 5.5 10E9/L (ref 4–11)
WBC # BLD AUTO: 5.7 10E9/L (ref 4–11)
WBC # BLD AUTO: 5.7 10E9/L (ref 4–11)
WBC # BLD AUTO: 5.9 10E9/L (ref 4–11)
WBC # BLD AUTO: 6.1 10E9/L (ref 4–11)
WBC # BLD AUTO: 6.1 10E9/L (ref 4–11)
WBC # BLD AUTO: 6.8 10E9/L (ref 4–11)
WBC #/AREA URNS AUTO: <1 /HPF (ref 0–5)

## 2019-01-01 PROCEDURE — 33225 L VENTRIC PACING LEAD ADD-ON: CPT | Performed by: INTERNAL MEDICINE

## 2019-01-01 PROCEDURE — 84484 ASSAY OF TROPONIN QUANT: CPT | Performed by: INTERNAL MEDICINE

## 2019-01-01 PROCEDURE — 36415 COLL VENOUS BLD VENIPUNCTURE: CPT | Performed by: STUDENT IN AN ORGANIZED HEALTH CARE EDUCATION/TRAINING PROGRAM

## 2019-01-01 PROCEDURE — 80048 BASIC METABOLIC PNL TOTAL CA: CPT | Performed by: FAMILY MEDICINE

## 2019-01-01 PROCEDURE — 80048 BASIC METABOLIC PNL TOTAL CA: CPT | Performed by: EMERGENCY MEDICINE

## 2019-01-01 PROCEDURE — 40000852 ZZH STATISTIC HEART CATH LAB OR EP LAB

## 2019-01-01 PROCEDURE — 85610 PROTHROMBIN TIME: CPT | Mod: QW

## 2019-01-01 PROCEDURE — 96376 TX/PRO/DX INJ SAME DRUG ADON: CPT

## 2019-01-01 PROCEDURE — 93321 DOPPLER ECHO F-UP/LMTD STD: CPT | Mod: 26 | Performed by: INTERNAL MEDICINE

## 2019-01-01 PROCEDURE — 25000132 ZZH RX MED GY IP 250 OP 250 PS 637: Performed by: INTERNAL MEDICINE

## 2019-01-01 PROCEDURE — C1888 ENDOVAS NON-CARDIAC ABL CATH: HCPCS | Performed by: INTERNAL MEDICINE

## 2019-01-01 PROCEDURE — 93308 TTE F-UP OR LMTD: CPT | Performed by: EMERGENCY MEDICINE

## 2019-01-01 PROCEDURE — 99214 OFFICE O/P EST MOD 30 MIN: CPT | Performed by: FAMILY MEDICINE

## 2019-01-01 PROCEDURE — 99607 MTMS BY PHARM ADDL 15 MIN: CPT | Performed by: PHARMACIST

## 2019-01-01 PROCEDURE — 33207 INSERT HEART PM VENTRICULAR: CPT | Mod: KX | Performed by: INTERNAL MEDICINE

## 2019-01-01 PROCEDURE — 40000172 ZZH STATISTIC PROCEDURE PREP ONLY

## 2019-01-01 PROCEDURE — 21000001 ZZH R&B HEART CARE

## 2019-01-01 PROCEDURE — 93308 TTE F-UP OR LMTD: CPT

## 2019-01-01 PROCEDURE — 94729 DIFFUSING CAPACITY: CPT | Mod: ZF

## 2019-01-01 PROCEDURE — 82043 UR ALBUMIN QUANTITATIVE: CPT | Performed by: FAMILY MEDICINE

## 2019-01-01 PROCEDURE — 25800030 ZZH RX IP 258 OP 636: Performed by: PHYSICIAN ASSISTANT

## 2019-01-01 PROCEDURE — 71260 CT THORAX DX C+: CPT

## 2019-01-01 PROCEDURE — 40000986 XR CHEST 2 VW

## 2019-01-01 PROCEDURE — 71046 X-RAY EXAM CHEST 2 VIEWS: CPT

## 2019-01-01 PROCEDURE — 93010 ELECTROCARDIOGRAM REPORT: CPT | Mod: Z6 | Performed by: EMERGENCY MEDICINE

## 2019-01-01 PROCEDURE — 85610 PROTHROMBIN TIME: CPT | Mod: QW | Performed by: FAMILY MEDICINE

## 2019-01-01 PROCEDURE — 25000128 H RX IP 250 OP 636: Performed by: PHYSICIAN ASSISTANT

## 2019-01-01 PROCEDURE — 99207 ZZC NO CHARGE NURSE ONLY: CPT

## 2019-01-01 PROCEDURE — 84443 ASSAY THYROID STIM HORMONE: CPT | Performed by: PHYSICIAN ASSISTANT

## 2019-01-01 PROCEDURE — 93018 CV STRESS TEST I&R ONLY: CPT | Performed by: INTERNAL MEDICINE

## 2019-01-01 PROCEDURE — 99285 EMERGENCY DEPT VISIT HI MDM: CPT | Mod: 25 | Performed by: EMERGENCY MEDICINE

## 2019-01-01 PROCEDURE — 36416 COLLJ CAPILLARY BLOOD SPEC: CPT

## 2019-01-01 PROCEDURE — 25000132 ZZH RX MED GY IP 250 OP 250 PS 637: Performed by: PHYSICIAN ASSISTANT

## 2019-01-01 PROCEDURE — 25000125 ZZHC RX 250: Performed by: NURSE PRACTITIONER

## 2019-01-01 PROCEDURE — 93306 TTE W/DOPPLER COMPLETE: CPT | Mod: 26 | Performed by: INTERNAL MEDICINE

## 2019-01-01 PROCEDURE — 25000132 ZZH RX MED GY IP 250 OP 250 PS 637: Performed by: STUDENT IN AN ORGANIZED HEALTH CARE EDUCATION/TRAINING PROGRAM

## 2019-01-01 PROCEDURE — C1894 INTRO/SHEATH, NON-LASER: HCPCS | Performed by: INTERNAL MEDICINE

## 2019-01-01 PROCEDURE — 83735 ASSAY OF MAGNESIUM: CPT | Performed by: PHYSICIAN ASSISTANT

## 2019-01-01 PROCEDURE — 25000125 ZZHC RX 250: Performed by: PHYSICIAN ASSISTANT

## 2019-01-01 PROCEDURE — 36415 COLL VENOUS BLD VENIPUNCTURE: CPT | Performed by: INTERNAL MEDICINE

## 2019-01-01 PROCEDURE — 80048 BASIC METABOLIC PNL TOTAL CA: CPT | Performed by: INTERNAL MEDICINE

## 2019-01-01 PROCEDURE — 93017 CV STRESS TEST TRACING ONLY: CPT

## 2019-01-01 PROCEDURE — 93281 PM DEVICE PROGR EVAL MULTI: CPT | Performed by: INTERNAL MEDICINE

## 2019-01-01 PROCEDURE — 99215 OFFICE O/P EST HI 40 MIN: CPT | Mod: 24 | Performed by: INTERNAL MEDICINE

## 2019-01-01 PROCEDURE — 93010 ELECTROCARDIOGRAM REPORT: CPT | Performed by: INTERNAL MEDICINE

## 2019-01-01 PROCEDURE — 94640 AIRWAY INHALATION TREATMENT: CPT

## 2019-01-01 PROCEDURE — 25000128 H RX IP 250 OP 636: Performed by: STUDENT IN AN ORGANIZED HEALTH CARE EDUCATION/TRAINING PROGRAM

## 2019-01-01 PROCEDURE — 85027 COMPLETE CBC AUTOMATED: CPT | Performed by: INTERNAL MEDICINE

## 2019-01-01 PROCEDURE — 96374 THER/PROPH/DIAG INJ IV PUSH: CPT | Performed by: EMERGENCY MEDICINE

## 2019-01-01 PROCEDURE — 99207 ZZC CDG-MDM COMPONENT: MEETS MODERATE - UP CODED: CPT | Performed by: HOSPITALIST

## 2019-01-01 PROCEDURE — 96375 TX/PRO/DX INJ NEW DRUG ADDON: CPT

## 2019-01-01 PROCEDURE — 25500064 ZZH RX 255 OP 636: Performed by: EMERGENCY MEDICINE

## 2019-01-01 PROCEDURE — 85610 PROTHROMBIN TIME: CPT | Performed by: FAMILY MEDICINE

## 2019-01-01 PROCEDURE — 93005 ELECTROCARDIOGRAM TRACING: CPT

## 2019-01-01 PROCEDURE — 93010 ELECTROCARDIOGRAM REPORT: CPT | Mod: 76 | Performed by: INTERNAL MEDICINE

## 2019-01-01 PROCEDURE — 33215 REPOSITION PACING-DEFIB LEAD: CPT | Performed by: INTERNAL MEDICINE

## 2019-01-01 PROCEDURE — C2621 PMKR, OTHER THAN SING/DUAL: HCPCS | Performed by: INTERNAL MEDICINE

## 2019-01-01 PROCEDURE — 96374 THER/PROPH/DIAG INJ IV PUSH: CPT | Mod: 59 | Performed by: EMERGENCY MEDICINE

## 2019-01-01 PROCEDURE — 99024 POSTOP FOLLOW-UP VISIT: CPT | Mod: 25 | Performed by: INTERNAL MEDICINE

## 2019-01-01 PROCEDURE — 25000132 ZZH RX MED GY IP 250 OP 250 PS 637: Performed by: NURSE PRACTITIONER

## 2019-01-01 PROCEDURE — 83605 ASSAY OF LACTIC ACID: CPT

## 2019-01-01 PROCEDURE — 93280 PM DEVICE PROGR EVAL DUAL: CPT

## 2019-01-01 PROCEDURE — 25000128 H RX IP 250 OP 636: Performed by: INTERNAL MEDICINE

## 2019-01-01 PROCEDURE — 99153 MOD SED SAME PHYS/QHP EA: CPT | Performed by: INTERNAL MEDICINE

## 2019-01-01 PROCEDURE — 93005 ELECTROCARDIOGRAM TRACING: CPT | Performed by: INTERNAL MEDICINE

## 2019-01-01 PROCEDURE — 0JH607Z INSERTION OF CARDIAC RESYNCHRONIZATION PACEMAKER PULSE GENERATOR INTO CHEST SUBCUTANEOUS TISSUE AND FASCIA, OPEN APPROACH: ICD-10-PCS | Performed by: INTERNAL MEDICINE

## 2019-01-01 PROCEDURE — 40000065 ZZH STATISTIC EKG NON-CHARGEABLE

## 2019-01-01 PROCEDURE — 25000131 ZZH RX MED GY IP 250 OP 636 PS 637: Performed by: STUDENT IN AN ORGANIZED HEALTH CARE EDUCATION/TRAINING PROGRAM

## 2019-01-01 PROCEDURE — 99233 SBSQ HOSP IP/OBS HIGH 50: CPT | Mod: GC | Performed by: INTERNAL MEDICINE

## 2019-01-01 PROCEDURE — 36415 COLL VENOUS BLD VENIPUNCTURE: CPT | Performed by: FAMILY MEDICINE

## 2019-01-01 PROCEDURE — 85610 PROTHROMBIN TIME: CPT | Performed by: NURSE PRACTITIONER

## 2019-01-01 PROCEDURE — 83735 ASSAY OF MAGNESIUM: CPT | Performed by: INTERNAL MEDICINE

## 2019-01-01 PROCEDURE — 84484 ASSAY OF TROPONIN QUANT: CPT | Performed by: PHYSICIAN ASSISTANT

## 2019-01-01 PROCEDURE — 80162 ASSAY OF DIGOXIN TOTAL: CPT | Performed by: EMERGENCY MEDICINE

## 2019-01-01 PROCEDURE — 94640 AIRWAY INHALATION TREATMENT: CPT | Performed by: EMERGENCY MEDICINE

## 2019-01-01 PROCEDURE — 93306 TTE W/DOPPLER COMPLETE: CPT

## 2019-01-01 PROCEDURE — 25000128 H RX IP 250 OP 636

## 2019-01-01 PROCEDURE — 99215 OFFICE O/P EST HI 40 MIN: CPT | Mod: 25 | Performed by: INTERNAL MEDICINE

## 2019-01-01 PROCEDURE — 93280 PM DEVICE PROGR EVAL DUAL: CPT | Mod: 26 | Performed by: INTERNAL MEDICINE

## 2019-01-01 PROCEDURE — 84484 ASSAY OF TROPONIN QUANT: CPT | Performed by: NURSE PRACTITIONER

## 2019-01-01 PROCEDURE — 99223 1ST HOSP IP/OBS HIGH 75: CPT | Mod: AI | Performed by: HOSPITALIST

## 2019-01-01 PROCEDURE — 93308 TTE F-UP OR LMTD: CPT | Mod: 26 | Performed by: EMERGENCY MEDICINE

## 2019-01-01 PROCEDURE — 93286 PERI-PX EVAL PM/LDLS PM IP: CPT

## 2019-01-01 PROCEDURE — 25000132 ZZH RX MED GY IP 250 OP 250 PS 637: Performed by: HOSPITALIST

## 2019-01-01 PROCEDURE — 99605 MTMS BY PHARM NP 15 MIN: CPT | Performed by: PHARMACIST

## 2019-01-01 PROCEDURE — 99214 OFFICE O/P EST MOD 30 MIN: CPT | Mod: GC | Performed by: INTERNAL MEDICINE

## 2019-01-01 PROCEDURE — 82565 ASSAY OF CREATININE: CPT | Performed by: HOSPITALIST

## 2019-01-01 PROCEDURE — 40000141 ZZH STATISTIC PERIPHERAL IV START W/O US GUIDANCE

## 2019-01-01 PROCEDURE — 84484 ASSAY OF TROPONIN QUANT: CPT | Performed by: STUDENT IN AN ORGANIZED HEALTH CARE EDUCATION/TRAINING PROGRAM

## 2019-01-01 PROCEDURE — 99152 MOD SED SAME PHYS/QHP 5/>YRS: CPT | Performed by: INTERNAL MEDICINE

## 2019-01-01 PROCEDURE — 97535 SELF CARE MNGMENT TRAINING: CPT | Mod: GO | Performed by: OCCUPATIONAL THERAPIST

## 2019-01-01 PROCEDURE — 85025 COMPLETE CBC W/AUTO DIFF WBC: CPT | Performed by: STUDENT IN AN ORGANIZED HEALTH CARE EDUCATION/TRAINING PROGRAM

## 2019-01-01 PROCEDURE — 99232 SBSQ HOSP IP/OBS MODERATE 35: CPT | Performed by: HOSPITALIST

## 2019-01-01 PROCEDURE — 93296 REM INTERROG EVL PM/IDS: CPT | Mod: ZF

## 2019-01-01 PROCEDURE — 83880 ASSAY OF NATRIURETIC PEPTIDE: CPT | Performed by: EMERGENCY MEDICINE

## 2019-01-01 PROCEDURE — 40000855 ZZH STATISTIC ECHO STRESS OR NM NPI

## 2019-01-01 PROCEDURE — 99024 POSTOP FOLLOW-UP VISIT: CPT | Performed by: NURSE PRACTITIONER

## 2019-01-01 PROCEDURE — 25000128 H RX IP 250 OP 636: Performed by: EMERGENCY MEDICINE

## 2019-01-01 PROCEDURE — 40000275 ZZH STATISTIC RCP TIME EA 10 MIN

## 2019-01-01 PROCEDURE — 21400000 ZZH R&B CCU UMMC

## 2019-01-01 PROCEDURE — 93294 REM INTERROG EVL PM/LDLS PM: CPT | Performed by: INTERNAL MEDICINE

## 2019-01-01 PROCEDURE — 84484 ASSAY OF TROPONIN QUANT: CPT | Performed by: EMERGENCY MEDICINE

## 2019-01-01 PROCEDURE — 25000125 ZZHC RX 250: Performed by: INTERNAL MEDICINE

## 2019-01-01 PROCEDURE — 80048 BASIC METABOLIC PNL TOTAL CA: CPT | Performed by: NURSE PRACTITIONER

## 2019-01-01 PROCEDURE — 80048 BASIC METABOLIC PNL TOTAL CA: CPT | Performed by: STUDENT IN AN ORGANIZED HEALTH CARE EDUCATION/TRAINING PROGRAM

## 2019-01-01 PROCEDURE — 25000132 ZZH RX MED GY IP 250 OP 250 PS 637

## 2019-01-01 PROCEDURE — 97110 THERAPEUTIC EXERCISES: CPT | Mod: GO | Performed by: OCCUPATIONAL THERAPIST

## 2019-01-01 PROCEDURE — 85025 COMPLETE CBC W/AUTO DIFF WBC: CPT | Performed by: EMERGENCY MEDICINE

## 2019-01-01 PROCEDURE — 78452 HT MUSCLE IMAGE SPECT MULT: CPT

## 2019-01-01 PROCEDURE — 36415 COLL VENOUS BLD VENIPUNCTURE: CPT | Performed by: NURSE PRACTITIONER

## 2019-01-01 PROCEDURE — C1898 LEAD, PMKR, OTHER THAN TRANS: HCPCS | Performed by: INTERNAL MEDICINE

## 2019-01-01 PROCEDURE — 99232 SBSQ HOSP IP/OBS MODERATE 35: CPT | Mod: 25 | Performed by: INTERNAL MEDICINE

## 2019-01-01 PROCEDURE — 80048 BASIC METABOLIC PNL TOTAL CA: CPT | Performed by: PHYSICIAN ASSISTANT

## 2019-01-01 PROCEDURE — 85027 COMPLETE CBC AUTOMATED: CPT | Performed by: NURSE PRACTITIONER

## 2019-01-01 PROCEDURE — 99207 C PAF COMPLETED  NO CHARGE: CPT | Performed by: FAMILY MEDICINE

## 2019-01-01 PROCEDURE — 99238 HOSP IP/OBS DSCHRG MGMT 30/<: CPT | Mod: GC | Performed by: INTERNAL MEDICINE

## 2019-01-01 PROCEDURE — 21000000 ZZH R&B IMCU HEART CARE

## 2019-01-01 PROCEDURE — 25000128 H RX IP 250 OP 636: Performed by: NURSE PRACTITIONER

## 2019-01-01 PROCEDURE — 96374 THER/PROPH/DIAG INJ IV PUSH: CPT | Mod: 59

## 2019-01-01 PROCEDURE — 78452 HT MUSCLE IMAGE SPECT MULT: CPT | Mod: 26 | Performed by: INTERNAL MEDICINE

## 2019-01-01 PROCEDURE — 99223 1ST HOSP IP/OBS HIGH 75: CPT | Mod: AI | Performed by: INTERNAL MEDICINE

## 2019-01-01 PROCEDURE — 27210784 ZZH KIT PACEMAKER CR8: Performed by: INTERNAL MEDICINE

## 2019-01-01 PROCEDURE — 33207 INSERT HEART PM VENTRICULAR: CPT | Performed by: INTERNAL MEDICINE

## 2019-01-01 PROCEDURE — 36415 COLL VENOUS BLD VENIPUNCTURE: CPT | Performed by: PHYSICIAN ASSISTANT

## 2019-01-01 PROCEDURE — 99207 ZZC NO CHARGE NURSE ONLY: CPT | Performed by: FAMILY MEDICINE

## 2019-01-01 PROCEDURE — 27210995 ZZH RX 272: Performed by: INTERNAL MEDICINE

## 2019-01-01 PROCEDURE — 25000125 ZZHC RX 250: Performed by: EMERGENCY MEDICINE

## 2019-01-01 PROCEDURE — 25000125 ZZHC RX 250: Performed by: STUDENT IN AN ORGANIZED HEALTH CARE EDUCATION/TRAINING PROGRAM

## 2019-01-01 PROCEDURE — 83605 ASSAY OF LACTIC ACID: CPT | Performed by: INTERNAL MEDICINE

## 2019-01-01 PROCEDURE — 97165 OT EVAL LOW COMPLEX 30 MIN: CPT | Mod: GO | Performed by: OCCUPATIONAL THERAPIST

## 2019-01-01 PROCEDURE — 40000276 ZZH STATISTIC RCP TIME ED VENT EA 10 MIN

## 2019-01-01 PROCEDURE — 96361 HYDRATE IV INFUSION ADD-ON: CPT

## 2019-01-01 PROCEDURE — 94060 EVALUATION OF WHEEZING: CPT | Mod: ZF

## 2019-01-01 PROCEDURE — 93325 DOPPLER ECHO COLOR FLOW MAPG: CPT | Mod: 26 | Performed by: INTERNAL MEDICINE

## 2019-01-01 PROCEDURE — G0378 HOSPITAL OBSERVATION PER HR: HCPCS

## 2019-01-01 PROCEDURE — 84443 ASSAY THYROID STIM HORMONE: CPT | Performed by: EMERGENCY MEDICINE

## 2019-01-01 PROCEDURE — 25500064 ZZH RX 255 OP 636: Performed by: HOSPITALIST

## 2019-01-01 PROCEDURE — 84436 ASSAY OF TOTAL THYROXINE: CPT | Performed by: STUDENT IN AN ORGANIZED HEALTH CARE EDUCATION/TRAINING PROGRAM

## 2019-01-01 PROCEDURE — 94640 AIRWAY INHALATION TREATMENT: CPT | Mod: 76

## 2019-01-01 PROCEDURE — 02HK3JZ INSERTION OF PACEMAKER LEAD INTO RIGHT VENTRICLE, PERCUTANEOUS APPROACH: ICD-10-PCS | Performed by: INTERNAL MEDICINE

## 2019-01-01 PROCEDURE — 93005 ELECTROCARDIOGRAM TRACING: CPT | Performed by: EMERGENCY MEDICINE

## 2019-01-01 PROCEDURE — 99239 HOSP IP/OBS DSCHRG MGMT >30: CPT | Performed by: INTERNAL MEDICINE

## 2019-01-01 PROCEDURE — 83735 ASSAY OF MAGNESIUM: CPT | Performed by: STUDENT IN AN ORGANIZED HEALTH CARE EDUCATION/TRAINING PROGRAM

## 2019-01-01 PROCEDURE — 81001 URINALYSIS AUTO W/SCOPE: CPT | Performed by: EMERGENCY MEDICINE

## 2019-01-01 PROCEDURE — 83735 ASSAY OF MAGNESIUM: CPT | Performed by: EMERGENCY MEDICINE

## 2019-01-01 PROCEDURE — 82803 BLOOD GASES ANY COMBINATION: CPT

## 2019-01-01 PROCEDURE — 87086 URINE CULTURE/COLONY COUNT: CPT | Performed by: EMERGENCY MEDICINE

## 2019-01-01 PROCEDURE — 99220 ZZC INITIAL OBSERVATION CARE,LEVL III: CPT | Mod: GC | Performed by: INTERNAL MEDICINE

## 2019-01-01 PROCEDURE — 85730 THROMBOPLASTIN TIME PARTIAL: CPT | Performed by: EMERGENCY MEDICINE

## 2019-01-01 PROCEDURE — 27210995 ZZH RX 272: Performed by: NURSE PRACTITIONER

## 2019-01-01 PROCEDURE — 82565 ASSAY OF CREATININE: CPT

## 2019-01-01 PROCEDURE — 99222 1ST HOSP IP/OBS MODERATE 55: CPT | Mod: 25 | Performed by: INTERNAL MEDICINE

## 2019-01-01 PROCEDURE — 02HL3JZ INSERTION OF PACEMAKER LEAD INTO LEFT VENTRICLE, PERCUTANEOUS APPROACH: ICD-10-PCS | Performed by: INTERNAL MEDICINE

## 2019-01-01 PROCEDURE — 27210794 ZZH OR GENERAL SUPPLY STERILE: Performed by: INTERNAL MEDICINE

## 2019-01-01 PROCEDURE — 93609 INTRA-VNTR MAPG TCHYCAR SITE: CPT | Performed by: INTERNAL MEDICINE

## 2019-01-01 PROCEDURE — 93010 ELECTROCARDIOGRAM REPORT: CPT | Mod: 59 | Performed by: EMERGENCY MEDICINE

## 2019-01-01 PROCEDURE — 93308 TTE F-UP OR LMTD: CPT | Mod: 26 | Performed by: INTERNAL MEDICINE

## 2019-01-01 PROCEDURE — 34300033 ZZH RX 343: Performed by: HOSPITALIST

## 2019-01-01 PROCEDURE — 02WA3MZ REVISION OF CARDIAC LEAD IN HEART, PERCUTANEOUS APPROACH: ICD-10-PCS | Performed by: INTERNAL MEDICINE

## 2019-01-01 PROCEDURE — 25000128 H RX IP 250 OP 636: Performed by: HOSPITALIST

## 2019-01-01 PROCEDURE — 80053 COMPREHEN METABOLIC PANEL: CPT | Performed by: EMERGENCY MEDICINE

## 2019-01-01 PROCEDURE — 36416 COLLJ CAPILLARY BLOOD SPEC: CPT | Performed by: FAMILY MEDICINE

## 2019-01-01 PROCEDURE — G0463 HOSPITAL OUTPT CLINIC VISIT: HCPCS | Mod: 25,ZF

## 2019-01-01 PROCEDURE — 99239 HOSP IP/OBS DSCHRG MGMT >30: CPT | Mod: GC | Performed by: INTERNAL MEDICINE

## 2019-01-01 PROCEDURE — C1887 CATHETER, GUIDING: HCPCS | Performed by: INTERNAL MEDICINE

## 2019-01-01 PROCEDURE — A9502 TC99M TETROFOSMIN: HCPCS | Performed by: HOSPITALIST

## 2019-01-01 PROCEDURE — 27210795 ZZH PAD DEFIB QUICK CR4: Performed by: INTERNAL MEDICINE

## 2019-01-01 PROCEDURE — 99207 CARDIAC DEVICE CHECK - REMOTE: CPT | Mod: ZP | Performed by: INTERNAL MEDICINE

## 2019-01-01 PROCEDURE — 85027 COMPLETE CBC AUTOMATED: CPT | Performed by: PHYSICIAN ASSISTANT

## 2019-01-01 PROCEDURE — 99285 EMERGENCY DEPT VISIT HI MDM: CPT | Mod: 25

## 2019-01-01 PROCEDURE — 99217 ZZC OBSERVATION CARE DISCHARGE: CPT | Mod: 24 | Performed by: INTERNAL MEDICINE

## 2019-01-01 PROCEDURE — 99215 OFFICE O/P EST HI 40 MIN: CPT | Performed by: INTERNAL MEDICINE

## 2019-01-01 PROCEDURE — 36415 COLL VENOUS BLD VENIPUNCTURE: CPT | Performed by: HOSPITALIST

## 2019-01-01 PROCEDURE — 85025 COMPLETE CBC W/AUTO DIFF WBC: CPT | Performed by: PHYSICIAN ASSISTANT

## 2019-01-01 PROCEDURE — 93296 REM INTERROG EVL PM/IDS: CPT

## 2019-01-01 PROCEDURE — 99222 1ST HOSP IP/OBS MODERATE 55: CPT | Mod: 57 | Performed by: INTERNAL MEDICINE

## 2019-01-01 PROCEDURE — 99211 OFF/OP EST MAY X REQ PHY/QHP: CPT | Mod: 24

## 2019-01-01 PROCEDURE — 93286 PERI-PX EVAL PM/LDLS PM IP: CPT | Mod: 26 | Performed by: NURSE PRACTITIONER

## 2019-01-01 PROCEDURE — 85027 COMPLETE CBC AUTOMATED: CPT | Mod: 91 | Performed by: PHYSICIAN ASSISTANT

## 2019-01-01 PROCEDURE — 85610 PROTHROMBIN TIME: CPT | Performed by: EMERGENCY MEDICINE

## 2019-01-01 PROCEDURE — C1730 CATH, EP, 19 OR FEW ELECT: HCPCS | Performed by: INTERNAL MEDICINE

## 2019-01-01 PROCEDURE — 96365 THER/PROPH/DIAG IV INF INIT: CPT

## 2019-01-01 PROCEDURE — 99232 SBSQ HOSP IP/OBS MODERATE 35: CPT | Performed by: INTERNAL MEDICINE

## 2019-01-01 PROCEDURE — 25000132 ZZH RX MED GY IP 250 OP 250 PS 637: Performed by: EMERGENCY MEDICINE

## 2019-01-01 PROCEDURE — 40000894 ZZH STATISTIC OT IP EVAL DEFER

## 2019-01-01 PROCEDURE — 40000264 ECHOCARDIOGRAM LIMITED

## 2019-01-01 PROCEDURE — 96375 TX/PRO/DX INJ NEW DRUG ADDON: CPT | Performed by: EMERGENCY MEDICINE

## 2019-01-01 PROCEDURE — 25500064 ZZH RX 255 OP 636: Performed by: INTERNAL MEDICINE

## 2019-01-01 PROCEDURE — 93016 CV STRESS TEST SUPVJ ONLY: CPT | Performed by: INTERNAL MEDICINE

## 2019-01-01 PROCEDURE — 83735 ASSAY OF MAGNESIUM: CPT | Performed by: NURSE PRACTITIONER

## 2019-01-01 PROCEDURE — 85610 PROTHROMBIN TIME: CPT | Performed by: INTERNAL MEDICINE

## 2019-01-01 PROCEDURE — 85610 PROTHROMBIN TIME: CPT | Performed by: PHYSICIAN ASSISTANT

## 2019-01-01 PROCEDURE — 93650 ICAR CATH ABLTJ AV NODE FUNC: CPT | Mod: GC | Performed by: INTERNAL MEDICINE

## 2019-01-01 PROCEDURE — 93650 ICAR CATH ABLTJ AV NODE FUNC: CPT | Performed by: INTERNAL MEDICINE

## 2019-01-01 PROCEDURE — 71045 X-RAY EXAM CHEST 1 VIEW: CPT

## 2019-01-01 PROCEDURE — C1900 LEAD, CORONARY VENOUS: HCPCS | Performed by: INTERNAL MEDICINE

## 2019-01-01 PROCEDURE — 40000264 ECHOCARDIOGRAM COMPLETE

## 2019-01-01 PROCEDURE — 93005 ELECTROCARDIOGRAM TRACING: CPT | Mod: 59 | Performed by: EMERGENCY MEDICINE

## 2019-01-01 PROCEDURE — 25800030 ZZH RX IP 258 OP 636: Performed by: NURSE PRACTITIONER

## 2019-01-01 DEVICE — IMPLANTABLE DEVICE: Type: IMPLANTABLE DEVICE | Status: FUNCTIONAL

## 2019-01-01 DEVICE — CRT-P VALITUDE X4: Type: IMPLANTABLE DEVICE | Status: FUNCTIONAL

## 2019-01-01 RX ORDER — FUROSEMIDE 10 MG/ML
40 INJECTION INTRAMUSCULAR; INTRAVENOUS ONCE
Status: COMPLETED | OUTPATIENT
Start: 2019-01-01 | End: 2019-01-01

## 2019-01-01 RX ORDER — TRAMADOL HYDROCHLORIDE 50 MG/1
50 TABLET ORAL EVERY 6 HOURS PRN
Status: DISCONTINUED | OUTPATIENT
Start: 2019-01-01 | End: 2019-01-01 | Stop reason: HOSPADM

## 2019-01-01 RX ORDER — AMOXICILLIN 250 MG
1 CAPSULE ORAL 2 TIMES DAILY PRN
Status: DISCONTINUED | OUTPATIENT
Start: 2019-01-01 | End: 2019-01-01 | Stop reason: HOSPADM

## 2019-01-01 RX ORDER — BUPIVACAINE HYDROCHLORIDE 2.5 MG/ML
10-30 INJECTION, SOLUTION EPIDURAL; INFILTRATION; INTRACAUDAL
Status: DISCONTINUED | OUTPATIENT
Start: 2019-01-01 | End: 2019-01-01 | Stop reason: HOSPADM

## 2019-01-01 RX ORDER — DOCUSATE SODIUM 100 MG/1
100 CAPSULE, LIQUID FILLED ORAL 2 TIMES DAILY PRN
Status: DISCONTINUED | OUTPATIENT
Start: 2019-01-01 | End: 2019-01-01 | Stop reason: HOSPADM

## 2019-01-01 RX ORDER — NITROGLYCERIN 0.4 MG/1
0.4 TABLET SUBLINGUAL EVERY 5 MIN PRN
Status: DISCONTINUED | OUTPATIENT
Start: 2019-01-01 | End: 2019-01-01 | Stop reason: HOSPADM

## 2019-01-01 RX ORDER — LOSARTAN POTASSIUM 100 MG/1
100 TABLET ORAL DAILY
Status: DISCONTINUED | OUTPATIENT
Start: 2019-01-01 | End: 2019-01-01 | Stop reason: HOSPADM

## 2019-01-01 RX ORDER — LIDOCAINE HYDROCHLORIDE 10 MG/ML
10-30 INJECTION, SOLUTION INFILTRATION; PERINEURAL
Status: DISCONTINUED | OUTPATIENT
Start: 2019-01-01 | End: 2019-01-01 | Stop reason: HOSPADM

## 2019-01-01 RX ORDER — PREDNISONE 1 MG/1
2 TABLET ORAL DAILY
Status: DISCONTINUED | OUTPATIENT
Start: 2019-01-01 | End: 2019-01-01 | Stop reason: HOSPADM

## 2019-01-01 RX ORDER — HEPARIN SODIUM 1000 [USP'U]/ML
1000-10000 INJECTION, SOLUTION INTRAVENOUS; SUBCUTANEOUS EVERY 5 MIN PRN
Status: DISCONTINUED | OUTPATIENT
Start: 2019-01-01 | End: 2019-01-01 | Stop reason: HOSPADM

## 2019-01-01 RX ORDER — ATROPINE SULFATE 0.1 MG/ML
.5-1 INJECTION INTRAVENOUS
Status: DISCONTINUED | OUTPATIENT
Start: 2019-01-01 | End: 2019-01-01 | Stop reason: HOSPADM

## 2019-01-01 RX ORDER — TIOTROPIUM BROMIDE 18 UG/1
18 CAPSULE ORAL; RESPIRATORY (INHALATION) DAILY
Qty: 90 CAPSULE | Refills: 3 | Status: SHIPPED | OUTPATIENT
Start: 2019-01-01 | End: 2019-01-01

## 2019-01-01 RX ORDER — PREDNISONE 1 MG/1
2 TABLET ORAL DAILY
Qty: 60 TABLET | Refills: 0 | Status: SHIPPED | OUTPATIENT
Start: 2019-01-01 | End: 2019-01-01

## 2019-01-01 RX ORDER — KETOROLAC TROMETHAMINE 30 MG/ML
15 INJECTION, SOLUTION INTRAMUSCULAR; INTRAVENOUS
Status: DISCONTINUED | OUTPATIENT
Start: 2019-01-01 | End: 2019-01-01 | Stop reason: HOSPADM

## 2019-01-01 RX ORDER — ATORVASTATIN CALCIUM 40 MG/1
40 TABLET, FILM COATED ORAL AT BEDTIME
Status: DISCONTINUED | OUTPATIENT
Start: 2019-01-01 | End: 2019-01-01 | Stop reason: HOSPADM

## 2019-01-01 RX ORDER — LOSARTAN POTASSIUM 50 MG/1
100 TABLET ORAL DAILY
Status: DISCONTINUED | OUTPATIENT
Start: 2019-01-01 | End: 2019-01-01 | Stop reason: HOSPADM

## 2019-01-01 RX ORDER — ESCITALOPRAM OXALATE 10 MG/1
10 TABLET ORAL DAILY
Status: DISCONTINUED | OUTPATIENT
Start: 2019-01-01 | End: 2019-01-01 | Stop reason: HOSPADM

## 2019-01-01 RX ORDER — FUROSEMIDE 20 MG
20 TABLET ORAL ONCE
Status: DISCONTINUED | OUTPATIENT
Start: 2019-01-01 | End: 2019-01-01

## 2019-01-01 RX ORDER — OXYCODONE AND ACETAMINOPHEN 5; 325 MG/1; MG/1
1 TABLET ORAL EVERY 4 HOURS PRN
Status: DISCONTINUED | OUTPATIENT
Start: 2019-01-01 | End: 2019-01-01 | Stop reason: HOSPADM

## 2019-01-01 RX ORDER — FUROSEMIDE 10 MG/ML
20-100 INJECTION INTRAMUSCULAR; INTRAVENOUS
Status: DISCONTINUED | OUTPATIENT
Start: 2019-01-01 | End: 2019-01-01 | Stop reason: HOSPADM

## 2019-01-01 RX ORDER — NALOXONE HYDROCHLORIDE 0.4 MG/ML
0.4 INJECTION, SOLUTION INTRAMUSCULAR; INTRAVENOUS; SUBCUTANEOUS EVERY 5 MIN PRN
Status: DISCONTINUED | OUTPATIENT
Start: 2019-01-01 | End: 2019-01-01 | Stop reason: HOSPADM

## 2019-01-01 RX ORDER — SODIUM CHLORIDE 9 MG/ML
INJECTION, SOLUTION INTRAVENOUS CONTINUOUS
Status: DISCONTINUED | OUTPATIENT
Start: 2019-01-01 | End: 2019-01-01

## 2019-01-01 RX ORDER — FENTANYL CITRATE 50 UG/ML
INJECTION, SOLUTION INTRAMUSCULAR; INTRAVENOUS
Status: DISCONTINUED
Start: 2019-01-01 | End: 2019-01-01 | Stop reason: WASHOUT

## 2019-01-01 RX ORDER — FLUTICASONE PROPIONATE 50 MCG
1 SPRAY, SUSPENSION (ML) NASAL DAILY PRN
Status: ON HOLD | COMMUNITY
End: 2019-01-01

## 2019-01-01 RX ORDER — CEFAZOLIN SODIUM 2 G/100ML
2 INJECTION, SOLUTION INTRAVENOUS
Status: COMPLETED | OUTPATIENT
Start: 2019-01-01 | End: 2019-01-01

## 2019-01-01 RX ORDER — ALBUTEROL SULFATE 90 UG/1
1-2 AEROSOL, METERED RESPIRATORY (INHALATION) EVERY 6 HOURS PRN
Status: DISCONTINUED | OUTPATIENT
Start: 2019-01-01 | End: 2019-01-01 | Stop reason: HOSPADM

## 2019-01-01 RX ORDER — PROTAMINE SULFATE 10 MG/ML
5-40 INJECTION, SOLUTION INTRAVENOUS EVERY 10 MIN PRN
Status: DISCONTINUED | OUTPATIENT
Start: 2019-01-01 | End: 2019-01-01 | Stop reason: HOSPADM

## 2019-01-01 RX ORDER — ALBUTEROL SULFATE 0.83 MG/ML
2.5 SOLUTION RESPIRATORY (INHALATION) EVERY 6 HOURS PRN
Qty: 60 VIAL | Refills: 3 | Status: ON HOLD | OUTPATIENT
Start: 2019-01-01 | End: 2019-01-01

## 2019-01-01 RX ORDER — SODIUM CHLORIDE 450 MG/100ML
INJECTION, SOLUTION INTRAVENOUS CONTINUOUS
Status: DISCONTINUED | OUTPATIENT
Start: 2019-01-01 | End: 2019-01-01 | Stop reason: HOSPADM

## 2019-01-01 RX ORDER — LORAZEPAM 2 MG/ML
.5-2 INJECTION INTRAMUSCULAR EVERY 10 MIN PRN
Status: DISCONTINUED | OUTPATIENT
Start: 2019-01-01 | End: 2019-01-01 | Stop reason: HOSPADM

## 2019-01-01 RX ORDER — LIDOCAINE HYDROCHLORIDE 10 MG/ML
10-30 INJECTION, SOLUTION INFILTRATION; PERINEURAL
Status: COMPLETED | OUTPATIENT
Start: 2019-01-01 | End: 2019-01-01

## 2019-01-01 RX ORDER — POTASSIUM CHLORIDE 1.5 G/1.58G
20-40 POWDER, FOR SOLUTION ORAL
Status: DISCONTINUED | OUTPATIENT
Start: 2019-01-01 | End: 2019-01-01 | Stop reason: HOSPADM

## 2019-01-01 RX ORDER — METOPROLOL TARTRATE 50 MG
50 TABLET ORAL 2 TIMES DAILY
Status: DISCONTINUED | OUTPATIENT
Start: 2019-01-01 | End: 2019-01-01 | Stop reason: HOSPADM

## 2019-01-01 RX ORDER — NALOXONE HYDROCHLORIDE 0.4 MG/ML
.1-.4 INJECTION, SOLUTION INTRAMUSCULAR; INTRAVENOUS; SUBCUTANEOUS
Status: DISCONTINUED | OUTPATIENT
Start: 2019-01-01 | End: 2019-01-01 | Stop reason: HOSPADM

## 2019-01-01 RX ORDER — AMIODARONE HYDROCHLORIDE 200 MG/1
200 TABLET ORAL 2 TIMES DAILY
Status: DISCONTINUED | OUTPATIENT
Start: 2019-01-01 | End: 2019-01-01 | Stop reason: HOSPADM

## 2019-01-01 RX ORDER — LIDOCAINE 40 MG/G
CREAM TOPICAL
Status: DISCONTINUED | OUTPATIENT
Start: 2019-01-01 | End: 2019-01-01 | Stop reason: HOSPADM

## 2019-01-01 RX ORDER — ACETAMINOPHEN 650 MG/1
650 SUPPOSITORY RECTAL EVERY 4 HOURS PRN
Status: DISCONTINUED | OUTPATIENT
Start: 2019-01-01 | End: 2019-01-01 | Stop reason: HOSPADM

## 2019-01-01 RX ORDER — MAGNESIUM SULFATE HEPTAHYDRATE 40 MG/ML
4 INJECTION, SOLUTION INTRAVENOUS EVERY 4 HOURS PRN
Status: DISCONTINUED | OUTPATIENT
Start: 2019-01-01 | End: 2019-01-01 | Stop reason: HOSPADM

## 2019-01-01 RX ORDER — FLUTICASONE PROPIONATE 50 MCG
1 SPRAY, SUSPENSION (ML) NASAL DAILY PRN
Status: DISCONTINUED | OUTPATIENT
Start: 2019-01-01 | End: 2019-01-01 | Stop reason: HOSPADM

## 2019-01-01 RX ORDER — FENTANYL CITRATE 50 UG/ML
25-50 INJECTION, SOLUTION INTRAMUSCULAR; INTRAVENOUS
Status: DISCONTINUED | OUTPATIENT
Start: 2019-01-01 | End: 2019-01-01 | Stop reason: HOSPADM

## 2019-01-01 RX ORDER — PROTAMINE SULFATE 10 MG/ML
1-5 INJECTION, SOLUTION INTRAVENOUS
Status: DISCONTINUED | OUTPATIENT
Start: 2019-01-01 | End: 2019-01-01 | Stop reason: HOSPADM

## 2019-01-01 RX ORDER — POTASSIUM CHLORIDE 29.8 MG/ML
20 INJECTION INTRAVENOUS
Status: DISCONTINUED | OUTPATIENT
Start: 2019-01-01 | End: 2019-01-01 | Stop reason: HOSPADM

## 2019-01-01 RX ORDER — KETOROLAC TROMETHAMINE 15 MG/ML
15 INJECTION, SOLUTION INTRAMUSCULAR; INTRAVENOUS ONCE
Status: DISCONTINUED | OUTPATIENT
Start: 2019-01-01 | End: 2019-01-01

## 2019-01-01 RX ORDER — SODIUM CHLORIDE 9 MG/ML
1000 INJECTION, SOLUTION INTRAVENOUS CONTINUOUS
Status: DISCONTINUED | OUTPATIENT
Start: 2019-01-01 | End: 2019-01-01

## 2019-01-01 RX ORDER — FUROSEMIDE 20 MG
20 TABLET ORAL DAILY
Status: DISCONTINUED | OUTPATIENT
Start: 2019-01-01 | End: 2019-01-01 | Stop reason: HOSPADM

## 2019-01-01 RX ORDER — DOCUSATE SODIUM 100 MG/1
100 CAPSULE, LIQUID FILLED ORAL 2 TIMES DAILY
Status: DISCONTINUED | OUTPATIENT
Start: 2019-01-01 | End: 2019-01-01 | Stop reason: HOSPADM

## 2019-01-01 RX ORDER — AMIODARONE HYDROCHLORIDE 200 MG/1
200 TABLET ORAL DAILY
Qty: 30 TABLET | Refills: 11 | Status: SHIPPED | OUTPATIENT
Start: 2019-01-01 | End: 2019-01-01 | Stop reason: SINTOL

## 2019-01-01 RX ORDER — IPRATROPIUM BROMIDE AND ALBUTEROL SULFATE 2.5; .5 MG/3ML; MG/3ML
3 SOLUTION RESPIRATORY (INHALATION)
Status: DISCONTINUED | OUTPATIENT
Start: 2019-01-01 | End: 2019-01-01 | Stop reason: HOSPADM

## 2019-01-01 RX ORDER — HYDRALAZINE HYDROCHLORIDE 50 MG/1
50 TABLET, FILM COATED ORAL 2 TIMES DAILY
Status: ON HOLD | COMMUNITY
End: 2019-01-01

## 2019-01-01 RX ORDER — GUAIFENESIN 600 MG/1
600 TABLET, EXTENDED RELEASE ORAL 2 TIMES DAILY PRN
Status: DISCONTINUED | OUTPATIENT
Start: 2019-01-01 | End: 2019-01-01 | Stop reason: HOSPADM

## 2019-01-01 RX ORDER — DIPHENHYDRAMINE HYDROCHLORIDE 50 MG/ML
25-50 INJECTION INTRAMUSCULAR; INTRAVENOUS
Status: DISCONTINUED | OUTPATIENT
Start: 2019-01-01 | End: 2019-01-01 | Stop reason: HOSPADM

## 2019-01-01 RX ORDER — AMLODIPINE BESYLATE 10 MG/1
10 TABLET ORAL DAILY
Qty: 30 TABLET | Refills: 0 | Status: SHIPPED | OUTPATIENT
Start: 2019-01-01

## 2019-01-01 RX ORDER — ONDANSETRON 4 MG/1
4 TABLET, ORALLY DISINTEGRATING ORAL EVERY 6 HOURS PRN
Qty: 30 TABLET | Refills: 0 | Status: SHIPPED | OUTPATIENT
Start: 2019-01-01

## 2019-01-01 RX ORDER — TRAMADOL HYDROCHLORIDE 50 MG/1
50 TABLET ORAL EVERY 6 HOURS PRN
Qty: 180 TABLET | Refills: 0 | Status: SHIPPED | OUTPATIENT
Start: 2019-01-01

## 2019-01-01 RX ORDER — LIDOCAINE HYDROCHLORIDE AND EPINEPHRINE 10; 10 MG/ML; UG/ML
10-30 INJECTION, SOLUTION INFILTRATION; PERINEURAL
Status: DISCONTINUED | OUTPATIENT
Start: 2019-01-01 | End: 2019-01-01 | Stop reason: HOSPADM

## 2019-01-01 RX ORDER — WARFARIN SODIUM 2.5 MG/1
TABLET ORAL
Qty: 30 TABLET | Refills: 0 | Status: SHIPPED | OUTPATIENT
Start: 2019-01-01 | End: 2019-01-01

## 2019-01-01 RX ORDER — FUROSEMIDE 20 MG
20 TABLET ORAL DAILY
Status: CANCELLED | OUTPATIENT
Start: 2019-01-01

## 2019-01-01 RX ORDER — ONDANSETRON 2 MG/ML
4 INJECTION INTRAMUSCULAR; INTRAVENOUS EVERY 6 HOURS PRN
Status: DISCONTINUED | OUTPATIENT
Start: 2019-01-01 | End: 2019-01-01 | Stop reason: HOSPADM

## 2019-01-01 RX ORDER — ONDANSETRON 2 MG/ML
4 INJECTION INTRAMUSCULAR; INTRAVENOUS ONCE
Status: COMPLETED | OUTPATIENT
Start: 2019-01-01 | End: 2019-01-01

## 2019-01-01 RX ORDER — ALBUTEROL SULFATE 90 UG/1
2 AEROSOL, METERED RESPIRATORY (INHALATION) EVERY 4 HOURS PRN
Status: DISCONTINUED | OUTPATIENT
Start: 2019-01-01 | End: 2019-01-01 | Stop reason: HOSPADM

## 2019-01-01 RX ORDER — FUROSEMIDE 20 MG
20 TABLET ORAL DAILY
Qty: 90 TABLET | Refills: 3 | Status: ON HOLD | OUTPATIENT
Start: 2019-01-01 | End: 2019-01-01

## 2019-01-01 RX ORDER — HYDROMORPHONE HYDROCHLORIDE 1 MG/ML
0.2 INJECTION, SOLUTION INTRAMUSCULAR; INTRAVENOUS; SUBCUTANEOUS
Status: DISCONTINUED | OUTPATIENT
Start: 2019-01-01 | End: 2019-01-01 | Stop reason: HOSPADM

## 2019-01-01 RX ORDER — WARFARIN SODIUM 2.5 MG/1
2.5 TABLET ORAL
Status: COMPLETED | OUTPATIENT
Start: 2019-01-01 | End: 2019-01-01

## 2019-01-01 RX ORDER — FLUMAZENIL 0.1 MG/ML
0.2 INJECTION, SOLUTION INTRAVENOUS
Status: DISCONTINUED | OUTPATIENT
Start: 2019-01-01 | End: 2019-01-01 | Stop reason: HOSPADM

## 2019-01-01 RX ORDER — LORAZEPAM 2 MG/ML
1 CONCENTRATE ORAL EVERY 8 HOURS PRN
Qty: 30 ML | Refills: 5 | Status: SHIPPED | OUTPATIENT
Start: 2019-01-01

## 2019-01-01 RX ORDER — AMLODIPINE BESYLATE 10 MG/1
10 TABLET ORAL DAILY
Qty: 90 TABLET | Refills: 1 | Status: SHIPPED | OUTPATIENT
Start: 2019-01-01 | End: 2019-01-01

## 2019-01-01 RX ORDER — NALOXONE HYDROCHLORIDE 0.4 MG/ML
.1-.4 INJECTION, SOLUTION INTRAMUSCULAR; INTRAVENOUS; SUBCUTANEOUS
Status: DISCONTINUED | OUTPATIENT
Start: 2019-01-01 | End: 2019-01-01

## 2019-01-01 RX ORDER — ONDANSETRON 2 MG/ML
4 INJECTION INTRAMUSCULAR; INTRAVENOUS EVERY 4 HOURS PRN
Status: DISCONTINUED | OUTPATIENT
Start: 2019-01-01 | End: 2019-01-01 | Stop reason: HOSPADM

## 2019-01-01 RX ORDER — LIDOCAINE HYDROCHLORIDE 10 MG/ML
10-30 INJECTION, SOLUTION EPIDURAL; INFILTRATION; INTRACAUDAL; PERINEURAL
Status: DISCONTINUED | OUTPATIENT
Start: 2019-01-01 | End: 2019-01-01 | Stop reason: HOSPADM

## 2019-01-01 RX ORDER — PROCHLORPERAZINE MALEATE 5 MG
5 TABLET ORAL EVERY 6 HOURS PRN
Status: DISCONTINUED | OUTPATIENT
Start: 2019-01-01 | End: 2019-01-01 | Stop reason: HOSPADM

## 2019-01-01 RX ORDER — DIGOXIN 125 MCG
125 TABLET ORAL DAILY
Status: CANCELLED | OUTPATIENT
Start: 2019-01-01

## 2019-01-01 RX ORDER — AMLODIPINE BESYLATE 10 MG/1
10 TABLET ORAL DAILY
Qty: 90 TABLET | Refills: 1 | Status: ON HOLD | OUTPATIENT
Start: 2019-01-01 | End: 2019-01-01

## 2019-01-01 RX ORDER — AMLODIPINE BESYLATE 5 MG/1
10 TABLET ORAL DAILY
Status: CANCELLED | OUTPATIENT
Start: 2019-01-01

## 2019-01-01 RX ORDER — POTASSIUM CHLORIDE 750 MG/1
40 TABLET, EXTENDED RELEASE ORAL ONCE
Status: COMPLETED | OUTPATIENT
Start: 2019-01-01 | End: 2019-01-01

## 2019-01-01 RX ORDER — FUROSEMIDE 10 MG/ML
20 INJECTION INTRAMUSCULAR; INTRAVENOUS ONCE
Status: DISCONTINUED | OUTPATIENT
Start: 2019-01-01 | End: 2019-01-01

## 2019-01-01 RX ORDER — IBUTILIDE FUMARATE 1 MG/10ML
1 INJECTION, SOLUTION INTRAVENOUS
Status: DISCONTINUED | OUTPATIENT
Start: 2019-01-01 | End: 2019-01-01 | Stop reason: HOSPADM

## 2019-01-01 RX ORDER — BISACODYL 10 MG
10 SUPPOSITORY, RECTAL RECTAL DAILY PRN
Status: DISCONTINUED | OUTPATIENT
Start: 2019-01-01 | End: 2019-01-01 | Stop reason: HOSPADM

## 2019-01-01 RX ORDER — AMLODIPINE BESYLATE 10 MG/1
10 TABLET ORAL DAILY
Status: DISCONTINUED | OUTPATIENT
Start: 2019-01-01 | End: 2019-01-01

## 2019-01-01 RX ORDER — METOCLOPRAMIDE 5 MG/1
5 TABLET ORAL
Status: DISCONTINUED | OUTPATIENT
Start: 2019-01-01 | End: 2019-01-01 | Stop reason: HOSPADM

## 2019-01-01 RX ORDER — DOBUTAMINE HYDROCHLORIDE 200 MG/100ML
5-40 INJECTION INTRAVENOUS CONTINUOUS PRN
Status: DISCONTINUED | OUTPATIENT
Start: 2019-01-01 | End: 2019-01-01 | Stop reason: HOSPADM

## 2019-01-01 RX ORDER — DOCUSATE SODIUM 100 MG/1
100 CAPSULE, LIQUID FILLED ORAL 2 TIMES DAILY PRN
Qty: 90 CAPSULE | Refills: 0 | Status: SHIPPED | OUTPATIENT
Start: 2019-01-01

## 2019-01-01 RX ORDER — HYDRALAZINE HYDROCHLORIDE 50 MG/1
100 TABLET, FILM COATED ORAL 3 TIMES DAILY
Qty: 60 TABLET | Refills: 3 | Status: ON HOLD | OUTPATIENT
Start: 2019-01-01 | End: 2019-01-01

## 2019-01-01 RX ORDER — ECHINACEA PURPUREA EXTRACT 125 MG
1 TABLET ORAL
Status: DISCONTINUED | OUTPATIENT
Start: 2019-01-01 | End: 2019-01-01 | Stop reason: HOSPADM

## 2019-01-01 RX ORDER — ADENOSINE 3 MG/ML
6-12 INJECTION, SOLUTION INTRAVENOUS EVERY 5 MIN PRN
Status: DISCONTINUED | OUTPATIENT
Start: 2019-01-01 | End: 2019-01-01 | Stop reason: HOSPADM

## 2019-01-01 RX ORDER — POLYETHYLENE GLYCOL 3350 17 G/17G
17 POWDER, FOR SOLUTION ORAL DAILY PRN
Status: DISCONTINUED | OUTPATIENT
Start: 2019-01-01 | End: 2019-01-01 | Stop reason: HOSPADM

## 2019-01-01 RX ORDER — DILTIAZEM HYDROCHLORIDE 30 MG/1
30 TABLET, FILM COATED ORAL EVERY 6 HOURS SCHEDULED
Status: DISCONTINUED | OUTPATIENT
Start: 2019-01-01 | End: 2019-01-01

## 2019-01-01 RX ORDER — HYDRALAZINE HYDROCHLORIDE 50 MG/1
50 TABLET, FILM COATED ORAL 3 TIMES DAILY
Status: DISCONTINUED | OUTPATIENT
Start: 2019-01-01 | End: 2019-01-01 | Stop reason: HOSPADM

## 2019-01-01 RX ORDER — HYDRALAZINE HYDROCHLORIDE 50 MG/1
50 TABLET, FILM COATED ORAL 2 TIMES DAILY
Status: DISCONTINUED | OUTPATIENT
Start: 2019-01-01 | End: 2019-01-01

## 2019-01-01 RX ORDER — OXYCODONE HYDROCHLORIDE 5 MG/1
5 TABLET ORAL ONCE
Status: COMPLETED | OUTPATIENT
Start: 2019-01-01 | End: 2019-01-01

## 2019-01-01 RX ORDER — HYDRALAZINE HYDROCHLORIDE 100 MG/1
100 TABLET, FILM COATED ORAL 3 TIMES DAILY
Status: DISCONTINUED | OUTPATIENT
Start: 2019-01-01 | End: 2019-01-01

## 2019-01-01 RX ORDER — GUAIFENESIN 600 MG/1
600 TABLET, EXTENDED RELEASE ORAL 2 TIMES DAILY PRN
Qty: 30 TABLET | Refills: 0 | Status: SHIPPED | OUTPATIENT
Start: 2019-01-01

## 2019-01-01 RX ORDER — AMIODARONE HYDROCHLORIDE 200 MG/1
200 TABLET ORAL 3 TIMES DAILY
Qty: 90 TABLET | Refills: 1 | Status: SHIPPED | OUTPATIENT
Start: 2019-01-01 | End: 2019-01-01

## 2019-01-01 RX ORDER — POTASSIUM CL/LIDO/0.9 % NACL 10MEQ/0.1L
10 INTRAVENOUS SOLUTION, PIGGYBACK (ML) INTRAVENOUS
Status: DISCONTINUED | OUTPATIENT
Start: 2019-01-01 | End: 2019-01-01 | Stop reason: HOSPADM

## 2019-01-01 RX ORDER — ONDANSETRON 4 MG/1
4 TABLET, ORALLY DISINTEGRATING ORAL EVERY 6 HOURS PRN
Status: DISCONTINUED | OUTPATIENT
Start: 2019-01-01 | End: 2019-01-01 | Stop reason: HOSPADM

## 2019-01-01 RX ORDER — DILTIAZEM HYDROCHLORIDE 5 MG/ML
10 INJECTION INTRAVENOUS ONCE
Status: DISCONTINUED | OUTPATIENT
Start: 2019-01-01 | End: 2019-01-01

## 2019-01-01 RX ORDER — FENTANYL CITRATE 50 UG/ML
INJECTION, SOLUTION INTRAMUSCULAR; INTRAVENOUS
Status: DISCONTINUED | OUTPATIENT
Start: 2019-01-01 | End: 2019-01-01 | Stop reason: HOSPADM

## 2019-01-01 RX ORDER — LABETALOL HYDROCHLORIDE 5 MG/ML
10 INJECTION, SOLUTION INTRAVENOUS
Status: DISCONTINUED | OUTPATIENT
Start: 2019-01-01 | End: 2019-01-01 | Stop reason: HOSPADM

## 2019-01-01 RX ORDER — FLUTICASONE PROPIONATE 50 MCG
1 SPRAY, SUSPENSION (ML) NASAL DAILY PRN
Qty: 18.2 ML | Refills: 3 | Status: SHIPPED | OUTPATIENT
Start: 2019-01-01

## 2019-01-01 RX ORDER — HYDRALAZINE HYDROCHLORIDE 20 MG/ML
10 INJECTION INTRAMUSCULAR; INTRAVENOUS EVERY 4 HOURS PRN
Status: DISCONTINUED | OUTPATIENT
Start: 2019-01-01 | End: 2019-01-01 | Stop reason: HOSPADM

## 2019-01-01 RX ORDER — AMIODARONE HYDROCHLORIDE 200 MG/1
200 TABLET ORAL
Status: DISCONTINUED | OUTPATIENT
Start: 2019-01-01 | End: 2019-01-01 | Stop reason: HOSPADM

## 2019-01-01 RX ORDER — ALBUTEROL SULFATE 90 UG/1
AEROSOL, METERED RESPIRATORY (INHALATION)
Qty: 18 G | Refills: 9 | OUTPATIENT
Start: 2019-01-01

## 2019-01-01 RX ORDER — MORPHINE SULFATE 2 MG/ML
1-2 INJECTION, SOLUTION INTRAMUSCULAR; INTRAVENOUS EVERY 4 HOURS PRN
Status: DISCONTINUED | OUTPATIENT
Start: 2019-01-01 | End: 2019-01-01 | Stop reason: HOSPADM

## 2019-01-01 RX ORDER — TIOTROPIUM BROMIDE 18 UG/1
18 CAPSULE ORAL; RESPIRATORY (INHALATION) DAILY
Status: DISCONTINUED | OUTPATIENT
Start: 2019-01-01 | End: 2019-01-01

## 2019-01-01 RX ORDER — AMLODIPINE BESYLATE 10 MG/1
10 TABLET ORAL DAILY
Status: DISCONTINUED | OUTPATIENT
Start: 2019-01-01 | End: 2019-01-01 | Stop reason: HOSPADM

## 2019-01-01 RX ORDER — IOPAMIDOL 755 MG/ML
56 INJECTION, SOLUTION INTRAVASCULAR ONCE
Status: COMPLETED | OUTPATIENT
Start: 2019-01-01 | End: 2019-01-01

## 2019-01-01 RX ORDER — HYDRALAZINE HYDROCHLORIDE 100 MG/1
100 TABLET, FILM COATED ORAL 3 TIMES DAILY
Status: DISCONTINUED | OUTPATIENT
Start: 2019-01-01 | End: 2019-01-01 | Stop reason: HOSPADM

## 2019-01-01 RX ORDER — FUROSEMIDE 20 MG
20 TABLET ORAL ONCE
Status: DISCONTINUED | OUTPATIENT
Start: 2019-01-01 | End: 2019-01-01 | Stop reason: HOSPADM

## 2019-01-01 RX ORDER — METOPROLOL TARTRATE 50 MG
50 TABLET ORAL 2 TIMES DAILY
Qty: 60 TABLET | Refills: 0 | Status: SHIPPED | OUTPATIENT
Start: 2019-01-01 | End: 2019-01-01

## 2019-01-01 RX ORDER — ALUMINA, MAGNESIA, AND SIMETHICONE 2400; 2400; 240 MG/30ML; MG/30ML; MG/30ML
30 SUSPENSION ORAL EVERY 4 HOURS PRN
Status: DISCONTINUED | OUTPATIENT
Start: 2019-01-01 | End: 2019-01-01 | Stop reason: HOSPADM

## 2019-01-01 RX ORDER — AMOXICILLIN 250 MG
2 CAPSULE ORAL 2 TIMES DAILY PRN
Status: DISCONTINUED | OUTPATIENT
Start: 2019-01-01 | End: 2019-01-01 | Stop reason: HOSPADM

## 2019-01-01 RX ORDER — MAGNESIUM SULFATE HEPTAHYDRATE 40 MG/ML
2 INJECTION, SOLUTION INTRAVENOUS DAILY PRN
Status: DISCONTINUED | OUTPATIENT
Start: 2019-01-01 | End: 2019-01-01 | Stop reason: HOSPADM

## 2019-01-01 RX ORDER — AMIODARONE HYDROCHLORIDE 200 MG/1
200 TABLET ORAL DAILY
Status: DISCONTINUED | OUTPATIENT
Start: 2019-01-01 | End: 2019-01-01 | Stop reason: HOSPADM

## 2019-01-01 RX ORDER — ALBUTEROL SULFATE 90 UG/1
2 AEROSOL, METERED RESPIRATORY (INHALATION) EVERY 6 HOURS PRN
Status: DISCONTINUED | OUTPATIENT
Start: 2019-01-01 | End: 2019-01-01 | Stop reason: HOSPADM

## 2019-01-01 RX ORDER — SODIUM CHLORIDE 9 MG/ML
INJECTION, SOLUTION INTRAVENOUS CONTINUOUS
Status: DISCONTINUED | OUTPATIENT
Start: 2019-01-01 | End: 2019-01-01 | Stop reason: HOSPADM

## 2019-01-01 RX ORDER — ALBUTEROL SULFATE 0.83 MG/ML
2.5 SOLUTION RESPIRATORY (INHALATION) EVERY 6 HOURS PRN
Status: DISCONTINUED | OUTPATIENT
Start: 2019-01-01 | End: 2019-01-01 | Stop reason: HOSPADM

## 2019-01-01 RX ORDER — PREDNISONE 20 MG/1
40 TABLET ORAL ONCE
Status: COMPLETED | OUTPATIENT
Start: 2019-01-01 | End: 2019-01-01

## 2019-01-01 RX ORDER — METOPROLOL TARTRATE 25 MG/1
25 TABLET, FILM COATED ORAL 2 TIMES DAILY
Status: DISCONTINUED | OUTPATIENT
Start: 2019-01-01 | End: 2019-01-01

## 2019-01-01 RX ORDER — HYDRALAZINE HYDROCHLORIDE 50 MG/1
50 TABLET, FILM COATED ORAL 3 TIMES DAILY
Status: DISCONTINUED | OUTPATIENT
Start: 2019-01-01 | End: 2019-01-01

## 2019-01-01 RX ORDER — MORPHINE SULFATE 4 MG/ML
1-2 INJECTION, SOLUTION INTRAMUSCULAR; INTRAVENOUS EVERY 5 MIN PRN
Status: DISCONTINUED | OUTPATIENT
Start: 2019-01-01 | End: 2019-01-01 | Stop reason: HOSPADM

## 2019-01-01 RX ORDER — ESCITALOPRAM OXALATE 10 MG/1
10 TABLET ORAL DAILY
COMMUNITY

## 2019-01-01 RX ORDER — ESCITALOPRAM OXALATE 10 MG/1
10 TABLET ORAL DAILY
Status: CANCELLED | OUTPATIENT
Start: 2019-01-01

## 2019-01-01 RX ORDER — KETOROLAC TROMETHAMINE 15 MG/ML
15 INJECTION, SOLUTION INTRAMUSCULAR; INTRAVENOUS
Status: DISCONTINUED | OUTPATIENT
Start: 2019-01-01 | End: 2019-01-01 | Stop reason: HOSPADM

## 2019-01-01 RX ORDER — REGADENOSON 0.08 MG/ML
0.4 INJECTION, SOLUTION INTRAVENOUS ONCE
Status: COMPLETED | OUTPATIENT
Start: 2019-01-01 | End: 2019-01-01

## 2019-01-01 RX ORDER — IBUTILIDE FUMARATE 1 MG/10ML
0.01 INJECTION, SOLUTION INTRAVENOUS
Status: DISCONTINUED | OUTPATIENT
Start: 2019-01-01 | End: 2019-01-01 | Stop reason: HOSPADM

## 2019-01-01 RX ORDER — ALBUTEROL SULFATE 90 UG/1
AEROSOL, METERED RESPIRATORY (INHALATION)
Qty: 18 G | Refills: 0 | Status: SHIPPED | OUTPATIENT
Start: 2019-01-01 | End: 2019-01-01

## 2019-01-01 RX ORDER — ATORVASTATIN CALCIUM 40 MG/1
40 TABLET, FILM COATED ORAL DAILY
Status: DISCONTINUED | OUTPATIENT
Start: 2019-01-01 | End: 2019-01-01

## 2019-01-01 RX ORDER — WARFARIN SODIUM 2.5 MG/1
TABLET ORAL
COMMUNITY
End: 2019-01-01

## 2019-01-01 RX ORDER — HYDRALAZINE HYDROCHLORIDE 20 MG/ML
10 INJECTION INTRAMUSCULAR; INTRAVENOUS ONCE
Status: COMPLETED | OUTPATIENT
Start: 2019-01-01 | End: 2019-01-01

## 2019-01-01 RX ORDER — WARFARIN SODIUM 2.5 MG/1
TABLET ORAL
Qty: 75 TABLET | Refills: 1 | Status: SHIPPED | OUTPATIENT
Start: 2019-01-01 | End: 2019-01-01

## 2019-01-01 RX ORDER — MORPHINE SULFATE 4 MG/ML
2 INJECTION, SOLUTION INTRAMUSCULAR; INTRAVENOUS ONCE
Status: COMPLETED | OUTPATIENT
Start: 2019-01-01 | End: 2019-01-01

## 2019-01-01 RX ORDER — CEFAZOLIN SODIUM 1 G/3ML
1 INJECTION, POWDER, FOR SOLUTION INTRAMUSCULAR; INTRAVENOUS EVERY 12 HOURS
Status: DISCONTINUED | OUTPATIENT
Start: 2019-01-01 | End: 2019-01-01 | Stop reason: HOSPADM

## 2019-01-01 RX ORDER — HYDRALAZINE HYDROCHLORIDE 50 MG/1
50 TABLET, FILM COATED ORAL 3 TIMES DAILY
Qty: 60 TABLET | Refills: 3 | Status: ON HOLD | OUTPATIENT
Start: 2019-01-01 | End: 2019-01-01

## 2019-01-01 RX ORDER — POTASSIUM CHLORIDE 750 MG/1
20-40 TABLET, EXTENDED RELEASE ORAL
Status: DISCONTINUED | OUTPATIENT
Start: 2019-01-01 | End: 2019-01-01 | Stop reason: HOSPADM

## 2019-01-01 RX ORDER — ATORVASTATIN CALCIUM 10 MG/1
10 TABLET, FILM COATED ORAL EVERY EVENING
Status: DISCONTINUED | OUTPATIENT
Start: 2019-01-01 | End: 2019-01-01 | Stop reason: HOSPADM

## 2019-01-01 RX ORDER — FUROSEMIDE 20 MG
20 TABLET ORAL DAILY
Qty: 30 TABLET | Refills: 0 | Status: SHIPPED | OUTPATIENT
Start: 2019-01-01 | End: 2019-01-01

## 2019-01-01 RX ORDER — ALBUTEROL SULFATE 90 UG/1
1-2 AEROSOL, METERED RESPIRATORY (INHALATION) EVERY 4 HOURS PRN
Status: DISCONTINUED | OUTPATIENT
Start: 2019-01-01 | End: 2019-01-01 | Stop reason: HOSPADM

## 2019-01-01 RX ORDER — TRAMADOL HYDROCHLORIDE 50 MG/1
50 TABLET ORAL ONCE
Status: COMPLETED | OUTPATIENT
Start: 2019-01-01 | End: 2019-01-01

## 2019-01-01 RX ORDER — MORPHINE SULFATE 4 MG/ML
4 INJECTION, SOLUTION INTRAMUSCULAR; INTRAVENOUS ONCE
Status: COMPLETED | OUTPATIENT
Start: 2019-01-01 | End: 2019-01-01

## 2019-01-01 RX ORDER — ASPIRIN 81 MG/1
81 TABLET ORAL DAILY
Status: DISCONTINUED | OUTPATIENT
Start: 2019-01-01 | End: 2019-01-01 | Stop reason: HOSPADM

## 2019-01-01 RX ORDER — HEPARIN SODIUM 200 [USP'U]/100ML
100-500 INJECTION, SOLUTION INTRAVENOUS CONTINUOUS PRN
Status: DISCONTINUED | OUTPATIENT
Start: 2019-01-01 | End: 2019-01-01 | Stop reason: HOSPADM

## 2019-01-01 RX ORDER — POTASSIUM CHLORIDE 7.45 MG/ML
10 INJECTION INTRAVENOUS
Status: DISCONTINUED | OUTPATIENT
Start: 2019-01-01 | End: 2019-01-01 | Stop reason: HOSPADM

## 2019-01-01 RX ORDER — ACETAMINOPHEN 325 MG/1
650 TABLET ORAL EVERY 4 HOURS PRN
Status: DISCONTINUED | OUTPATIENT
Start: 2019-01-01 | End: 2019-01-01 | Stop reason: HOSPADM

## 2019-01-01 RX ORDER — PREDNISONE 20 MG/1
40 TABLET ORAL DAILY
Qty: 10 TABLET | Refills: 0 | Status: ON HOLD | OUTPATIENT
Start: 2019-01-01 | End: 2019-01-01

## 2019-01-01 RX ORDER — ALBUTEROL SULFATE 90 UG/1
AEROSOL, METERED RESPIRATORY (INHALATION)
Qty: 18 G | Refills: 9 | Status: SHIPPED | OUTPATIENT
Start: 2019-01-01

## 2019-01-01 RX ORDER — LOSARTAN POTASSIUM 50 MG/1
50 TABLET ORAL DAILY
Status: DISCONTINUED | OUTPATIENT
Start: 2019-01-01 | End: 2019-01-01

## 2019-01-01 RX ORDER — WARFARIN SODIUM 2.5 MG/1
2.5 TABLET ORAL
Status: DISCONTINUED | OUTPATIENT
Start: 2019-01-01 | End: 2019-01-01

## 2019-01-01 RX ORDER — AMIODARONE HYDROCHLORIDE 200 MG/1
200 TABLET ORAL DAILY
COMMUNITY
End: 2019-01-01

## 2019-01-01 RX ORDER — ATORVASTATIN CALCIUM 10 MG/1
10 TABLET, FILM COATED ORAL DAILY
Status: DISCONTINUED | OUTPATIENT
Start: 2019-01-01 | End: 2019-01-01

## 2019-01-01 RX ORDER — WARFARIN SODIUM 2.5 MG/1
TABLET ORAL
Qty: 30 TABLET | Refills: 0 | Status: ON HOLD | OUTPATIENT
Start: 2019-01-01 | End: 2019-01-01

## 2019-01-01 RX ORDER — LOSARTAN POTASSIUM 100 MG/1
100 TABLET ORAL DAILY
Qty: 90 TABLET | Refills: 3 | Status: ON HOLD | OUTPATIENT
Start: 2019-01-01 | End: 2019-01-01

## 2019-01-01 RX ORDER — MORPHINE SULFATE 2 MG/ML
1-2 INJECTION, SOLUTION INTRAMUSCULAR; INTRAVENOUS EVERY 5 MIN PRN
Status: DISCONTINUED | OUTPATIENT
Start: 2019-01-01 | End: 2019-01-01 | Stop reason: HOSPADM

## 2019-01-01 RX ORDER — OXYCODONE HYDROCHLORIDE 5 MG/1
5 TABLET ORAL EVERY 4 HOURS PRN
Status: DISCONTINUED | OUTPATIENT
Start: 2019-01-01 | End: 2019-01-01 | Stop reason: HOSPADM

## 2019-01-01 RX ORDER — MAGNESIUM SULFATE HEPTAHYDRATE 40 MG/ML
2 INJECTION, SOLUTION INTRAVENOUS ONCE
Status: COMPLETED | OUTPATIENT
Start: 2019-01-01 | End: 2019-01-01

## 2019-01-01 RX ORDER — WARFARIN SODIUM 2.5 MG/1
TABLET ORAL
Qty: 30 TABLET | Refills: 11 | Status: ON HOLD | OUTPATIENT
Start: 2019-01-01 | End: 2019-01-01

## 2019-01-01 RX ORDER — IPRATROPIUM BROMIDE AND ALBUTEROL SULFATE 2.5; .5 MG/3ML; MG/3ML
3 SOLUTION RESPIRATORY (INHALATION) ONCE
Status: COMPLETED | OUTPATIENT
Start: 2019-01-01 | End: 2019-01-01

## 2019-01-01 RX ORDER — ALBUTEROL SULFATE 90 UG/1
AEROSOL, METERED RESPIRATORY (INHALATION)
Qty: 18 G | Refills: 9 | Status: ON HOLD | OUTPATIENT
Start: 2019-01-01 | End: 2019-01-01

## 2019-01-01 RX ORDER — POTASSIUM CHLORIDE 750 MG/1
20 TABLET, EXTENDED RELEASE ORAL ONCE
Status: COMPLETED | OUTPATIENT
Start: 2019-01-01 | End: 2019-01-01

## 2019-01-01 RX ORDER — LANOLIN ALCOHOL/MO/W.PET/CERES
3 CREAM (GRAM) TOPICAL
Status: DISCONTINUED | OUTPATIENT
Start: 2019-01-01 | End: 2019-01-01 | Stop reason: HOSPADM

## 2019-01-01 RX ORDER — ONDANSETRON 2 MG/ML
4 INJECTION INTRAMUSCULAR; INTRAVENOUS ONCE
Status: DISCONTINUED | OUTPATIENT
Start: 2019-01-01 | End: 2019-01-01

## 2019-01-01 RX ORDER — PROCHLORPERAZINE 25 MG
12.5 SUPPOSITORY, RECTAL RECTAL EVERY 12 HOURS PRN
Status: DISCONTINUED | OUTPATIENT
Start: 2019-01-01 | End: 2019-01-01 | Stop reason: HOSPADM

## 2019-01-01 RX ORDER — AMIODARONE HYDROCHLORIDE 200 MG/1
200 TABLET ORAL DAILY
Qty: 30 TABLET | Refills: 6 | Status: ON HOLD | OUTPATIENT
Start: 2019-01-01 | End: 2019-01-01

## 2019-01-01 RX ORDER — ATORVASTATIN CALCIUM 40 MG/1
40 TABLET, FILM COATED ORAL AT BEDTIME
COMMUNITY
End: 2019-01-01

## 2019-01-01 RX ORDER — DILTIAZEM HYDROCHLORIDE 5 MG/ML
0.25 INJECTION INTRAVENOUS ONCE
Status: COMPLETED | OUTPATIENT
Start: 2019-01-01 | End: 2019-01-01

## 2019-01-01 RX ORDER — WARFARIN SODIUM 5 MG/1
5 TABLET ORAL
Status: DISCONTINUED | OUTPATIENT
Start: 2019-01-01 | End: 2019-01-01 | Stop reason: HOSPADM

## 2019-01-01 RX ORDER — ATORVASTATIN CALCIUM 40 MG/1
TABLET, FILM COATED ORAL
Qty: 90 TABLET | Refills: 3 | Status: ON HOLD | OUTPATIENT
Start: 2019-01-01 | End: 2019-01-01

## 2019-01-01 RX ORDER — AMIODARONE HYDROCHLORIDE 200 MG/1
200 TABLET ORAL DAILY
Status: DISCONTINUED | OUTPATIENT
Start: 2019-01-01 | End: 2019-01-01

## 2019-01-01 RX ORDER — ALBUTEROL SULFATE 0.83 MG/ML
2.5 SOLUTION RESPIRATORY (INHALATION) EVERY 6 HOURS PRN
Qty: 60 VIAL | Refills: 3 | Status: SHIPPED | OUTPATIENT
Start: 2019-01-01

## 2019-01-01 RX ORDER — METOPROLOL TARTRATE 1 MG/ML
2.5 INJECTION, SOLUTION INTRAVENOUS EVERY 4 HOURS PRN
Status: DISCONTINUED | OUTPATIENT
Start: 2019-01-01 | End: 2019-01-01 | Stop reason: HOSPADM

## 2019-01-01 RX ORDER — HYDROMORPHONE HYDROCHLORIDE 1 MG/ML
0.5 INJECTION, SOLUTION INTRAMUSCULAR; INTRAVENOUS; SUBCUTANEOUS ONCE
Status: DISCONTINUED | OUTPATIENT
Start: 2019-01-01 | End: 2019-01-01

## 2019-01-01 RX ORDER — DIGOXIN 125 MCG
125 TABLET ORAL DAILY
Qty: 30 TABLET | Refills: 3 | Status: ON HOLD | OUTPATIENT
Start: 2019-01-01 | End: 2019-01-01

## 2019-01-01 RX ORDER — LOSARTAN POTASSIUM 50 MG/1
50 TABLET ORAL ONCE
Status: COMPLETED | OUTPATIENT
Start: 2019-01-01 | End: 2019-01-01

## 2019-01-01 RX ORDER — METOPROLOL TARTRATE 50 MG
50 TABLET ORAL 2 TIMES DAILY
Qty: 180 TABLET | Refills: 0 | Status: SHIPPED | OUTPATIENT
Start: 2019-01-01 | End: 2019-01-01 | Stop reason: SINTOL

## 2019-01-01 RX ORDER — HEPARIN SODIUM 200 [USP'U]/100ML
100-600 INJECTION, SOLUTION INTRAVENOUS CONTINUOUS PRN
Status: DISCONTINUED | OUTPATIENT
Start: 2019-01-01 | End: 2019-01-01 | Stop reason: HOSPADM

## 2019-01-01 RX ORDER — FUROSEMIDE 10 MG/ML
20 INJECTION INTRAMUSCULAR; INTRAVENOUS ONCE
Status: COMPLETED | OUTPATIENT
Start: 2019-01-01 | End: 2019-01-01

## 2019-01-01 RX ADMIN — HYDRALAZINE HYDROCHLORIDE 50 MG: 50 TABLET, FILM COATED ORAL at 20:24

## 2019-01-01 RX ADMIN — DILTIAZEM HYDROCHLORIDE 13.95 MG: 5 INJECTION INTRAVENOUS at 13:52

## 2019-01-01 RX ADMIN — HYDRALAZINE HYDROCHLORIDE 50 MG: 50 TABLET, FILM COATED ORAL at 08:35

## 2019-01-01 RX ADMIN — LOSARTAN POTASSIUM 50 MG: 50 TABLET, FILM COATED ORAL at 11:58

## 2019-01-01 RX ADMIN — METOPROLOL TARTRATE 12.5 MG: 25 TABLET, FILM COATED ORAL at 19:41

## 2019-01-01 RX ADMIN — PREDNISONE 40 MG: 20 TABLET ORAL at 11:40

## 2019-01-01 RX ADMIN — PREDNISONE 2 MG: 1 TABLET ORAL at 08:21

## 2019-01-01 RX ADMIN — METOPROLOL TARTRATE 12.5 MG: 25 TABLET, FILM COATED ORAL at 09:25

## 2019-01-01 RX ADMIN — AMIODARONE HYDROCHLORIDE 0.5 MG/MIN: 50 INJECTION, SOLUTION INTRAVENOUS at 00:44

## 2019-01-01 RX ADMIN — TETROFOSMIN 26.8 MCI.: 1.38 INJECTION, POWDER, LYOPHILIZED, FOR SOLUTION INTRAVENOUS at 09:36

## 2019-01-01 RX ADMIN — WARFARIN SODIUM 2.5 MG: 2.5 TABLET ORAL at 20:24

## 2019-01-01 RX ADMIN — APIXABAN 2.5 MG: 2.5 TABLET, FILM COATED ORAL at 09:24

## 2019-01-01 RX ADMIN — LOSARTAN POTASSIUM 100 MG: 100 TABLET, FILM COATED ORAL at 08:35

## 2019-01-01 RX ADMIN — Medication 2 G: at 23:29

## 2019-01-01 RX ADMIN — APIXABAN 2.5 MG: 2.5 TABLET, FILM COATED ORAL at 22:05

## 2019-01-01 RX ADMIN — APIXABAN 2.5 MG: 2.5 TABLET, FILM COATED ORAL at 22:02

## 2019-01-01 RX ADMIN — ATORVASTATIN CALCIUM 40 MG: 40 TABLET, FILM COATED ORAL at 21:41

## 2019-01-01 RX ADMIN — METOCLOPRAMIDE HYDROCHLORIDE 5 MG: 5 TABLET ORAL at 08:21

## 2019-01-01 RX ADMIN — ALBUTEROL SULFATE 2 PUFF: 90 AEROSOL, METERED RESPIRATORY (INHALATION) at 18:56

## 2019-01-01 RX ADMIN — METOPROLOL TARTRATE 50 MG: 50 TABLET ORAL at 23:06

## 2019-01-01 RX ADMIN — HYDRALAZINE HYDROCHLORIDE 100 MG: 100 TABLET, FILM COATED ORAL at 21:59

## 2019-01-01 RX ADMIN — AMLODIPINE BESYLATE 10 MG: 10 TABLET ORAL at 08:08

## 2019-01-01 RX ADMIN — DOCUSATE SODIUM 100 MG: 100 CAPSULE, LIQUID FILLED ORAL at 10:06

## 2019-01-01 RX ADMIN — ATORVASTATIN CALCIUM 40 MG: 40 TABLET, FILM COATED ORAL at 21:18

## 2019-01-01 RX ADMIN — IPRATROPIUM BROMIDE AND ALBUTEROL SULFATE 3 ML: .5; 3 SOLUTION RESPIRATORY (INHALATION) at 08:51

## 2019-01-01 RX ADMIN — LOSARTAN POTASSIUM 50 MG: 50 TABLET, FILM COATED ORAL at 12:53

## 2019-01-01 RX ADMIN — CEFAZOLIN SODIUM 1 G: 1 INJECTION, POWDER, FOR SOLUTION INTRAMUSCULAR; INTRAVENOUS at 03:19

## 2019-01-01 RX ADMIN — HUMAN ALBUMIN MICROSPHERES AND PERFLUTREN 3 ML: 10; .22 INJECTION, SOLUTION INTRAVENOUS at 09:30

## 2019-01-01 RX ADMIN — ATORVASTATIN CALCIUM 40 MG: 40 TABLET, FILM COATED ORAL at 21:56

## 2019-01-01 RX ADMIN — FUROSEMIDE 20 MG: 20 TABLET ORAL at 17:55

## 2019-01-01 RX ADMIN — FUROSEMIDE 20 MG: 20 TABLET ORAL at 08:43

## 2019-01-01 RX ADMIN — HYDRALAZINE HYDROCHLORIDE 50 MG: 50 TABLET, FILM COATED ORAL at 19:07

## 2019-01-01 RX ADMIN — MORPHINE SULFATE 2 MG: 2 INJECTION, SOLUTION INTRAMUSCULAR; INTRAVENOUS at 08:16

## 2019-01-01 RX ADMIN — LOSARTAN POTASSIUM 100 MG: 100 TABLET, FILM COATED ORAL at 08:54

## 2019-01-01 RX ADMIN — POTASSIUM CHLORIDE 20 MEQ: 750 TABLET, EXTENDED RELEASE ORAL at 05:20

## 2019-01-01 RX ADMIN — ESCITALOPRAM OXALATE 10 MG: 10 TABLET ORAL at 08:47

## 2019-01-01 RX ADMIN — MORPHINE SULFATE 2 MG: 2 INJECTION, SOLUTION INTRAMUSCULAR; INTRAVENOUS at 08:21

## 2019-01-01 RX ADMIN — ATORVASTATIN CALCIUM 40 MG: 40 TABLET, FILM COATED ORAL at 20:16

## 2019-01-01 RX ADMIN — AMIODARONE HYDROCHLORIDE 200 MG: 200 TABLET ORAL at 08:45

## 2019-01-01 RX ADMIN — METOCLOPRAMIDE HYDROCHLORIDE 5 MG: 5 TABLET ORAL at 17:54

## 2019-01-01 RX ADMIN — HYDRALAZINE HYDROCHLORIDE 50 MG: 50 TABLET ORAL at 07:57

## 2019-01-01 RX ADMIN — GUAIFENESIN 600 MG: 600 TABLET, EXTENDED RELEASE ORAL at 13:09

## 2019-01-01 RX ADMIN — HEPARIN SODIUM 600 UNITS/HR: 10000 INJECTION, SOLUTION INTRAVENOUS at 14:58

## 2019-01-01 RX ADMIN — LOSARTAN POTASSIUM 100 MG: 50 TABLET ORAL at 08:22

## 2019-01-01 RX ADMIN — FUROSEMIDE 20 MG: 20 TABLET ORAL at 08:54

## 2019-01-01 RX ADMIN — VITAMIN D, TAB 1000IU (100/BT) 1000 UNITS: 25 TAB at 08:08

## 2019-01-01 RX ADMIN — METOPROLOL TARTRATE 12.5 MG: 25 TABLET, FILM COATED ORAL at 21:18

## 2019-01-01 RX ADMIN — PREDNISONE 2 MG: 1 TABLET ORAL at 08:55

## 2019-01-01 RX ADMIN — AMLODIPINE BESYLATE 10 MG: 10 TABLET ORAL at 16:42

## 2019-01-01 RX ADMIN — CEFAZOLIN SODIUM 2 G: 2 INJECTION, SOLUTION INTRAVENOUS at 15:30

## 2019-01-01 RX ADMIN — ESCITALOPRAM OXALATE 10 MG: 10 TABLET ORAL at 09:25

## 2019-01-01 RX ADMIN — METOPROLOL TARTRATE 50 MG: 50 TABLET ORAL at 09:29

## 2019-01-01 RX ADMIN — AMLODIPINE BESYLATE 10 MG: 10 TABLET ORAL at 08:35

## 2019-01-01 RX ADMIN — MORPHINE SULFATE 1 MG: 2 INJECTION, SOLUTION INTRAMUSCULAR; INTRAVENOUS at 17:44

## 2019-01-01 RX ADMIN — LOSARTAN POTASSIUM 100 MG: 100 TABLET, FILM COATED ORAL at 08:08

## 2019-01-01 RX ADMIN — METOPROLOL TARTRATE 25 MG: 25 TABLET ORAL at 20:16

## 2019-01-01 RX ADMIN — FUROSEMIDE 40 MG: 10 INJECTION, SOLUTION INTRAVENOUS at 17:15

## 2019-01-01 RX ADMIN — LOSARTAN POTASSIUM 50 MG: 50 TABLET, FILM COATED ORAL at 10:06

## 2019-01-01 RX ADMIN — POTASSIUM CHLORIDE 40 MEQ: 750 TABLET, EXTENDED RELEASE ORAL at 18:05

## 2019-01-01 RX ADMIN — METOPROLOL TARTRATE 50 MG: 50 TABLET ORAL at 21:56

## 2019-01-01 RX ADMIN — ATORVASTATIN CALCIUM 40 MG: 40 TABLET, FILM COATED ORAL at 21:15

## 2019-01-01 RX ADMIN — OXYCODONE HYDROCHLORIDE 5 MG: 5 TABLET ORAL at 21:59

## 2019-01-01 RX ADMIN — FUROSEMIDE 20 MG: 10 INJECTION, SOLUTION INTRAVENOUS at 10:16

## 2019-01-01 RX ADMIN — DOCUSATE SODIUM 100 MG: 100 CAPSULE, LIQUID FILLED ORAL at 09:26

## 2019-01-01 RX ADMIN — APIXABAN 2.5 MG: 2.5 TABLET, FILM COATED ORAL at 09:29

## 2019-01-01 RX ADMIN — WARFARIN SODIUM 2.5 MG: 2.5 TABLET ORAL at 18:05

## 2019-01-01 RX ADMIN — METOPROLOL TARTRATE 50 MG: 50 TABLET ORAL at 08:43

## 2019-01-01 RX ADMIN — AMIODARONE HYDROCHLORIDE 1 MG/MIN: 50 INJECTION, SOLUTION INTRAVENOUS at 20:52

## 2019-01-01 RX ADMIN — POTASSIUM CHLORIDE 20 MEQ: 750 TABLET, EXTENDED RELEASE ORAL at 21:59

## 2019-01-01 RX ADMIN — HYDRALAZINE HYDROCHLORIDE 50 MG: 50 TABLET ORAL at 16:42

## 2019-01-01 RX ADMIN — ESCITALOPRAM OXALATE 10 MG: 10 TABLET ORAL at 10:06

## 2019-01-01 RX ADMIN — METOPROLOL TARTRATE 12.5 MG: 25 TABLET, FILM COATED ORAL at 10:05

## 2019-01-01 RX ADMIN — FUROSEMIDE 20 MG: 20 TABLET ORAL at 10:06

## 2019-01-01 RX ADMIN — LOSARTAN POTASSIUM 100 MG: 50 TABLET ORAL at 08:46

## 2019-01-01 RX ADMIN — AMIODARONE HYDROCHLORIDE 0.5 MG/MIN: 50 INJECTION, SOLUTION INTRAVENOUS at 15:59

## 2019-01-01 RX ADMIN — PREDNISONE 2 MG: 1 TABLET ORAL at 08:48

## 2019-01-01 RX ADMIN — LOSARTAN POTASSIUM 100 MG: 50 TABLET ORAL at 07:58

## 2019-01-01 RX ADMIN — MORPHINE SULFATE 1 MG: 2 INJECTION, SOLUTION INTRAMUSCULAR; INTRAVENOUS at 19:04

## 2019-01-01 RX ADMIN — METOPROLOL TARTRATE 25 MG: 25 TABLET ORAL at 08:55

## 2019-01-01 RX ADMIN — ESCITALOPRAM OXALATE 10 MG: 10 TABLET ORAL at 07:58

## 2019-01-01 RX ADMIN — ATORVASTATIN CALCIUM 10 MG: 10 TABLET, FILM COATED ORAL at 19:07

## 2019-01-01 RX ADMIN — HYDRALAZINE HYDROCHLORIDE 10 MG: 20 INJECTION INTRAMUSCULAR; INTRAVENOUS at 18:12

## 2019-01-01 RX ADMIN — DOCUSATE SODIUM 100 MG: 100 CAPSULE, LIQUID FILLED ORAL at 08:55

## 2019-01-01 RX ADMIN — CEFAZOLIN SODIUM 2 G: 2 INJECTION, SOLUTION INTRAVENOUS at 08:48

## 2019-01-01 RX ADMIN — LOSARTAN POTASSIUM 100 MG: 100 TABLET, FILM COATED ORAL at 08:55

## 2019-01-01 RX ADMIN — MAGNESIUM SULFATE IN WATER 2 G: 40 INJECTION, SOLUTION INTRAVENOUS at 05:20

## 2019-01-01 RX ADMIN — VITAMIN D, TAB 1000IU (100/BT) 1000 UNITS: 25 TAB at 08:35

## 2019-01-01 RX ADMIN — DOCUSATE SODIUM 100 MG: 100 CAPSULE, LIQUID FILLED ORAL at 08:35

## 2019-01-01 RX ADMIN — DOCUSATE SODIUM 100 MG: 100 CAPSULE, LIQUID FILLED ORAL at 07:57

## 2019-01-01 RX ADMIN — ALBUTEROL SULFATE 2.5 MG: 2.5 SOLUTION RESPIRATORY (INHALATION) at 03:26

## 2019-01-01 RX ADMIN — UMECLIDINIUM 1 PUFF: 62.5 AEROSOL, POWDER ORAL at 07:51

## 2019-01-01 RX ADMIN — APIXABAN 2.5 MG: 2.5 TABLET, FILM COATED ORAL at 10:06

## 2019-01-01 RX ADMIN — TRAMADOL HYDROCHLORIDE 50 MG: 50 TABLET ORAL at 14:43

## 2019-01-01 RX ADMIN — APIXABAN 2.5 MG: 2.5 TABLET, FILM COATED ORAL at 21:56

## 2019-01-01 RX ADMIN — PREDNISONE 2 MG: 1 TABLET ORAL at 08:54

## 2019-01-01 RX ADMIN — DOCUSATE SODIUM 100 MG: 100 CAPSULE, LIQUID FILLED ORAL at 08:08

## 2019-01-01 RX ADMIN — ESCITALOPRAM OXALATE 10 MG: 10 TABLET ORAL at 08:22

## 2019-01-01 RX ADMIN — DOCUSATE SODIUM 100 MG: 100 CAPSULE, LIQUID FILLED ORAL at 19:07

## 2019-01-01 RX ADMIN — ESCITALOPRAM OXALATE 10 MG: 10 TABLET ORAL at 09:29

## 2019-01-01 RX ADMIN — AMLODIPINE BESYLATE 10 MG: 10 TABLET ORAL at 09:29

## 2019-01-01 RX ADMIN — DOCUSATE SODIUM 100 MG: 100 CAPSULE, LIQUID FILLED ORAL at 08:54

## 2019-01-01 RX ADMIN — FUROSEMIDE 20 MG: 20 TABLET ORAL at 09:25

## 2019-01-01 RX ADMIN — WARFARIN SODIUM 2.5 MG: 2.5 TABLET ORAL at 19:01

## 2019-01-01 RX ADMIN — SODIUM CHLORIDE 1000 ML: 9 INJECTION, SOLUTION INTRAVENOUS at 18:16

## 2019-01-01 RX ADMIN — ESCITALOPRAM OXALATE 10 MG: 10 TABLET ORAL at 08:21

## 2019-01-01 RX ADMIN — IPRATROPIUM BROMIDE AND ALBUTEROL SULFATE 3 ML: .5; 3 SOLUTION RESPIRATORY (INHALATION) at 21:26

## 2019-01-01 RX ADMIN — METOPROLOL TARTRATE 25 MG: 25 TABLET ORAL at 21:15

## 2019-01-01 RX ADMIN — PREDNISONE 2 MG: 1 TABLET ORAL at 08:22

## 2019-01-01 RX ADMIN — AMIODARONE HYDROCHLORIDE 200 MG: 200 TABLET ORAL at 13:05

## 2019-01-01 RX ADMIN — POTASSIUM CHLORIDE 20 MEQ: 750 TABLET, EXTENDED RELEASE ORAL at 06:56

## 2019-01-01 RX ADMIN — MORPHINE SULFATE 4 MG: 4 INJECTION INTRAVENOUS at 20:27

## 2019-01-01 RX ADMIN — SODIUM CHLORIDE 83 ML: 9 INJECTION, SOLUTION INTRAVENOUS at 18:40

## 2019-01-01 RX ADMIN — ESCITALOPRAM OXALATE 10 MG: 10 TABLET ORAL at 10:38

## 2019-01-01 RX ADMIN — AMIODARONE HYDROCHLORIDE 1 MG/MIN: 50 INJECTION, SOLUTION INTRAVENOUS at 01:13

## 2019-01-01 RX ADMIN — PREDNISONE 2 MG: 1 TABLET ORAL at 07:59

## 2019-01-01 RX ADMIN — PREDNISONE 2 MG: 1 TABLET ORAL at 08:35

## 2019-01-01 RX ADMIN — DOCUSATE SODIUM 100 MG: 100 CAPSULE, LIQUID FILLED ORAL at 21:56

## 2019-01-01 RX ADMIN — ESCITALOPRAM OXALATE 10 MG: 10 TABLET ORAL at 08:55

## 2019-01-01 RX ADMIN — AMLODIPINE BESYLATE 10 MG: 10 TABLET ORAL at 08:43

## 2019-01-01 RX ADMIN — SODIUM CHLORIDE: 4.5 INJECTION, SOLUTION INTRAVENOUS at 08:45

## 2019-01-01 RX ADMIN — MORPHINE SULFATE 2 MG: 2 INJECTION, SOLUTION INTRAMUSCULAR; INTRAVENOUS at 13:09

## 2019-01-01 RX ADMIN — ATORVASTATIN CALCIUM 40 MG: 40 TABLET, FILM COATED ORAL at 19:41

## 2019-01-01 RX ADMIN — DOCUSATE SODIUM 100 MG: 100 CAPSULE, LIQUID FILLED ORAL at 09:29

## 2019-01-01 RX ADMIN — REGADENOSON 0.4 MG: 0.08 INJECTION, SOLUTION INTRAVENOUS at 09:32

## 2019-01-01 RX ADMIN — ALBUTEROL SULFATE 2 PUFF: 90 AEROSOL, METERED RESPIRATORY (INHALATION) at 09:28

## 2019-01-01 RX ADMIN — MORPHINE SULFATE 2 MG: 2 INJECTION, SOLUTION INTRAMUSCULAR; INTRAVENOUS at 03:17

## 2019-01-01 RX ADMIN — DOCUSATE SODIUM 100 MG: 100 CAPSULE, LIQUID FILLED ORAL at 21:18

## 2019-01-01 RX ADMIN — ONDANSETRON 4 MG: 2 INJECTION INTRAMUSCULAR; INTRAVENOUS at 20:33

## 2019-01-01 RX ADMIN — METOCLOPRAMIDE HYDROCHLORIDE 5 MG: 5 TABLET ORAL at 08:22

## 2019-01-01 RX ADMIN — PREDNISONE 2 MG: 1 TABLET ORAL at 09:24

## 2019-01-01 RX ADMIN — IPRATROPIUM BROMIDE AND ALBUTEROL SULFATE 3 ML: .5; 3 SOLUTION RESPIRATORY (INHALATION) at 08:38

## 2019-01-01 RX ADMIN — PREDNISONE 2 MG: 1 TABLET ORAL at 08:08

## 2019-01-01 RX ADMIN — FUROSEMIDE 20 MG: 20 TABLET ORAL at 08:21

## 2019-01-01 RX ADMIN — HYDRALAZINE HYDROCHLORIDE 100 MG: 100 TABLET, FILM COATED ORAL at 08:47

## 2019-01-01 RX ADMIN — PREDNISONE 2 MG: 1 TABLET ORAL at 09:29

## 2019-01-01 RX ADMIN — MORPHINE SULFATE 2 MG: 2 INJECTION, SOLUTION INTRAMUSCULAR; INTRAVENOUS at 23:09

## 2019-01-01 RX ADMIN — AMIODARONE HYDROCHLORIDE 200 MG: 200 TABLET ORAL at 07:57

## 2019-01-01 RX ADMIN — AMLODIPINE BESYLATE 10 MG: 10 TABLET ORAL at 08:47

## 2019-01-01 RX ADMIN — FUROSEMIDE 20 MG: 20 TABLET ORAL at 09:29

## 2019-01-01 RX ADMIN — IOPAMIDOL 56 ML: 755 INJECTION, SOLUTION INTRAVENOUS at 18:40

## 2019-01-01 RX ADMIN — IPRATROPIUM BROMIDE AND ALBUTEROL SULFATE 3 ML: .5; 3 SOLUTION RESPIRATORY (INHALATION) at 12:14

## 2019-01-01 RX ADMIN — HYDRALAZINE HYDROCHLORIDE 50 MG: 50 TABLET ORAL at 19:39

## 2019-01-01 RX ADMIN — HYDRALAZINE HYDROCHLORIDE 50 MG: 50 TABLET, FILM COATED ORAL at 13:56

## 2019-01-01 RX ADMIN — FUROSEMIDE 40 MG: 10 INJECTION, SOLUTION INTRAVENOUS at 12:59

## 2019-01-01 RX ADMIN — METOPROLOL TARTRATE 12.5 MG: 25 TABLET, FILM COATED ORAL at 08:55

## 2019-01-01 RX ADMIN — FUROSEMIDE 40 MG: 10 INJECTION, SOLUTION INTRAVENOUS at 11:14

## 2019-01-01 RX ADMIN — ATORVASTATIN CALCIUM 10 MG: 10 TABLET, FILM COATED ORAL at 20:24

## 2019-01-01 RX ADMIN — ESCITALOPRAM OXALATE 10 MG: 10 TABLET ORAL at 08:35

## 2019-01-01 RX ADMIN — IPRATROPIUM BROMIDE AND ALBUTEROL SULFATE 3 ML: .5; 3 SOLUTION RESPIRATORY (INHALATION) at 11:40

## 2019-01-01 RX ADMIN — HUMAN ALBUMIN MICROSPHERES AND PERFLUTREN 9 ML: 10; .22 INJECTION, SOLUTION INTRAVENOUS at 20:14

## 2019-01-01 RX ADMIN — FUROSEMIDE 40 MG: 10 INJECTION, SOLUTION INTRAVENOUS at 08:32

## 2019-01-01 RX ADMIN — ESCITALOPRAM OXALATE 10 MG: 10 TABLET ORAL at 08:08

## 2019-01-01 RX ADMIN — ESCITALOPRAM OXALATE 10 MG: 10 TABLET ORAL at 08:54

## 2019-01-01 RX ADMIN — TETROFOSMIN 9.1 MCI.: 1.38 INJECTION, POWDER, LYOPHILIZED, FOR SOLUTION INTRAVENOUS at 07:20

## 2019-01-01 RX ADMIN — DOCUSATE SODIUM 100 MG: 100 CAPSULE, LIQUID FILLED ORAL at 19:41

## 2019-01-01 RX ADMIN — LOSARTAN POTASSIUM 100 MG: 50 TABLET ORAL at 08:21

## 2019-01-01 RX ADMIN — HYDRALAZINE HYDROCHLORIDE 100 MG: 100 TABLET, FILM COATED ORAL at 13:38

## 2019-01-01 RX ADMIN — ATORVASTATIN CALCIUM 40 MG: 40 TABLET, FILM COATED ORAL at 23:06

## 2019-01-01 RX ADMIN — PREDNISONE 2 MG: 1 TABLET ORAL at 10:05

## 2019-01-01 RX ADMIN — SODIUM CHLORIDE: 9 INJECTION, SOLUTION INTRAVENOUS at 08:00

## 2019-01-01 RX ADMIN — IPRATROPIUM BROMIDE AND ALBUTEROL SULFATE 3 ML: .5; 3 SOLUTION RESPIRATORY (INHALATION) at 12:38

## 2019-01-01 RX ADMIN — OXYCODONE HYDROCHLORIDE 5 MG: 5 TABLET ORAL at 16:06

## 2019-01-01 RX ADMIN — UMECLIDINIUM 1 PUFF: 62.5 AEROSOL, POWDER ORAL at 18:05

## 2019-01-01 RX ADMIN — MORPHINE SULFATE 2 MG: 2 INJECTION, SOLUTION INTRAMUSCULAR; INTRAVENOUS at 03:47

## 2019-01-01 RX ADMIN — FUROSEMIDE 20 MG: 20 TABLET ORAL at 08:47

## 2019-01-01 RX ADMIN — FUROSEMIDE 20 MG: 20 TABLET ORAL at 08:22

## 2019-01-01 RX ADMIN — APIXABAN 2.5 MG: 2.5 TABLET, FILM COATED ORAL at 19:41

## 2019-01-01 RX ADMIN — MORPHINE SULFATE 2 MG: 2 INJECTION, SOLUTION INTRAMUSCULAR; INTRAVENOUS at 21:31

## 2019-01-01 RX ADMIN — POTASSIUM CHLORIDE 20 MEQ: 750 TABLET, EXTENDED RELEASE ORAL at 08:47

## 2019-01-01 RX ADMIN — MAGNESIUM SULFATE HEPTAHYDRATE 2 G: 40 INJECTION, SOLUTION INTRAVENOUS at 19:07

## 2019-01-01 RX ADMIN — AMIODARONE HYDROCHLORIDE 0.5 MG/MIN: 50 INJECTION, SOLUTION INTRAVENOUS at 07:02

## 2019-01-01 RX ADMIN — APIXABAN 2.5 MG: 2.5 TABLET, FILM COATED ORAL at 14:55

## 2019-01-01 RX ADMIN — SODIUM CHLORIDE: 4.5 INJECTION, SOLUTION INTRAVENOUS at 11:11

## 2019-01-01 RX ADMIN — DOCUSATE SODIUM 100 MG: 100 CAPSULE, LIQUID FILLED ORAL at 19:39

## 2019-01-01 RX ADMIN — SODIUM CHLORIDE 1000 ML: 9 INJECTION, SOLUTION INTRAVENOUS at 20:02

## 2019-01-01 RX ADMIN — ALBUTEROL SULFATE 1 PUFF: 90 AEROSOL, METERED RESPIRATORY (INHALATION) at 07:51

## 2019-01-01 RX ADMIN — LOSARTAN POTASSIUM 100 MG: 100 TABLET, FILM COATED ORAL at 09:29

## 2019-01-01 RX ADMIN — ALBUTEROL SULFATE 2.5 MG: 2.5 SOLUTION RESPIRATORY (INHALATION) at 22:27

## 2019-01-01 RX ADMIN — APIXABAN 2.5 MG: 2.5 TABLET, FILM COATED ORAL at 08:55

## 2019-01-01 RX ADMIN — Medication 3100 UNITS: at 14:49

## 2019-01-01 RX ADMIN — HYDRALAZINE HYDROCHLORIDE 50 MG: 50 TABLET, FILM COATED ORAL at 13:05

## 2019-01-01 RX ADMIN — LOSARTAN POTASSIUM 100 MG: 100 TABLET, FILM COATED ORAL at 08:43

## 2019-01-01 RX ADMIN — METOCLOPRAMIDE HYDROCHLORIDE 5 MG: 5 TABLET ORAL at 13:09

## 2019-01-01 RX ADMIN — APIXABAN 2.5 MG: 2.5 TABLET, FILM COATED ORAL at 17:55

## 2019-01-01 RX ADMIN — HYDRALAZINE HYDROCHLORIDE 50 MG: 50 TABLET, FILM COATED ORAL at 08:08

## 2019-01-01 RX ADMIN — MAGNESIUM HYDROXIDE 15 ML: 400 SUSPENSION ORAL at 08:16

## 2019-01-01 RX ADMIN — METOCLOPRAMIDE HYDROCHLORIDE 5 MG: 5 TABLET ORAL at 17:50

## 2019-01-01 RX ADMIN — DOCUSATE SODIUM 100 MG: 100 CAPSULE, LIQUID FILLED ORAL at 10:38

## 2019-01-01 RX ADMIN — AMLODIPINE BESYLATE 10 MG: 10 TABLET ORAL at 07:58

## 2019-01-01 RX ADMIN — DOCUSATE SODIUM 100 MG: 100 CAPSULE, LIQUID FILLED ORAL at 20:16

## 2019-01-01 RX ADMIN — FUROSEMIDE 40 MG: 10 INJECTION, SOLUTION INTRAVENOUS at 22:00

## 2019-01-01 RX ADMIN — PREDNISONE 2 MG: 1 TABLET ORAL at 08:43

## 2019-01-01 RX ADMIN — ALBUTEROL SULFATE 2 PUFF: 90 AEROSOL, METERED RESPIRATORY (INHALATION) at 11:28

## 2019-01-01 RX ADMIN — HYDROMORPHONE HYDROCHLORIDE 0.2 MG: 1 INJECTION, SOLUTION INTRAMUSCULAR; INTRAVENOUS; SUBCUTANEOUS at 08:46

## 2019-01-01 RX ADMIN — MORPHINE SULFATE 2 MG: 4 INJECTION INTRAVENOUS at 19:38

## 2019-01-01 ASSESSMENT — MIFFLIN-ST. JEOR
SCORE: 919.87
SCORE: 903.08
SCORE: 944.36
SCORE: 852.28
SCORE: 908.07
SCORE: 919.41
SCORE: 823.7
SCORE: 922.13
SCORE: 927.58
SCORE: 923.49
SCORE: 946.17
SCORE: 942.55
SCORE: 813.27
SCORE: 887.21
SCORE: 899.91
SCORE: 955.25
SCORE: 893.1
SCORE: 921.23
SCORE: 929.16
SCORE: 818.26
SCORE: 899.68

## 2019-01-01 ASSESSMENT — ENCOUNTER SYMPTOMS
LIGHT-HEADEDNESS: 0
DIAPHORESIS: 1
ABDOMINAL PAIN: 0
BACK PAIN: 0
APPETITE CHANGE: 0
PALPITATIONS: 1
LIGHT-HEADEDNESS: 0
VOMITING: 0
COUGH: 0
PALPITATIONS: 0
VOMITING: 0
NERVOUS/ANXIOUS: 1
ABDOMINAL PAIN: 0
COUGH: 0
CHILLS: 0
LIGHT-HEADEDNESS: 0
COUGH: 1
NAUSEA: 0
CHILLS: 0
VOMITING: 0
UNEXPECTED WEIGHT CHANGE: 0
FEVER: 0
FEVER: 0
SORE THROAT: 0
NAUSEA: 0
SHORTNESS OF BREATH: 1
FEVER: 0
NAUSEA: 0
PALPITATIONS: 1
DIARRHEA: 0
RHINORRHEA: 0
DIZZINESS: 0
SHORTNESS OF BREATH: 1
SHORTNESS OF BREATH: 1
CHILLS: 0
SHORTNESS OF BREATH: 1
VOMITING: 0
CHEST TIGHTNESS: 1

## 2019-01-01 ASSESSMENT — ACTIVITIES OF DAILY LIVING (ADL)
DRESS: 0-->INDEPENDENT
COGNITION: 0 - NO COGNITION ISSUES REPORTED
ADLS_ACUITY_SCORE: 17
ADLS_ACUITY_SCORE: 11
ADLS_ACUITY_SCORE: 17
DEPENDENT_IADLS:: INDEPENDENT
ADLS_ACUITY_SCORE: 17
ADLS_ACUITY_SCORE: 20
ADLS_ACUITY_SCORE: 11
DEPENDENT_IADLS:: INDEPENDENT
FALL_HISTORY_WITHIN_LAST_SIX_MONTHS: NO
ADLS_ACUITY_SCORE: 17
RETIRED_COMMUNICATION: 0-->UNDERSTANDS/COMMUNICATES WITHOUT DIFFICULTY
ADLS_ACUITY_SCORE: 11
ADLS_ACUITY_SCORE: 17
ADLS_ACUITY_SCORE: 17
DRESS: 0-->INDEPENDENT
ADLS_ACUITY_SCORE: 20
ADLS_ACUITY_SCORE: 11
BATHING: 0-->INDEPENDENT
ADLS_ACUITY_SCORE: 11
ADLS_ACUITY_SCORE: 17
ADLS_ACUITY_SCORE: 11
DEPENDENT_IADLS:: INDEPENDENT
ADLS_ACUITY_SCORE: 11
ADLS_ACUITY_SCORE: 15
ADLS_ACUITY_SCORE: 17
ADLS_ACUITY_SCORE: 20
DEPENDENT_IADLS:: INDEPENDENT
ADLS_ACUITY_SCORE: 11
ADLS_ACUITY_SCORE: 17
DEPENDENT_IADLS:: INDEPENDENT
COGNITION: 0 - NO COGNITION ISSUES REPORTED
ADLS_ACUITY_SCORE: 16
FALL_HISTORY_WITHIN_LAST_SIX_MONTHS: NO
ADLS_ACUITY_SCORE: 17
AMBULATION: 0-->INDEPENDENT
BATHING: 0-->INDEPENDENT
ADLS_ACUITY_SCORE: 11
ADLS_ACUITY_SCORE: 17
ADLS_ACUITY_SCORE: 17
ADLS_ACUITY_SCORE: 19
ADLS_ACUITY_SCORE: 17
TOILETING: 0-->INDEPENDENT
ADLS_ACUITY_SCORE: 11
ADLS_ACUITY_SCORE: 15
ADLS_ACUITY_SCORE: 20
ADLS_ACUITY_SCORE: 20
ADLS_ACUITY_SCORE: 17
TRANSFERRING: 0-->INDEPENDENT
ADLS_ACUITY_SCORE: 20
ADLS_ACUITY_SCORE: 17
AMBULATION: 0-->INDEPENDENT
SWALLOWING: 0-->SWALLOWS FOODS/LIQUIDS WITHOUT DIFFICULTY
RETIRED_COMMUNICATION: 0-->UNDERSTANDS/COMMUNICATES WITHOUT DIFFICULTY
PREVIOUS_RESPONSIBILITIES: MEAL PREP;HOUSEKEEPING;LAUNDRY;SHOPPING;YARDWORK
DEPENDENT_IADLS:: INDEPENDENT
ADLS_ACUITY_SCORE: 15
ADLS_ACUITY_SCORE: 17
DEPENDENT_IADLS:: INDEPENDENT
ADLS_ACUITY_SCORE: 17
ADLS_ACUITY_SCORE: 17
ADLS_ACUITY_SCORE: 11
TRANSFERRING: 0-->INDEPENDENT
ADLS_ACUITY_SCORE: 17
ADLS_ACUITY_SCORE: 17
RETIRED_EATING: 0-->INDEPENDENT
ADLS_ACUITY_SCORE: 17
ADLS_ACUITY_SCORE: 17
TOILETING: 0-->INDEPENDENT
ADLS_ACUITY_SCORE: 17
ADLS_ACUITY_SCORE: 17
ADLS_ACUITY_SCORE: 11
SWALLOWING: 0-->SWALLOWS FOODS/LIQUIDS WITHOUT DIFFICULTY
ADLS_ACUITY_SCORE: 17
RETIRED_EATING: 0-->INDEPENDENT
ADLS_ACUITY_SCORE: 17

## 2019-01-01 ASSESSMENT — PAIN DESCRIPTION - DESCRIPTORS
DESCRIPTORS: ACHING;SORE;CONSTANT
DESCRIPTORS: DISCOMFORT
DESCRIPTORS: ACHING;CONSTANT
DESCRIPTORS: DISCOMFORT
DESCRIPTORS: DISCOMFORT
DESCRIPTORS: ACHING;CONSTANT;SORE
DESCRIPTORS: DISCOMFORT

## 2019-01-01 ASSESSMENT — PAIN SCALES - GENERAL: PAINLEVEL: NO PAIN (0)

## 2019-01-14 NOTE — TELEPHONE ENCOUNTER
Rapid pulse all weekend / flu shot-This is pt's appt for 1/15/19 with pcp.    I talked to pt.  The rapid heart rate comes and goes.  PT had it on Friday, Sat, and Sunday.  Lasts for about one hour.  Pt reports the pulse goes to 150 and then back to 90.  Reports eg=020/80  No SOB, or CP.    PT wanted a pill to make it stop.  If pt has sustained rapid heart rate, pt should go to ER.  Otherwise, pt has clinic appt for 1/15/19.    BRAYDEN Hudson

## 2019-01-15 PROBLEM — I48.91 NEW ONSET ATRIAL FIBRILLATION (H): Status: ACTIVE | Noted: 2019-01-01

## 2019-01-15 NOTE — H&P
[facility]    History and Physical  Hospitalist       Date of Admission:  1/15/2019    Assessment & Plan   Jennifer Bob is a 87 year old female who presents with tachycardia after checking her blood pressure at home.  Past medical history includes CAD, Hx of MI, CABG, Hyperlipidemia, COPD, PMR, anxiety TIA approximately 7 months ago.    New onset atrial fibrillation, rapid ventricular rate  Hypertension: Patient states she has been checking her blood pressure at home for the past 5 days, she has noted she has a heart rate in the 140s, but is grossly asymptomatic.  She also endorses jaw/teeth pain, but denies any chest pain, shortness of breath, palpitations, dizziness/lightheadedness, syncope or nausea/vomiting.  EKG in the emergency department confirms atrial fibrillation with rapid ventricular response, right bundle branch block, no ST/T wave changes consistent with ischemia.  Chest x-ray in the emergency department demonstrates hyperinflation of both lungs, sternotomy wires present, no other acute abnormalities.  TSH 2.18, troponin negative, unremarkable BMP and CBC. Of note, the patient has a hx of BB intolerance d/t her COPD.  --Patient received diltiazem bolus in ED which brought her rate below 100, remains in atrial fibrillation  --Will start PO diltiazem, and will utilize infusion if rate < 100  --Pharmacy to dose heparin infusion  --Cardiology was consulted, plans for potential PIA and cardioversion, CHADSVASC2 of 8  --Add on magnesium   --PTA regimen includes amlodipine, hydralazine, and losartan - will hold PTA regimen until tomorrow in light of diltiazem infusion    Hx of CVI 7/2018: Patient had a CVI approximately 7 months ago, received TPA, with mild residual right hand weakness, who has been on daily baby aspirin and Plavix.  She was evaluated by vascular surgery in 10/2018 for carotid stenosis evaluation.  MRI at this time demonstrates right internal carotid stenosis 40%,  left 60%.  --Vascular surgery did not recommend surgical management, continued with medical management with repeat carotid ultrasound in 10/2019    CAD  S/p CABG: Long-standing cardiac history, history of MI had one stent placed in 2009, history of three-vessel CABG in 1982.  Most recent echocardiogram demonstrates EF 45-50%, mild aortic sclerosis, pulmonary hypertension, PA systolic pressure 48 mmHg.  --Continue Lasix 20 mg daily    COPD: Long-standing history with COPD.  Typically very well managed with fluticasone, and Ventolin as needed   --Albuterol available as needed    Hyperlipidemia: Chronic and stable on atorvastatin  --Continue PTA regimen    Polymyalgia rheumatica: Diagnosed 4/2018, has been on chronic prednisone.  No recent acute flareups.  --Continue low-dose prednisone, tramadol as needed    Anxiety: Typically well managed with Lexapro  --We will attempt to avoid in hospital due to risk of increased acute metabolic encephalopathy.       DVT Prophylaxis: Pneumatic Compression Devices and Heparin IV infusion  Code Status: DNR / DNI    This was discussed with the patient and her daughter, we discussed this may change if a procedure is warranted.     Disposition: Expected discharge in 2-3 days once work up and interventions completed.    Gloria Veras, CNP    Primary Care Physician   Torri Fisher MD    Chief Complaint   Elevated HR    History is obtained from the patient    History of Present Illness   Jennifer Bob is a 87 year old female who presents with elevated heart rate.  The patient has a past medical history of CAD, history of MI, CABG, hyperlipidemia, COPD, PMR, anxiety and on TIA.  Patient takes her blood pressure daily at home, she noted in the past 5 days her heart rate has been above 140.  She was grossly asymptomatic with the exception of some mild tenderness in her teeth/jaw.  She denies any chest pain, shortness of breath, palpitations, dizziness/lightheadedness, syncope,  urinary symptoms, fever/chills or any sick contacts.  I examined the patient in the emergency department with her daughter at the bedside.  She is an elderly, frail woman lying in the gurney, no apparent distress.    Past Medical History    I have reviewed this patient's medical history and updated it with pertinent information if needed.   Past Medical History:   Diagnosis Date     Abdominal wall hernia     three hernias at previous incision sites, allergic to mesh so repair not repeated, wears girdle     Anxiety 1/9/2012     ASCVD (arteriosclerotic cardiovascular disease) 1/9/2012     CKD (chronic kidney disease) stage 3, GFR 30-59 ml/min (H) 1/10/2012     Colon polyp     resected     DDD (degenerative disc disease), lumbar 1/10/2012     Hyperlipidemia LDL goal <100 1/9/2012     Hyperlipidemia LDL goal <70 12/16/2013     Hypertension goal BP (blood pressure) < 140/90 1/9/2012     Incontinence of urine 1/9/2012     Left hip pain 1/9/2012     Low back pain 1/9/2012     Seasonal allergies 1/9/2012     STEMI (ST elevation myocardial infarction) (H) 8-    with VF arrest.     Stented coronary artery 8-     TIA (transient ischaemic attack) 1/10/2012       Past Surgical History   I have reviewed this patient's surgical history and updated it with pertinent information if needed.  Past Surgical History:   Procedure Laterality Date     abdominal abscess      at incisional site after kristine     adominal hernia repair      had mesh placed, then allergic so it was removed and she wears a girdle     CHOLECYSTECTOMY       CORONARY ARTERY BYPASS  1992       Prior to Admission Medications   Prior to Admission Medications   Prescriptions Last Dose Informant Patient Reported? Taking?   BETA BLOCKER NOT PRESCRIBED, INTENTIONAL,   No No   Sig: Beta Blocker not prescribed intentionally due to COPD, shortness of breath with atenolol and lopressor   Calcium Citrate-Vitamin D (CALCIUM CITRATE + PO)   Yes No   Sig: Take 1  tablet by mouth daily    Cholecalciferol (VITAMIN D3 PO)   Yes No   Sig: Take 1,000 Units by mouth daily   Multiple Vitamins-Minerals (MULTIVITAMIN OR)   Yes No   Sig: Take 1 tablet by mouth daily    Saline (OCEAN NASAL SPRAY NA)   Yes No   Sig: Administer 1 spray in each nostril up to two times daily as needed   TRAMADOL HCL PO   Yes No   Sig: Take 50 mg by mouth every 6 hours as needed for moderate to severe pain   VENTOLIN  (90 BASE) MCG/ACT Inhaler   No No   Sig: INHALE 2 PUFFS INTO THE LUNGS EVERY 6 HOURS AS NEEDED   amLODIPine (NORVASC) 10 MG tablet   No No   Sig: Take 1 tablet (10 mg) by mouth daily   aspirin 81 MG chewable tablet   No No   Sig: Take 1 tablet (81 mg) by mouth daily   atorvastatin (LIPITOR) 40 MG tablet   No No   Sig: Due for appt in March, one tab daily   docusate sodium (COLACE) 100 MG capsule   Yes No   Sig: Take 1 capsule by mouth 2 times daily    fluticasone (FLONASE) 50 MCG/ACT spray   No No   Sig: USE TWO SPRAYS IN EACH NOSTRIL EVERY OTHER DAY   furosemide (LASIX) 20 MG tablet   No No   Sig: TAKE ONE TABLET BY MOUTH EVERY DAY   hydrALAZINE (APRESOLINE) 50 MG tablet   No No   Sig: Take 1 tablet (50 mg) by mouth 2 times daily Takes an additional 50 mg in the afternoon if systolic blood pressure is higher than 160   losartan (COZAAR) 100 MG tablet   No No   Sig: Take 1 tablet (100 mg) by mouth daily   predniSONE (DELTASONE) 1 MG tablet   Yes No   Sig: Take 2 mg by mouth daily   sodium chloride-sodium bicarb (CLASSIC NETI POT SINUS WASH) 2300-700 MG KIT   Yes No   Sig: Mix 1 packet in Neti Pot and administer in each nostril daily as needed      Facility-Administered Medications: None     Allergies   Allergies   Allergen Reactions     Adhesive Tape      blisters     Atenolol      SOB     Lopressor Hct      SOB       Social History   I have reviewed this patient's social history and updated it with pertinent information if needed. Jennifer Bob  reports that she has quit  smoking. she has never used smokeless tobacco. She reports that she does not drink alcohol or use drugs.     Lives independently in senior living in [place]    Family History   I have reviewed this patient's family history and updated it with pertinent information if needed.   History reviewed. No pertinent family history.    Review of Systems   CONSTITUTIONAL: NEGATIVE for fever, chills, change in weight  INTEGUMENTARY/SKIN: NEGATIVE for worrisome rashes, moles or lesions  ENT/MOUTH: NEGATIVE for ear, mouth and throat problems  RESP: NEGATIVE for significant cough or SOB  CV: NEGATIVE for chest pain, palpitations or peripheral edema  GI: NEGATIVE for nausea, abdominal pain, heartburn, or change in bowel habits  : NEGATIVE for frequency, dysuria, or hematuria  MUSCULOSKELETAL: NEGATIVE for significant arthralgias or myalgia  NEURO: NEGATIVE for weakness, dizziness or paresthesias  HEME: NEGATIVE for bleeding problems  PSYCHIATRIC: NEGATIVE for changes in mood or affect    Physical Exam   Temp: 98.1  F (36.7  C) Temp src: Oral BP: 153/78 Pulse: 81 Heart Rate: 100 Resp: 20 SpO2: 92 % O2 Device: None (Room air)    Vital Signs with Ranges  Temp:  [97.5  F (36.4  C)-98.1  F (36.7  C)] 98.1  F (36.7  C)  Pulse:  [] 81  Heart Rate:  [100-141] 100  Resp:  [16-30] 20  BP: (153-190)/() 153/78  SpO2:  [92 %-97 %] 92 %  123 lbs 0 oz    Constitutional: Elderly, frail female lying on the [facility].  No apparent distress  Eyes: Conjunctiva and pupils examined and normal.  HEENT: Moist mucous membranes, normal dentition.  Respiratory: Clear to auscultation bilaterally, no crackles or wheezing.  Cardiovascular: Irregular rate and rhythm, normal S1 and S2, and no murmur noted.  GI: Soft, non-distended, non-tender, normal bowel sounds.  Skin: Pale, warm and dry.  Fragile  Neurologic: Cranial nerves 2-12 intact, normal strength and sensation.  Psychiatric: Alert, oriented to person, place and time, no obvious anxiety or  depression.    Data   Data reviewed today:  I personally reviewed the EKG tracing showing afib, RVR.  Recent Labs   Lab 01/15/19  1150   WBC 5.7   HGB 13.4   MCV 98         POTASSIUM 4.3   CHLORIDE 106   CO2 30   BUN 19   CR 0.96   ANIONGAP 6   JERILYN 9.0   *   TROPI 0.040       Imaging:  Recent Results (from the past 24 hour(s))   XR Chest 2 Views    Narrative    CHEST TWO VIEWS January 15, 2019 12:28 PM     HISTORY: Cardiac arrhythmia.    COMPARISON: 7/11/2018.      Impression    IMPRESSION: Sternotomy. Hyperinflation both lungs. The lungs are  otherwise clear. No pleural effusions are identified. Cardiac  enlargement. Pulmonary vascularity is within normal limits. Aortic  calcification. Calcified bilateral hilar lymph nodes are again noted.    ISADORA COLLADO MD

## 2019-01-15 NOTE — ED NOTES
Bed: ED03  Expected date: 1/15/19  Expected time: 11:19 AM  Means of arrival:   Comments:  Triage-westrum, getting EKG in triage

## 2019-01-15 NOTE — ED NOTES
"[facility]  ED Nurse Handoff Report    ED Chief complaint: Tachycardia (pt noticed high HR on O2 sat monitor on Friday, went to doctor today, had EKG, told she was in a-fib, sent to ED. denies CP, SOB, weakness. no hx of a-fib per pt)      ED Diagnosis:   Final diagnoses:   New onset atrial fibrillation (H)   Atrial fibrillation with RVR (H)   History of stroke       Code Status: DNR / DNI    Allergies:   Allergies   Allergen Reactions     Adhesive Tape      blisters     Atenolol      SOB     Lopressor Hct      SOB       Activity level - Baseline/Home:  Independent    Activity Level - Current:   Independent     Needed?: No    Isolation: No  Infection: Not Applicable  Bariatric?: No    Vital Signs:   Vitals:    01/15/19 1117 01/15/19 1200 01/15/19 1300 01/15/19 1400   BP: (!) 190/114 (!) 167/106 (!) 167/99 153/78   Pulse: 132 105 113 81   Resp: 16 30 29 20   Temp: 98.1  F (36.7  C)      TempSrc: Oral      SpO2: 97% 94% 93% 92%   Weight: 55.8 kg (123 lb)      Height: 1.575 m (5' 2\")          Cardiac Rhythm: ,   Cardiac  Cardiac Rhythm: Atrial fibrillation(with RVR with a rate of )    Pain level: 0-10 Pain Scale: 0    Is this patient confused?: No   Does this patient have a guardian?  No         If yes, is there guardianship documents in the Epic \"Code/ACP\" activity?  N/A         Guardian Notified?  N/A  [place] - Suicide Severity Rating Scale Completed?  Yes  If yes, what color did the patient score?  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   Focused Assessment: Denies CP and SOB. A fib with RVR noted. Pt alert and oriented  Tests Performed: Labs, CXR, EKG  Abnormal Results: None  Treatments provided: Diltiazem bolus and will be starting heparin. Diltiazem drip not given as patient HR 60's-70's    Family Comments: Daughter at the bedside    OBS brochure/video discussed/provided to " patient/family: N/A              Name of person given brochure if not patient: na              Relationship to patient: na    ED Medications:   Medications   diltiazem (CARDIZEM) 125 mg in sodium chloride 0.9 % 125 mL infusion (0 mg/hr Intravenous Not Given 1/15/19 1410)   heparin Loading Dose bolus dose from infusion pump 3,100 Units (not administered)   heparin infusion 25,000 units in 0.45% NaCl 250 mL (not administered)   diltiazem (CARDIZEM) injection 13.95 mg (13.95 mg Intravenous Given 1/15/19 1352)       Drips infusing?:  Yes Heparin    For the majority of the shift this patient was Green.   Interventions performed were na.    Severe Sepsis OR Septic Shock Diagnosis Present: No    To be done/followed up on inpatient unit:  Heparin    ED NURSE PHONE NUMBER: [phone]

## 2019-01-15 NOTE — ED PROVIDER NOTES
Emergency Department Attending Supervision Note  1/15/2019  1:45 PM      I evaluated this patient with Ginger Gleason.       Briefly, patient is an 87-year-old female with complicated past medical history including CVA, CABG CHF, COPD who is intolerant of beta-blockers due to shortness of breath likely due to airway reversibility presents for evaluation of A. fib with RVR.  No history of A. fib.  No report of chest pain, chest pressure or chest discomfort.      On my exam, patient is well-appearing with no complaints.  She is tachycardic with an irregularly irregular rhythm consistent with A. fib.      Medical Decision Making  87-year-old female with no history of A. fib presenting for new onset A. fib with RVR noticed incidentally while checking her O2 sats at home where she was noted to have a heart rate in the 140s, initially seen in clinic diagnosed with A. fib and sent to the ED.  Her workup is negative for electrolyte derangements, troponins within normal limits and her TSH is within normal limits.  She is intolerant of beta-blockers due to her history of COPD causing exacerbation.  As such, she was initiated on diltiazem and loaded with plan for infusion and was also initiated on heparin given a CHADSVASC2 of 8.  She remained HD stable in the ED.    Disposition:  Admit for continued management of Afib with RVR    Christopher Moseley MD  [facility]  Former[facility] Emergency Department    1:45 PM 01/15/19             Christopher Moseley MD  01/15/19 [id number]

## 2019-01-15 NOTE — ED PROVIDER NOTES
"  History     Chief Complaint:  Tachycardia     HPI   Jennifer Bob is a 87 year old female with a history of TIA 7 months ago, STEMI, stented coronary artery, hyperlipidemia, and arteriosclerotic cardiovascular disease on daily baby aspirin (stopped plavix one month ago) who presents with her daughter for evaluation of new onset of atrial fibrillation and hypertension. The patient reports that she has been experiencing hypertension for the past 5 days in the 140s. Along with her hypertension she noted an \"odd feeling in her teeth\" when her heart rate was high, but denies dizziness, nausea, palpitations, chest pain, abdominal pain, leg pain, or vomiting. The patient mentions that she was at her clinic when they notes that she was in atrial fibrillation. Patient mentions that she has not been taking her blood pressure medication because she only takes them if her blood pressure is elevated. Her blood pressure has not been elevated the past 5 days when she has checked them in the morning. The patient does not report feeling any different than her usual.     Allergies:  Atenolol  Lopressor Hct     Medications:    amLODIPine (NORVASC) 10 MG tablet  aspirin 81 MG chewable tablet  atorvastatin (LIPITOR) 40 MG tablet  BETA BLOCKER NOT PRESCRIBED, INTENTIONAL,  Calcium Citrate-Vitamin D (CALCIUM CITRATE + PO)  Cholecalciferol (VITAMIN D3 PO)  docusate sodium (COLACE) 100 MG capsule  fluticasone (FLONASE) 50 MCG/ACT spray  furosemide (LASIX) 20 MG tablet  hydrALAZINE (APRESOLINE) 50 MG tablet  losartan (COZAAR) 100 MG tablet  Multiple Vitamins-Minerals (MULTIVITAMIN OR)  predniSONE (DELTASONE) 1 MG tablet  Saline (OCEAN NASAL SPRAY NA)  sodium chloride-sodium bicarb (CLASSIC NETI POT SINUS WASH) 2300-700 MG KIT  TRAMADOL HCL PO  VENTOLIN  (90 BASE) MCG/ACT Inhaler    Past Medical History:    Abdominal wall hernia   Anxiety   ASCVD (arteriosclerotic cardiovascular disease)   CKD (chronic kidney disease) stage 3, " "GFR 30-59 ml/min    Colon polyp   DDD (degenerative disc disease), lumbar   Hyperlipidemia LDL goal <100   Hyperlipidemia LDL goal <70   Hypertension goal BP (blood pressure) < 140/90   Incontinence of urine   Left hip pain   Low back pain   Seasonal allergies   STEMI (ST elevation myocardial infarction)    Stented coronary artery   TIA (transient ischaemic attack)   Past Surgical History:    abdominal abscess   at incisional site after kristine  adominal hernia repair   had mesh placed, then allergic so it was removed and she wears a girdle  CHOLECYSTECTOMY   CORONARY ARTERY BYPASS    Family History:    History reviewed. No pertinent family history.     Social History:  Smoking status: Former smoker  Alcohol use: No  Marital Status:   [4]     Review of Systems   Cardiovascular: Negative for chest pain, palpitations and leg swelling.   Gastrointestinal: Negative for abdominal pain, nausea and vomiting.   Neurological: Negative for dizziness.   All other systems reviewed and are negative.    Physical Exam     Patient Vitals for the past 24 hrs:   BP Temp Temp src Pulse Heart Rate Resp SpO2 Height Weight   01/15/19 1400 153/78 -- -- 81 100 20 92 % -- --   01/15/19 1300 (!) 167/99 -- -- 113 141 29 93 % -- --   01/15/19 1200 (!) 167/106 -- -- 105 129 30 94 % -- --   01/15/19 1117 (!) 190/114 98.1  F (36.7  C) Oral 132 -- 16 97 % 1.575 m (5' 2\") 55.8 kg (123 lb)       Physical Exam  General: Alert and interactive.   Head: Scalp is atraumatic.   Eyes: The pupils are equal, round, and reactive to light. EOMs intact. No scleral icterus.   ENT:      Nose: No obvious sign of deformity. Nares are patent.   Ears: No erythema or swelling to the exterior ear.   Mouth/Throat: No erythema of posterior oropharynx. No obvious exudate.   Mucus membranes are moist.    Neck: Normal range of motion. No nuchal rigidity.Trachea is in the midline       CV: Irregularly irregular rate in between 90 and 140. S1 and S2 normal without " murmur, click, gallop or rub.   Resp: Breath sounds are clear bilaterally, without rhonchi, wheezes, rales. Non-labored, no retractions or accessory muscle use. Decreased breath sounds throughout.    GI: Abdomen is soft without distension. No tenderness to palpation. No peritoneal signs.    MS: Normal strength in all 4 extremities. No midline cervical, thoracic, or lumbar tenderness.   Skin:  Warm and dry. No rash or lesions noted.  Neuro: Alert and oriented x 3. GCS 15. CN II-XII grossly intact. Strength 5/5 and sensation intact in all four extremities. Psych: Awake. Alert.  Normal affect. Appropriate interactions.  Lymph: No anterior or posterior cervical lymphadenopathy noted.    Emergency Department Course   ECG (11:25:30):  Rate 120 bpm. MA interval *. QRS duration 146. QT/QTc 368/520. P-R-T axes * 94 26. Atrial fibrillation with rapid ventricular response, Right bundle branch block, abnormal ECG, No significant change compared to EKG dated 1/15/19 Interpreted at 1125 by Christopher Moseley MD.      Imaging:  Radiographic findings were communicated with the patient who voiced understanding of the findings.    XR chest  IMPRESSION: Sternotomy. Hyperinflation both lungs. The lungs are  otherwise clear. No pleural effusions are identified. Cardiac  enlargement. Pulmonary vascularity is within normal limits. Aortic  calcification. Calcified bilateral hilar lymph nodes are again noted.  As read by radiology     Laboratory:  TSH with free T4 reflex: 2.18  Troponin I: 0.040  BMP: Glucose 108, GFR 53, WNL (Creatinine 0.96)  CBC: WNL (WBC 5.7, HGB 13.4, )    Interventions:  1352: Cardizem 13.95 mg IV    Emergency Department Course:  Past medical records, nursing notes, and vitals reviewed.  1131: I performed an exam of the patient and obtained history, as documented above.    IV inserted and blood drawn.    The patient was sent for a XR chest while in the emergency department, findings above.    1332: I spoke with  Cardiologist, Dr. Do who recommended a cardiac bed for the patient.    Findings and plan explained to the Patient who consents to admission.     1403: Discussed the patient with Dr. Moya, who will admit the patient to a [facility] bed for further monitoring, evaluation, and treatment.     Impression & Plan      Medical Decision Making:  Jennifer Bob is a 87 year old female with a history of an ischemic stroke 7 months ago, previous stented coronary arteries, hyperlipidemia, and cardiovascular disease on a daily aspirin who presents for evaluation of new onset atrial fibrillation. The patient mentions she takes her blood pressure and pulse regularly at home and for the past 5 days has noted her heart rate to be elevated in the 140ss. She noted some unusual feeling in her teeth but no other symptoms at this time. She was initially diagnosed in the clinic with an EKG and sent over here for further workup. Her workup here is negative for electrolyte derangement, troponin is within the normal limit for the patient (she is chronically elevated), and her TSH is within normal limits. She is intolerant to beta blockers due to there history of COPD, and therefore I loaded her with Diltiazem here in the emergency department, which did control her rate here. She is also initiated on Heparin, given she has a [id number] score of 8. Likely can be transitioned to oral anticoagulation with either Eliquis or Xarelto. She remained hemodynamically stable here in the emergency department and had no instances of hypotension. The patient continued to have no symptoms, including no chest pain, shortness of breath, lightheadedness, nausea, or any others. Her X-rays negative for pneumonia, pleural effusions, pneumothorax.     As for teeth pain when high heart rate; she does have history of lateral ischemia, and this can be expected. Cardiology to address this, as no indication for emergent coronary angiogram or catheterization. I discussed  the case with Dr. Do of Cardiology, who recommended she be admitted to the hospital and possibly undergo TE cardioversion versus rate control and anticoagulation. I discussed the case with Dr. Moya who will admit the patient to a [facility] bed for monitoring, evaluation and treatment. She remained hemodynamically stable and afebrile here while in the emergency department, and I believe she is stable for admission.     Critical Care time:  none    Diagnosis:    ICD-10-CM    1. New onset atrial fibrillation (H) I48.91 Magnesium     Magnesium     CANCELED: Magnesium   2. Atrial fibrillation with RVR (H) I48.91    3. History of stroke Z86.73        Disposition:  Admitted to [facility]    Harshil Ríos  1/15/2019    EMERGENCY DEPARTMENT    Scribe Disclosure:  I, Harshil Ríos, am serving as a scribe at 11:31 AM on 1/15/2019 to document services personally performed by Ginger Ny PA-C based on my observations and the provider's statements to me.           Ginger Ny PA-C  01/15/19 1779

## 2019-01-15 NOTE — PHARMACY-ADMISSION MEDICATION HISTORY
Admission medication history interview status for the 1/15/2019  admission is complete. See EPIC admission navigator for prior to admission medications     Medication history source reliability:Good    Actions taken by pharmacist (provider contacted, etc):None     Additional medication history information not noted on PTA med list :None    Medication reconciliation/reorder completed by provider prior to medication history? No    Time spent in this activity: 12 min    Prior to Admission medications    Medication Sig Last Dose Taking? Auth Provider   amLODIPine (NORVASC) 10 MG tablet Take 1 tablet (10 mg) by mouth daily 1/14/2019 at Unknown time Yes Torri Fisher MD   aspirin 81 MG chewable tablet Take 1 tablet (81 mg) by mouth daily 1/14/2019 at hs Yes Dariusz Hightower MD   atorvastatin (LIPITOR) 40 MG tablet Due for appt in March, one tab daily 1/14/2019 at hs Yes Torri Fisher MD   Calcium Citrate-Vitamin D (CALCIUM CITRATE + PO) Take 1 tablet by mouth daily  1/15/2019 at Unknown time Yes Reported, Patient   Cholecalciferol (VITAMIN D3 PO) Take 1,000 Units by mouth daily 1/15/2019 at Unknown time Yes Reported, Patient   docusate sodium (COLACE) 100 MG capsule Take 1 capsule by mouth 2 times daily  1/15/2019 at am Yes Reported, Patient   escitalopram (LEXAPRO) 10 MG tablet Take 10 mg by mouth daily 1/15/2019 at Unknown time Yes Unknown, Entered By History   fluticasone (FLONASE) 50 MCG/ACT nasal spray Spray 1 spray into both nostrils daily as needed for rhinitis or allergies Past Week at Unknown time Yes Unknown, Entered By History   furosemide (LASIX) 20 MG tablet TAKE ONE TABLET BY MOUTH EVERY DAY 1/14/2019 at Unknown time Yes Torri Fisher MD   hydrALAZINE (APRESOLINE) 50 MG tablet Take 1 tablet (50 mg) by mouth 2 times daily Takes an additional 50 mg in the afternoon if systolic blood pressure is higher than 160 1/14/2019 at Unknown time Yes Torri Fisher MD   losartan (COZAAR) 100 MG  tablet Take 1 tablet (100 mg) by mouth daily 1/14/2019 at Unknown time Yes Torri Fisher MD   Multiple Vitamins-Minerals (MULTIVITAMIN OR) Take 1 tablet by mouth daily  1/15/2019 at Unknown time Yes Reported, Patient   predniSONE (DELTASONE) 1 MG tablet Take 2 mg by mouth daily 1/15/2019 at Unknown time Yes Unknown, Entered By History   Saline (OCEAN NASAL SPRAY NA) Administer 1 spray in each nostril up to two times daily as needed prn Yes Reported, Patient   sodium chloride-sodium bicarb (CLASSIC NETI POT SINUS WASH) 2300-700 MG KIT Mix 1 packet in Neti Pot and administer in each nostril daily as needed prn Yes Reported, Patient   TRAMADOL HCL PO Take 50 mg by mouth every 6 hours as needed for moderate to severe pain prn Yes Reported, Patient   VENTOLIN  (90 BASE) MCG/ACT Inhaler INHALE 2 PUFFS INTO THE LUNGS EVERY 6 HOURS AS NEEDED prn Yes Torri Fisher MD   BETA BLOCKER NOT PRESCRIBED, INTENTIONAL, Beta Blocker not prescribed intentionally due to COPD, shortness of breath with atenolol and lopressor   Torri Fisher MD

## 2019-01-15 NOTE — CONSULTS
[facility]    Cardiology Consultation     Date of Admission:  1/15/2019    Assessment & Plan   Jennifer Bob is a 87 year old female who was admitted on 1/15/2019.    1. Prior history of bypass surgery coronary artery disease with last stress test being 2014 which showed circumflex territory ischemia under medical management without any angina  2. New onset atrial fibrillation  3. Strokes x 2 last one being July 2018  4. Peripheral arterial disease status post stenting in 2018  5. Mild ischemic cardiomyopathy with ejection fraction of 45%  6. Hypertension   7. COPD    Patient's heart rates are well controlled after diltiazem initiation.  She is completely asymptomatic at this point.  It is likely that she had always been having subclinical atrial fibrillation with may have been responsible for her strokes but this is hard to say obviously.  She has a high risk for recurrent strokes based on her overall risk factor profile.  Her risk score is 8 based on coronary disease age sex hypertension strokes and heart failure.      I discussed extensively about atrial fibrillation its etiology and management.  We went over rate and rhythm control approaches.  We went over need for stroke prophylaxis and bleeding versus stroke risks.  At this point the patient is willing to retry beta-blockers.  She says she has never had any major problems with beta-blockers but may have had shortness of breath which is gotten attributed to that.  Given her mild ischemic cardiomyopathy I would probably first try to using beta-blockers rather than calcium channel blockers.  If she fails that then it is reasonable to put her on diltiazem given that she has only mild LV dysfunction.    We will monitor on telemetry overnight and decide about a cardioversion tomorrow.  If her heart rate is controlled I am more inclined to just do anticoagulation and reassess things in the next 1 month to see if cardioversion is required.       We also discussed types of anticoagulant agents.  Patient does not want INR checking.  As such Eliquis or rivaroxaban is fine.    Lastly the patient's teeth pain when she is tachycardic in atrial fibrillation is not unexpected given that she has diffuse coronary disease and history of bypass surgery with her last stress test in 2014 showing lateral ischemia.  If she does not have any exertional symptoms it is reasonable to manage this medically.    Please consult with vascular and neurology to see if aspirin is needed from their standpoint now that she will be on anticoagulation.    We will follow tomorrow.    Manuel Do MD    Primary Care Physician   Torri Fisher MD    Reason for Consult   Reason for consult: I was asked by medicine team to evaluate this patient for new onset atrial fibrillation.    History of Present Illness   Jennifer Bob is a 87 year old female who presents with new onset atrial fibrillation.  Patient has a history of coronary artery disease bypass surgery many years ago and has been following up at the [facility].  Patient has a history of 2 strokes.  She has a history of mild ischemic cardiomyopathy with an ejection fraction of 45%.  Her last stroke was in July 2018.  At that point she was on back on aspirin therapy but Plavix was added to her regimen.  She was seen by cardiology and neurology and underwent a cardiac monitor which did not show any atrial fibrillation.  Subsequently she was noted to have claudication and underwent peripheral vascular stenting.  Currently she takes aspirin monotherapy in addition to her other medications.    She says over the last 4-5 days on her blood pressure monitor she has been noticing that her heart rate has been about 140.  It waxes and wanes between 140 and sometimes goes all the way down to 70 or 80 bpm.  She says when her heart rate is 140 she gets teeth pain.  Otherwise denies anginal symptoms.  Denies shortness of  breath palpitations syncope or presyncope.  Because her heart rate was up she went to the clinic today and then was sent to the emergency department following which she was admitted to the hospital and cardiology consultation has been requested.      In the emergency department she got intravenous diltiazem and has been started on oral diltiazem by primary team.  Of note the patient has reported a history of shortness of breath and intolerance to beta-blockers because of COPD.  However patient says that her shortness of breath was very mild and she is never had any major allergic reactions or actual exacerbations of COPD.    Patient Active Problem List   Diagnosis     Anxiety     Low back pain     Left hip pain     ASCVD (arteriosclerotic cardiovascular disease)     Incontinence of urine     Hypertension goal BP (blood pressure) < 140/90     Seasonal allergies     Myocardial infarction (H)     DDD (degenerative disc disease), lumbar     Transient cerebral ischemia     CKD (chronic kidney disease) stage 3, GFR 30-59 ml/min (H)     Corns and callosities     Health Care Home     Spinal stenosis, lumbar region, without neurogenic claudication     Synovial cyst of lumbar facet joint     Acquired spondylolisthesis     Lumbar radicular pain     Stroke (H)     Hypertension     Hyperlipidemia LDL goal <70     Late effects of CVA (cerebrovascular accident)     COPD (chronic obstructive pulmonary disease) (H)     ACP (advance care planning)     RA (rheumatoid arthritis) (H)     Gout     Chronic pain syndrome     Heart failure (H)     Chronic diastolic heart failure (H)     PMR (polymyalgia rheumatica) (H)     New onset atrial fibrillation (H)       Past Medical History   I have reviewed this patient's medical history and updated it with pertinent information if needed.   Past Medical History:   Diagnosis Date     Abdominal wall hernia     three hernias at previous incision sites, allergic to mesh so repair not repeated, wears  girdle     Anxiety 1/9/2012     ASCVD (arteriosclerotic cardiovascular disease) 1/9/2012     CKD (chronic kidney disease) stage 3, GFR 30-59 ml/min (H) 1/10/2012     Colon polyp     resected     DDD (degenerative disc disease), lumbar 1/10/2012     Hyperlipidemia LDL goal <100 1/9/2012     Hyperlipidemia LDL goal <70 12/16/2013     Hypertension goal BP (blood pressure) < 140/90 1/9/2012     Incontinence of urine 1/9/2012     Left hip pain 1/9/2012     Low back pain 1/9/2012     Seasonal allergies 1/9/2012     STEMI (ST elevation myocardial infarction) (H) 8-    with VF arrest.     Stented coronary artery 8-     TIA (transient ischaemic attack) 1/10/2012       Past Surgical History   I have reviewed this patient's surgical history and updated it with pertinent information if needed.  Past Surgical History:   Procedure Laterality Date     abdominal abscess      at incisional site after kristine     adominal hernia repair      had mesh placed, then allergic so it was removed and she wears a girdle     CHOLECYSTECTOMY       CORONARY ARTERY BYPASS  1992       Prior to Admission Medications   Prior to Admission Medications   Prescriptions Last Dose Informant Patient Reported? Taking?   BETA BLOCKER NOT PRESCRIBED, INTENTIONAL,  Daughter No No   Sig: Beta Blocker not prescribed intentionally due to COPD, shortness of breath with atenolol and lopressor   Calcium Citrate-Vitamin D (CALCIUM CITRATE + PO) 1/15/2019 at Unknown time Daughter Yes Yes   Sig: Take 1 tablet by mouth daily    Cholecalciferol (VITAMIN D3 PO) 1/15/2019 at Unknown time Daughter Yes Yes   Sig: Take 1,000 Units by mouth daily   Multiple Vitamins-Minerals (MULTIVITAMIN OR) 1/15/2019 at Unknown time Daughter Yes Yes   Sig: Take 1 tablet by mouth daily    Saline (OCEAN NASAL SPRAY NA) prn Daughter Yes Yes   Sig: Administer 1 spray in each nostril up to two times daily as needed   TRAMADOL HCL PO prn Daughter Yes Yes   Sig: Take 50 mg by mouth  every 6 hours as needed for moderate to severe pain   VENTOLIN  (90 BASE) MCG/ACT Inhaler prn Daughter No Yes   Sig: INHALE 2 PUFFS INTO THE LUNGS EVERY 6 HOURS AS NEEDED   amLODIPine (NORVASC) 10 MG tablet 1/14/2019 at Unknown time Daughter No Yes   Sig: Take 1 tablet (10 mg) by mouth daily   aspirin 81 MG chewable tablet 1/14/2019 at hs Daughter No Yes   Sig: Take 1 tablet (81 mg) by mouth daily   atorvastatin (LIPITOR) 40 MG tablet 1/14/2019 at hs Daughter No Yes   Sig: Due for appt in March, one tab daily   docusate sodium (COLACE) 100 MG capsule 1/15/2019 at am Daughter Yes Yes   Sig: Take 1 capsule by mouth 2 times daily    escitalopram (LEXAPRO) 10 MG tablet 1/15/2019 at Unknown time Daughter Yes Yes   Sig: Take 10 mg by mouth daily   fluticasone (FLONASE) 50 MCG/ACT nasal spray Past Week at Unknown time Daughter Yes Yes   Sig: Spray 1 spray into both nostrils daily as needed for rhinitis or allergies   furosemide (LASIX) 20 MG tablet 1/14/2019 at Unknown time Daughter No Yes   Sig: TAKE ONE TABLET BY MOUTH EVERY DAY   hydrALAZINE (APRESOLINE) 50 MG tablet 1/14/2019 at Unknown time Daughter No Yes   Sig: Take 1 tablet (50 mg) by mouth 2 times daily Takes an additional 50 mg in the afternoon if systolic blood pressure is higher than 160   losartan (COZAAR) 100 MG tablet 1/14/2019 at Unknown time Daughter No Yes   Sig: Take 1 tablet (100 mg) by mouth daily   predniSONE (DELTASONE) 1 MG tablet 1/15/2019 at Unknown time Daughter Yes Yes   Sig: Take 2 mg by mouth daily   sodium chloride-sodium bicarb (CLASSIC NETI POT SINUS WASH) 2300-700 MG KIT prn Daughter Yes Yes   Sig: Mix 1 packet in Neti Pot and administer in each nostril daily as needed      Facility-Administered Medications: None     Current Facility-Administered Medications   Medication Dose Route Frequency     atorvastatin  40 mg Oral At Bedtime     docusate sodium  100 mg Oral BID     [START ON 1/16/2019] escitalopram  10 mg Oral Daily      "furosemide  20 mg Oral Daily     metoprolol tartrate  25 mg Oral BID     [START ON 1/16/2019] predniSONE  2 mg Oral Daily     sodium chloride (PF)  3 mL Intracatheter Q8H     Current Facility-Administered Medications   Medication Last Rate     diltiazem (CARDIZEM) infusion ADULT       HEParin Stopped (01/15/19 1603)     Reason anticoagulant not prescribed for atrial fibrillation       - MEDICATION INSTRUCTIONS -       sodium chloride       Allergies   Allergies   Allergen Reactions     Adhesive Tape      blisters     Atenolol      SOB     Lopressor Hct      SOB       Social History    reports that she has quit smoking. she has never used smokeless tobacco. She reports that she does not drink alcohol or use drugs.    Family History   History reviewed. No pertinent family history.    Review of Systems   The comprehensive 10 point Review of Systems is negative other than noted in the HPI or here.     Physical Exam   Vital Signs with Ranges  Temp:  [97.5  F (36.4  C)-98.1  F (36.7  C)] 97.6  F (36.4  C)  Pulse:  [] 87  Heart Rate:  [] 95  Resp:  [16-30] 20  BP: (153-190)/() 156/92  SpO2:  [92 %-97 %] 92 %  Wt Readings from Last 4 Encounters:   01/15/19 55.4 kg (122 lb 3.2 oz)   01/15/19 55.9 kg (123 lb 4 oz)   09/10/18 57.9 kg (127 lb 9.6 oz)   08/29/18 58.2 kg (128 lb 4 oz)     No intake/output data recorded.      Vitals: BP (!) 156/92 (BP Location: Left arm)   Pulse 87   Temp 97.6  F (36.4  C) (Oral)   Resp 20   Ht 1.575 m (5' 2\")   Wt 55.4 kg (122 lb 3.2 oz)   SpO2 92%   BMI 22.35 kg/m      General alert oriented in no acute distress  Lungs are clear to auscultation bilaterally  Neck is supple JVP is normal  Cardiac sounds are irregular with a soft systolic murmur  Extremities are warm without edema  Abdomen is soft nontender nondistended without rebound or guarding  Psych appropriate affect  HEENT no icterus or pallor    Recent Labs   Lab 01/15/19  1150   TROPI 0.040       Recent Labs   Lab " 01/15/19  1150   WBC 5.7   HGB 13.4   MCV 98         POTASSIUM 4.3   CHLORIDE 106   CO2 30   BUN 19   CR 0.96   GFRESTIMATED 53*   GFRESTBLACK 62   ANIONGAP 6   JERILYN 9.0   *   TROPI 0.040     Recent Labs   Lab Test 09/10/18  0724 07/08/18  0323  07/06/15  1039 04/25/14  0748   CHOL 142 134   < > 163 179   HDL 62 69   < > 72 64   LDL 62 57   < > 73 95   TRIG 89 40   < > 88 101   CHOLHDLRATIO  --   --   --  2.3 2.8    < > = values in this interval not displayed.     Recent Labs   Lab 01/15/19  1150   WBC 5.7   HGB 13.4   HCT 41.1   MCV 98        No results for input(s): PH, PHV, PO2, PO2V, SAT, PCO2, PCO2V, HCO3, HCO3V in the last 168 hours.  No results for input(s): NTBNPI, NTBNP in the last 168 hours.  No results for input(s): DD in the last 168 hours.  No results for input(s): SED, CRP in the last 168 hours.  Recent Labs   Lab 01/15/19  1150        Recent Labs   Lab 01/15/19  1150   TSH 2.18     No results for input(s): COLOR, APPEARANCE, URINEGLC, URINEBILI, URINEKETONE, SG, UBLD, URINEPH, PROTEIN, UROBILINOGEN, NITRITE, LEUKEST, RBCU, WBCU in the last 168 hours.    Imaging:  Recent Results (from the past 48 hour(s))   XR Chest 2 Views    Narrative    CHEST TWO VIEWS January 15, 2019 12:28 PM     HISTORY: Cardiac arrhythmia.    COMPARISON: 7/11/2018.      Impression    IMPRESSION: Sternotomy. Hyperinflation both lungs. The lungs are  otherwise clear. No pleural effusions are identified. Cardiac  enlargement. Pulmonary vascularity is within normal limits. Aortic  calcification. Calcified bilateral hilar lymph nodes are again noted.    ISADORA COLLADO MD       Echo:  No results found for this or any previous visit (from the past 4320 hour(s)).

## 2019-01-15 NOTE — PROGRESS NOTES
RECEIVING UNIT ED HANDOFF REVIEW    ED Nurse Handoff Report was reviewed by: Maribel Cheatham on January 15, 2019 at 4:10 PM

## 2019-01-16 NOTE — PROGRESS NOTES
Echocardiogram results came back as worsening LV dysfunction.  Ejection fraction now is about 25%.  There is also RV dysfunction.  These are new findings compared to prior.  Patient denies any symptoms of classical angina. No clinical HF. However given the drop in EF we will rule out new worsening coronary artery disease.  We will get a vasodilator nuclear stress test.  My guess is that this is tachycardia induced cardiomyopathy from new diagnosis of atrial fibrillation.  We will see what the nuclear stress test shows.

## 2019-01-16 NOTE — PROVIDER NOTIFICATION
MD Notification    Notified Person: MD    Notified Person Name: Ernestina    Notification Date/Time: 8:10am 1/16    Notification Interaction: in person     Purpose of Notification: pauses up to 3 seconds.     Orders Received: will review case and consult with EP     Comments: holding metoprolol. Pt. Nonsymptomatic at this time.

## 2019-01-16 NOTE — PROVIDER NOTIFICATION
MD Notification    Notified Person: MD    Notified Person Name: Dr. River    Notification Date/Time: 1/15/19 2005    Notification Interaction: Telephone    Purpose of Notification: Trop elevation    Orders Received: No new orders placed. Currently on apixaban.    Comments: Pt has new onset afib

## 2019-01-16 NOTE — PLAN OF CARE
"Pt A/O. VSS. Up with sba. Tele shows afib cvr/svr. Started metoprolol. Pt denies any CP or SOB. Plan for possible PIA/CV today, NPO since MN. Pt has no new complaints.    /72 (BP Location: Left arm)   Pulse 69   Temp 98.1  F (36.7  C) (Oral)   Resp 16   Ht 1.575 m (5' 2\")   Wt 55.4 kg (122 lb 3.2 oz)   SpO2 92%   BMI 22.35 kg/m        "

## 2019-01-16 NOTE — PROGRESS NOTES
Assessment and Plan:     Jennifer Bob is a 87 year old female who was admitted on 1/15/2019 with new AFib with HR in the 140s.     1. Prior history of bypass surgery coronary artery disease with last stress test being 2014 which showed circumflex territory ischemia under medical management without any angina  2. New onset atrial fibrillation  3. Strokes x 2 last one being July 2018  4. Peripheral arterial disease status post stenting in 2018  5. Mild ischemic cardiomyopathy with ejection fraction of 45%  6. Hypertension   7. COPD    Plan - Rate control strategy for AFib. Low dose 12.5 BID of Metoprolol is fine. Given small pause in AF while sleeping, we will send her home on a monitor. She wants to follow up at [place] [facility] with Dr. TODD Lowe. We will arrange. Anticoagulation for AFib - Apixaban.     Discuss with vascular and neurology about plans on aspirin.     Echo pending today, await results before discharge.     Non specific troponin elevation without symptoms, likely in the setting of established CAD and fast AFib. Can be addressed at outpatient follow up with Dr. Lowe.    HTN control. That may be triggering AFib. Primary to address as indicated. Call with questions.     Manuel Do MD        Interval History:   No overnight events. This am had a 3 second pause in atrial fibrillation when she was asleep. Tolerated metoprolol one dose of 25 mg last night without any SOB. No symptoms at all. No angina, palpitations, SOB.          Medications:       apixaban ANTICOAGULANT  2.5 mg Oral BID     atorvastatin  40 mg Oral At Bedtime     docusate sodium  100 mg Oral BID     escitalopram  10 mg Oral Daily     furosemide  20 mg Oral Daily     metoprolol tartrate  12.5 mg Oral BID     predniSONE  2 mg Oral Daily     sodium chloride (PF)  3 mL Intracatheter Q8H            Physical Exam:     Patient Vitals for the past 24 hrs:   BP Temp Temp src Pulse Heart Rate Resp SpO2 Height Weight   01/16/19 0609 -- -- --  "-- -- -- -- -- 53.3 kg (117 lb 8 oz)   01/16/19 0200 151/72 98.1  F (36.7  C) Oral 69 -- 16 92 % -- --   01/15/19 2000 -- 98.3  F (36.8  C) Oral -- -- -- 93 % -- --   01/15/19 1900 180/85 -- -- -- 76 -- -- -- --   01/15/19 1753 (!) 154/102 -- -- -- 98 -- -- -- --   01/15/19 1629 (!) 156/92 97.6  F (36.4  C) Oral -- 95 20 92 % -- 55.4 kg (122 lb 3.2 oz)   01/15/19 1550 (!) 165/97 -- -- 87 104 28 93 % -- --   01/15/19 1400 153/78 -- -- 81 100 20 92 % -- --   01/15/19 1300 (!) 167/99 -- -- 113 141 29 93 % -- --   01/15/19 1200 (!) 167/106 -- -- 105 129 30 94 % -- --   01/15/19 1117 (!) 190/114 98.1  F (36.7  C) Oral 132 -- 16 97 % 1.575 m (5' 2\") 55.8 kg (123 lb)     Vitals:    01/15/19 1117 01/15/19 1629 01/16/19 0609   Weight: 55.8 kg (123 lb) 55.4 kg (122 lb 3.2 oz) 53.3 kg (117 lb 8 oz)         Intake/Output Summary (Last 24 hours) at 1/16/2019 0845  Last data filed at 1/16/2019 0609  Gross per 24 hour   Intake 120 ml   Output 100 ml   Net 20 ml       01/11 0700 - 01/16 0659  In: 120 [P.O.:120]  Out: 100 [Urine:100]  Net: 20    Exam:  GENERAL APPEARANCE ADULT: Alert, in no acute distress  RESP: lungs clear to auscultation   CV: irregularly irregular rhythm  ABDOMEN: no guarding or rebound  EXTREMITIES: No edema         Data:   LABS (Last four results)  CMP  Recent Labs   Lab 01/16/19  0602 01/15/19  1150    142   POTASSIUM 4.4 4.3   CHLORIDE 105 106   CO2 33* 30   ANIONGAP 4 6   GLC 92 108*   BUN 15 19   CR 1.01 0.96   GFRESTIMATED 50* 53*   GFRESTBLACK 58* 62   JERILYN 8.7 9.0   MAG  --  2.1     CBC  Recent Labs   Lab 01/15/19  1150   WBC 5.7   RBC 4.20   HGB 13.4   HCT 41.1   MCV 98   MCH 31.9   MCHC 32.6   RDW 14.0        INRNo lab results found in last 7 days.  TROPONINS   Lab Results   Component Value Date    TROPI 0.116 (H) 01/16/2019    TROPI 0.079 (H) 01/15/2019    TROPI 0.040 01/15/2019    TROPI 0.104 (H) 07/09/2018    TROPI 0.097 (H) 07/09/2018    TROPONIN 0.02 07/07/2018    TROPONIN 0.04 " 02/09/2017    TROPONIN 0.02 04/24/2014    TROPONIN 0.03 10/17/2013                                                                                                               Manuel Do MD

## 2019-01-16 NOTE — CONSULTS
Medication coverage check for Eliquis. $240 for first 30-day supply. $40 monthly after that.  Sumaya Reid CphT  [facility] Discharge Pharmacy Liaison  Liaison Cell: [phone]

## 2019-01-16 NOTE — PLAN OF CARE
Admission    Patient arrives to room 262-01 via cart from ED around 1600.  Care plan note: A&Ox4. SBA. Denied pain. Tele: a-fib CVR. BP elevated 180/85. Incontinent of urine x1. Heparin drip discontinued, started on eliquis per cardiology. PIV SL. Tolerated clear liquid diet.

## 2019-01-16 NOTE — PROGRESS NOTES
[facility]    Medicine Progress Note - Hospitalist Service       Date of Admission:  1/15/2019  Assessment & Plan   Jennifer Bob is a 87 year old female admitted on 1/15/2019.  Past medical history includes CAD s/p CABG, COPD, PMR, anxiety and CVA who presents with tachycardia after checking her blood pressure at home, found to have A-fib with RVR.     New onset atrial fibrillation with rapid ventricular rate  Noted to be tachycardic on home BP cuff, reporting of teeth/jaw pain but otherwise asymptomatic.  Admission EKG demonstrating A-fib with RVR which is new diagnosis.  Serial trop mildly elevated, suspected to be demand in setting of known CAD.  TSH wnl.  CHADS2-VASC of 8.  - developed pauses overnight and metoprolol reduced to 12.5 mg on 1/16; but RN reporting tachycardia with activity  - TTE 1/16 shows EF 27%, decreased RV function, more pronounced WMA (all new compared to prior)  - initiated on apixaban this admission    CAD s/p CABG in 1982 and PCI 2009  Most recent TTE 7/2018 demonstrates EF 45-50%, mild aortic sclerosis, pulmonary hypertension, PA systolic pressure 48 mmHg.  - TTE findings as above  - continue PTA atorvastatin, lasix    Hypertension  PTA regimen includes amlodipine, hydralazine, and losartan.  - continue metoprolol as above  - resume losartan at reduced dose of 50 mg daily  - titrate meds as indicated; hydralazine and amlodipine remain on hold     Hx of CVI 7/2018  Carotid stenosis  Evaluated by vascular surgery in 10/2018 for carotid stenosis, recommended medical management with plan for repeat carotid US in 10/2019.       COPD  Managed on prn inhalers.  No signs of acute exacerbation.  - Albuterol prn     Polymyalgia rheumatica  - continue PTA prednisone     Anxiety  - continue PTA lexapro       Diet: 2 Gram Sodium Diet    DVT Prophylaxis: apixaban  Conde Catheter: not present  Code Status: DNR/DNI      Disposition Plan   Expected discharge: 2 - 3 days,  recommended to prior living arrangement once any further work-up and med titration is complete.  Entered: Sha Forbes MD 01/16/2019, 10:57 AM       The patient's care was discussed with the Bedside Nurse, Patient and Patient's Family.    Sha Forbes MD  Hospitalist Service  [facility]    ______________________________________________________________________    Interval History   Feeling well, denies any chest pain/pressure, dyspnea, palpitations, lightheadedness or other complaints.    Data reviewed today: I reviewed all medications, new labs and imaging results over the last 24 hours. I personally reviewed no images or EKG's today.    Physical Exam   Vital Signs: Temp: 97.6  F (36.4  C) Temp src: Oral BP: 159/79 Pulse: (P) 118(with activity ) Heart Rate: 76 Resp: 16 SpO2: 94 % O2 Device: None (Room air)    Weight: 117 lbs 8 oz  General Appearance: Well developed, thin elderly female in no acute distress  Respiratory: lungs clear bilaterally without wheezes or crackles  Cardiovascular: irregular rhythm, normal rate, normal s1/s2, no peripheral edema  GI: abdomen soft, normal bowel sounds  Other: Alert and appropriate, cranial nerves grossly intact     Data   Recent Labs   Lab 01/16/19  0602 01/16/19  0015 01/15/19  1900 01/15/19  1150   WBC  --   --   --  5.7   HGB  --   --   --  13.4   MCV  --   --   --  98   PLT  --   --   --  151     --   --  142   POTASSIUM 4.4  --   --  4.3   CHLORIDE 105  --   --  106   CO2 33*  --   --  30   BUN 15  --   --  19   CR 1.01  --   --  0.96   ANIONGAP 4  --   --  6   JERILYN 8.7  --   --  9.0   GLC 92  --   --  108*   TROPI  --  0.116* 0.079* 0.040

## 2019-01-17 NOTE — PLAN OF CARE
Alert and oriented x4. On RA. Tele A-fib, CVR, BBB. HRs down to 30s when pt is asleep, asymptomatic. Pt having pauses up to 3 seconds - Dr gloria, metoprolol decreased yesterday. Started eliquis yesterday. NPO since midnight. Plan for nuc med stress test today. Will continue to monitor.

## 2019-01-17 NOTE — PLAN OF CARE
Pt is A/O x4. Independent. VSS. Denies pain. IV SL. Went for stress test today. 2gm Na diet. Tolerates well. Tele Afib RVR. Plan to keep overnight and waiting EP consult.

## 2019-01-17 NOTE — PROGRESS NOTES
[facility]    Medicine Progress Note - Hospitalist Service       Date of Admission:  1/15/2019  Assessment & Plan   Jennifer Bob is a 87 year old female admitted on 1/15/2019.  Past medical history includes CAD s/p CABG, COPD, PMR, anxiety and CVA who presents with tachycardia after checking her blood pressure at home, found to have A-fib with RVR.     New onset atrial fibrillation with rapid ventricular rate  Type 2 MI due to demand ischemia due to A-fib with RVR  Noted to be tachycardic on home BP cuff, reporting of teeth/jaw pain but otherwise asymptomatic.  Admission EKG demonstrating A-fib with RVR which is new diagnosis.  Serial trop mildly elevated, suspected to be demand in setting of known CAD.  TTE 1/16 shows EF 27%, decreased RV function, more pronounced WMA (all new compared to prior).  TSH wnl.  CHADS2-VASC of 8.  - metoprolol reduced to 12.5 mg on 1/16 due to pauses; but experiencing significant tachycardia with activity - will have EP consult  - nuclear stress 1/17 without significant reversible ischemia  - initiated on apixaban this admission    CAD s/p CABG in 1982 and PCI 2009  Most recent TTE 7/2018 demonstrates EF 45-50%, mild aortic sclerosis, pulmonary hypertension, PA systolic pressure 48 mmHg.  TTE findings as above.  - continue atorvastatin, lasix, losartan, metoprolol    Hypertension  PTA regimen includes amlodipine, hydralazine, and losartan.  - continue metoprolol as above  - increase losartan to 100 mg daily (give 50 mg x1 now)  - titrate meds as indicated; hydralazine and amlodipine remain on hold     Hx of CVI 7/2018  Carotid stenosis  Evaluated by vascular surgery in 10/2018 for carotid stenosis, recommended medical management with plan for repeat carotid US in 10/2019.       COPD  Managed on prn inhalers.  No signs of acute exacerbation.  - Albuterol prn     Polymyalgia rheumatica  - continue PTA prednisone     Anxiety  - continue PTA lexapro       Diet:  Combination Diet Regular Diet Adult; 2 gm NA Diet    DVT Prophylaxis: apixaban  Conde Catheter: not present  Code Status: DNR/DNI      Disposition Plan   Expected discharge: 2 - 3 days, recommended to prior living arrangement once any further work-up and med titration is complete.  Entered: Sha Forbes MD 01/17/2019, 1:33 PM       The patient's care was discussed with the Bedside Nurse, Patient and Patient's Family.    Sha Forbes MD  Hospitalist Service  [facility]    ______________________________________________________________________    Interval History   She is again without complaint and feeling well.  No chest discomfort, edema or dyspnea.    Data reviewed today: I reviewed all medications, new labs and imaging results over the last 24 hours. I personally reviewed no images or EKG's today.    Physical Exam   Vital Signs: Temp: 98  F (36.7  C) Temp src: Oral BP: 162/74 Pulse: 105 Heart Rate: 92 Resp: 16 SpO2: 94 % O2 Device: None (Room air)    Weight: 117 lbs 1.6 oz  General Appearance: Well developed, thin elderly female in no acute distress, lying in bed  Respiratory: lungs clear bilaterally without wheezes or crackles  Cardiovascular: irregular rhythm, normal rate, normal s1/s2, no peripheral edema  GI: abdomen soft, normal bowel sounds  Other: Alert and appropriate, cranial nerves grossly intact     Data   Recent Labs   Lab 01/16/19  0602 01/16/19  0015 01/15/19  1900 01/15/19  1150   WBC  --   --   --  5.7   HGB  --   --   --  13.4   MCV  --   --   --  98   PLT  --   --   --  151     --   --  142   POTASSIUM 4.4  --   --  4.3   CHLORIDE 105  --   --  106   CO2 33*  --   --  30   BUN 15  --   --  19   CR 1.01  --   --  0.96   ANIONGAP 4  --   --  6   JERILYN 8.7  --   --  9.0   GLC 92  --   --  108*   TROPI  --  0.116* 0.079* 0.040

## 2019-01-17 NOTE — PROGRESS NOTES
Assessment and Plan:     Jennifer Bob is a 87 year old female who was admitted on 1/15/2019 with new AFib with HR in the 140s. During admission noted new worsening LV dysfunction with EF down to 25%. MPI done to rule out worsening CAD which showed very mild ischemia. Having pauses during sleep, but in RVR to 140-150 with minimal ambulation.      1. Prior history of bypass surgery coronary artery disease with last stress test being 2014 which showed circumflex territory ischemia under medical management without any angina  2. New onset atrial fibrillation with tachybrady response  3. Strokes x 2 last one being July 2018  4. Peripheral arterial disease status post stenting in 2018  5. Mild ischemic cardiomyopathy with ejection fraction of 45% - worsened to 25% this admission likely from tachycardia  6. Hypertension   7. COPD    Plan - Rate control strategy for AFib on low dose 12.5 BID of Metoprolol is not working. Having tachybrady responses. Will have EP consult especially given good rate control here might be prudent given likely tachycardia induced cardiomyopathy. Await EP input.     Anticoagulation for AFib - Apixaban.     Manuel Do MD        Interval History:   No overnight events. This am had a 3 second pause in atrial fibrillation when she was asleep despite being on lower dose of BB. With minimal ambulation, HR is jumping into the 140-150 range.          Medications:       apixaban ANTICOAGULANT  2.5 mg Oral BID     atorvastatin  40 mg Oral At Bedtime     docusate sodium  100 mg Oral BID     escitalopram  10 mg Oral Daily     furosemide  20 mg Oral Daily     [START ON 1/18/2019] losartan  100 mg Oral Daily     metoprolol tartrate  12.5 mg Oral BID     predniSONE  2 mg Oral Daily     sodium chloride (PF)  10 mL Intracatheter Q8H     sodium chloride (PF)  3 mL Intracatheter Q8H            Physical Exam:     Patient Vitals for the past 24 hrs:   BP Temp Temp src Pulse Heart Rate Resp SpO2  Weight   01/17/19 1151 162/74 98  F (36.7  C) Oral -- 92 16 94 % --   01/17/19 0938 160/86 -- -- -- 96 -- 97 % --   01/17/19 0935 166/87 -- -- -- 100 16 97 % --   01/17/19 0932 145/76 -- -- -- 122 -- 98 % --   01/17/19 0924 167/80 -- -- -- 83 16 96 % --   01/17/19 0802 172/88 98  F (36.7  C) Oral -- 98 16 94 % --   01/17/19 0525 -- -- -- -- -- -- -- 53.1 kg (117 lb 1.6 oz)   01/17/19 0127 144/70 98  F (36.7  C) Oral -- 67 16 95 % --   01/16/19 2118 163/81 -- -- 105 -- -- -- --   01/16/19 2013 148/69 98.1  F (36.7  C) Oral -- 82 16 94 % --   01/16/19 1535 151/87 97.4  F (36.3  C) Oral 80 -- 16 93 % --     Vitals:    01/15/19 1117 01/15/19 1629 01/16/19 0609 01/17/19 0525   Weight: 55.8 kg (123 lb) 55.4 kg (122 lb 3.2 oz) 53.3 kg (117 lb 8 oz) 53.1 kg (117 lb 1.6 oz)         Intake/Output Summary (Last 24 hours) at 1/16/2019 0845  Last data filed at 1/16/2019 0609  Gross per 24 hour   Intake 120 ml   Output 100 ml   Net 20 ml       01/12 0700 - 01/17 0659  In: 600 [P.O.:600]  Out: 100 [Urine:100]  Net: 500    Exam:  GENERAL APPEARANCE ADULT: Alert, in no acute distress  RESP: lungs clear to auscultation   CV: irregularly irregular rhythm  ABDOMEN: no guarding or rebound  EXTREMITIES: No edema         Data:   LABS (Last four results)  CMP  Recent Labs   Lab 01/16/19  0602 01/15/19  1150    142   POTASSIUM 4.4 4.3   CHLORIDE 105 106   CO2 33* 30   ANIONGAP 4 6   GLC 92 108*   BUN 15 19   CR 1.01 0.96   GFRESTIMATED 50* 53*   GFRESTBLACK 58* 62   JERILYN 8.7 9.0   MAG  --  2.1     CBC  Recent Labs   Lab 01/15/19  1150   WBC 5.7   RBC 4.20   HGB 13.4   HCT 41.1   MCV 98   MCH 31.9   MCHC 32.6   RDW 14.0        INRNo lab results found in last 7 days.  TROPONINS   Lab Results   Component Value Date    TROPI 0.116 (H) 01/16/2019    TROPI 0.079 (H) 01/15/2019    TROPI 0.040 01/15/2019    TROPI 0.104 (H) 07/09/2018    TROPI 0.097 (H) 07/09/2018    TROPONIN 0.02 07/07/2018    TROPONIN 0.04 02/09/2017    TROPONIN 0.02  04/24/2014    TROPONIN 0.03 10/17/2013                                                                                                               Manuel Do MD

## 2019-01-17 NOTE — PLAN OF CARE
Pt. A&O. HTN 140s-150s. Pt. States she is in the 160s at home. Losartan restarted.  Tele this AM Afib CVR/SVR with pauses and a single blocked beat. Metoprolol decreased. No further pauses or blocks noted. Afib now CVR 70-90 with RVR with activity 100-135. Pt. Nonsymptomatic.  CMS intact. Echo done, abnormal. NM Stress test ordered for tomorrow. Pt. Denies chest pain or SOB. Indp/SBA.

## 2019-01-17 NOTE — PROGRESS NOTES
Lexiscan Stress:    Patient admitted for Lexiscan stress test. Admission intake info taken. PIV in place and is flushed and patent. Patient's lungs sound clear to auscultation. Patient was monitored throughout test with 12 lead EKG, BP and O2 sats. Patient tolerated well. VSS. Patient denied chest pain or pressure prior to and after injection.      0942-Pt transferred back to Rm 244-1 per wheelchair.

## 2019-01-18 NOTE — PLAN OF CARE
VSS on RA.  Tele: Afib CVR and ralph.   Pt up at ayo.  Pt having some 2sec pauses, pt sleeping, per notes, cards aware.  PT NPO since midnight for EP consult.  Call light within reach.  Will continue to monitor.

## 2019-01-18 NOTE — CONSULTS
Consult Date:  01/18/2019     CARDIAC ELECTROPHYSIOLOGY CONSULTATION     REQUESTING PHYSICIAN:  Dr. DA Do.      REASON FOR CONSULTATION:  Tachycardia-bradycardia syndrome.      HISTORY OF PRESENT ILLNESS:    It is my pleasure seeing Ms. Jennifer Bob for atrial fibrillation with tachycardia-bradycardia features.  This delightful 87-year-old female has history of COPD and coronary artery disease with bypass surgery several years ago.  She also has history of 2 TIAs/strokes and chronic mild ischemic cardiomyopathy with EF of 45%.  Apparently, her last TIA/stroke was in 07/2018.  At that time, no atrial arrhythmia was found, and the patient had clopidogrel added to her aspirin.      For the preceding several days before admission, the patient noted heart racing.  The patient was admitted on 01/15/2019 when she presented because of tachycardia.  She was in rapid atrial fibrillation.  She was admitted and was seen in Cardiology consultation by Dr. Do.  Echocardiography demonstrated a decrease in her ejection fraction, which is currently about 27%.  Akinesis of the basal inferior wall with global LV hypokinesis.      During hospitalization, attempts have been made to control her heart rate.  However, the patient is exquisitely sensitive to beta-blockers; even at the very modest dose of metoprolol 25 mg b.i.d., she became severely bradycardic.  After the dose was decreased to 12.5 mg b.i.d., the patient's heart rate became very fast with any activity.  Typically, HR is controlled at rest but with any movement it goes in the 150s.      The patient lives alone in a senior apartment.  She is a nonsmoker, nondrinker.      She has history of COPD, previous MI and bypass, PMR and TIA/stroke.        DIAGNOSTIC STUDIES:    Sodium 142, potassium 4.4, creatinine 1.0, glucose 92.    Her 12-lead EKG shows atrial fibrillation with RVR and RBBB.    Her echocardiogram during this admission showed an LVEF of 27% with inferior  "akinesis and global hypokinesis, moderately reduced RV systolic function, no significant valvular abnormalities.        IMPRESSION:    1.  Persistent atrial fibrillation, likely of recent onset.  Worsening of chronic ischemic cardiomyopathy.  Ms. Bob is not a good candidate for rhythm control because of severe cardiomyopathy, ischemic heart disease and COPD.  I think we need to focus on rate control.  Unfortunately, she is exhibiting severe tachy/ralph features and it will not be possible to safely control her AF without a permanent pacemaker.      I went over the technical aspects, risks and benefits of pacemaker implantation with Ms. Bob.  She requires a biventricular pacemaker as she has significant LV dysfunction and I anticipate a high percentage of future RV pacing.  I do not think that an atrial lead is necessary as we do not plan to pursue rhythm control options.  I explained there is an approximately 2-3% risk of serious complication.  She understands and agrees to proceed.      Following pacemaker implantation, we will increase her beta-blocker to control tachycardia.      Finally, given her advanced age, she is not a good candidate for a primary prevention ICD (instead of a pacemaker).      I do appreciate the opportunity to be part of her care.        RECOMMENDATIONS:    Implant  biventricular pacemaker without atrial lead.  Hold Eliquis today.         MICH BLACKWELL MD, [facility]          Physical Exam:  Vitals: /74   Pulse 106   Temp 98.2  F (36.8  C) (Oral)   Resp 16   Ht 1.575 m (5' 2\")   Wt 53.2 kg (117 lb 3.2 oz)   SpO2 95%   BMI 21.44 kg/m      Intake/Output Summary (Last 24 hours) at 1/18/2019 2007  Last data filed at 1/18/2019 1853  Gross per 24 hour   Intake 240 ml   Output --   Net 240 ml     Vitals:    01/15/19 1117 01/15/19 1629 01/16/19 0609 01/17/19 0525   Weight: 55.8 kg (123 lb) 55.4 kg (122 lb 3.2 oz) 53.3 kg (117 lb 8 oz) 53.1 kg (117 lb 1.6 oz)    01/18/19 0500 "   Weight: 53.2 kg (117 lb 3.2 oz)     Constitutional:  Slender elderly female, A+O x3.  Pt is in NAD.  Very tachycardiac after ambulation x 50 feet.  HEAD: Normocephalic.  SKIN: Skin normal color, texture and turgor with no lesions or eruptions.  Eyes: PERRL, EOMI.  ENT:  Supple, normal JVP. No lymphadenopathy or thyroid enlargement.  Chest:  CTA bilat but with decreased breath sound diffusely, no rales or wheezing.  Cardiac:  Irreg irreg, no S3 or mr.  Abdomen:  Normal BS.  Soft, non-tender and non-distended.  No rebound or guarding.    Extremities:  Radial pulses palpable 1+ B/L.  No LE edema noted.   Neurological: Strength and sensation grossly symmetric and intact throughout.         Review of Systems:  Complete review of system is otherwise negative with the exception of what was described above.       CURRENT MEDICATIONS:    [START ON 1/19/2019] apixaban ANTICOAGULANT  2.5 mg Oral BID     atorvastatin  40 mg Oral At Bedtime     [START ON 1/19/2019] ceFAZolin  1 g Intravenous Q12H     docusate sodium  100 mg Oral BID     escitalopram  10 mg Oral Daily     furosemide  20 mg Oral Daily     losartan  100 mg Oral Daily     metoprolol tartrate  12.5 mg Oral BID     predniSONE  2 mg Oral Daily     sodium chloride (PF)  10 mL Intracatheter Q8H     sodium chloride (PF)  3 mL Intracatheter Q8H     sodium chloride (PF)  3 mL Intracatheter Q8H       ALLERGIES     Allergies   Allergen Reactions     Adhesive Tape      blisters     Atenolol      SOB     Lopressor Hct      SOB       PAST MEDICAL HISTORY:  Past Medical History:   Diagnosis Date     Abdominal wall hernia     three hernias at previous incision sites, allergic to mesh so repair not repeated, wears girdle     Anxiety 1/9/2012     ASCVD (arteriosclerotic cardiovascular disease) 1/9/2012     CKD (chronic kidney disease) stage 3, GFR 30-59 ml/min (H) 1/10/2012     Colon polyp     resected     DDD (degenerative disc disease), lumbar 1/10/2012     Hyperlipidemia LDL  goal <100 1/9/2012     Hyperlipidemia LDL goal <70 12/16/2013     Hypertension goal BP (blood pressure) < 140/90 1/9/2012     Incontinence of urine 1/9/2012     Left hip pain 1/9/2012     Low back pain 1/9/2012     Seasonal allergies 1/9/2012     STEMI (ST elevation myocardial infarction) (H) 8-    with VF arrest.     Stented coronary artery 8-     TIA (transient ischaemic attack) 1/10/2012       PAST SURGICAL HISTORY:  Past Surgical History:   Procedure Laterality Date     abdominal abscess      at incisional site after kristine     adominal hernia repair      had mesh placed, then allergic so it was removed and she wears a girdle     CHOLECYSTECTOMY       CORONARY ARTERY BYPASS  1992       FAMILY HISTORY:  History reviewed. No pertinent family history.    SOCIAL HISTORY:  Social History     Socioeconomic History     Marital status:      Spouse name: None     Number of children: None     Years of education: None     Highest education level: None   Social Needs     Financial resource strain: None     Food insecurity - worry: None     Food insecurity - inability: None     Transportation needs - medical: None     Transportation needs - non-medical: None   Occupational History     None   Tobacco Use     Smoking status: Former Smoker     Smokeless tobacco: Never Used     Tobacco comment: quit smoking in 1983   Substance and Sexual Activity     Alcohol use: No     Drug use: No     Sexual activity: No   Other Topics Concern     Parent/sibling w/ CABG, MI or angioplasty before 65F 55M? No   Social History Narrative     None         Recent Lab Results:  Recent Labs   Lab 01/16/19  0602 01/16/19  0015 01/15/19  1900 01/15/19  1150   WBC  --   --   --  5.7   HGB  --   --   --  13.4   MCV  --   --   --  98   PLT  --   --   --  151     --   --  142   POTASSIUM 4.4  --   --  4.3   CHLORIDE 105  --   --  106   CO2 33*  --   --  30   BUN 15  --   --  19   CR 1.01  --   --  0.96   ANIONGAP 4  --   --  6    JERILYN 8.7  --   --  9.0   GLC 92  --   --  108*   TROPI  --  0.116* 0.079* 0.040                                D: 2019   T: 2019   MT:       Name:     TRACY BARBER   MRN:      [id number]        Account:       [id number]   :      1931           Consult Date:  2019      Document: [id number]       cc: Manuel Do MD

## 2019-01-18 NOTE — PROGRESS NOTES
[facility]    Medicine Progress Note - Hospitalist Service       Date of Admission:  1/15/2019  Assessment & Plan   Jennifer Bob is a 87 year old female admitted on 1/15/2019.  Past medical history includes CAD s/p CABG, COPD, PMR, anxiety and CVA who presents with tachycardia after checking her blood pressure at home, found to have A-fib with RVR.     New onset A-fib with tachy-ralph  Type 2 MI due to demand ischemia due to A-fib with RVR  Noted to be tachycardic on home BP cuff, reporting of teeth/jaw pain but otherwise asymptomatic.  Admission EKG demonstrating A-fib with RVR (new diagnosis).  Serial trop mildly elevated, suspected to be demand in setting of known CAD.  TTE 1/16 shows EF 27%, decreased RV function, more pronounced WMA (all new compared to prior).  EF changes felt to be rate-related as nuclear stress 1/17 without significant reversible ischemia.  TSH wnl.  CHADS2-VASC of 8.  Developed pauses with low dose BB but remained tachycardic with activity.  - EP recommending PPM today and increase in BB thereafter  - initiated on apixaban this admission    CAD s/p CABG in 1982 and PCI 2009  Most recent TTE 7/2018 demonstrates EF 45-50%, mild aortic sclerosis, pulmonary hypertension, PA systolic pressure 48 mmHg.  TTE findings as above.  - continue atorvastatin, lasix, losartan, metoprolol    Hypertension  PTA regimen includes amlodipine, hydralazine, and losartan.  - continue metoprolol and losartan  - holding hydralazine and amlodipine given plan to increase BB     Hx of CVI 7/2018  Carotid stenosis  Evaluated by vascular surgery in 10/2018 for carotid stenosis, recommended medical management with plan for repeat carotid US in 10/2019.       COPD  Managed on prn inhalers.  No signs of acute exacerbation.  - Albuterol prn     Polymyalgia rheumatica  - continue PTA prednisone     Anxiety  - continue PTA lexapro       Diet: Combination Diet Regular Diet Adult; 2 gm NA Diet  NPO for  Medical/Clinical Reasons Except for: Other; Specify: May take medications with a drink of water prior to Electrophysiology study    DVT Prophylaxis: apixaban  Conde Catheter: not present  Code Status: DNR/DNI      Disposition Plan   Expected discharge: Tomorrow, recommended to prior living arrangement once pacemaker placed, cleared by EP.  Entered: Sha Forbes MD 01/18/2019, 1:59 PM       The patient's care was discussed with the Bedside Nurse, Patient and Patient's Family.    Sha Forbes MD  Hospitalist Service  [facility]    ______________________________________________________________________    Interval History   Feeling well, denies any cardiorespiratory symptoms.  Eager for pacer placement and discharge home.    Data reviewed today: I reviewed all medications, new labs and imaging results over the last 24 hours. I personally reviewed no images or EKG's today.    Physical Exam   Vital Signs: Temp: 97.8  F (36.6  C) Temp src: Oral BP: 165/79 Pulse: 82 Heart Rate: 55 Resp: 16 SpO2: 94 % O2 Device: None (Room air)    Weight: 117 lbs 3.2 oz  General Appearance: Well developed, thin elderly female in no acute distress  Respiratory: lungs clear bilaterally without wheezes or crackles  Cardiovascular: irregular rhythm, normal rate, normal s1/s2, no peripheral edema  GI:   Other: Alert and appropriate, cranial nerves grossly intact     Data   Recent Labs   Lab 01/16/19  0602 01/16/19  0015 01/15/19  1900 01/15/19  1150   WBC  --   --   --  5.7   HGB  --   --   --  13.4   MCV  --   --   --  98   PLT  --   --   --  151     --   --  142   POTASSIUM 4.4  --   --  4.3   CHLORIDE 105  --   --  106   CO2 33*  --   --  30   BUN 15  --   --  19   CR 1.01  --   --  0.96   ANIONGAP 4  --   --  6   JERILYN 8.7  --   --  9.0   GLC 92  --   --  108*   TROPI  --  0.116* 0.079* 0.040

## 2019-01-19 NOTE — DISCHARGE SUMMARY
[facility]    Discharge Summary  Hospitalist    Date of Admission:  1/15/2019  Date of Discharge:  1/19/2019  Discharging Provider: Ramy Lyn MD  Date of Service (when I saw the patient): 01/19/19    Discharge Diagnoses   A. fib with RVR  Tachybradycardia syndrome  Status post pacemaker placement    History of Present Illness   Jennifer Bob is an 87 year old female who presented with tachycardia    Hospital Course     Jennifer Bob is a 87 year old female admitted on 1/15/2019.  Past medical history includes CAD s/p CABG, COPD, PMR, anxiety and CVA who presents with tachycardia after checking her blood pressure at home, found to have A-fib with RVR.    Final discharge diagnosis and hospital course     New onset A-fib with tachy-ralph  Type 2 MI due to demand ischemia due to A-fib with RVR  Noted to be tachycardic on home BP cuff, reporting of teeth/jaw pain but otherwise asymptomatic.  Admission EKG demonstrating A-fib with RVR (new diagnosis).  Serial trop mildly elevated, suspected to be demand in setting of known CAD.  TTE 1/16 shows EF 27%, decreased RV function, more pronounced WMA (all new compared to prior).  EF changes felt to be rate-related as nuclear stress 1/17 without significant reversible ischemia.  TSH wnl.  CHADS2-VASC of 8.  Developed pauses with low dose BB but remained tachycardic with activity.  - EP recommending PPM  which was done on 1/18/2018 and increase in metoprolol to 50 mg twice daily after that.  - initiated on apixaban this admission  Now the rate is well controlled, blood pressure is good patient is asymptomatic feeling good no dizziness lightheadedness no chest pain orthopnea PND palpitation and wanted to go home.  Patient will be discharged home in stable condition cardiology cleared her to be discharged home as well.     CAD s/p CABG in 1982 and PCI 2009  Most recent TTE 7/2018 demonstrates EF 45-50%, mild aortic sclerosis, pulmonary  hypertension, PA systolic pressure 48 mmHg.  TTE findings as above.  - continue atorvastatin, lasix, losartan, metoprolol     Hypertension  PTA regimen includes amlodipine, hydralazine, and losartan.  - continue metoprolol and losartan  - holding hydralazine and amlodipine given plan to increase BB     Hx of CVI 7/2018  Carotid stenosis  Evaluated by vascular surgery in 10/2018 for carotid stenosis, recommended medical management with plan for repeat carotid US in 10/2019.       COPD  Managed on prn inhalers.  No signs of acute exacerbation.  - Albuterol prn     Polymyalgia rheumatica  - continue PTA prednisone     Anxiety  - continue PTA lexapro     With the PCP in 1 week  With the cardiology as scheduled  New medication metoprolol 50 mg twice daily and apixaban  Discussed with her the possible side effect of both the medications and precautions to avoid any falls.  As increased risk of bleeding with anticoagulation.      Ramy Lyn MD    Significant Results and Procedures       Pending Results   These results will be followed up by PCP  Unresulted Labs Ordered in the Past 30 Days of this Admission     No orders found from 11/16/2018 to 1/16/2019.          Code Status   DNR / DNI       Primary Care Physician   Torri Fisher MD    Physical Exam   Temp: 98.4  F (36.9  C) Temp src: Oral BP: 130/72 Pulse: 106 Heart Rate: 109 Resp: 16 SpO2: 92 % O2 Device: None (Room air) Oxygen Delivery: 2 LPM  Vitals:    01/17/19 0525 01/18/19 0500 01/19/19 0605   Weight: 53.1 kg (117 lb 1.6 oz) 53.2 kg (117 lb 3.2 oz) 53.4 kg (117 lb 11.2 oz)     Vital Signs with Ranges  Temp:  [98  F (36.7  C)-98.4  F (36.9  C)] 98.4  F (36.9  C)  Pulse:  [] 106  Heart Rate:  [] 109  Resp:  [16] 16  BP: (127-164)/() 130/72  SpO2:  [92 %-97 %] 92 %  I/O last 3 completed shifts:  In: 240 [P.O.:240]  Out: -     Constitutional: awake, alert, cooperative, no apparent distress, and appears stated age  Eyes: Lids and lashes normal,  pupils equal, round and reactive to light, extra ocular muscles intact, sclera clear, conjunctiva normal  Respiratory: No increased work of breathing, good air exchange, clear to auscultation bilaterally, no crackles or wheezing  Cardiovascular: Normal apical impulse, regular rate and rhythm, normal S1 and S2, no S3 or S4, and no murmur noted  GI: No scars, normal bowel sounds, soft, non-distended, non-tender, no masses palpated, no hepatosplenomegally  Skin: no bruising or bleeding  Neurologic: Awake, alert, oriented to name, place and time.  Focal deficit    Discharge Disposition   Discharged to home  Condition at discharge: Stable    Consultations This Hospital Stay   PHARMACY TO DOSE HEPARIN  CARDIOLOGY IP CONSULT  PHARMACY TO DOSE HEPARIN  PHARMACY LIAISON FOR MEDICATION COVERAGE CONSULT  CARE TRANSITION RN/SW IP CONSULT  ELECTROPHYSIOLOGY IP CONSULT    Time Spent on this Encounter   IRamy, personally saw the patient today and spent greater than 30 minutes discharging this patient.    Discharge Orders      Follow-Up with Cardiac Advanced Practice Provider      Follow-up and recommended labs and tests     You have a follow up Cardiology appointment with Dr Claudia Lowe  [address] [place] [address]   Appointments:   [phone]     Reason for your hospital stay    Afib with RVR     Follow-up and recommended labs and tests     Follow up with primary care provider, Torri Fisher MD, within 7 days for hospital follow- up.  No follow up labs or test are needed.     Activity    Your activity upon discharge: activity as tolerated     DNR/DNI     Cardiac Event Monitor Adult Pediatric    Hook up prior to discharge     Diet    Follow this diet upon discharge: Orders Placed This Encounter      2 Gram Sodium Diet     Discharge Medications   Current Discharge Medication List      START taking these medications     Details   apixaban ANTICOAGULANT (ELIQUIS) 2.5 MG tablet Take 1 tablet (2.5 mg) by mouth 2 times daily  Qty: 60 tablet, Refills: 0    Associated Diagnoses: New onset atrial fibrillation (H)      metoprolol tartrate (LOPRESSOR) 50 MG tablet Take 1 tablet (50 mg) by mouth 2 times daily  Qty: 60 tablet, Refills: 0    Associated Diagnoses: New onset atrial fibrillation (H)         CONTINUE these medications which have NOT CHANGED    Details   amLODIPine (NORVASC) 10 MG tablet Take 1 tablet (10 mg) by mouth daily  Qty: 90 tablet, Refills: 1    Associated Diagnoses: Essential hypertension with goal blood pressure less than 140/90      atorvastatin (LIPITOR) 40 MG tablet Due for appt in March, one tab daily  Qty: 90 tablet, Refills: 3    Associated Diagnoses: Hyperlipidemia LDL goal <100      Calcium Citrate-Vitamin D (CALCIUM CITRATE + PO) Take 1 tablet by mouth daily       Cholecalciferol (VITAMIN D3 PO) Take 1,000 Units by mouth daily      docusate sodium (COLACE) 100 MG capsule Take 1 capsule by mouth 2 times daily       escitalopram (LEXAPRO) 10 MG tablet Take 10 mg by mouth daily      fluticasone (FLONASE) 50 MCG/ACT nasal spray Spray 1 spray into both nostrils daily as needed for rhinitis or allergies      furosemide (LASIX) 20 MG tablet TAKE ONE TABLET BY MOUTH EVERY DAY  Qty: 90 tablet, Refills: 3    Associated Diagnoses: Essential hypertension with goal blood pressure less than 140/90      losartan (COZAAR) 100 MG tablet Take 1 tablet (100 mg) by mouth daily  Qty: 90 tablet, Refills: 3    Associated Diagnoses: Essential hypertension with goal blood pressure less than 140/90      Multiple Vitamins-Minerals (MULTIVITAMIN OR) Take 1 tablet by mouth daily       predniSONE (DELTASONE) 1 MG tablet Take 2 mg by mouth daily      Saline (OCEAN NASAL SPRAY NA) Administer 1 spray in each nostril up to two times daily as needed      sodium chloride-sodium bicarb (CLASSIC NETI POT SINUS WASH) 2300-700 MG KIT Mix 1 packet in  Neti Pot and administer in each nostril daily as needed      TRAMADOL HCL PO Take 50 mg by mouth every 6 hours as needed for moderate to severe pain      VENTOLIN  (90 BASE) MCG/ACT Inhaler INHALE 2 PUFFS INTO THE LUNGS EVERY 6 HOURS AS NEEDED  Qty: 18 g, Refills: 9    Associated Diagnoses: Chronic obstructive pulmonary disease, unspecified COPD type (H)      BETA BLOCKER NOT PRESCRIBED, INTENTIONAL, Beta Blocker not prescribed intentionally due to COPD, shortness of breath with atenolol and lopressor  Refills: 0    Associated Diagnoses: Essential hypertension with goal blood pressure less than 140/90; ASCVD (arteriosclerotic cardiovascular disease)         STOP taking these medications       aspirin 81 MG chewable tablet Comments:   Reason for Stopping:         hydrALAZINE (APRESOLINE) 50 MG tablet Comments:   Reason for Stopping:             Allergies   Allergies   Allergen Reactions     Adhesive Tape      blisters     Atenolol      SOB     Lopressor Hct      SOB     Data   Most Recent 3 CBC's:  Recent Labs   Lab Test 01/15/19  1150 09/10/18  0724 08/29/18  1054 07/09/18  0313   WBC 5.7 8.3  --  7.7   HGB 13.4 13.5 13.7 11.8   MCV 98 101*  --  105*    161  --  124*      Most Recent 3 BMP's:  Recent Labs   Lab Test 01/19/19  0556 01/16/19  0602 01/15/19  1150  08/29/18  1054   NA  --  142 142  --  143   POTASSIUM  --  4.4 4.3  --  4.4   CHLORIDE  --  105 106  --  105   CO2  --  33* 30  --  28   BUN  --  15 19  --  24   CR 1.01 1.01 0.96   < > 0.96   ANIONGAP  --  4 6  --  10   JERILNY  --  8.7 9.0  --  8.7   GLC  --  92 108*  --  114*    < > = values in this interval not displayed.     Most Recent 2 LFT's:  Recent Labs   Lab Test 05/02/18  0936 02/09/17  1239   AST 24 63*   ALT 29 56*   ALKPHOS 60 87   BILITOTAL 0.6 0.8     Most Recent INR's and Anticoagulation Dosing History:  Anticoagulation Dose History     Recent Dosing and Labs Latest Ref Rng & Units 6/19/2012 10/17/2013 4/24/2014 7/7/2018 7/8/2018  9/10/2018    INR 0.86 - 1.14 0.90 0.90 0.90 1.00 1.04 0.98    INR-ISTAT 0.86 - 1.14 - - - <0.9 - -        Most Recent 3 Troponin's:  Recent Labs   Lab Test 01/16/19  0015 01/15/19  1900 01/15/19  1150  07/07/18  2225  02/09/17  0438 04/24/14  1902   TROPI 0.116* 0.079* 0.040   < >  --    < >  --   --    TROPONIN  --   --   --   --  0.02  --  0.04 0.02    < > = values in this interval not displayed.     Most Recent Cholesterol Panel:  Recent Labs   Lab Test 09/10/18  0724   CHOL 142   LDL 62   HDL 62   TRIG 89     Most Recent 6 Bacteria Isolates From Any Culture (See EPIC Reports for Culture Details):  Recent Labs   Lab Test 07/11/18  0945 02/03/14  1009   CULT 10,000 to 50,000 colonies/mL  Gram negative rods  *  <10,000 colonies/mL  Strain 2  Gram negative rods  *  <10,000 colonies/mL  Gram positive cocci  *  10,000 to 50,000 colonies/mL  Strain 2  Gram positive cocci  *  Susceptibility testing not routinely done No Beta Streptococcus isolated     Most Recent TSH, T4 and A1c Labs:  Recent Labs   Lab Test 01/15/19  1150 09/10/18  0724   TSH 2.18  --    A1C  --  5.6     Results for orders placed or performed during the hospital encounter of 01/15/19   XR Chest 2 Views    Narrative    CHEST TWO VIEWS January 15, 2019 12:28 PM     HISTORY: Cardiac arrhythmia.    COMPARISON: 7/11/2018.      Impression    IMPRESSION: Sternotomy. Hyperinflation both lungs. The lungs are  otherwise clear. No pleural effusions are identified. Cardiac  enlargement. Pulmonary vascularity is within normal limits. Aortic  calcification. Calcified bilateral hilar lymph nodes are again noted.    ISADORA COLLADO MD   NM Lexiscan stress test (nuc card)    Narrative    GATED MYOCARDIAL PERFUSION SCINTIGRAPHY WITH INTRAVENOUS PHARMACOLOGIC  VASODILATATION LEXISCAN -ONE DAY STUDY     1/17/2019 10:50 AM  TRACY BARBER  87 years  Female   12/5/1931.    Indication/Clinical History: Coronary artery disease with previous  CABG and new onset  atrial fibrillation and atypical chest pain    Impression  1.  Myocardial perfusion imaging using single isotope technique  demonstrated a small nontransmural scar of the distal anteroapical  wall with mild nirav-infarct ischemia. There is a second small fixed  basal inferior defect likely attenuation artifact.   2. Gated images demonstrated not performed due to arrhythmia.  The  left ventricular systolic function is not assessed.  3. Compared to the prior study from no previous study .    Procedure  Pharmacologic stress testing was performed with Lexiscan at a rate of  0.08 mg/ml rapid bolus injection, for 15 seconds, 0.4 mg/5ml  intravenously. Low-level exercise was not performed along with the  vasodilator infusion.  The heart rate was 83 at baseline and jocy to  110 beats per minute during the Lexiscan infusion. The rest blood  pressure was 167/80 mmHg and was 160/86 mm Hg during Lexiscan  infusion. The patient experienced no symptoms  during the test.    Myocardial perfusion imaging was performed at rest, approximately 45  minutes after the injection intravenously of 9.1 mCi of Tc-99m  Myoview. At peak pharmacologic effect, 10-20 seconds after Lexiscan,   the patient was injected intravenously with 26.8 mCi of  Tc-99m  Myoview. The post-stress tomographic imaging was performed  approximately 60 minutes after stress.    EKG Findings  The resting EKG demonstrated atrial fibrillation with controlled  ventricular rate and right bundle branch block. The stress EKG  demonstrated no ischemic changes.    Tomographic Findings  Overall, the study quality is adequate . On the stress images,  moderate to severe count reduction is seen in the distal anteroapical  wall and moderate count reduction in the basal inferior wall. On the  rest images, there is moderate count reduction seen in the distal  anteroapical wall indicating a nontransmural scar with mild  nirav-infarct ischemia. Similar count reduction is seen in the  basal  inferior wall suggesting attenuation artifact. . Gated images  demonstrated not performed due to arrhythmia. The left ventricular  ejection fraction was calculated to be not assessed. TID was not  assessed.    SHEA CISNEROS MD   X-ray Chest 2 vws*    Narrative    CHEST TWO VIEWS  1/19/2019 7:56 AM     HISTORY: Post PPM    COMPARISON: 1/15/2019 chest x-ray      Impression    IMPRESSION:   1. New dual-lead cardiac device projecting over the left chest. No  pneumothorax.  2. Sternotomy bands redemonstrated, stable heart size upper normal.  Stable calcified hilar nodes, calcified aorta and hyperinflated lungs  with mild increased markings at the left base. No acute appearing  infiltrate or pleural effusion.  3. No pulmonary vascular congestion.    JIMMY MORRIS MD     Most Recent Anemia Panel:  Recent Labs   Lab Test 01/15/19  1150  11/26/13  1432   WBC 5.7   < >  --    HGB 13.4   < > 14.2   HCT 41.1   < >  --    MCV 98   < >  --       < >  --    IRON  --   --  80   IRONSAT  --   --  30   FEB  --   --  265   ZEESHAN  --   --  83    < > = values in this interval not displayed.

## 2019-01-19 NOTE — PLAN OF CARE
Alert and oriented. VSS on room air. Denies pain. Dressing to left chest CDI. CMS intact. Up with stand by assist to bathroom. Incontinent. Tolerating diet. Tele underlying a fib with occasional pacing.

## 2019-01-19 NOTE — PLAN OF CARE
Pt. Discharged to home.  Skin check completed.  Instructions reviewed, meds given, no new concerns.

## 2019-01-19 NOTE — DISCHARGE INSTRUCTIONS
Pacemaker/ICD Generator Change Discharge Instructions    After you go home:      Have an adult stay with you until tomorrow.    You may resume your normal diet.       For 24 hours - due to the sedation you received:    Relax and take it easy.    Do NOT make any important or legal decisions.    Do NOT drive or operate machines at home or at work.    Do NOT drink alcohol.    Care of Chest Incision:      Keep the bandage on at least 3 days. You may remove the dressing on ***. Change it only if it gets loose or soaked. If you need to change it, use 4x4-inch gauze and a large clear bandage.     If there is a pressure dressing (gauze & tape) - 24 hours after your procedure you may remove ONLY the top dressing. Leave the bottom dressing on.    Leave the strips of tape on. They will fall off on their own, or we will remove them at your first check-up.    Check your wound daily for signs of infection, such as increased redness, severe swelling or draining. Fever may also be a sign of infection. Call us if you see any of these signs.    If there are no signs of infection, you may shower after the bandage comes off in 3 days. If you take a tub bath, keep the wound dry.    No soaking the incision (swimming pool, bathtub, hot tub) for 2 weeks.    You may have mild to medium pain for 3 to 5 days. Take Acetaminophen (Tylenol) or Ibuprofen (Advil) for the pain. If the pain persists or is severe, call us.    Activity:      You can begin to use your arm as it feels comfortable to you.    No driving for one day & limit to necessary driving for one week.    Bleeding:      If you start bleeding from the incision site, sit down and press firmly on the site for 10 minutes.     Once bleeding stops, call [facility] as soon as you can.       Call 911 right away if you have heavy bleeding or bleeding that does not stop.      Medicines:      Take your medications, including blood thinners, unless your provider tells you not to.    If you  have stopped any other medicines, check with your provider about when to restart them.    Follow Up Appointments:      Follow up with Device Clinic at [facility] of patient preference in 7-10 days.    Call the clinic if:      You have a large or growing hard lump around the site.    The site is red, swollen, hot or tender.    Blood or fluid is draining from the site.    You have chills or a fever greater than 101 F (38 C).    You feel dizzy or light-headed.    Questions or concerns.      Telling others about your device:      Before you leave the hospital, you will receive a temporary ID card. A permanent card will be mailed to you about 6 to 8 weeks later. Always carry the ID card with you. It has important details about your device.    You may also get a Medical Alert bracelet or tag that says you have a pacemaker or a defibrillator (ICD).  Go to [email or web address].     Always tell doctors, dentists and other care providers that you have a device implanted in you.    Let us know before you plan any surgeries. Your care team must take special steps to keep you safe during certain procedures. These steps will depend on the type of device you have. Your provider will need to see your ID card. They may need to call us for instructions.    Device Safety:      Please refer to device  s booklet for further information.        [facility] [place]:    [phone] [facility] (7 days a week)

## 2019-01-19 NOTE — PROGRESS NOTES
"EP Cardiology Progress Note  Usman Rojo MD         Assessment and Plan:      1.  Ischemic heart disease with prior CABG, EF 25% (believed to be partly related to tachycardia induced cardiomyopathy on top of mild chronic LV dysfunction).  2.  Permanent atrial fibrillation.  Tachybrady features during this admission.    3.  COPD.    4.  Peripheral arterial disease  5.  TIA/CVA as recently as July 2018.    She received a biventricular pacemaker yesterday.  Today device function is normal.  Chest x-ray looks okay.  Incision is dry.  She remains in atrial fibrillation.  Rate is fast with exertion.    Plan:  -Increase metoprolol to 50 mg twice daily  -Apixaban for anticoagulation, no ASA  -Continue losartan  -Okay to send home today  -We will arrange for device clinic follow-up in 1 week.  Cardiology LAURA in 2 weeks.        Total Time: 25 min;   15 minutes spent in direct communication with patient / family and care coordination.               Interval History:   no new complaints and doing well; no cp, sob, n/v/d, or abd pain.          Review of Systems:   CONSTITUTIONAL: NEGATIVE for fever, chills, change in weight  ENT/MOUTH: NEGATIVE for ear, mouth and throat problems  RESP: NEGATIVE for significant cough or SOB  CV: NEGATIVE for chest pain, palpitations or peripheral edema          Physical Exam:        Blood pressure 130/72, pulse 106, temperature 98.4  F (36.9  C), temperature source Oral, resp. rate 16, height 1.575 m (5' 2\"), weight 53.4 kg (117 lb 11.2 oz), SpO2 92 %, not currently breastfeeding.  Vitals:    01/17/19 0525 01/18/19 0500 01/19/19 0605   Weight: 53.1 kg (117 lb 1.6 oz) 53.2 kg (117 lb 3.2 oz) 53.4 kg (117 lb 11.2 oz)     Vital Signs with Ranges  Temp:  [97.8  F (36.6  C)-98.4  F (36.9  C)] 98.4  F (36.9  C)  Pulse:  [] 106  Heart Rate:  [] 109  Resp:  [16] 16  BP: (127-165)/() 130/72  SpO2:  [92 %-97 %] 92 %  I/O's Last 24 hours  I/O last 3 completed shifts:  In: 240 " [P.O.:240]  Out: -     EXAM:  A+O x 3, the patient appears comfortable  Lungs: Mildly decreased breath sounds throughout  Cardiovascular irregular rhythm, rate controlled at rest  Abdomen soft nontender  Extremities no edema         Medications:          apixaban ANTICOAGULANT  2.5 mg Oral BID     atorvastatin  40 mg Oral At Bedtime     ceFAZolin  1 g Intravenous Q12H     docusate sodium  100 mg Oral BID     escitalopram  10 mg Oral Daily     furosemide  20 mg Oral Daily     losartan  100 mg Oral Daily     metoprolol tartrate  50 mg Oral BID     predniSONE  2 mg Oral Daily     sodium chloride (PF)  10 mL Intracatheter Q8H     sodium chloride (PF)  3 mL Intracatheter Q8H     sodium chloride (PF)  3 mL Intracatheter Q8H            Data:      All new lab and imaging data was reviewed.   Recent Labs   Lab Test 01/15/19  1150 09/10/18  0724 08/29/18  1054 07/09/18  0313 07/08/18  0323 07/07/18  2222   WBC 5.7 8.3  --  7.7 8.3 8.5   HGB 13.4 13.5 13.7 11.8 12.7 12.1   MCV 98 101*  --  105* 103* 104*    161  --  124* 142* 123*   INR  --  0.98  --   --  1.04 1.00      Recent Labs   Lab Test 01/19/19  0556 01/16/19  0602 01/15/19  1150  08/29/18  1054   NA  --  142 142  --  143   POTASSIUM  --  4.4 4.3  --  4.4   CHLORIDE  --  105 106  --  105   CO2  --  33* 30  --  28   BUN  --  15 19  --  24   CR 1.01 1.01 0.96   < > 0.96   ANIONGAP  --  4 6  --  10   JERILYN  --  8.7 9.0  --  8.7   GLC  --  92 108*  --  114*    < > = values in this interval not displayed.     Recent Labs   Lab Test 01/16/19  0015 01/15/19  1900 01/15/19  1150  07/07/18  2225  02/09/17  0438 04/24/14  1902   TROPI 0.116* 0.079* 0.040   < >  --    < >  --   --    TROPONIN  --   --   --   --  0.02  --  0.04 0.02    < > = values in this interval not displayed.         EKG results:  Reviewed      Imaging:   Recent Results (from the past 24 hour(s))   X-ray Chest 2 vws*    Narrative    CHEST TWO VIEWS  1/19/2019 7:56 AM     HISTORY: Post PPM    COMPARISON:  1/15/2019 chest x-ray      Impression    IMPRESSION:   1. New dual-lead cardiac device projecting over the left chest. No  pneumothorax.  2. Sternotomy bands redemonstrated, stable heart size upper normal.  Stable calcified hilar nodes, calcified aorta and hyperinflated lungs  with mild increased markings at the left base. No acute appearing  infiltrate or pleural effusion.  3. No pulmonary vascular congestion.    JIMMY MORRIS MD

## 2019-01-19 NOTE — PLAN OF CARE
Pt. A&O. VSS.  -160 systolic. Pt. States this is her baseline. Medication adjustment deferred until tomorrow with increase in beta blocker.  Returned to unit post pacer placement around 17:45. CMS intact. Site dry and intact. Denies pain. Tele underlying Afib CVR/RVR with occasional V paced/bi Vpaced.

## 2019-01-21 NOTE — TELEPHONE ENCOUNTER
Post device implant discharge phone call. ([facility])    Reviewed the following:  No raising arm above shoulder on the side of implant for 3 weeks Until 2-8-19)  Remove outer dressing 3 days after implant. Tues 1-22-19   May shower after outer dressing removed.   Leave steri-strips in place, will be removed at 1 week device check  No driving for: NA  Watch for redness, drainage, warmth, or fever. Call device clinic if any signs of infection.     1 week device check scheduled: Thursday January 24th @ 0900/ [facility]    Pt states understanding of all instructions.

## 2019-01-21 NOTE — TELEPHONE ENCOUNTER
"Called patient as a follow up to a weekend call into physician on call. Pt had a device placed on Friday and was discharged home on Saturday. She later called in to report a sharp pain in her left side. She did not mention anything to RN who called post implant today but I did call her to specifically ask about the left sided pain. She states that it is getting much better, today it only hurt when \" I bent over to do my hair\".   I told her to call us if this worsens or the pain becomes constant. If after hours she should contact doctor on call or go to ED.  Pt verbalized understanding. BRAYDEN Aguirre  "

## 2019-01-21 NOTE — TELEPHONE ENCOUNTER
"Hospital/TCU/ED for chronic condition Discharge Protocol    \"Hi, my name is Nelda Davis, a registered nurse, and I am calling from [facility].  I am calling to follow up and see how things are going for you after your recent emergency visit/hospital/TCU stay.\"    Tell me how you are doing now that you are home?\" Doing pretty good.    Started Metoprolol and Eliquis BID.  No questions about these medications-well explained in hospital.    Not taking hydralazine-was told to not continue this medication.    No longer taking calcium with vitamin d.  Med list updated.    Is taking vitamin d, 1000 units daily.    Needs hospital follow up appt with Dr. Fisher rescheduled from 1/24/19 to 1/28/19.  On 1/24/19 returns to hospital for pacemaker check.    Appt scheduled with Dr. Fisher on 1/28/19.  Appt date, time and location confirmed with patient.      Discharge Instructions    \"Let's review your discharge instructions.  What is/are the follow-up recommendations?  Pt. Response: Follow up with cardiology and PCP    \"Has an appointment with your primary care provider been scheduled?\"   Yes. (confirm)    \"When you see the provider, I would recommend that you bring your medications with you.\"    Medications    \"Tell me what changed about your medicines when you discharged?\"    Changes to chronic meds?    2 medications, but patient denies any questions.    \"What questions do you have about your medications?\"    None     New diagnoses of heart failure, COPD, diabetes, or MI?                  Medication reconciliation completed? Yes  Was MTM referral placed (*Make sure to put transitions as reason for referral)?   No    Call Summary    \"What questions or concerns do you have about your recent visit and your follow-up care?\"     none    \"If you have questions or things don't continue to improve, we encourage you contact us through the main clinic number (give number).  Even if the clinic is not open, triage nurses are available " "24/7 to help you.     We would like you to know that our clinic has extended hours (provide information).  We also have urgent care (provide details on closest location and hours/contact info)\"      \"Thank you for your time and take care!\"               "

## 2019-01-22 PROBLEM — R07.9 CHEST PAIN: Status: ACTIVE | Noted: 2019-01-01

## 2019-01-22 NOTE — ED PROVIDER NOTES
History     Chief Complaint:    Chest Pain    HPI   Jennifer Bob is a 87 year old female with a history of C.A.D. Status post CABG, COPD, stroke, who was admitted to the hospital on 1/15-1/19 with tachycardia and was diagnosed with new onset atrial fibrillation with RVR, she also had a troponin leak that was thought to be due to demand ischemia at that time. Pacemaker ([employer]) was placed, a TTE was performed which showed an EF of 27% and she was started on Eliquis. She was discharged from the hospital without any chest pain or shortness of breath, presents today with chest pain. The patient reports that the day after she was discharged she started to experience a stabbing pain in her left chest that initially randomly came on and it woke her up at night, but in the past 2 days has been brought on by exertion. The pain will last roughly 3 seconds and up to 4 times a day. She also has had a cough that produces clear phlegm and becomes short of breath when the pain is at its worst. The patient denies fever, lightheadedness, drainage, pain to the touch, back pain, ankle or leg swelling, changes in appetite or fluid intake, vomiting, or that deep breaths worsen the pain. The patient has taken all of her medications today as prescribed.      Allergies:  Adhesive Tape; Blisters  Atenolol; Shortness of breath  Lopressor; Shortness of breath      Medications:    Norvasc  Eliquis  Lipitor  Beta Blocker  Colace  Lexapro  Flonase  Lasix  Cozaar  Lopressor  Deltasone   [place] Nasal Spray  Classic Neti Pot Sinus Wash  Tramadol  Ventolin HFA    Past Medical History:    Abdominal wall hernia   Anxiety   ASCVD (arteriosclerotic cardiovascular disease)   CKD (chronic kidney disease) stage 3, GFR 30-59 ml/min (H)   Colon polyp   DDD (degenerative disc disease), lumbar   Hyperlipidemia LDL goal <100   Hyperlipidemia LDL goal <70   Hypertension goal BP (blood pressure) < 140/90   Incontinence of urine   Left hip  "pain   Low back pain   Seasonal allergies   STEMI (ST elevation myocardial infarction) (H)   Stented coronary artery   TIA (transient ischemic attack)    Past Surgical History:    Abdominal Abscess  Abdominal Hernia Repair  Cholecystectomy  Coronary Artery Bypass  EP Pacemaker     Family History:    Family history reviewed. No pertinent family history.     Social History:  The patient was accompanied to the ED by daughters.  Smoking Status: Former Smoker  Smokeless Tobacco: Never Used  Alcohol Use: Negative  Drug Use: Negative  PCP: Torri Fisher   Marital Status:       Review of Systems   Constitutional: Negative for appetite change and fever.   Respiratory: Positive for cough and shortness of breath (only when pain is bad).    Cardiovascular: Positive for chest pain. Negative for leg swelling.   Gastrointestinal: Negative for vomiting.   Musculoskeletal: Negative for back pain.   Neurological: Negative for light-headedness.   All other systems reviewed and are negative.    Physical Exam     Patient Vitals for the past 24 hrs:   BP Temp Temp src Pulse Heart Rate Resp SpO2 Height Weight   01/22/19 2030 (!) 149/112 -- -- 109 100 18 97 % -- --   01/22/19 2025 (!) 145/104 -- -- -- 112 18 97 % -- --   01/22/19 2005 (!) 153/116 -- -- -- 104 20 93 % -- --   01/22/19 2000 157/84 -- -- 92 105 18 91 % -- --   01/22/19 1940 (!) 157/118 -- -- -- -- 16 93 % -- --   01/22/19 1930 (!) 156/100 -- -- -- -- 16 93 % -- --   01/22/19 1900 (!) 159/131 -- -- 90 96 18 94 % -- --   01/22/19 1845 -- -- -- -- 74 (!) 46 92 % -- --   01/22/19 1815 142/77 -- -- 87 -- -- 94 % -- --   01/22/19 1715 158/77 98.6  F (37  C) Temporal 96 -- 16 95 % 1.575 m (5' 2\") 53.5 kg (118 lb)        Physical Exam    Physical Exam   Constitutional:  Patient is oriented to person, place, and time. They appear well-developed and well-nourished. Mild distress secondary to chest pain.   HENT:   Mouth/Throat:   Oropharynx is clear and moist.   Eyes: "    Conjunctivae normal and EOM are normal. Pupils are equal, round, and reactive to light.   Neck:    Normal range of motion.   Cardiovascular: Normal rate, regular rhythm and normal heart sounds.  Exam reveals no gallop and no friction rub.  No murmur heard. Patient has very focal tenderness under the left breast, 1 finger point in breath, not reproducible to palpation. No pleuritic pain. Patient has a pacemaker site in the left anterior chest, steri strips are still intact with no leakage, there is aging ecchymosis around this area. No fluctuance, no erythema. Mildly tender to palpation.    Pulmonary/Chest:  Effort normal and breath sounds normal. Patient has no wheezes. Patient has no rales.   Abdominal:   Soft. Bowel sounds are normal. Patient exhibits no mass. There is no tenderness. There is no rebound and no guarding.   Musculoskeletal:  Normal range of motion. Trace pedal edema.   Neurological:   Patient is alert and oriented to person, place, and time. Patient has normal strength. No cranial nerve deficit or sensory deficit. GCS 15  Skin:   Skin is warm and dry. No rash noted. No erythema.   Psychiatric:   Patient has a normal mood and  behavior     Emergency Department Course     ECG:  ECG taken at 1719, ECG read at 1739  Suspect unspecified pacemaker Failure  Atrial fibrillation with rapid ventricular response with frequent ventricular paced complexes and with premature ventricular or aberrantly conducted complexes  Left bundle branch block  Abnormal ECG  Rate 130 bpm. DC interval * ms. QRS duration 142 ms. QT/QTc 138/203 ms. P-R-T axes * 45 0.    Imaging:  Radiology findings were communicated with the patient who voiced understanding of the findings.    CT Chest Pulmonary Embolism w Contrast   Preliminary Result   IMPRESSION:    1. A left anterior chest wall cardiac device is present with lead tips   in the right atrium and ventricle. The lead within the right ventricle   appears to traverse through the  myocardium and into the pericardial   fat. Note that evaluation of the exact location of the tip is limited   due to cardiac motion artifact and streak artifact from the electrode.   2. Mild interstitial thickening in bilateral lung bases, possibly   relating to interstitial pulmonary edema.   3. No visualized pulmonary embolus or other evidence of active   pulmonary disease.      The findings were discussed with Dr. Roman by Dr. Malave on   1/22/2018 at 1858 hours.        Laboratory:  Laboratory findings were communicated with the patient who voiced understanding of the findings.    Creatinine POCT: Creatinine 1.3 (H), GFR 39 (L)  CBC: WBC 5.5, HGB 12.9,  (L)  BMP: Glucose 11 (H), GFR 30 (L) o/w WNL (Creatinine 1.25 (H))  INR: 1.09  Partial Thromboplastin Time: 32  Troponin (Collected 1812): 0.046 (H)   Nt probnp inpatient: 4,503 (H)    Interventions:  1816 NS 1000 mL IV  1938 Morphine 2 mg IV  2002 NS 1000 mL IV  2014 Optison 9 mL IV  2027 Morphine 4 mg IV  2033 Zofran 4 mg IV     Emergency Department Course:     Nursing notes and vitals reviewed.    1719 EKG obtained as noted above.     1729 I performed an exam of the patient as documented above.     1812 IV was inserted and blood was drawn for laboratory testing, results above.     1824 I spoke with [employer] for interpretation of pacemaker.     1824 The patient was sent for a CT while in the emergency department, results above.      1857 I spoke with Dr. Malave of the radiology service regarding patient's imaging  1906 I spoke with Dr. Do  Of cardiology regarding patient's presentation, findings, and plan of care. Recommended a stat bedside echo    1911 I spoke with Dr. Marino of the hospitalist service from [facility] regarding patient's presentation, findings, and plan of care.     1915 Recheck and update.     2000 Echo arrived at patient's room.     2023 Recheck and update. I was notified the patient was in more pain during  the US. Spoke with patient and family. Mor morphine given    2027 I spoke with Dr. Do of the cardiology service regarding patient's presentation, findings, and plan of care. And concern for increasing pain    2033 Recheck and update.     2044 I spoke with Dr. Do of the cariolodgy service regarding patient's presentation, findings, and plan of care.     2100 I spoke with Dr. Do of the cardiology service regarding patient's presentation, findings, and plan of care.     2100 I personally reviewed the imaging, lab, and EKG results with the patient and answered all related questions prior to admission.    Impression & Plan      Medical Decision Making:  Jennifer Bob is a 87 year old female presenting with stabbing chest pain focally in the left side of the chest. It does not radiate. She has not had any fever or cough concerning for pneumonia. This was exertional, not pleuritic in nature. Pacemaker interrogation was performed and I spoke with [employer], it appears to be functioning. The EKG that we obtained showed a rate of 130, the parameters for her VVIR, pacemaker is between 60 and 130, so suspect her intrinsic rate is reflected on this EKG as well as paced rate. Blood work was obtained, her cardiac enzyme is a little bit elevated, however this is less than her previous troponin, which was felt to be due to demand ischemia. She has some renal insufficiency. Her white blood cell count and hemoglobin are unremarkable, her platelets are slightly low but again this is preexisting. CT of her chest was performed and I spoke with Dr. Malave of radiology, the lead in the right ventricle appears to traverse through the myocardium and into the pericardial fat and this is likely the cause of her pain as the location is correct. There is no evidence of paracardial effusion. The patient states that she last took her Eliquis this morning, and she is currently taking it twice a day.     At this time, the  patient has not actually received and Cardizem as her heart rate came down spontaneously. I did a stat Echo which showed that there is no effusion they can deal with this tomorrow. I spoke with cardiology multiple times regarding the family s concern that this get taken care of now. I made them aware there is no EP team available at night to do this. This is not an emergency procedure at this time given no effusion or tamponade. I will admit to Dr. Clifford    Diagnosis:    ICD-10-CM    1. Pacemaker complications T82.9XXA         Disposition:   The patient is admitted into the care of Dr. Marino.     Scribe Disclosure:  I, Orla Severson, am serving as a scribe at 5:22 PM on 1/22/2019 to document services personally performed by Sima Roman MD based on my observations and the provider's statements to me.      EMERGENCY DEPARTMENT       Sima Roman MD  01/23/19 0055

## 2019-01-22 NOTE — TELEPHONE ENCOUNTER
"Patient was evaluated by cardiology while inpatient for new onset A. Fib with RVR, rates 140's without symptoms. EF has decreased from 45% to 27%. Unable to rate control with BB as pt developed bradycardia with even minor adjustments. 1/18/19 CRT-P implanted and BB adjusted. Called patient to discuss any post hospital d/c questions she may have, review medication changes, and confirm f/u appts. Patient denied any questions regarding new medications or changes to PTA medications. Pt c/o localized left anterior chest pain, \"right over my heart,\" vs incisional pain. \"It feels like a knife stabbing me in the heart and I would like it to stop. It takes my breath away.\" Rates as 8-9/10 and worse with movement and bending forward, resolves with rest. Pain is non-radiating and not accompanied with nausea, sweatiness or other symptoms. \"I didn't have this before the PPM was put in.\" Denies hiccups. Denies palpitations and states HR has been in the 80's today per home pulse ox. Pt instructed to go to ED for further evaluation as per Dr. Rojo recommendations. See previous device RN note from earlier today. Pt verbalized understanding and will call daughter to transport. Patient verbalized understanding and agreed with plan. BRITNI Beverly RN.    "

## 2019-01-23 NOTE — PROGRESS NOTES
[facility]    Hospitalist Progress Note    Date of Service (when I saw the patient): 01/23/2019    Assessment & Plan   Jennifer Bob is a 87 year old female who was admitted on 1/22/2019.  Ms. Jennifer Bob is a delightful 87-year-old  lady with a past medical history notable for coronary artery disease, status post CABG and PCI, CVA, hypertension, dyslipidemia, COPD, polymyalgia rheumatica, anxiety, chronic kidney disease stage III, low back pain, persistent atrial fibrillation, and tachybrady syndrome, status post permanent pacemaker on 01/18/2019 who has presented with intermittent sharp left-sided chest pain for 3 days.  CT chest showed possible pacemaker lead displacement in to periardial space     1.  Pleuritic and positional Chest pain likely due to pacemaker RV  Lead perforation in to pericardial space:  STAT echo showed no pericardial effusion   hgb stayed stable from 12.9 to 12.6 this AM  Remained hemodynamically stable throughout hospitalization   EP consulted and she underwent   Uneventful revision of RV pacemaker lead. Remained hemodynamically stable throughout the procedure.            elliquis held on admission to restart this evening post procedure ok with EP to restart anticoagulation  Post procedure echo pending this afternoon   2.  Ischemic cardiomyopathy:    The most recent LV ejection fraction was 27% on 01/15/2019. Echo also showed global hypokinesis, mild to moderate tricuspid regurgitation and moderately reduced RV systolic function.  BNP is elevated at 4503, however, the patient does not appear to be volume overloaded.  She will continue with prior to admission metoprolol 50 mg p.o. b.i.d., and losartan 100 mg p.o. Daily.    Restarted lasix 20mg PO daily today    3.  Hypertension:.   She will continue with prior to admission metoprolol 50 mg p.o. b.i.d., Losartan 100 mg p.o. Daily, and Norvasc 10 mg p.o. daily.  I will have IV labetalol available p.r.n.   4.   Dyslipidemia.      She will continue with prior to admission Lipitor 40 mg p.o. Daily.     5.  Acute kidney injury on chronic kidney disease,       stage III:  Baseline creatinine is around 1.  Creatinine on admission  1.3.  held her prior to admission furosemide on admission .   continue cautiously her prior to admission losartan.  Notably,  patient has received contrast in ED for CT.  avoid NSAIDs or other nephrotoxins.     Cr stable at 1.04 today . Restarted lasix today   6.  Chronic obstructive pulmonary disease:  Stable.  She will continue with prior to admission p.r.n. albuterol inhaler.   7.  Polymyalgia rheumatica.  She will continue with prior to admission prednisone 2 mg p.o. daily.   8.  History of CVA:  She will continue on prior to admission Lipitor.  Eliquis restarted today .   9.  Anxiety:  She will continue with prior to admission Lexapro.     10.  Persistent atrial fibrillation with tachybrady syndrome.  The patient is status post permanent pacemaker on 01/18.  She will continue with prior to admission metoprolol 50 mg p.o. b.i.d.  I will have IV metoprolol available p.r.n. for heart rate more than 120.  The patient will be kept on telemetry.   ELLIQUIS restarted today   11.  Deep venous thrombosis prophylaxis: ELLIQUIS     2.  Code status:  It was discussed with the patient in the presence of her daughter and she requested to stay DNR/DNI.       13.  Disposition: tomorrow if remains stable when OK with cardiology              Zoya Winters MD  [phone] (P)      Interval History   Doing well post procedure. No chest pain currently. No SOB     -Data reviewed today: I reviewed all new labs and imaging results over the last 24 hours. I personally reviewed no images or EKG's today.    Physical Exam   Temp: 97.4  F (36.3  C) Temp src: Oral BP: 143/77 Pulse: 81 Heart Rate: 80 Resp: (!) 36 SpO2: 96 % O2 Device: None (Room air) Oxygen Delivery: 2 LPM  Vitals:    01/22/19 1715 01/23/19 0517   Weight:  53.5 kg (118 lb) 56.7 kg (125 lb 0 oz)     Vital Signs with Ranges  Temp:  [97.4  F (36.3  C)-98.6  F (37  C)] 97.4  F (36.3  C)  Pulse:  [] 81  Heart Rate:  [] 80  Resp:  [13-46] 36  BP: (107-159)/() 143/77  SpO2:  [89 %-100 %] 96 %  No intake/output data recorded.    Constitutional: Awake, alert, cooperative, no apparent distress  Respiratory: bibasilar crackles heard. No wheezing   Cardiovascular: irregular , normal S1 and S2, and no murmur noted  GI: Normal bowel sounds, soft, non-distended, non-tender  Skin/Integumen: No rashes, no cyanosis, no edema  Other:     Medications       amLODIPine  10 mg Oral Daily     apixaban ANTICOAGULANT  2.5 mg Oral BID     atorvastatin  40 mg Oral At Bedtime     docusate sodium  100 mg Oral BID     escitalopram  10 mg Oral Daily     furosemide  20 mg Oral Daily     HYDROmorphone  0.5 mg Intravenous Once     losartan  100 mg Oral Daily     metoprolol tartrate  50 mg Oral BID     predniSONE  2 mg Oral Daily     sodium chloride (PF)  3 mL Intracatheter Q8H       Data   Recent Labs   Lab 01/23/19  0850 01/23/19  0529 01/23/19  0210 01/22/19  2200 01/22/19  1812  01/19/19  0556   WBC 5.9 4.6  --   --  5.5  --   --    HGB 12.6 11.9  --   --  12.9  --   --    * 101*  --   --  99  --   --    * 122*  --   --  136*  --   --    INR 1.08  --   --   --  1.09  --   --    NA  --  145*  --   --  142  --   --    POTASSIUM  --  4.5  --   --  4.6  --   --    CHLORIDE  --  113*  --   --  108  --   --    CO2  --  27  --   --  30  --   --    BUN  --  23  --   --  29  --   --    CR  --  1.04  --   --  1.25*  --  1.01   ANIONGAP  --  5  --   --  4  --   --    JERILYN  --  7.9*  --   --  8.8  --   --    GLC  --  83  --   --  111*  --   --    TROPI 0.086*  --  0.084* 0.061* 0.046*   < >  --     < > = values in this interval not displayed.       Recent Results (from the past 24 hour(s))   CT Chest Pulmonary Embolism w Contrast    Narrative    CT CHEST WITH CONTRAST   1/22/2019  6:44 PM     HISTORY: Status post pacemaker placement on 1/18/2019. Chest pain.    COMPARISON: 2/9/2017.    TECHNIQUE: Following the uneventful administration of 56mL Isovue-370  intravenous contrast, helical sections were acquired through the lungs  according to the pulmonary embolism protocol. Coronal reconstructions  were generated. Radiation dose for this scan was reduced using  automated exposure control, adjustment of the mA and/or kV according  to the patient's size, or iterative reconstruction technique.    FINDINGS: No visualized pulmonary embolus. The thoracic aorta is  normal in caliber. Atherosclerotic calcification in the thoracic aorta  and coronary arteries. Mild to moderate cardiomegaly. A left anterior  chest wall cardiac device is present with lead tips in the right  ventricle in a branch of the coronary sinus. The lead within the right  ventricle appears to traverse through the anterior myocardium and into  the epicardial fat and possibly through the pericardium (for example,  series 6 image 115 and series 8 image 26). No pericardial effusion.    Moderate emphysematous changes in the lungs. A few linear opacities  scattered in the periphery of both lungs, most likely representing  scarring and/or atelectasis. Mild interstitial thickening in bilateral  lung bases. The lungs are otherwise clear. No pleural effusion. No  enlarged lymph nodes in the chest. Prior median sternotomy.    Scan through the upper abdomen is significant for several calcified  granulomas in the spleen.      Impression    IMPRESSION:   1. A left anterior chest wall cardiac device is present with lead tips  in the right ventricle and a branch of the coronary sinus. The lead  within the right ventricle appears to traverse through the anterior  myocardium and into the epicardial fat and possibly through the  pericardium. Note that evaluation of the exact location of the tip is  limited due to cardiac motion and streak artifact from  the electrode.  If clinically relevant, an EKG-gated CT scan of the heart could be  considered for further evaluation.  2. Mild interstitial thickening in bilateral lung bases, possibly  relating to interstitial pulmonary edema.  3. No visualized pulmonary embolus or other evidence of active  pulmonary disease.    The findings were discussed with Dr. Roman by Dr. Malave on  1/22/2018 at 1858 hours.    YURY MALAVE MD

## 2019-01-23 NOTE — ED NOTES
"[facility]  ED Nurse Handoff Report    ED Chief complaint: Chest Pain (pain since sunday, pacemaker placed last Friday)      ED Diagnosis:   Final diagnoses:   None       Code Status: DNR / DNI    Allergies:   Allergies   Allergen Reactions     Adhesive Tape      blisters     Atenolol      SOB     Lopressor Hct      SOB       Activity level - Baseline/Home:  Independent    Activity Level - Current:   Independent     Needed?: No    Isolation: No  Infection: Not Applicable  Bariatric?: No    Vital Signs:   Vitals:    01/22/19 1715   BP: 158/77   Pulse: 96   Resp: 16   Temp: 98.6  F (37  C)   TempSrc: Temporal   SpO2: 95%   Weight: 53.5 kg (118 lb)   Height: 1.575 m (5' 2\")       Cardiac Rhythm: ,        Pain level: 0-10 Pain Scale: 10    Is this patient confused?: No   Does this patient have a guardian?  No         If yes, is there guardianship documents in the Epic \"Code/ACP\" activity?  N/A         Guardian Notified?  N/A  [place] - Suicide Severity Rating Scale Completed?  Yes  If yes, what color did the patient score?  White    Patient Report: Initial Complaint: Patient comes in with chest pain following pacemaker placement Friday. Initially concerned for pacemaker failure but interrogation showed it is functioning properly.   Focused Assessment:   Neuro: WDL   Cards: Paced rhythm Hr in 90s. Epigastric chest pain   Pulm: WDL   GI NPO in ER   : WDL     Tests Performed: EKG, Labs, CT  Abnormal Results: EKG  Treatments provided: None    Family Comments: At bedside     OBS brochure/video discussed/provided to patient/family: N/A              Name of person given brochure if not patient:               Relationship to patient:     ED Medications:   Medications   0.9% sodium chloride BOLUS (1,000 mLs Intravenous New Bag 1/22/19 1826)     Followed by   sodium chloride 0.9% infusion (not administered)   diltiazem (CARDIZEM) injection 10 mg (not administered)   iopamidol (ISOVUE-370) solution " 56 mL (56 mLs Intravenous Given 1/22/19 1840)   Saline (83 mLs As instructed Given 1/22/19 1840)       Drips infusing?:  No    For the majority of the shift this patient was Green.   Interventions performed were none.    Severe Sepsis OR Septic Shock Diagnosis Present: No    To be done/followed up on inpatient unit:      ED NURSE PHONE NUMBER: [phone] [phone]

## 2019-01-23 NOTE — PHARMACY-ADMISSION MEDICATION HISTORY
Admission medication history interview status for the 1/22/2019  admission is complete. See EPIC admission navigator for prior to admission medications     Medication history source reliability:Good    Actions taken by pharmacist (provider contacted, etc): spoke to pt     Additional medication history information not noted on PTA med list :None    Medication reconciliation/reorder completed by provider prior to medication history? No    Time spent in this activity: 10 minutes    Prior to Admission medications    Medication Sig Last Dose Taking? Auth Provider   amLODIPine (NORVASC) 10 MG tablet Take 1 tablet (10 mg) by mouth daily 1/22/2019 at Unknown time Yes Torri Fisher MD   apixaban ANTICOAGULANT (ELIQUIS) 2.5 MG tablet Take 1 tablet (2.5 mg) by mouth 2 times daily 1/22/2019 at am Yes Ramy Lyn MD   atorvastatin (LIPITOR) 40 MG tablet Due for appt in March, one tab daily 1/21/2019 at pm Yes Torri Fisher MD   Cholecalciferol (VITAMIN D3 PO) Take 1,000 Units by mouth daily 1/22/2019 at Unknown time Yes Reported, Patient   docusate sodium (COLACE) 100 MG capsule Take 1 capsule by mouth 2 times daily  1/22/2019 at am Yes Reported, Patient   escitalopram (LEXAPRO) 10 MG tablet Take 10 mg by mouth daily 1/22/2019 at Unknown time Yes Unknown, Entered By History   fluticasone (FLONASE) 50 MCG/ACT nasal spray Spray 1 spray into both nostrils daily as needed for rhinitis or allergies prn med Yes Unknown, Entered By History   furosemide (LASIX) 20 MG tablet TAKE ONE TABLET BY MOUTH EVERY DAY 1/22/2019 at Unknown time Yes Torri Fisher MD   losartan (COZAAR) 100 MG tablet Take 1 tablet (100 mg) by mouth daily 1/22/2019 at Unknown time Yes Torri Fisher MD   metoprolol tartrate (LOPRESSOR) 50 MG tablet Take 1 tablet (50 mg) by mouth 2 times daily 1/22/2019 at am Yes Ramy Lyn MD   Multiple Vitamins-Minerals (MULTIVITAMIN OR) Take 1 tablet by mouth daily  1/22/2019 at Unknown time Yes  Reported, Patient   predniSONE (DELTASONE) 1 MG tablet Take 2 mg by mouth daily 1/22/2019 at Unknown time Yes Unknown, Entered By History   Saline (OCEAN NASAL SPRAY NA) Administer 1 spray in each nostril up to two times daily as needed prn med Yes Reported, Patient   sodium chloride-sodium bicarb (CLASSIC NETI POT SINUS WASH) 2300-700 MG KIT Mix 1 packet in Neti Pot and administer in each nostril daily as needed prn med Yes Reported, Patient   TRAMADOL HCL PO Take 50 mg by mouth every 6 hours as needed for moderate to severe pain prn med Yes Reported, Patient   VENTOLIN  (90 BASE) MCG/ACT Inhaler INHALE 2 PUFFS INTO THE LUNGS EVERY 6 HOURS AS NEEDED prn med Yes Torri Fisher MD   BETA BLOCKER NOT PRESCRIBED, INTENTIONAL, Beta Blocker not prescribed intentionally due to COPD, shortness of breath with atenolol and lopressor   Torri Fisher MD Beth Mulder, PharmD

## 2019-01-23 NOTE — PLAN OF CARE
Pt. A&O. Tele Vpaced, occasional bi V pacing. Site dry and intact. CMS intact. Pt. Denies pain or SOB. Indp. Pt. 1L oxgyen at rest. Unable to wean to room air. 87-88% at rest and 84% with activity. Pt. Using incentive spirometer, acapella device, and occasional able to cough up clear sputum. Xray and pacer interrogation in AM.

## 2019-01-23 NOTE — H&P
Admitted:     01/22/2019      PRIMARY CARE PHYSICIAN:  Torri Fisher MD      CHIEF COMPLAINT:  Chest pain.      HISTORY OF PRESENT ILLNESS:  Ms. Jennifer Bob is a delightful 87-year-old  lady with a past medical history notable for coronary artery disease, CVA, hypertension, dyslipidemia, chronic kidney disease, COPD, polymyalgia rheumatica, anxiety, carotid artery stenosis and low back pain who has presented to the Emergency Department at [facility] for evaluation of chest pain.  She is a reliable historian.  She was recently hospitalized from 01/15 through 01/19/2019 at [facility] for atrial fibrillation and tachybrady syndrome and a [employer] pacemaker was implanted on 01/18 and she was discharged on 01/19.  The patient states on Sunday night, that is 3 days ago, she woke with a sharp left-sided chest pain which lasted for a few seconds.  The pain has continued to occur intermittently with exertion since then and is associated with shortness of breath without radiating to the jaw, back or left arm, with a severity of 10/10.  The persistence of the symptoms prompted the patient to come to the Emergency Department seeking medical attention.  She reports no nausea, vomiting, fever, chills, palpitation or other complaints.  In the Emergency Department, the patient has been evaluated by Dr. Roman, the ER attending physician.  Upon arrival, electrocardiogram revealed V-paced rhythm with a rate of 130 and underlying atrial fibrillation.  She is otherwise hemodynamically stable.  Troponin I is minimally elevated at 0.046.  BNP is 4503.  Creatinine is 1.3.  Hematology profile shows normal white count 5.5, hemoglobin 12.9.  CT chest demonstrates a left anterior chest wall cardiac device with a lead tip in the right atrium and ventricle.  The lead within the right ventricle appears to traverse through the myocardium and into the pericardial fat.  There is also mild  interstitial thickening in the bilateral lung bases, possibly related to interstitial pulmonary edema.  The case has been communicated to on-call cardiologist due to concern for dislodgement of the pacemaker lead causing her chest pain.  Hospitalization has been recommended for further management.      PAST MEDICAL HISTORY:   1.  Coronary artery disease, status post CABG in 1982 and PCI in 2009  (the patient believes it was a RCA stent).  The most recent nuclear stress test on 01/17/2019 revealed a small nontransmural scar of the distal anteroapical wall with mild nirav-infarct ischemia and a second small fixed basal inferior defect likely attenuation artifact.   2.  Ischemic cardiomyopathy with most recent LV ejection fraction of 27% by echocardiogram on 01/16/2019.  It also showed global hypokinesis, mild-to-moderate tricuspid regurgitation and moderately reduced RV systolic function.   2.  Persistent atrial fibrillation.   3.  Tachybrady syndrome, status post implantation of the [employer] permanent pacemaker on 01/18/2019.   4.  Hypertension.   5.  Dyslipidemia.   6.  Chronic obstructive pulmonary disease.   7.  History TIA/CVA , the last one in 07/2018.   8.  Colon polyp.   9.  Chronic kidney disease, stage III, baseline creatinine around 1.   10.  Abdominal wall hernia.   11.  Low back pain.   12.  Seasonal allergies.   13.  Bilateral carotid artery stenosis (50-69% stenosis in the right internal carotid artery and a greater than or equal to 70%  stenosis in the left internal carotid artery by Doppler in 09/2018).  14.  Peripheral arterial disease.   15.  Polymyalgia rheumatica, on prednisone.   16.  Anxiety.      PAST SURGICAL HISTORY:   Past Surgical History:   Procedure Laterality Date     abdominal abscess      at incisional site after kristine     adominal hernia repair      had mesh placed, then allergic so it was removed and she wears a girdle     CHOLECYSTECTOMY       CORONARY ARTERY BYPASS  1992      EP PACEMAKER N/A 2019    Procedure: EP Pacemaker;  Surgeon: Serafin Llamas MD;  Location:  [facility] CATH [facility]        FAMILY HISTORY:  Father  of a stroke at age of 55.  Mother apparently had CHF and  at the age of 80.      SOCIAL HISTORY:  The patient is .  She lives independently.  She ambulates without any assistive devices.  She is a former smoker and quit in .  She does not drink alcohol.      MEDICATIONS:   Prior to Admission Medications   Prescriptions Last Dose Informant Patient Reported? Taking?   BETA BLOCKER NOT PRESCRIBED, INTENTIONAL,  Daughter No No   Sig: Beta Blocker not prescribed intentionally due to COPD, shortness of breath with atenolol and lopressor   Cholecalciferol (VITAMIN D3 PO) 2019 at Unknown time Daughter Yes Yes   Sig: Take 1,000 Units by mouth daily   Multiple Vitamins-Minerals (MULTIVITAMIN OR) 2019 at Unknown time Daughter Yes Yes   Sig: Take 1 tablet by mouth daily    Saline (OCEAN NASAL SPRAY NA) prn med Daughter Yes Yes   Sig: Administer 1 spray in each nostril up to two times daily as needed   TRAMADOL HCL PO prn med Daughter Yes Yes   Sig: Take 50 mg by mouth every 6 hours as needed for moderate to severe pain   VENTOLIN  (90 BASE) MCG/ACT Inhaler prn med Daughter No Yes   Sig: INHALE 2 PUFFS INTO THE LUNGS EVERY 6 HOURS AS NEEDED   amLODIPine (NORVASC) 10 MG tablet 2019 at Unknown time Daughter No Yes   Sig: Take 1 tablet (10 mg) by mouth daily   apixaban ANTICOAGULANT (ELIQUIS) 2.5 MG tablet 2019 at am  No Yes   Sig: Take 1 tablet (2.5 mg) by mouth 2 times daily   atorvastatin (LIPITOR) 40 MG tablet 2019 at pm Daughter No Yes   Sig: Due for appt in March, one tab daily   docusate sodium (COLACE) 100 MG capsule 2019 at am Daughter Yes Yes   Sig: Take 1 capsule by mouth 2 times daily    escitalopram (LEXAPRO) 10 MG tablet 2019 at Unknown time Daughter Yes Yes   Sig: Take 10 mg by mouth daily    fluticasone (FLONASE) 50 MCG/ACT nasal spray prn med Daughter Yes Yes   Sig: Spray 1 spray into both nostrils daily as needed for rhinitis or allergies   furosemide (LASIX) 20 MG tablet 1/22/2019 at Unknown time Daughter No Yes   Sig: TAKE ONE TABLET BY MOUTH EVERY DAY   losartan (COZAAR) 100 MG tablet 1/22/2019 at Unknown time Daughter No Yes   Sig: Take 1 tablet (100 mg) by mouth daily   metoprolol tartrate (LOPRESSOR) 50 MG tablet 1/22/2019 at am  No Yes   Sig: Take 1 tablet (50 mg) by mouth 2 times daily   predniSONE (DELTASONE) 1 MG tablet 1/22/2019 at Unknown time Daughter Yes Yes   Sig: Take 2 mg by mouth daily   sodium chloride-sodium bicarb (CLASSIC NETI POT SINUS WASH) 2300-700 MG KIT prn med Daughter Yes Yes   Sig: Mix 1 packet in Neti Pot and administer in each nostril daily as needed      Facility-Administered Medications: None        ALLERGIES AND INTOLERANCES:  ADHESIVE TAPES, ATENOLOL CAUSING SHORTNESS OF BREATH.      REVIEW OF SYSTEMS:  A 10-point review of system was performed thoroughly and was negative except as stated in history of present illness.      PHYSICAL EXAMINATION:   GENERAL:  This patient is a very pleasant elderly lady who is in no acute distress.   VITAL SIGNS:  Blood pressure 142/77, heart rate 77, respiratory rate 16, temperature 98.6 degrees Fahrenheit, oxygen saturation 93% room air.   HEENT:  The pupils are round, equal, reactive to light bilaterally.  There is no conjunctival pallor or scleral icterus.  Oral mucosa appears moist.   NECK:  Supple.  There is no elevated JVP.   LUNGS:  Clear to auscultation bilaterally on anterior chest examination.   CARDIOVASCULAR:  There is normal S1 and S2 with regular rhythm.  The rate is slightly tachycardic.   ABDOMEN:  Soft, nontender, nondistended.  Bowel sounds are present.   EXTREMITIES:  There is no calf tenderness or significant lower extremity edema.   SKIN:  There is no jaundice, cyanosis or acute rash.   NEUROLOGIC:  She is  awake, alert, oriented x3.  Speech is normal.  There is no gross focal deficit on examination.      LABORATORY DATA:   1.  Chemistry profile:  Sodium 142, potassium 4.6, BUN 29, creatinine 1.3, calcium 8.8, glucose 111.   BNP 4503.  Troponin I 0.046.   2.  Hematology profile:  White count 5.5, hemoglobin 12.9, platelet count 136,000 with differential of 63% neutrophils.  The electrocardiogram reveals atrial fibrillation with a V-paced rhythm at a heart rate of 130.   3.  CT chest with contrast:  It was reviewed in the history of present illness.      ASSESSMENT AND PLAN:  Ms. Jennifer Bob is a delightful 87-year-old  lady with a past medical history notable for coronary artery disease, status post CABG and PCI, CVA, hypertension, dyslipidemia, COPD, polymyalgia rheumatica, anxiety, chronic kidney disease stage III, low back pain, persistent atrial fibrillation, and tachybrady syndrome, status post permanent pacemaker on 01/18/2019 who has presented with intermittent sharp left-sided chest pain for 3 days.  She is being admitted to the hospital under inpatient status.   1.  Chest pain likely due to pacemaker lead dislodgement:  The patient has known history of coronary disease, status post CABG in 1982 and PCI in 2009, probably right RCA stent x1.  The most recent nuclear stress test on 01/17/2019 demonstrated a small nontransmural scar of the distal anteroapical wall with mild nirav-infarct ischemia and a second small fixed basal inferior defect likely attenuation artifact.  The electrocardiogram upon admission showed atrial fibrillation with a V-paced rhythm and heart rate of 130 which has now improved.  Troponin I is minimally elevated at 0.046 which is nonspecific.  The pain is described as sharp, left-sided chest pain with no radiation.  CT scan of the chest is negative for pulmonary embolism; but it seems pacemaker lead have traversed through the myocardium and into the pericardial fat, likely the  cause of chest pain.  On CAT scan there is no pericardial fluid.  The case has been communicated to on-call cardiologist.      PLAN:  As below.   1.     a.  Admit to cardiac floor.     b.  Stat bedside echo.    c.  Hold prior to admission Eliquis as the patient might require cardiac intervention.   d.  Request formal cardiology consult.   e.  Serial troponin I to ensure there is no uptrending.   f.  Telemetry.   g.  Continue prior to admission Lipitor 40 mg p.o. daily, metoprolol 50 mg p.o. b.i.d. and Losartan 100 mg p.o. daily.   h.  Further management per Cardiology Service.   2.  Ischemic cardiomyopathy:  The most recent LV ejection fraction was 27% on 01/15/2019. Echo also showed global hypokinesis, mild to moderate tricuspid regurgitation and moderately reduced RV systolic function.  BNP is elevated at 4503, however, the patient does not appear to be volume overloaded.  She will continue with prior to admission metoprolol 50 mg p.o. b.i.d., and losartan 100 mg p.o. daily.  I will hold her prior to admission furosemide for now due to an increase in creatinine.  Her weight and input and output will be closely monitored.   3.  Hypertension:  The blood pressure is mildly elevated.  She will continue with prior to admission metoprolol 50 mg p.o. b.i.d., Losartan 100 mg p.o. Daily, and Norvasc 10 mg p.o. daily.  I will have IV labetalol available p.r.n.   4.  Dyslipidemia.  She will continue with prior to admission Lipitor 40 mg p.o. daily.   5.  Acute kidney injury on chronic kidney disease, stage III:  Baseline creatinine is around 1.  Creatinine today is 1.3.  I will hold her prior to admission furosemide.  I will continue cautiously her prior to admission losartan.  Her renal function will be closely monitored.  Notably,  patient has received contrast in ED for CT.  I will avoid NSAIDs or other nephrotoxins.   6.  Chronic obstructive pulmonary disease:  Stable.  She will continue with prior to admission p.r.n.  albuterol inhaler.   7.  Polymyalgia rheumatica.  She will continue with prior to admission prednisone 2 mg p.o. daily.   8.  History of CVA:  She will continue on prior to admission Lipitor.  Eliquis will be resumed once no further cardiac workup is planned.   9.  Anxiety:  She will continue with prior to admission Lexapro.   10.  Persistent atrial fibrillation with tachybrady syndrome.  The patient is status post permanent pacemaker on .  Eliquis will be held in anticipation of cardiac intervention for dislodged pacemaker lead.  She will continue with prior to admission metoprolol 50 mg p.o. b.i.d.  I will have IV metoprolol available p.r.n. for heart rate more than 120.  The patient will be kept on telemetry.   11.  Deep venous thrombosis prophylaxis:  Pneumatic compression device.   12.  Code status:  It was discussed with the patient in the presence of her daughter and she requested to stay DNR/DNI.   13.  Disposition:  I anticipate at least 2 days of inpatient management.        I would like to thank Dr. Torri Fisher for allowing the Hospitalist Service to participate in the care of this patient.         MARIAM ARANGO MD             D: 2019   T: 2019   MT: LEONA      Name:     TRACY BARBER   MRN:      [id number]        Account:      [id number]   :      1931        Admitted:     2019                   Document: [id number]       cc: Torri Fisher MD

## 2019-01-23 NOTE — PLAN OF CARE
VSS. Tele: showed V- paced with inappropriate sensing. Dilaudid given for pain prior to lead revision. Report given to [facility] RN, pt will transfer to [facility] after cath lab. Belongings have been brought to new room.

## 2019-01-23 NOTE — PROGRESS NOTES
Spoke to ED MD and was told patient has been complaining of pleuritic and position CP since 24 hours now worse. Patient came to the ED per card RN recommendations. Chest CT showed RV lead perforation into the pericardial space. Stat echo did not show effusion. Discussed with Dr. Rojo over the phone and we should plan on lead revision tomorrow am. Pain control overnight per medicine team. Hold anticoagulation and keep NPO. Avoid movements for the patient.

## 2019-01-23 NOTE — PROGRESS NOTES
Uneventful revision of RV pacemaker lead. Remained hemodynamically stable throughout the procedure. Obtain echo in pm.

## 2019-01-23 NOTE — CONSULTS
[facility]    EP Consultation     Date of Admission:  1/22/2019  Date of Consult: 01/23/19  Requesting Physician: Dr. Winters  Primary care physician:Torri Fisher MD    Reason for consult/ Chief Complaint:RV lead perforation/dislodgement    Jennifer Wen is an 87-year-old female with a history of hypertension, hyperlipidemia, polymyalgia rheumatic, ischemic cardiomyopathy, CAD, (previous CABG, EF around 45%) and permanent atrial fibrillation on Eliquis 2.5 mg bid, who presented with symptomatic tachy-ralph syndrome (episodes of A. fib with RVR, alternating with profound bradycardia suggestive of paroxysmal complete AV block). She was referred for pacemaker implantation on January 15, 2019.  She received a biventricular pacemaker.  Approximately 4 days ago she woke up with sharp left-sided chest pain which lasted several seconds.  She reported the pain and severity of 10/10.  She had been in contact with  as well as our device RN earlier in the week.  The pain was often intermittent.  When it became more progressive she was directed to the emergency department.    CT of the chest yesterday demonstrates the RV lead migrated and appeared to perforate the myocardium into the pericardial fat.  A stat echo was completed and reviewed by Dr. Do did not show effusion.  This was reviewed with  last night and he recommended lead revision today.    History of Present Illness     Code Status    DNR/DNI    Past Medical History   I have reviewed this patient's medical history and updated it with pertinent information if needed.   Past Medical History:   Diagnosis Date     Abdominal wall hernia     three hernias at previous incision sites, allergic to mesh so repair not repeated, wears girdle     Anxiety 1/9/2012     ASCVD (arteriosclerotic cardiovascular disease) 1/9/2012     CKD (chronic kidney disease) stage 3, GFR 30-59 ml/min (H) 1/10/2012     Colon polyp     resected     DDD (degenerative  disc disease), lumbar 1/10/2012     Hyperlipidemia LDL goal <100 1/9/2012     Hyperlipidemia LDL goal <70 12/16/2013     Hypertension goal BP (blood pressure) < 140/90 1/9/2012     Incontinence of urine 1/9/2012     Left hip pain 1/9/2012     Low back pain 1/9/2012     Seasonal allergies 1/9/2012     STEMI (ST elevation myocardial infarction) (H) 8-    with VF arrest.     Stented coronary artery 8-     TIA (transient ischaemic attack) 1/10/2012       Past Surgical History   I have reviewed this patient's surgical history and updated it with pertinent information if needed.  Past Surgical History:   Procedure Laterality Date     abdominal abscess      at incisional site after kristine     adominal hernia repair      had mesh placed, then allergic so it was removed and she wears a girdle     CHOLECYSTECTOMY       CORONARY ARTERY BYPASS  1992     EP PACEMAKER N/A 1/18/2019    Procedure: EP Pacemaker;  Surgeon: Serafin Llamas MD;  Location:  [facility]       Prior to Admission Medications   Prior to Admission Medications   Prescriptions Last Dose Informant Patient Reported? Taking?   BETA BLOCKER NOT PRESCRIBED, INTENTIONAL,  Daughter No No   Sig: Beta Blocker not prescribed intentionally due to COPD, shortness of breath with atenolol and lopressor   Cholecalciferol (VITAMIN D3 PO) 1/22/2019 at Unknown time Daughter Yes Yes   Sig: Take 1,000 Units by mouth daily   Multiple Vitamins-Minerals (MULTIVITAMIN OR) 1/22/2019 at Unknown time Daughter Yes Yes   Sig: Take 1 tablet by mouth daily    Saline (OCEAN NASAL SPRAY NA) prn med Daughter Yes Yes   Sig: Administer 1 spray in each nostril up to two times daily as needed   TRAMADOL HCL PO prn med Daughter Yes Yes   Sig: Take 50 mg by mouth every 6 hours as needed for moderate to severe pain   VENTOLIN  (90 BASE) MCG/ACT Inhaler prn med Daughter No Yes   Sig: INHALE 2 PUFFS INTO THE LUNGS EVERY 6 HOURS AS NEEDED   amLODIPine (NORVASC) 10  MG tablet 1/22/2019 at Unknown time Daughter No Yes   Sig: Take 1 tablet (10 mg) by mouth daily   apixaban ANTICOAGULANT (ELIQUIS) 2.5 MG tablet 1/22/2019 at am  No Yes   Sig: Take 1 tablet (2.5 mg) by mouth 2 times daily   atorvastatin (LIPITOR) 40 MG tablet 1/21/2019 at pm Daughter No Yes   Sig: Due for appt in March, one tab daily   docusate sodium (COLACE) 100 MG capsule 1/22/2019 at am Daughter Yes Yes   Sig: Take 1 capsule by mouth 2 times daily    escitalopram (LEXAPRO) 10 MG tablet 1/22/2019 at Unknown time Daughter Yes Yes   Sig: Take 10 mg by mouth daily   fluticasone (FLONASE) 50 MCG/ACT nasal spray prn med Daughter Yes Yes   Sig: Spray 1 spray into both nostrils daily as needed for rhinitis or allergies   furosemide (LASIX) 20 MG tablet 1/22/2019 at Unknown time Daughter No Yes   Sig: TAKE ONE TABLET BY MOUTH EVERY DAY   losartan (COZAAR) 100 MG tablet 1/22/2019 at Unknown time Daughter No Yes   Sig: Take 1 tablet (100 mg) by mouth daily   metoprolol tartrate (LOPRESSOR) 50 MG tablet 1/22/2019 at am  No Yes   Sig: Take 1 tablet (50 mg) by mouth 2 times daily   predniSONE (DELTASONE) 1 MG tablet 1/22/2019 at Unknown time Daughter Yes Yes   Sig: Take 2 mg by mouth daily   sodium chloride-sodium bicarb (CLASSIC NETI POT SINUS WASH) 2300-700 MG KIT prn med Daughter Yes Yes   Sig: Mix 1 packet in Neti Pot and administer in each nostril daily as needed      Facility-Administered Medications: None     Allergies   Allergies   Allergen Reactions     Adhesive Tape      blisters     Atenolol      SOB     Lopressor Hct      SOB       Social History   I have reviewed this patient's social history and updated it with pertinent information if needed. Jennifer Bob  reports that she has quit smoking. she has never used smokeless tobacco. She reports that she does not drink alcohol or use drugs.    Family History   I have reviewed this patient's family history and updated it with pertinent information if needed.    No family history on file.    Review of Systems   The 5 point Review of Systems is negative other than noted in the HPI or here.     Physical Exam   Temp: 97.8  F (36.6  C) Temp src: Oral BP: 134/68 Pulse: 89 Heart Rate: 64 Resp: 16 SpO2: 99 % O2 Device: Nasal cannula Oxygen Delivery: 2 LPM  Vital Signs with Ranges  Temp:  [97.8  F (36.6  C)-98.6  F (37  C)] 97.8  F (36.6  C)  Pulse:  [] 89  Heart Rate:  [] 64  Resp:  [13-46] 16  BP: (124-159)/() 134/68  SpO2:  [91 %-100 %] 99 %  125 lbs .01 oz    Constitutional   alert and oriented, in no acute distress.  Skin   warm and dry to touch  ENT   no pallor or cyanosis  Neck  JVP  Chest   no tenderness to palpation   Lungs clear  Cardiac S1 S2, no m,r,g  Abdomen   Soft, nontender  Extremities and Back   No edema.    Neurological  no gross motor deficits noted, affect appropriate, oriented to time, person and place.    Data   Results for orders placed or performed during the hospital encounter of 01/22/19 (from the past 24 hour(s))   EKG 12-lead, tracing only   Result Value Ref Range    Interpretation ECG Click View Image link to view waveform and result    CBC with platelets differential   Result Value Ref Range    WBC 5.5 4.0 - 11.0 10e9/L    RBC Count 4.05 3.8 - 5.2 10e12/L    Hemoglobin 12.9 11.7 - 15.7 g/dL    Hematocrit 40.2 35.0 - 47.0 %    MCV 99 78 - 100 fl    MCH 31.9 26.5 - 33.0 pg    MCHC 32.1 31.5 - 36.5 g/dL    RDW 13.9 10.0 - 15.0 %    Platelet Count 136 (L) 150 - 450 10e9/L    Diff Method Automated Method     % Neutrophils 63.0 %    % Lymphocytes 22.2 %    % Monocytes 12.1 %    % Eosinophils 2.0 %    % Basophils 0.5 %    % Immature Granulocytes 0.2 %    Nucleated RBCs 0 0 /100    Absolute Neutrophil 3.5 1.6 - 8.3 10e9/L    Absolute Lymphocytes 1.2 0.8 - 5.3 10e9/L    Absolute Monocytes 0.7 0.0 - 1.3 10e9/L    Absolute Eosinophils 0.1 0.0 - 0.7 10e9/L    Absolute Basophils 0.0 0.0 - 0.2 10e9/L    Abs Immature Granulocytes 0.0 0 - 0.4  10e9/L    Absolute Nucleated RBC 0.0    INR   Result Value Ref Range    INR 1.09 0.86 - 1.14   Partial thromboplastin time   Result Value Ref Range    PTT 32 22 - 37 sec   Basic metabolic panel   Result Value Ref Range    Sodium 142 133 - 144 mmol/L    Potassium 4.6 3.4 - 5.3 mmol/L    Chloride 108 94 - 109 mmol/L    Carbon Dioxide 30 20 - 32 mmol/L    Anion Gap 4 3 - 14 mmol/L    Glucose 111 (H) 70 - 99 mg/dL    Urea Nitrogen 29 7 - 30 mg/dL    Creatinine 1.25 (H) 0.52 - 1.04 mg/dL    GFR Estimate 39 (L) >60 mL/min/[1.73_m2]    GFR Estimate If Black 45 (L) >60 mL/min/[1.73_m2]    Calcium 8.8 8.5 - 10.1 mg/dL   Troponin I   Result Value Ref Range    Troponin I ES 0.046 (H) 0.000 - 0.045 ug/L   Nt probnp inpatient (BNP)   Result Value Ref Range    N-Terminal Pro BNP Inpatient 4,503 (H) 0 - 1,800 pg/mL   Creatinine POCT   Result Value Ref Range    Creatinine 1.3 (H) 0.52 - 1.04 mg/dL    GFR Estimate 39 (L) >60 mL/min/[1.73_m2]    GFR Estimate If Black 47 (L) >60 mL/min/[1.73_m2]   CT Chest Pulmonary Embolism w Contrast    Narrative    CT CHEST WITH CONTRAST   1/22/2019 6:44 PM     HISTORY: Status post pacemaker placement on 1/18/2019. Chest pain.    COMPARISON: 2/9/2017.    TECHNIQUE: Following the uneventful administration of 56mL Isovue-370  intravenous contrast, helical sections were acquired through the lungs  according to the pulmonary embolism protocol. Coronal reconstructions  were generated. Radiation dose for this scan was reduced using  automated exposure control, adjustment of the mA and/or kV according  to the patient's size, or iterative reconstruction technique.    FINDINGS: No visualized pulmonary embolus. The thoracic aorta is  normal in caliber. Atherosclerotic calcification in the thoracic aorta  and coronary arteries. Mild to moderate cardiomegaly. A left anterior  chest wall cardiac device is present with lead tips in the right  ventricle in a branch of the coronary sinus. The lead within the  right  ventricle appears to traverse through the anterior myocardium and into  the epicardial fat and possibly through the pericardium (for example,  series 6 image 115 and series 8 image 26). No pericardial effusion.    Moderate emphysematous changes in the lungs. A few linear opacities  scattered in the periphery of both lungs, most likely representing  scarring and/or atelectasis. Mild interstitial thickening in bilateral  lung bases. The lungs are otherwise clear. No pleural effusion. No  enlarged lymph nodes in the chest. Prior median sternotomy.    Scan through the upper abdomen is significant for several calcified  granulomas in the spleen.      Impression    IMPRESSION:   1. A left anterior chest wall cardiac device is present with lead tips  in the right ventricle and a branch of the coronary sinus. The lead  within the right ventricle appears to traverse through the anterior  myocardium and into the epicardial fat and possibly through the  pericardium. Note that evaluation of the exact location of the tip is  limited due to cardiac motion and streak artifact from the electrode.  If clinically relevant, an EKG-gated CT scan of the heart could be  considered for further evaluation.  2. Mild interstitial thickening in bilateral lung bases, possibly  relating to interstitial pulmonary edema.  3. No visualized pulmonary embolus or other evidence of active  pulmonary disease.    The findings were discussed with Dr. Roman by Dr. Malave on  1/22/2018 at 1858 hours.    YURY MALAVE MD   Troponin I   Result Value Ref Range    Troponin I ES 0.061 (H) 0.000 - 0.045 ug/L   Troponin I   Result Value Ref Range    Troponin I ES 0.084 (H) 0.000 - 0.045 ug/L   Basic metabolic panel   Result Value Ref Range    Sodium 145 (H) 133 - 144 mmol/L    Potassium 4.5 3.4 - 5.3 mmol/L    Chloride 113 (H) 94 - 109 mmol/L    Carbon Dioxide 27 20 - 32 mmol/L    Anion Gap 5 3 - 14 mmol/L    Glucose 83 70 - 99 mg/dL    Urea Nitrogen  23 7 - 30 mg/dL    Creatinine 1.04 0.52 - 1.04 mg/dL    GFR Estimate 48 (L) >60 mL/min/[1.73_m2]    GFR Estimate If Black 56 (L) >60 mL/min/[1.73_m2]    Calcium 7.9 (L) 8.5 - 10.1 mg/dL   CBC with platelets   Result Value Ref Range    WBC 4.6 4.0 - 11.0 10e9/L    RBC Count 3.68 (L) 3.8 - 5.2 10e12/L    Hemoglobin 11.9 11.7 - 15.7 g/dL    Hematocrit 37.2 35.0 - 47.0 %     (H) 78 - 100 fl    MCH 32.3 26.5 - 33.0 pg    MCHC 32.0 31.5 - 36.5 g/dL    RDW 14.2 10.0 - 15.0 %    Platelet Count 122 (L) 150 - 450 10e9/L       Assessment & Plan   Jennifer Bob is a 87 year old female who was admitted on 1/22/2019. I reviewed the case with Dr. Hodges.      1. RV lead migration with questionable dislodgment.  Patient will be brought to the EP lab today for RV lead revision.  She is n.p.o. and orders have been placed.  Risks and benefits of the procedure were reviewed with the patient by myself and by Dr. Hodges.  Risks include, but are not limited to: Bruising, bleeding, pocket hematoma, pocket infection, cardiac perforation with possibility of tamponade, cardiac arrhythmias.  Patient agrees and is willing to proceed.  Consent signed. IV antibiotics will be given on call and after implant to decrease infection risk.    We will follow closely.

## 2019-01-23 NOTE — PLAN OF CARE
A&Ox4. VSS on 2 L NC. Chest pain has improved since arriving to the unit. Pt declined pain medications. Tele occasionally V-paced with failure to sense. Bedrest. Incontinent of urine. Pericare provided. Pt's daughter here overnight. Plan for cards consult and repair of RV lead perforation. NPO since MN. Continue to monitor.

## 2019-01-23 NOTE — PROVIDER NOTIFICATION
Pt's Troponin slowly rising. No CP. No Troponin order for this AM. Dr. Reynoso paged. No new orders.

## 2019-01-24 NOTE — PLAN OF CARE
Patient is alert and oriented times 4.  cms is intact. Left incision C, D, I.  No co pain. Patient is up indep.  pt voiding per BR. Discharging home with daUGHTER 1300

## 2019-01-24 NOTE — PLAN OF CARE
Pt. A&Ox4. VSS on 2L, oxygen, unable to wean to room air. 87-88% at rest and 84% with activity. Tele V-paced, occasional Bi V- pacing. Site dry and intact. CMS intact. Pt. Denies pain or SOB. Indp. Pt. Using incentive spirometer, acapella device, and occasional able to cough up clear sputum. Discharge plan pending pt progress.Will continue to observe.

## 2019-01-24 NOTE — DISCHARGE SUMMARY
[facility]  Discharge Summary        Jennifer Bob MRN# [id number]   YOB: 1931 Age: 87 year old     Date of Admission:  1/22/2019  Date of Discharge:  1/24/2019  Admitting Physician:  Lilian Marino MD  Discharge Physician: Zoya Winters MD  Discharging Service: Hospitalist     Primary Provider: Torri Fisher  Primary Care Physician Phone Number: [phone]         Discharge Diagnoses/Problem Oriented Hospital Course (Providers):    Jennifer Bob was admitted on 1/22/2019 by Lilian Marino MD and I would refer you to their history and physical.  The following problems were addressed during her hospitalization:  Jennifer Bob is a 87 year old female who was admitted on 1/22/2019.  Ms. Jennifer Bob is a delightful 87-year-old  lady with a past medical history notable for coronary artery disease, status post CABG and PCI, CVA, hypertension, dyslipidemia, COPD, polymyalgia rheumatica, anxiety, chronic kidney disease stage III, low back pain, persistent atrial fibrillation, and tachybrady syndrome, status post permanent pacemaker on 01/18/2019 who has presented with intermittent sharp left-sided chest pain for 3 days.  CT chest showed possible pacemaker lead displacement in to periardial space      1.  Pleuritic and positional Chest pain  due to pacemaker RV  Lead subacute perforation in to pericardial space with no evidence of pericardial effusion or tamponade :  STAT echo showed no pericardial effusion on admission   hgb stayed stable from 12.9 to 12.6 to 12.7 this AM  Remained hemodynamically stable throughout hospitalization   EP consulted and she underwent   Uneventful revision of RV pacemaker lead. Remained hemodynamically stable throughout the procedure on 1/23 .              elliquis held on admission to restarted post procedure    Post procedure echo was negative for pericardial effusion and EF was 30-35%  Trop peaked  at 0.08. Trop  elevation due to the lead perforation and underlying CHF   2.  Ischemic cardiomyopathy with acute on chronic systolic CHF exacerbation :    The most recent LV ejection fraction was 27% on 01/15/2019. Echo also showed global hypokinesis, mild to moderate tricuspid regurgitation and moderately reduced RV systolic function.  BNP is elevated at 4503, however, the patient does not appear to be volume overloaded.  She will continue with prior to admission metoprolol 50 mg p.o. b.i.d., and losartan 100 mg p.o. Daily.     Restarted lasix 20mg PO daily   Has some bilateral crackles on exam today. So extra 20mg IV one dose lasix given today    3.  Hypertension:.   She will continue with prior to admission metoprolol 50 mg p.o. b.i.d., Losartan 100 mg p.o. Daily, and Norvasc 10 mg p.o. daily.  I will have IV labetalol available p.r.n.   4.  Dyslipidemia.       She will continue with prior to admission Lipitor 40 mg p.o. Daily.      5.  Acute kidney injury on chronic kidney disease,         stage III:  Baseline creatinine is around 1.  Creatinine on admission  1.3.  held her prior to admission furosemide on admission .   continue cautiously her prior to admission losartan.  Notably,  patient has received contrast in ED for CT.  avoid NSAIDs or other nephrotoxins.      Cr stable at 1.04 today . Restarted lasix   6.  Chronic obstructive pulmonary disease:  Stable.  She will continue with prior to admission p.r.n. albuterol inhaler.   7.  Polymyalgia rheumatica.  She will continue with prior to admission prednisone 2 mg p.o. daily.   8.  History of CVA:  She will continue on prior to admission Lipitor.  Eliquis restarted today .   9.  Anxiety:  She will continue with prior to admission Lexapro.     10.  Persistent atrial fibrillation with tachybrady syndrome.  The patient is status post permanent pacemaker on 01/18.  She will continue with prior to admission metoprolol 50 mg p.o. b.i.d.   The patient will be kept on telemetry.    ELLIQUIS restarted    HR well controlled   11.  Deep venous thrombosis prophylaxis: ELLIQUIS      2.  Code status:  It was discussed with the patient in the presence of her daughter and she requested to stay DNR/DNI.         13.  Disposition: home today            Zoya Winters MD  [phone] (P)                Code Status:      DNR / DNI        Brief Hospital Stay Summary Sent Home With Patient in AVS:               Important Results:      See below         Pending Results:        Unresulted Labs Ordered in the Past 30 Days of this Admission     No orders found from 11/23/2018 to 1/23/2019.            Discharge Instructions and Follow-Up:            Discharge Disposition:      Discharged to home        Discharge Medications:        Current Discharge Medication List      CONTINUE these medications which have NOT CHANGED    Details   amLODIPine (NORVASC) 10 MG tablet Take 1 tablet (10 mg) by mouth daily  Qty: 90 tablet, Refills: 1    Associated Diagnoses: Essential hypertension with goal blood pressure less than 140/90      apixaban ANTICOAGULANT (ELIQUIS) 2.5 MG tablet Take 1 tablet (2.5 mg) by mouth 2 times daily  Qty: 60 tablet, Refills: 0    Associated Diagnoses: New onset atrial fibrillation (H)      atorvastatin (LIPITOR) 40 MG tablet Due for appt in March, one tab daily  Qty: 90 tablet, Refills: 3    Associated Diagnoses: Hyperlipidemia LDL goal <100      Cholecalciferol (VITAMIN D3 PO) Take 1,000 Units by mouth daily      docusate sodium (COLACE) 100 MG capsule Take 1 capsule by mouth 2 times daily       escitalopram (LEXAPRO) 10 MG tablet Take 10 mg by mouth daily      fluticasone (FLONASE) 50 MCG/ACT nasal spray Spray 1 spray into both nostrils daily as needed for rhinitis or allergies      furosemide (LASIX) 20 MG tablet TAKE ONE TABLET BY MOUTH EVERY DAY  Qty: 90 tablet, Refills: 3    Associated Diagnoses: Essential hypertension with goal blood pressure less than 140/90      losartan (COZAAR) 100 MG  "tablet Take 1 tablet (100 mg) by mouth daily  Qty: 90 tablet, Refills: 3    Associated Diagnoses: Essential hypertension with goal blood pressure less than 140/90      metoprolol tartrate (LOPRESSOR) 50 MG tablet Take 1 tablet (50 mg) by mouth 2 times daily  Qty: 60 tablet, Refills: 0    Associated Diagnoses: New onset atrial fibrillation (H)      Multiple Vitamins-Minerals (MULTIVITAMIN OR) Take 1 tablet by mouth daily       predniSONE (DELTASONE) 1 MG tablet Take 2 mg by mouth daily      Saline (OCEAN NASAL SPRAY NA) Administer 1 spray in each nostril up to two times daily as needed      sodium chloride-sodium bicarb (CLASSIC NETI POT SINUS WASH) 2300-700 MG KIT Mix 1 packet in Neti Pot and administer in each nostril daily as needed      TRAMADOL HCL PO Take 50 mg by mouth every 6 hours as needed for moderate to severe pain      VENTOLIN  (90 BASE) MCG/ACT Inhaler INHALE 2 PUFFS INTO THE LUNGS EVERY 6 HOURS AS NEEDED  Qty: 18 g, Refills: 9    Associated Diagnoses: Chronic obstructive pulmonary disease, unspecified COPD type (H)      BETA BLOCKER NOT PRESCRIBED, INTENTIONAL, Beta Blocker not prescribed intentionally due to COPD, shortness of breath with atenolol and lopressor  Refills: 0    Associated Diagnoses: Essential hypertension with goal blood pressure less than 140/90; ASCVD (arteriosclerotic cardiovascular disease)               Allergies:         Allergies   Allergen Reactions     Adhesive Tape      blisters     Atenolol      SOB     Lopressor Hct      SOB           Consultations This Hospital Stay:      Consultation during this admission received from cardiology and EP         Condition and Physical on Discharge:      Discharge condition: Stable   Vitals: Blood pressure 151/89, pulse 100, temperature 98  F (36.7  C), temperature source Oral, resp. rate 18, height 1.575 m (5' 2\"), weight 55.6 kg (122 lb 9.6 oz), SpO2 93 %, not currently breastfeeding.     Constitutional: Alert and awake    Lungs: " CTA   Cardiovascular: irregular   Abdomen: Soft, NT, ND, BS+   Skin: Warm and dry    Other:          Discharge Time:      Greater than 30 minutes.        Image Results From This Hospital Stay (For Non-EPIC Providers):        Results for orders placed or performed during the hospital encounter of 01/22/19   CT Chest Pulmonary Embolism w Contrast    Narrative    CT CHEST WITH CONTRAST   1/22/2019 6:44 PM     HISTORY: Status post pacemaker placement on 1/18/2019. Chest pain.    COMPARISON: 2/9/2017.    TECHNIQUE: Following the uneventful administration of 56mL Isovue-370  intravenous contrast, helical sections were acquired through the lungs  according to the pulmonary embolism protocol. Coronal reconstructions  were generated. Radiation dose for this scan was reduced using  automated exposure control, adjustment of the mA and/or kV according  to the patient's size, or iterative reconstruction technique.    FINDINGS: No visualized pulmonary embolus. The thoracic aorta is  normal in caliber. Atherosclerotic calcification in the thoracic aorta  and coronary arteries. Mild to moderate cardiomegaly. A left anterior  chest wall cardiac device is present with lead tips in the right  ventricle in a branch of the coronary sinus. The lead within the right  ventricle appears to traverse through the anterior myocardium and into  the epicardial fat and possibly through the pericardium (for example,  series 6 image 115 and series 8 image 26). No pericardial effusion.    Moderate emphysematous changes in the lungs. A few linear opacities  scattered in the periphery of both lungs, most likely representing  scarring and/or atelectasis. Mild interstitial thickening in bilateral  lung bases. The lungs are otherwise clear. No pleural effusion. No  enlarged lymph nodes in the chest. Prior median sternotomy.    Scan through the upper abdomen is significant for several calcified  granulomas in the spleen.      Impression    IMPRESSION:   1.  A left anterior chest wall cardiac device is present with lead tips  in the right ventricle and a branch of the coronary sinus. The lead  within the right ventricle appears to traverse through the anterior  myocardium and into the epicardial fat and possibly through the  pericardium. Note that evaluation of the exact location of the tip is  limited due to cardiac motion and streak artifact from the electrode.  If clinically relevant, an EKG-gated CT scan of the heart could be  considered for further evaluation.  2. Mild interstitial thickening in bilateral lung bases, possibly  relating to interstitial pulmonary edema.  3. No visualized pulmonary embolus or other evidence of active  pulmonary disease.    The findings were discussed with Dr. Roman by Dr. Malave on  1/22/2018 at 1858 hours.    YURY MALAVE MD   X-ray Chest 2 vws*    Narrative    XR CHEST TWO VIEWS   1/24/2019 8:59 AM     HISTORY: Lead position.    COMPARISON: Chest x-ray 1/19/2019.      Impression    IMPRESSION: Two views of the chest are performed. Lungs are  hyperinflated consistent with underlying COPD. Blunting of the  costophrenic angle suggests trace bilateral pleural effusions. Heart  size is unchanged and probably mildly enlarged. Implantable cardiac  device and both leads appear unchanged in position. Previous median  sternotomy for CABG. No pneumothorax. Aorta is calcified and mildly  tortuous.    ADITYA KENNY MD     TTE on 1/24 showed   The left ventricle is normal in size.  There is severe inferior and inferolateral wall hypokinesis.  Left ventricular systolic function is moderate to severely reduced.  The visual ejection fraction is estimated at 30-35%.  There is no pericardial effusion.  Small bilateral pleural effusion        Most Recent Lab Results In EPIC (For Non-EPIC Providers):    Most Recent 3 CBC's:  Recent Labs   Lab Test 01/24/19  0554 01/23/19  0850 01/23/19  0529   WBC 6.1 5.9 4.6   HGB 12.7 12.6 11.9   * 101*  101*   * 126* 122*      Most Recent 3 BMP's:  Recent Labs   Lab Test 01/23/19  0529 01/22/19  1812 01/19/19  0556 01/16/19  0602   * 142  --  142   POTASSIUM 4.5 4.6  --  4.4   CHLORIDE 113* 108  --  105   CO2 27 30  --  33*   BUN 23 29  --  15   CR 1.04 1.25* 1.01 1.01   ANIONGAP 5 4  --  4   JERILYN 7.9* 8.8  --  8.7   GLC 83 111*  --  92     Most Recent 3 Troponin's:  Recent Labs   Lab Test 01/23/19  0850 01/23/19  0210 01/22/19  2200  07/07/18  2225  02/09/17  0438 04/24/14  1902   TROPI 0.086* 0.084* 0.061*   < >  --    < >  --   --    TROPONIN  --   --   --   --  0.02  --  0.04 0.02    < > = values in this interval not displayed.     Most Recent 3 INR's:  Recent Labs   Lab Test 01/23/19  0850 01/22/19  1812 09/10/18  0724   INR 1.08 1.09 0.98     Most Recent 2 LFT's:  Recent Labs   Lab Test 05/02/18  0936 02/09/17  1239   AST 24 63*   ALT 29 56*   ALKPHOS 60 87   BILITOTAL 0.6 0.8     Most Recent Cholesterol Panel:  Recent Labs   Lab Test 09/10/18  0724   CHOL 142   LDL 62   HDL 62   TRIG 89     Most Recent 6 Bacteria Isolates From Any Culture (See EPIC Reports for Culture Details):  Recent Labs   Lab Test 07/11/18  0945 02/03/14  1009   CULT 10,000 to 50,000 colonies/mL  Gram negative rods  *  <10,000 colonies/mL  Strain 2  Gram negative rods  *  <10,000 colonies/mL  Gram positive cocci  *  10,000 to 50,000 colonies/mL  Strain 2  Gram positive cocci  *  Susceptibility testing not routinely done No Beta Streptococcus isolated     Most Recent TSH, T4 and HgbA1c:   Recent Labs   Lab Test 01/15/19  1150 09/10/18  0724   TSH 2.18  --    A1C  --  5.6

## 2019-01-24 NOTE — PROGRESS NOTES
Device Discharge  Dressing:  Clean, dry, and intact  Chest XR reviewed:  Yes  Pneumo present?:  No  Lead Measurements per Device Rep:  WNL    Education Provided:  Avoid raising left arm above the level of the shoulder for 3 weeks.  Do not shower until outer occlusive dressing has been removed.  Remove outer dressing in 3 - 4 days.  Leave steri-strips in place, these will be removed at the 1 week check.   Call Device Clinic with increased swelling, drainage, or fever > 101 degrees.   Follow-up with the Device Clinic as scheduled. 2/1/19 at 11 AM

## 2019-01-24 NOTE — PROGRESS NOTES
[facility]    EP Progress Note    Date of Service (when I saw the patient): 01/24/2019  Reviewed with yudi Patten seen and examined.   No chest pain. Vitals stable.  Chest X-ray reviewed, fine.  Readmitted for chest pain, suspected subacute RV lead perforation. Chest pain resolved after RV lead repositioning. Echo showed no pericardial effusion.  Ok for discharge today.  GREG Jimenez MD     Assessment & Plan   Jennifer Bob is a 87 year old female with h/o HTN, dyslipidemia, polymyalgia rheumatic on prednisone, CVAs, CAD and ischemic CM (s/p CABG with EF 45%, recently down to 25% on echo 1/2019) and permanent Atrial Fibrillation on Elqiuis 2.5 mg BID.  She underwent placement of a Flynn BiV PPM (no atrial lead) 1/18/2019 by Dr. Llamas for evidence of tachybrady syndrome (paroxysmal CHB alternating with rapid AFib).  She developed sharp L-sided CP ~1/19 and came to ER 1/22 when pain was more progressive. Noted to have RV lead migration.  Echo showed no effusion.  Lead revision recommended.    1. Tachybrady Syndrome with RV lead dislodgement -  * Underwent uneventful revision of RV PPM lead & repositioned into RV apex with Dr. Hodges 1/23  * Follow up echo yesterday  1/23 afternoon continues to show no pericardial effusion/tamponade  * Device interrogation today looks good (Yuliana)    * Hemodynamically stable, with HRs 80s and BPs >150s  * Hgb stable @ 12.7 g/dL this AM    * Pt has NO PAIN    * Troponin lead, plateaued at 0.08 on 1/23 PM    * Eliquis 2.5 mg (age/weight) held night of admit, but has been getting it since 1/23. CHADSVASc 8 (CM, HTN, CVA, CAD, age, sex)     PLAN:   * CXR PENDING   * Continue Eliquis 2.5 mg BID and routine device interrogation (scheduled 1/30)   * Device RN coming over for teaching.   * As long as CXR looks OK, OK to discharge from EP standpoint. Follow-up made 1/30 with Device; Sees Dr. Lowe at [facility] 1/31    2. CAD and CM -   * Sees Dr. Claudia Lowe at  "[facility] (last OV 7/2018)  * Had CABG 1992 and had MI with VF arrest s/p PCI in SVG to RCA 8/2019 in [place]  * Had stress test done 1/2019 during admission due to drop in EF from 45% to 25% on echo 1/16/2019. This showed no significant ischemia, with small scar noted in distal anteroapical wall with mild per-infarct ischemia. Small fixed basal inferior defect noted to be likely artifact. Thought likely to be tachycardic-induced     PLAN:   * Continue statin, BB and ARB   * No evidence of fluid overload on exam today, though weight is up 2 kg c/w admit and I/Os are up 500 mL. Breathing is \"pretty easy\" today   * Will give an extra 20 mg Lasix today given weight increase.    3. TIAs/CVA -   * Had a CVA 2013 with mild residual R hand weakness (resolved over time).   * Hospiltazed 7/2018 with another embolic stroke.  tPA given with minimal residual deficits. Apparently had Plavix x 3 months added at that time. ZioPatch placed 7/2018 which showed SR.  * CTA head/neck 7/2018 showed AJ 40% and LICA 60%      PLAN:   * Plans to check outpatient Dopplers 10/2019    4. COPD -   * PFTs 2014 showed decreased FEV1  * PTA on Prednisone and inhalers      PLAN:   * Per Hospitalist team. Note O2 sats 91% on RA this AM    5. PVD -   * Known L SFA occlusion with bilateral iliac stent and angioplasty of left common profunda 9/2018 (Dr. Todd)     PLAN:   * See #2    Yin Giron PA-C    Interval History   No chest discomfort whatsoever  No c/o dizziness, lightheadedness    Physical Exam   Temp: 98.1  F (36.7  C) Temp src: Oral BP: 157/79 Pulse: 69 Heart Rate: 79 Resp: 16 SpO2: 97 % O2 Device: Nasal cannula Oxygen Delivery: (P) 2 LPM  Vitals:    01/22/19 1715 01/23/19 0517 01/24/19 0605   Weight: 53.5 kg (118 lb) 56.7 kg (125 lb 0 oz) 55.6 kg (122 lb 9.6 oz)     Vital Signs with Ranges  Temp:  [97.4  F (36.3  C)-98.2  F (36.8  C)] 98.1  F (36.7  C)  Pulse:  [] 69  Heart Rate:  [79-80] 79  Resp:  [16-36] 16  BP: " (107-162)/(73-97) 157/79  SpO2:  [88 %-98 %] 97 %  I/O last 3 completed shifts:  In: 600 [P.O.:600]  Out: -     Telemetry: BiV Paced @ 80 bpm currently. Occasional PVCs    Constitutional: Up; alert and oriented  Respiratory: Non-labored but note diminished BS bilateral bases  Cardiovascular: Regular. No rub on exam  Musculoskeletal: No edema    Medications       amLODIPine  10 mg Oral Daily     apixaban ANTICOAGULANT  2.5 mg Oral BID     atorvastatin  40 mg Oral At Bedtime     docusate sodium  100 mg Oral BID     escitalopram  10 mg Oral Daily     furosemide  20 mg Oral Daily     HYDROmorphone  0.5 mg Intravenous Once     losartan  100 mg Oral Daily     metoprolol tartrate  50 mg Oral BID     predniSONE  2 mg Oral Daily     sodium chloride (PF)  3 mL Intracatheter Q8H       Data   CXR PENDING    Results for orders placed or performed during the hospital encounter of 01/22/19 (from the past 24 hour(s))   Troponin I   Result Value Ref Range    Troponin I ES 0.086 (H) 0.000 - 0.045 ug/L   CBC with platelets   Result Value Ref Range    WBC 5.9 4.0 - 11.0 10e9/L    RBC Count 3.92 3.8 - 5.2 10e12/L    Hemoglobin 12.6 11.7 - 15.7 g/dL    Hematocrit 39.5 35.0 - 47.0 %     (H) 78 - 100 fl    MCH 32.1 26.5 - 33.0 pg    MCHC 31.9 31.5 - 36.5 g/dL    RDW 14.2 10.0 - 15.0 %    Platelet Count 126 (L) 150 - 450 10e9/L   INR   Result Value Ref Range    INR 1.08 0.86 - 1.14   EP Device    Narrative    Jennifer Bob  [id number]  January 23, 2019    PROCEDURES PERFORMED:   1.  Right ventricular pacemaker lead revision  2. Conscious sedation.     INDICATION: RV lead micro-perforation    History of Present Illness:    This is a 87 year old year-old patient with a history of persistent atrial   fibrillation and bradycardia associated with mild LV dysfunction.  A CRT   with pacer was implanted a week ago.  Unfortunately, last few days patient   experienced chest pain worsened with positional changes and with deep    breathing.  She was noted to have RV pacer lead very close to the   pericardial space on the CT scan without evidence of pericardial effusion.    Patient was referred for lead revision. Risks and benefits were discussed   prior to the procedure including but not limited to vascular injury and   infection.     Methods:    I determined this patient to be an appropriate candidate for the planned   sedation and procedure and have reassessed the patient immediately prior   to sedation and procedure. The patient was prepped and draped in the usual   manner. The procedure was performed under conscious sedation for 10   minutes.  1 mg of Versed and 25 of Fentanyl were used. Heart rate, BP,   respiration, oxygen saturation, and patient responses were monitored   throughout the procedure with the RN assistance directly under my   supervision. Intravenous antibiotic was given. 1% lidocaine was   infiltrated into the incision located below the mid left clavicle. An   incision was made with a #15 blade. The device was then removed from the   pocket. The RV lead was detached from the device.  Sutures on the sleeve   were removed and the stylet was advanced into this lead.  The helix was   retracted and the lead was repositioned into the RV apical area.    Appropriate sensing and threshold were obtained.  The lead was then   attached to the new device. The pocket was then flushed with bacitracin   solution. The device was placed in the pocket and was closed with 2-0 and   4-0 Vicryl sutures. Steri-Strips and an OpSite dressing were then placed   over the incision.  Patient remained hemodynamically stable throughout the   procedure.  The patient was then [facility] in   stable condition.     DEVICE INFORMATION: See previous dictated procedure note    STIMULATION THRESHOLDS:    RV -  Sense:10.5 mV, Threshold: 0.4 V @ 0.5 ms, Impedance: 994 ohms  LV -  Sense:17.8 mV, Threshold: 1.1 V @ 0.5 ms, Impedance:  703 ohms    Fluoroscopy (minutes): 1    COMPLICATIONS: None.    CONCLUSION:  1. Uneventful revision of RV pacemaker lead  2. Limited echo later this afternoon    Kory Herrera MD     Echocardiogram Limited    Narrative    [id number]  [id number]  [id number]^JAVIER^KORY^FELICE           [facility]  Echocardiography Laboratory  [address] [place] [address]        Name: TRACY BARBER  MRN: [id number]  : 1931  Study Date: 2019 11:42 AM  Age: 87 yrs  Gender: Female  Patient Location: [facility]  Reason For Study: Pericardial Effusion  History: MI, PACEMAKER, COPD  Ordering Physician: KORY HERRERA  Referring Physician: WING COLMENARES  Performed By: West Alvarez RDCS     BSA: 1.6 m2  Height: 62 in  Weight: 125 lb  BP: 152/97 mmHg  _____________________________________________________________________________  __        Procedure  Limited Portable Echo Adult.  _____________________________________________________________________________  __        Interpretation Summary     The left ventricle is normal in size.  There is severe inferior and inferolateral wall hypokinesis.  Left ventricular systolic function is moderate to severely reduced.  The visual ejection fraction is estimated at 30-35%.  There is no pericardial effusion.  Small bilateral pleural effusion     LV function looks slightly better. Pericardial effusion is still not present.  _____________________________________________________________________________  __        Left Ventricle  The left ventricle is normal in size. There is normal left ventricular wall  thickness. Left ventricular systolic function is moderate to severely reduced.  The visual ejection fraction is estimated at 30-35%. There is severe inferior  and inferolateral wall hypokinesis.     Right Ventricle  The right ventricle is normal in structure, function and size.     Atria  The left atrium is moderately dilated. Right atrial size is normal. There  is  no atrial shunt seen.     Mitral Valve  The mitral valve is normal in structure and function. There is trace mitral  regurgitation.        Tricuspid Valve  Right ventricle systolic pressure estimate normal. There is mild (1+)  tricuspid regurgitation. Normal IVC (1.5-2.5cm) with >50% respiratory  collapse; right atrial pressure is estimated at 5-10mmHg. The right  ventricular systolic pressure is approximated at 23.9 mmHg plus the right  atrial pressure.     Aortic Valve  The aortic valve is trileaflet with aortic valve sclerosis. There is trace  aortic regurgitation. No aortic stenosis is present.     Pulmonic Valve  The pulmonic valve is not well seen, but is grossly normal. There is trace  pulmonic valvular regurgitation.     Vessels  Normal size aorta. The IVC is normal in size and reactivity with respiration,  suggesting normal central venous pressure.     Pericardium  There is no pericardial effusion. Small bilateral pleural effusion.        Rhythm  The rhythm was undetermined.  _____________________________________________________________________________  __           Doppler Measurements & Calculations  PI end-d arti: 154.7 cm/sec  TR max arti: 244.3 cm/sec  TR max P.9 mmHg           _____________________________________________________________________________  __           Report approved by: Juani Marquez 2019 04:04 PM      CBC with platelets   Result Value Ref Range    WBC 6.1 4.0 - 11.0 10e9/L    RBC Count 4.03 3.8 - 5.2 10e12/L    Hemoglobin 12.7 11.7 - 15.7 g/dL    Hematocrit 40.7 35.0 - 47.0 %     (H) 78 - 100 fl    MCH 31.5 26.5 - 33.0 pg    MCHC 31.2 (L) 31.5 - 36.5 g/dL    RDW 13.9 10.0 - 15.0 %    Platelet Count 120 (L) 150 - 450 10e9/L

## 2019-01-24 NOTE — DISCHARGE INSTRUCTIONS
Discharge Instructions for Pacemaker Implantation  You have had a procedure to insert a pacemaker. Once inside your body, this small electronic device helps keep your heart from beating too slowly. A pacemaker can t fix existing heart problems. But it can help you feel better and have more energy. As you recover, follow all of the instructions you are given, including those below.  Activity    Follow the instructions you are given about limiting your activity.    Do not raise your arm on the incision side above shoulder level for 3 weeks. This gives the device lead wires time to attach securely inside your heart.    Ask your doctor when you can expect to return to work.    You can still exercise. It s good for your body and your heart. Talk with your doctor about an exercise plan.  Other Precautions    Follow your doctor's directions carefully for wound care. You may remove the outer dressing in 3  Days, Saturday 1/26. Leave the steri-strips in place; these will be removed at your 1 week follow-up. Never put any creams, lotions, or products like peroxide on an incision unless your doctor tells you to.     Before you receive any treatment, tell all health care providers (including your dentist) that you have a pacemaker.    You will be given an ID card that contains information about your pacemaker. Always carry this card with you. You can show this card if your pacemaker sets off a metal detector. You should also show it to avoid screening with a hand-held security wand.    Keep your cell phone away from your pacemaker. Don t carry the phone in your shirt pocket, even when it s turned off.    Avoid strong magnets. Examples are those used in MRIs or in hand-held security wands.    Avoid strong electrical fields. Examples are those made by radio transmitting towers,  ham  radios, and heavy-duty electrical equipment.    Avoid leaning over the open foster of a running car. A running engine creates an electrical field.  Most household and yard appliances will not cause any problems. If you use any large power tools, such as an industrial , talk with your doctor.   Follow-Up    Follow up in the device clinic as scheduled. 2/1/2019 at 11:00 AM    Make regular follow-up appointments with your doctor. He or she will check the pacemaker to make sure it s working properly.  When to Call the Device Clinic [phone]  Call your doctor immediately if you have any of the following:    Dizziness    Chest pain    Lack of energy    Fainting spells    Twitching chest muscles    Rapid pulse or pounding heartbeat    Shortness of breath    Increase pain around your pacemaker    Fever above 100.4 F (38 C) or other signs of infection (redness, swelling, drainage, or warmth at the incision site)    Hiccups that won t stop     3937-0068 [employer]. [address] [place] [address] 13257. All rights reserved. This information is not intended as a substitute for professional medical care. Always follow your healthcare professional's instructions.

## 2019-01-25 NOTE — TELEPHONE ENCOUNTER
"ED/Discharge Protocol    \"Hi, my name is Emmy Pinto, a registered nurse, and I am calling on behalf of Dr. Fisher's office at [facility].  I am calling to follow up and see how things are going for you after your recent visit.\"    \"I see that you were in the (ER/UC/IP) on [place] 1/22 - 1/24 pacemaker complications     How are you doing now that you are home?\" feeling pretty good\" no chest pain, sob, denies heart rate concerns, patient has NO questions regarding her hospital stay - if any arise she will return call to clinic, discussed that if clinic closed for the weekend patient can get to 24 hour nurse line by calling clinic number     Is patient experiencing symptoms that may require a hospital visit?  No     Discharge Instructions    \"Let's review your discharge instructions.  What is/are the follow-up recommendations?  Pt. Response: keep incision dry - patient has not yet looked at the incision to see if red/drainage - she will monitor and call with concerns     \"Were you instructed to make a follow-up appointment?\"  Pt. Response: Yes.  Has appointment been made?   Yes  Dr. Fisher 1/28/19 and Dr. Lowe cardiology 1/31/19     \"When you see the provider, I would recommend that you bring your discharge instructions with you.    Medications    \"How many new medications are you on since your hospitalization/ED visit?\"    0 - patient has NO questions regarding her medications she currently is taking   \"How many of your current medicines changed (dose, timing, name, etc.) while you were in the hospital/ED visit?\"   0   \"Do you have questions about your medications?\"   No  \"Were you newly diagnosed with heart failure, COPD, diabetes or did you have a heart attack?\"   No  For patients on insulin: \"Did you start on insulin in the hospital or did you have your insulin dose changed?\"   No    Medication reconciliation completed? Yes    Was MTM referral placed (*Make sure to put transitions as reason for " "referral)?   No    Call Summary    \"Do you have any questions or concerns about your condition or care plan at the moment?\"    No  Triage nurse advice given: continue to monitor incision site, if redness, drainage, fever, increase in pain patient to call clinic/cardiology to advise     Patient was in ER 2 times admitted boht times - appropriate use of ER in the past year (assess appropriateness of ER visits.)      \"If you have questions or things don't continue to improve, we encourage you contact us through the main clinic number,  [phone].  Even if the clinic is not open, triage nurses are available 24/7 to help you.     We would like you to know that our clinic has extended hours (provide information).  We also have urgent care (provide details on closest location and hours/contact info)\"      \"Thank you for your time and take care!\"    Emmy Pinto Registered Nurse   [facility]           "

## 2019-01-25 NOTE — LETTER
[facility]  Complex Care Plan  About Me  Patient Name:  Jennifer Bob    YOB: 1931  Age:     87 year old   Lelia MRN:   [id number] Telephone Information:  Home Phone [phone]   Mobile [phone]       Address:    [address]   [address]  [facility] [address] Email address:  [email or web address]      Emergency Contact(s)  Name Relationship Lgl Grd Work Phone Home Phone Mobile Phone   1. LISKVNG EVANS* Daughter No  [phone]   2. YESIKA TOURE* Daughter No  [phone]           Primary language:  English     needed? No   [employer] [place] [employer]:  [phone] op. 1  Other communication barriers: None  Preferred Method of Communication:  Mail  Current living arrangement:    Mobility Status/ Medical Equipment: Independent    Health Maintenance  Health Maintenance Reviewed: Due/Overdue   Health Maintenance Due   Topic Date Due     HF ACTION PLAN Q3 YR  12[birth year]05/1931     URINE DRUG SCREEN Q1 YR  12/05/1946     ZOSTER IMMUNIZATION (2 of 3) 12/25/2012     COPD ACTION PLAN Q1 YR  10/23/2015       My Access Plan  Medical Emergency 911   Primary Clinic Line [facility] [phone]   24 Hour Appointment Line [phone] or  [phone]) (toll-free)   24 Hour Nurse Line [phone] (toll-free)   Preferred Urgent Care     Preferred Hospital     Preferred Pharmacy [facility] [place], [place] - [address]     Behavioral Health Crisis Line The National Suicide Prevention Lifeline at 1-159.680.8428 or 911     My Care Team Members    Patient Care Team       Relationship Specialty Notifications Start End    Torri Fisher MD PCP - General Family Practice  1/16/12     Phone: [phone] Fax: [phone] [address] [facility] [address]    Torri Fisher MD PCP - Assigned PCP   6/28/15     Phone: [phone] Fax: [phone] [address]  [facility] [address]    [facility] Care Coordinator Cardiology Admissions 12/10/14     Phone: [phone] Pager: [phone]        Sara Mcghee, RN Lead Care Coordinator Primary Care - CC Admissions 1/25/19     Phone: [phone] Fax: [phone]                My Care Plans  Self Management and Treatment Plan  Goals and (Comments)  Goals        General    Monitoring (pt-stated)     Notes - Note created  1/25/2019 11:15 AM by Sara Mcghee, RN    Goal Statement: I will monitor for any signs of surgical infection  Measure of Success: My incision will be healed   Supportive Steps to Achieve: I will seek medical help if surgical site has increased rudeness swelling drainage or fever   I will concentrate on moving around the apartment and will not lift arm above her head   Barriers: No barriers identified   Strengths: supportive daughter   Date to Achieve By: 2/8/2019  Patient expressed understanding of goal: Yes                Action Plans on File: COPD Heart Failure                       Advance Care Plans/Directives Type:   Type Advanced Care Plans/Directives: DNR/DNI    My Medical and Care Information  Problem List   Patient Active Problem List   Diagnosis     Anxiety     Low back pain     Left hip pain     ASCVD (arteriosclerotic cardiovascular disease)     Incontinence of urine     Hypertension goal BP (blood pressure) < 140/90     Seasonal allergies     Myocardial infarction (H)     DDD (degenerative disc disease), lumbar     Transient cerebral ischemia     CKD (chronic kidney disease) stage 3, GFR 30-59 ml/min (H)     Corns and callosities     Health Care Home     Spinal stenosis, lumbar region, without neurogenic claudication     Synovial cyst of lumbar facet joint     Acquired spondylolisthesis     Lumbar radicular pain     Stroke (H)     Hypertension     Hyperlipidemia LDL goal <70     Late effects of CVA (cerebrovascular accident)     COPD (chronic obstructive pulmonary disease) (H)      RA (rheumatoid arthritis) (H)     Gout     Chronic pain syndrome     Heart failure (H)     Chronic diastolic heart failure (H)     PMR (polymyalgia rheumatica) (H)     New onset atrial fibrillation (H)     Chest pain      Current Medications and Allergies:  See printed Medication Report.    Care Coordination Start Date: 1/25/2019   Frequency of Care Coordination: 2 weeks   Form Last Updated: 01/25/2019

## 2019-01-25 NOTE — PROGRESS NOTES
Clinic Care Coordination Contact    Clinic Care Coordination Contact  OUTREACH    Referral Information:  Referral Source: IP Report    Primary Diagnosis: Cardiovascular - other    Chief Complaint   Patient presents with     Clinic Care Coordination - Post Hospital     Clinic Care Coordination RN        Pound Utilization: [facility] Admission---1/22-1/24/2019-Chest pain for 3 days  pacemaker lead misplaced   revision of pacemaker lead performed   Clinic Utilization  Difficulty keeping appointments:: No  Compliance Concerns: No  No-Show Concerns: No  No PCP office visit in Past Year: No  Utilization    Last refreshed: 1/24/2019  4:06 PM:  Hospital Admissions 3           Last refreshed: 1/24/2019  4:06 PM:  ED Visits 0           Last refreshed: 1/24/2019  4:06 PM:  No Show Count (past year) 4              Current as of: 1/24/2019  4:06 PM              Clinical Concerns:  Current Medical Concerns:  Patient denies any further chest discomfort   Patient plan is to start moving around the apartment and will not lift arm above her head per instructions      Education Provided to patient: COPD and Heart Failure action plans mailed to patient today    Pain  Pain (GOAL):: No  Health Maintenance Reviewed: Due/Overdue   Health Maintenance Due   Topic Date Due     HF ACTION PLAN Q3 YR  [birth year]31     URINE DRUG SCREEN Q1 YR  12/05/1946     ZOSTER IMMUNIZATION (2 of 3) 12/25/2012     COPD ACTION PLAN Q1 YR  10/23/2015       Clinical Pathway: None    Medication Management:  Reviewed by triage call today      Functional Status:  Dependent ADLs:: Independent  Dependent IADLs:: Independent  Bed or wheelchair confined:: No  Mobility Status: Independent  Fallen 2 or more times in the past year?: No    Living Situation:  Type of residence:: Apartment    Diet/Exercise/Sleep:  Diet:: No added salt  Inadequate nutrition (GOAL):: No  Food Insecurity: No  Tube Feeding: No  Inadequate activity/exercise (GOAL)::  No  Significant changes in sleep pattern (GOAL): No         Psychosocial:  Informal Support system:: Children     Financial/Insurance:   Financial/Insurance concerns (GOAL):: No    Community Resources: None  Supplies used at home:: None       Advance Care Plan/Directive  Advanced Care Plans/Directives on file:: Yes  Type Advanced Care Plans/Directives: DNR/DNI    Referrals Placed: None     Goals:   Goals        General    Monitoring (pt-stated)     Notes - Note created  1/25/2019 11:15 AM by Sara Mcghee RN    Goal Statement: I will monitor for any signs of surgical infection  Measure of Success: My incision will be healed   Supportive Steps to Achieve: I will seek medical help if surgical site has increased rudeness swelling drainage or fever   I will concentrate on moving around the apartment and will not lift arm above her head   Barriers: No barriers identified   Strengths: supportive daughter   Date to Achieve By: 2/8/2019  Patient expressed understanding of goal: Yes                 Patient/Caregiver understanding: Patient expresses good understanding of discharge instructions     Outreach Frequency: 2 weeks  Future Appointments              In 1 week HUANG D[facility]    In 1 week Torri Fisher MD [facility]    In 2 weeks Claudia Lowe MD [facility]          Plan:   Patient will seek medical help if chest pain or any signs of the surgical infection  Patient will start moving around the apartment to regain strength  Patient will keep PCP and Cardiology future appointments  CC will meet patient face to face at PCP visit 2/4/2019    Sara Mcghee RN / Clinical Care Coordinator     [facility]   [email or web address]  Office :  [phone] / Fax :  [phone]

## 2019-01-25 NOTE — LETTER
[place] CARE COORDINATION  [address]  [facility] [address]  January 25, 2019    Jennifer Bob  [address]     [facility] [address]      Dear Jennifer,    I am a clinic care coordinator who works with Torri Fisher MD, MD at [facility] . I wanted to thank you for spending the time to talk with me.  I wanted to introduce myself and provide you with my contact information so that you can call me with questions or concerns about your health care. Below is a description of clinic care coordination and how I can further assist you.     The clinic care coordinator is a registered nurse and/or  who understand the health care system. The goal of clinic care coordination is to help you manage your health and improve access to the [facility] system in the most efficient manner. The registered nurse can assist you in meeting your health care goals by providing education, coordinating services, and strengthening the communication among your providers. The  can assist you with financial, behavioral, psychosocial, chemical dependency, counseling, and/or psychiatric resources.    Please feel free to contact me at [phone], with any questions or concerns. We at [facility] are focused on providing you with the highest-quality healthcare experience possible and that all starts with you.     Sincerely,     Sara Mcghee RN / Clinical Care Coordinator     [facility]    [facility]   [email or web address]  Office :  [phone] / Fax :  [phone]      Enclosed: I have enclosed a copy of the Complex Care Plan. This has helpful information and goals that we have talked about. Please keep this in an easy to access place to use as needed.

## 2019-02-04 NOTE — PROGRESS NOTES
SUBJECTIVE:   Jennifer Bob is a 87 year old female who presents to clinic today for the following health issues:      Hospital Follow-up Visit:    Hospital/Nursing Home/IP Rehab Facility: [facility]  Date of Admission: 1/22/2019  Date of Discharge: 1/24/2019  Reason(s) for Admission: EP Pacemaker            Problems taking medications regularly:  None       Medication changes since discharge: yes        Problems adhering to non-medication therapy:  None    Summary of hospitalization:  [facility] discharge summary reviewed  Diagnostic Tests/Treatments reviewed.  Follow up needed: PCP, cardiology   Other Healthcare Providers Involved in Patient s Care:         Specialist appointment - cardiology  Update since discharge: improved.      Post Discharge Medication Reconciliation: discharge medications reconciled and changed, per note/orders (see AVS).  Plan of care communicated with patient and her daughter     Coding guidelines for this visit:  Type of Medical   Decision Making Face-to-Face Visit       within 7 Days of discharge Face-to-Face Visit        within 14 days of discharge   Moderate Complexity 56989 [id number]   High Complexity 61130 [id number]          She was hospitalized on 1/15/2019 for new onset atrial fibrillation with RVR, tachybradycardia cardia syndrome and had a pacemaker placed.  She was started on metoprolol 50 mg twice daily apixaban.  Her hydralazine and amlodipine were held given the plan to increase her beta-blocker.  Rate and blood pressure were well controlled at the time of discharge.  She was again admitted on sharp left-sided chest pain for 3 days.  The chest showed possible pacemaker into the pericardial space.  She underwent revision of RV pacemaker lead.  Apixiban was held prior to the procedure and restarted post procedure.    Today she reports some increase in wheezing, shortness of breath, and cough with sputum production over the last several days.  She denies  "any fever.  She denies any weight gain.  She has not had much appetite since she was in the hospital last.  She denies any chest pain.  She does use her albuterol and gets relief.  Her daughter asks if she can get a nebulizer prescription.      Prior hospital discharge summary on 1/24/2019:  \"      Discharge Diagnoses/Problem Oriented Hospital Course (Providers):    Jennifer Bob was admitted on 1/22/2019 by Lilian Marino MD and I would refer you to their history and physical.  The following problems were addressed during her hospitalization:  Jennifer Bob is a 87 year old female who was admitted on 1/22/2019.  Ms. Jennifer Bob is a delightful 87-year-old  lady with a past medical history notable for coronary artery disease, status post CABG and PCI, CVA, hypertension, dyslipidemia, COPD, polymyalgia rheumatica, anxiety, chronic kidney disease stage III, low back pain, persistent atrial fibrillation, and tachybrady syndrome, status post permanent pacemaker on 01/18/2019 who has presented with intermittent sharp left-sided chest pain for 3 days.  CT chest showed possible pacemaker lead displacement in to periardial space      1.  Pleuritic and positional Chest pain  due to pacemaker RV  Lead subacute perforation in to pericardial space with no evidence of pericardial effusion or tamponade :  STAT echo showed no pericardial effusion on admission   hgb stayed stable from 12.9 to 12.6 to 12.7 this AM  Remained hemodynamically stable throughout hospitalization   EP consulted and she underwent   Uneventful revision of RV pacemaker lead. Remained hemodynamically stable throughout the procedure on 1/23 .              elliquis held on admission to restarted post procedure    Post procedure echo was negative for pericardial effusion and EF was 30-35%  Trop peaked  at 0.08. Trop elevation due to the lead perforation and underlying CHF   2.  Ischemic cardiomyopathy with acute on chronic systolic " CHF exacerbation :    The most recent LV ejection fraction was 27% on 01/15/2019. Echo also showed global hypokinesis, mild to moderate tricuspid regurgitation and moderately reduced RV systolic function.  BNP is elevated at 4503, however, the patient does not appear to be volume overloaded.  She will continue with prior to admission metoprolol 50 mg p.o. b.i.d., and losartan 100 mg p.o. Daily.     Restarted lasix 20mg PO daily   Has some bilateral crackles on exam today. So extra 20mg IV one dose lasix given today    3.  Hypertension:.   She will continue with prior to admission metoprolol 50 mg p.o. b.i.d., Losartan 100 mg p.o. Daily, and Norvasc 10 mg p.o. daily.  I will have IV labetalol available p.r.n.   4.  Dyslipidemia.       She will continue with prior to admission Lipitor 40 mg p.o. Daily.      5.  Acute kidney injury on chronic kidney disease,         stage III:  Baseline creatinine is around 1.  Creatinine on admission  1.3.  held her prior to admission furosemide on admission .   continue cautiously her prior to admission losartan.  Notably,  patient has received contrast in ED for CT.  avoid NSAIDs or other nephrotoxins.      Cr stable at 1.04 today . Restarted lasix   6.  Chronic obstructive pulmonary disease:  Stable.  She will continue with prior to admission p.r.n. albuterol inhaler.   7.  Polymyalgia rheumatica.  She will continue with prior to admission prednisone 2 mg p.o. daily.   8.  History of CVA:  She will continue on prior to admission Lipitor.  Eliquis restarted today .   9.  Anxiety:  She will continue with prior to admission Lexapro.     10.  Persistent atrial fibrillation with tachybrady syndrome.  The patient is status post permanent pacemaker on 01/18.  She will continue with prior to admission metoprolol 50 mg p.o. b.i.d.   The patient will be kept on telemetry.   LEANN restarted    HR well controlled   11.  Deep venous thrombosis prophylaxis: LEANN      2.  Code status:  It  "was discussed with the patient in the presence of her daughter and she requested to stay DNR/DNI.         13.  Disposition: home today            Zoya Winters MD  [phone] (P)     \"  Problem list and histories reviewed & adjusted, as indicated.  Additional history: as documented    Patient Active Problem List   Diagnosis     Anxiety     Low back pain     Left hip pain     ASCVD (arteriosclerotic cardiovascular disease)     Incontinence of urine     Hypertension goal BP (blood pressure) < 140/90     Seasonal allergies     Myocardial infarction (H)     DDD (degenerative disc disease), lumbar     Transient cerebral ischemia     CKD (chronic kidney disease) stage 3, GFR 30-59 ml/min (H)     Corns and callosities     Health Care Home     Spinal stenosis, lumbar region, without neurogenic claudication     Synovial cyst of lumbar facet joint     Acquired spondylolisthesis     Lumbar radicular pain     Stroke (H)     Hypertension     Hyperlipidemia LDL goal <70     Late effects of CVA (cerebrovascular accident)     COPD (chronic obstructive pulmonary disease) (H)     RA (rheumatoid arthritis) (H)     Gout     Chronic pain syndrome     Heart failure (H)     Chronic diastolic heart failure (H)     PMR (polymyalgia rheumatica) (H)     New onset atrial fibrillation (H)     Chest pain     Past Surgical History:   Procedure Laterality Date     abdominal abscess      at incisional site after kristine     adominal hernia repair      had mesh placed, then allergic so it was removed and she wears a girdle     CHOLECYSTECTOMY       CORONARY ARTERY BYPASS  1992     EP PACEMAKER N/A 1/18/2019    Procedure: EP Pacemaker;  Surgeon: Serafin Llamas MD;  Location:  [facility]     EP PACEMAKER Left 1/23/2019    Procedure: EP Pacemaker;  Surgeon: Ran Hodges MD;  Location:  [facility]       Social History     Tobacco Use     Smoking status: Former Smoker     Smokeless tobacco: Never Used     Tobacco " comment: quit smoking in 1983   Substance Use Topics     Alcohol use: No     History reviewed. No pertinent family history.      Current Outpatient Medications   Medication Sig Dispense Refill     albuterol (PROVENTIL) (2.5 MG/3ML) 0.083% neb solution Take 1 vial (2.5 mg) by nebulization every 6 hours as needed for shortness of breath / dyspnea or wheezing 60 vial 3     amLODIPine (NORVASC) 10 MG tablet Take 1 tablet (10 mg) by mouth daily 90 tablet 1     apixaban ANTICOAGULANT (ELIQUIS) 2.5 MG tablet Take 1 tablet (2.5 mg) by mouth 2 times daily 180 tablet 0     atorvastatin (LIPITOR) 40 MG tablet Due for appt in March, one tab daily 90 tablet 3     Cholecalciferol (VITAMIN D3 PO) Take 1,000 Units by mouth daily       docusate sodium (COLACE) 100 MG capsule Take 1 capsule by mouth 2 times daily        escitalopram (LEXAPRO) 10 MG tablet Take 10 mg by mouth daily       fluticasone (FLONASE) 50 MCG/ACT nasal spray Spray 1 spray into both nostrils daily as needed for rhinitis or allergies       furosemide (LASIX) 20 MG tablet TAKE ONE TABLET BY MOUTH EVERY DAY 90 tablet 3     losartan (COZAAR) 100 MG tablet Take 1 tablet (100 mg) by mouth daily 90 tablet 3     metoprolol tartrate (LOPRESSOR) 50 MG tablet Take 1 tablet (50 mg) by mouth 2 times daily 180 tablet 0     Multiple Vitamins-Minerals (MULTIVITAMIN OR) Take 1 tablet by mouth daily        order for DME Equipment being ordered: Nebulizer with mask and tubing 1 Units 0     predniSONE (DELTASONE) 1 MG tablet Take 2 mg by mouth daily       predniSONE (DELTASONE) 20 MG tablet Take 40 mg by mouth daily for 5 days. 10 tablet 0     Saline (OCEAN NASAL SPRAY NA) Administer 1 spray in each nostril up to two times daily as needed       sodium chloride-sodium bicarb (CLASSIC NETI POT SINUS WASH) 2300-700 MG KIT Mix 1 packet in Neti Pot and administer in each nostril daily as needed       tiotropium (SPIRIVA) 18 MCG inhaled capsule Inhale 1 capsule (18 mcg) into the lungs  daily 90 capsule 3     TRAMADOL HCL PO Take 50 mg by mouth every 6 hours as needed for moderate to severe pain       VENTOLIN  (90 BASE) MCG/ACT Inhaler INHALE 2 PUFFS INTO THE LUNGS EVERY 6 HOURS AS NEEDED 18 g 9     BETA BLOCKER NOT PRESCRIBED, INTENTIONAL, Beta Blocker not prescribed intentionally due to COPD, shortness of breath with atenolol and lopressor  0     Allergies   Allergen Reactions     Adhesive Tape      blisters     Atenolol      SOB     Lopressor Hct      SOB     Recent Labs   Lab Test 01/23/19  0529 01/22/19  1816 01/22/19  1812  01/15/19  1150 09/10/18  0724  07/08/18  0323  05/02/18  0936  02/09/17  1239 02/09/17  0440  04/24/14  2221   A1C  --   --   --   --   --  5.6  --  5.9*  --   --   --   --   --   --  5.6   LDL  --   --   --   --   --  62  --  57  --  67  --  66  --    < >  --    HDL  --   --   --   --   --  62  --  69  --  72  --  87  --    < >  --    TRIG  --   --   --   --   --  89  --  40  --  71  --  80  --    < >  --    ALT  --   --   --   --   --   --   --   --   --  29  --  56* 49   < >  --    CR 1.04  --  1.25*   < > 0.96 0.97   < > 0.90   < > 0.88   < >  --  0.93   < >  --    GFRESTIMATED 48* 39* 39*   < > 53* 54*   < > 59*   < > 61   < >  --  57*  47*   < >  --    GFRESTBLACK 56* 47* 45*   < > 62 66   < > 72   < > 73   < >  --  69  57*   < >  --    POTASSIUM 4.5  --  4.6   < > 4.3  --    < > 4.0   < > 3.9   < >  --  3.8   < >  --    TSH  --   --   --   --  2.18  --   --   --   --   --   --  1.66  --    < >  --     < > = values in this interval not displayed.      BP Readings from Last 3 Encounters:   02/04/19 148/84   01/24/19 151/89   01/19/19 130/72    Wt Readings from Last 3 Encounters:   02/04/19 53.5 kg (118 lb)   01/24/19 55.6 kg (122 lb 9.6 oz)   01/19/19 53.4 kg (117 lb 11.2 oz)           Reviewed and updated as needed this visit by clinical staff  Tobacco  Allergies  Meds  Med Hx  Surg Hx  Fam Hx  Soc Hx      Reviewed and updated as needed this visit by  Provider         ROS:  As above     OBJECTIVE:     /84   Pulse 87   Temp 97.7  F (36.5  C) (Oral)   Wt 53.5 kg (118 lb)   SpO2 95%   BMI 21.58 kg/m    Body mass index is 21.58 kg/m .  GENERAL: healthy, alert and no distress  RESP: lungs with slight wheezes, no crackles or rhonchi; airflow to bases  CV: irregular, normal S1 S2, no S3 or S4, no murmur, click or rub, no peripheral edema      Diagnostic Test Results:  none     ASSESSMENT/PLAN:            1.  Pleuritic and positional Chest pain  due to pacemaker RV  Lead subacute perforation in to pericardial space with no evidence of pericardial effusion or tamponade   Chest pain resolved. Cardiology follow up scheduled on 2/14/19 with Dr. Lowe.     STAT echo showed no pericardial effusion on admission   hgb stayed stable from 12.9 to 12.6 to 12.7 this AM  Remained hemodynamically stable throughout hospitalization   EP consulted and she underwent   Uneventful revision of RV pacemaker lead. Remained hemodynamically stable throughout the procedure on 1/23 .              elliquis held on admission to restarted post procedure    Post procedure echo was negative for pericardial effusion and EF was 30-35%  Trop peaked  at 0.08. Trop elevation due to the lead perforation and underlying CHF     2.  Ischemic cardiomyopathy with acute on chronic systolic CHF exacerbation :    Weight has remained stable. No changes today. Follow up planned with cardiology.  Continues metoprolol 50 mg twice daily, Lasix 20 mg daily and losartan 100 mg daily    The most recent LV ejection fraction was 27% on 01/15/2019. Echo also showed global hypokinesis, mild to moderate tricuspid regurgitation and moderately reduced RV systolic function.  BNP is elevated at 4503, however, the patient does not appear to be volume overloaded.  She will continue with prior to admission metoprolol 50 mg p.o. b.i.d., and losartan 100 mg p.o. Daily.   Restarted lasix 20mg PO daily   Has some bilateral  crackles on exam today. So extra 20mg IV one dose lasix given today      3.  Hypertension:.   [place] hypertension.  At home pressures have remained normal.  Continues metoprolol 50 mg twice daily, losartan 100 mg daily, amlodipine 10 mg daily, furosemide 20 mg daily.  Remains off hydralazine.    She will continue with prior to admission metoprolol 50 mg p.o. b.i.d., Losartan 100 mg p.o. Daily, and Norvasc 10 mg p.o. daily.  I will have IV labetalol available p.r.n.       4.  Dyslipidemia.       She will continue with prior to admission Lipitor 40 mg p.o. Daily.      5.  Acute kidney injury on chronic kidney disease,    Repeat BMP today.      stage III:  Baseline creatinine is around 1.  Creatinine on admission  1.3.  held her prior to admission furosemide on admission .   continue cautiously her prior to admission losartan.  Notably,  patient has received contrast in ED for CT.  avoid NSAIDs or other nephrotoxins.      Cr stable at 1.04 today . Restarted lasix     6.  Chronic obstructive pulmonary disease with exacerbation.  Continues to use albuterol with relief.  I prescribed a nebulizer with albuterol solution today.  Will start Spiriva as well.  She will start a short course of prednisone 40 mg daily for 5 days.  She will let me know if symptoms are not improving or if they are worsening.  If worsening, would recommend ER visit.    7.  Polymyalgia rheumatica.  She will continue with prior to admission prednisone 2 mg p.o. daily.   8.  History of CVA:  She will continue on prior to admission Lipitor. continues eliquis.   9.  Anxiety:  She will continue with prior to admission Lexapro.     10.  Persistent atrial fibrillation with tachybrady syndrome.  The patient is status post permanent pacemaker on 01/18.  She will continue with prior to admission metoprolol 50 mg p.o. b.i.d.  Continues eliquis.          Patient Instructions   1) You should have your kidney function rechecked and it is okay to do that at the  time of your visit with Dr. Lowe.   2) Start the prednisone 40mg daily for 5 days for your COPD and let me know if you are not improving or are getting worse. You can also  the nebulizer machine.   3) Start the spiriva inhaler daily.   4) Continue your current medications (I sent refills).         Torri Fisher M.D.        [facility]

## 2019-02-04 NOTE — PATIENT INSTRUCTIONS
1) You should have your kidney function rechecked and it is okay to do that at the time of your visit with Dr. Lowe.   2) Start the prednisone 40mg daily for 5 days for your COPD and let me know if you are not improving or are getting worse. You can also  the nebulizer machine.   3) Start the spiriva inhaler daily.   4) Continue your current medications (I sent refills).

## 2019-02-06 NOTE — TELEPHONE ENCOUNTER
"Patient calling in to report she thinks she is having SE from Eliquis and states \" I did not take it today and I want to switch to warfarin\". I inquired about her side effects, she states she started wheezing about 5 hours after she took it and felt tightness in her throat. She is feeling fine now. I reviewed with Dr Rojo. He sees no concerns with her switching to warfarin. Patient stated she would prefer to go to PCP office for INRs due to travel/parking. I will contact Dr Fisher and ask how they would like to proceed.   Message will be sent to Dr Fisher and the INR clinic at [facility] will contact patient. I called pt to update her. LSchaeppi<RN  "

## 2019-02-06 NOTE — TELEPHONE ENCOUNTER
Reason for Call: Request for an order or referral:    Order or referral being requested: INR referral    Date needed: as soon as possible    Has the patient been seen by the PCP for this problem? NA    Additional comments: BRAYDEN Amezcua called on pt's behalf to see if she can transfer to our INR clinic instead. Writer was told by Haily Mcleod to send an encounter to provider to place an order for her to switch. She would like to switch because clinic is closer to her home and she wants to switch her medication.     Goldie RN: [phone]    Call taken on 2/6/2019 at 3:28 PM by Krystle Wong

## 2019-02-06 NOTE — TELEPHONE ENCOUNTER
I reviewed the side effects listed for Eliquis and respiratory symptoms are not listed under common side effects (anaphylasix is, but would think that would be more acute, obvious presentation). When we visited two days ago, we thought it may have been a flare of COPD based on symptoms at that time and I gave her prednisonet. If she is not noticing improvement, I am concerned about other possibilities including pneumonia. Please let her know based on the symptoms described with no improvement on prednisone I would recommend ED evaluation. I do not think that we should wait to see if changing Eliquis to coumadin makes a difference. I would recommend discussing changing to coumadin with her cardiologist who will be managing her anticoagulation if it is ultimately felt that her symptoms are due to Eliqius.  Given her symptoms, I think she should be seen in the ER today.     Torri Fisher M.D.

## 2019-02-06 NOTE — TELEPHONE ENCOUNTER
2/4/19 was last OV.    Pt called in with Eliquis concern.    Pt has been on the med for 2 weeks.    She reports the following sx for the last 2 weeks-  Tightness in chest.  Wheezing is bad.  Dizzy.  Trouble breathing.  4-5 hours after taking the med, she feels hot and flushed.    She normally takes the med at 0930 am.  Pt is wanting to try coumadin instead.    Routing to pcp. Pt is needing a response today.  She will be available for clinic callback.  BRAYDEN Hudson

## 2019-02-06 NOTE — TELEPHONE ENCOUNTER
"I gave pt this message.  She was not going to go to the ER. \" I won't go back to the hosp.\"  Pt was \"just wanting coumadin prescribed.\"  She pushed back on ER recommendation. Pt declined this recommendation.      I huddled with Dr. Fisher.  PT will need to call her cardiologist about a med change from eliquis to coumadin.  Dr. Fisher would not be doing that.    I talked to pt again.   She is not having chest tightness, wheezing, of difficulty breathing now.  She didn't take the Eliquis this morning. So, she says she is feeling fine.    I did tell pt that if these sx occur, she should go to the ER.  I also told pt that she should call her cardiologist now to discuss the desired med change to coumadin.  She said she would. Pt had given me the cardiologist name of Dr. Levin at [phone].    LUCIANA as f/u with PCP.  BRAYDEN Hudson          "

## 2019-02-07 NOTE — TELEPHONE ENCOUNTER
Writer spoke, again with Donna from cardiology and informed team that patient will likely refuse any further Eliqiuis doses. Donna will make sure Dr. Rojo is aware of patient's choice to not bridge between AC agents despite the risks.     Writer attempted to reach patient, home line busy. Writer left detailed instructions for daughter, Esthela, who is present with patient in the home. Instructions include: start warfarin 2.5 mg daily ALONG with Eliquis. Please STOP Eliquis after the third dose of warfarin is given. Check INR on 2/11 as scheduled. Rx sent to  pharmacy.    Triage team, please continue to attempt to reach patient/daughter to ensure directions are understood. Dr. Rojo AGREES with recommendations to go to the ER today. Sandy Young, GLENNN, RN

## 2019-02-07 NOTE — TELEPHONE ENCOUNTER
"Writer spoke with Donna from Dr. Rojo office earlier this afternoon. As discussed, patient would like to transition to Warfarin as she is having s/e to Eliquis. Since pacemaker placement and starting Eliquis, patient has not felt herself or bounced back from hospitalization (low appetite, fatigue, and below sx).    As reported to triage on 2/6, patient c/o \"tightness in the chest, wheezing, dizziness, trouble breathing.\" Patient reports these sx have been ongoing prior to pacemaker placement, but have worsen since discharge. Patient reports diaphoresis and hot flashes about 4-5 hours after taking Eliqius as well.    Writer again advised both patient and daughter to go to the ER. Daughter is willing to bring patient, but pt continues to refuse. Writer reviewed s/s of afib, heart attack and stroke with patient and daughter and advised very close monitoring.     In regards to AC management, if Dr. Rojo is willing to approve the initiation of Warfarin,  [facility] will manage patient's therapy under Dr. Fisher or Dr. Lowe (appt with Cardiology on 2/14/19). Tentative INR appt scheduled for Monday 2/11.      Per up-to-date, \"Apixaban to warfarin - Prescribing information suggests stopping apixaban and providing a parenteral agent during warfarin initiation because the INR cannot be monitored adequately during administration of a direct factor Xa inhibitor.\" Alternatively, patient could start warfarin and stop apixaban 3 days later. HOWEVER, patient is REFUSING BOTH OPTIONS. ONLY WILLING TO START WARFARIN. Writer reviewed the risks involved in AC transition without proper bridging, patient verbalized understanding.     Routing call to Dr. Rojo for review. Thank you! Sandy Young, GLENNN, RN        "

## 2019-02-07 NOTE — TELEPHONE ENCOUNTER
Call from Sandy from the INR clinic at [facility]. She did communicate with Dr Rojo re: transitioning to Warfarin from Eliquis. She is calling to inform that the pt refuses to continue the Eliquis, but will be starting the Warfarin today.  Will notify Dr Rojo. SueLangenbrunnerRN

## 2019-02-07 NOTE — TELEPHONE ENCOUNTER
Oxygen order placed 1 litre via nasal canula. Please fax where it needs to go   Further recommendations if any to come from PCP  Please have pcp review on return  Without testing hard to know what level recommended  Getting too much oxygen in COPD can be harmful too as it can eliminate the drive to breathe   If gets worse despite or inspite of this to go to the ER to be appropriately evaluated

## 2019-02-07 NOTE — TELEPHONE ENCOUNTER
When I saw Ms. Bob in the hospital in January she was on low-dose apixaban.    Now I see that I need to sign an order for warfarin and INR clinic referral.    I did sign the INR Clinic referral.    EP RN can you please call pt, is she taking warfarin?  Does she have a prescription?  Why was apixaban stopped?  Was it d/t high cost?  It is probably safer for her...    DI

## 2019-02-07 NOTE — TELEPHONE ENCOUNTER
I am routing pended INR Clinic referral/order to cardiologist and cardiology nurse.  Needs referral signed but I also do not see that Warfarin has been started and this would be managed by cardiology - can have the INR checks done here.    Called cardiology nurse at number listed below and had to leave a message.  Zara Mcarthur RN

## 2019-02-07 NOTE — TELEPHONE ENCOUNTER
Phone call to  DME     Explained that order was placed for oxygen -   Advised testing required to have insurance cover oxygen is NOT in chart     Family would like to discuss private pay - spoke to Maria Del Rosario who will call and discuss this with patient/daughter     Emmy Pinto, Registered Nurse   [facility]

## 2019-02-07 NOTE — TELEPHONE ENCOUNTER
She can certainly do INRs here. Since the oversight is by cardiology, however, I believe that she needs the order to come from the cardiologist. Please let the nurse at cardiology clinic know that she can have INR done at our clinic and to send an order. Torri Fisher M.D.

## 2019-02-07 NOTE — TELEPHONE ENCOUNTER
When I saw Ms. Bob in the hospital in January she was on low-dose apixaban.     Now I see that I need to sign an order for warfarin and INR clinic referral.    I did sign the INR Clinic referral.     EP RN can you please call pt, is she taking warfarin?  Does she have a prescription?  Why was apixaban stopped?  Was it d/t high cost?  It is probably safer for her...     DI      2-7-19: Re: above, Chrissy spoke with the pt yesterday and she states she is having SE and wants to change to warfarin. She will have her INR followed at the [facility]. They are requesting that we order the initial dosing of Warfarin, discontinuing the Apixaban.  (see Chrissy's note from 2-6-19. )She did not take the Apixaban yesterday. They will schedule the pt in their INR clinic and manage. SueLangenbrunnerRN

## 2019-02-07 NOTE — TELEPHONE ENCOUNTER
Provider of the day please review  -     If willing to place orders for oxygen please place in epic and will contact  DME to discuss private pay with patient/daughter   Thank you,     Phone call from daughter Pushpa     Patient was seen by Dr. Fisher on 2/4/19 for COPD exacerbation     Daughter reports that patient has become more short of breath and is requesting oxygen order -   Writer explained that in order to have oxygen covered by insurance there would need to be specific testing completed and does not appear that this has been completed     Writer advised that if breathing has worsened patient should present to the ER and patient / daughter have declined this recommendation     Daughter states patient is NOT coming in today and would like an order placed - she will discuss with DME company cost of private pay if unable to get insurance to cover     Routing to provider of the day as pcp is out of the office     Emmy Pinto, Registered Nurse   [facility]

## 2019-02-07 NOTE — TELEPHONE ENCOUNTER
Phone call from patient's daughter Pushpa C2C on file     Calling to check status of this request for medication change     Advised Pushpa that message was sent to cardiology to address - Pushpa nor her mother have received a call back to discuss plan     Routing back to cardiology to follow up with patient     Emmy Pinto, Registered Nurse   [facility]

## 2019-02-08 NOTE — TELEPHONE ENCOUNTER
Writer reached patient and reviewed Warfarin plan as outlined below. Patient reports improved sx: no longer wheezing, SOB and tightness in chest have subsided substantially, and hot flashes have stopped completely. Sandy Young, GLENNN, RN

## 2019-02-11 NOTE — PROGRESS NOTES
ANTICOAGULATION INITIAL CLINIC VISIT    Patient Name:  Jennifer Bob  Date:  2019  Referred by: Dr. Rojo  Contact Type:  Face to Face with Daughter, Esthela    SUBJECTIVE:  Coumadin education was completed today.  Topics covered include:  -Introduction to coumadin  -Proper Administration  -INR Testing  -Sign/Symptoms of Bleeding  -Signs/Symptoms of Clot Formation or Stroke  -Dietary Intake of Vitamin K  -Drug Interactions  -Anticoagulation Identification (bracelet, necklace or wallet card)  -Future Surgery  -Effects of Alcohol, Tobacco, and Exercise on Coumadin    Coumadin Education Booklet and Coumadin Identification Wallet Card were given to the patient.       Patient Findings     Positives:   Initiation of therapy          OBJECTIVE    INR Protime   Date Value Ref Range Status   2019 1.7 (A) 0.86 - 1.14 Final       ASSESSMENT / PLAN  INR assessment SUB    Recheck INR In: 3 DAYS    INR Location Clinic      Anticoagulation Summary  As of 2019    INR goal:   2.0-3.0   TTR:   --   INR used for dosin.7! (2019)   Warfarin maintenance plan:   No maintenance plan   Full warfarin instructions:   : 2.5 mg; : 2.5 mg; : 1.25 mg; Otherwise No maintenance plan   Next INR check:   2019   Target end date:       Indications    New onset atrial fibrillation (H) [I48.91]  Stroke (H) [I63.9]             Anticoagulation Episode Summary     INR check location:       Preferred lab:       Send INR reminders to:   [facility]    Comments:         Anticoagulation Care Providers     Provider Role Specialty Phone number    Bridget Rojo MD Referring Clinical Cardiac Electrophysiology [phone]    Torri Fisher MD [facility] [phone]            See the Encounter Report to view Anticoagulation Flowsheet and Dosing Calendar (Go to Encounters tab in chart review, and find the Anticoagulation Therapy Visit)    Writer advised patient to continue 2.5  mg today and tomorrow except Wednesday drop dose to 1.25 mg given already fast response to Warfarin dosing. Recheck Thursday 2/14 when back in clinic seeing Dr. Lowe for cardiology f/u.     Patient made aware if signs of clotting (pain, tenderness, swelling, or color change in any extremity) AND/OR bleeding occur (nosebleeds, bleeding gums, bruising, or blood in stool or urine) to notify provider & seek medical attention. If severe symptoms develop, such as major bleeding, chest pain, shortness of breath, fall, trauma or s/s of stroke, patient to call 911 immediately.     Dosage adjustment made based on physician directed care plan.    Sandy Young RN

## 2019-02-12 NOTE — PROGRESS NOTES
Clinic Care Coordination Contact    Clinic Care Coordination Contact  OUTREACH    Referral Information:  Referral Source: IP Report    Primary Diagnosis: Cardiovascular - other    Chief Complaint   Patient presents with     Clinic Care Coordination - Follow-up     Clinic Care Coordination RN         [place] Utilization:[facility] Admission---1/22-1/24/2019-Chest pain for 3 days  pacemaker lead misplaced   revision of pacemaker lead performed   Clinic Utilization  Difficulty keeping appointments:: No  Compliance Concerns: No  No-Show Concerns: No  No PCP office visit in Past Year: No  Utilization    Last refreshed: 2/12/2019 11:09 AM:  Hospital Admissions 3           Last refreshed: 2/12/2019 11:09 AM:  ED Visits 0           Last refreshed: 2/12/2019 11:09 AM:  No Show Count (past year) 4              Current as of: 2/12/2019 11:09 AM              Clinical Concerns:  Current Medical Concerns:  Patient reports no further chest pain.  Patient has a follow up INR and Cardiology appointment 2/14/2019--     Pain  Pain (GOAL):: No  Health Maintenance Reviewed:    Clinical Pathway: None    Medication Management:  Not discussed today      Functional Status:  Dependent ADLs:: Independent  Dependent IADLs:: Independent  Bed or wheelchair confined:: No  Mobility Status: Independent    Living Situation:  Type of residence:: Apartment    Diet/Exercise/Sleep:  Diet:: No added salt  Inadequate nutrition (GOAL):: No  Food Insecurity: No  Tube Feeding: No  Inadequate activity/exercise (GOAL):: No  Significant changes in sleep pattern (GOAL): No    Transportation:           Psychosocial:  Mental health DX:: No  Mental health management concern (GOAL):: No  Informal Support system:: Children     Financial/Insurance:     Community Resources: None  Supplies used at home:: None       Advance Care Plan/Directive  Advanced Care Plans/Directives on file:: Yes  Type Advanced Care Plans/Directives: DNR/DNI    Referrals  Placed: None     Goals:   Goals        General    #1 Monitoring (pt-stated)     Notes - Note created  1/25/2019 11:15 AM by Sara Mcghee RN    Goal Statement: I will monitor for any signs of surgical infection  Measure of Success: My incision will be healed   Supportive Steps to Achieve: I will seek medical help if surgical site has increased rudeness swelling drainage or fever   I will concentrate on moving around the apartment and will not lift arm above her head   Barriers: No barriers identified   Strengths: supportive daughter   Date to Achieve By: 2/8/2019  Patient expressed understanding of goal: Yes                 Outreach Frequency: 2 weeks  Future Appointments              In 2 days  INR CLINIC [facility]    In 2 days Claudia Lowe MD [facility]    In 1 month HUANG DCR2 [facility] CL[facility]    In 2 months Torri Fisher MD [facility]          Plan:   Patient will keep INR and Cardiology appointment 2/14/2019  Patient will seek medical help if experiencing reoccurrence of chest pain   CC will follow up in 1-2 weeks     Sara Mcghee RN / Clinical Care Coordinator     [facility] [place] [facility] [place]   [email or web address]  Office :  [phone] / Fax :  [phone]

## 2019-02-14 NOTE — PATIENT INSTRUCTIONS
"You were seen today in the Cardiovascular Clinic at the [facility].     Cardiology Providers you saw during your visit: Dr. Claduia Lowe    Diagnosis:  Atrial fibrillation    Results: discussed with patient    Orders:   None    Medication Changes:   Stop metoprolol   Start digoxin 0.125 mg once a day  Restart hydralazine 50 mg twice a day      Recommendations:   None    Follow-up:   4 weeks at [facility] with pacemaker check same day       Please feel free to call me with any questions or concerns.       Addie Irving LPN     Questions and [phone].   First press #1 for the [employer] and then press #3 for \"Medical Questions\" to reach us Cardiology Nurses.      On Call Cardiologist for after hours or on weekends: [phone]   option #4 and ask to speak to the on-call Cardiologist.          If you need a medication refill please contact your pharmacy.  Please allow 3 business days for your refill to be completed.    "

## 2019-02-14 NOTE — LETTER
2/14/2019      RE: Jennifer Bob  [address]   [address]  [facility] [address]     Dear Colleague,    Thank you for the opportunity to participate in the care of your patient, Jennifer Bob, at the [facility]. Please see a copy of my visit note below.    error    I am delighted to see Jennifer Bob for follow up of atrial fibrillation.  She is here with her son in law.    As you know, the patient is a 87 year old  female with a h/o CAD s/p CABG, stroke in 2013 with mild residual right hand weakness. She was hospitalized 7/7/18 with another embolic stroke and received tPA, minimal residual deficits. A ziopatch monitor was placed prior to discharge, and showed no atrial fibrillation. I last saw her in July 2018. She had not been on a beta blocker after CABG due to advanced COPD.    She presented to [facility] 1/15/19 when her home BP cuff had shown HR 140s. In ED she was found to be in afib. She denied palpitations, chest pressure, or shortness of breath. EF found to be 27% by echo although she was in afib at the time. Nuclear stress test showed no reversible ischemia. She was noted to have pauses on telemetry on metoprolol 25 bid; with reduction of dose to 12.5 bid she had ventricular rates up to 150s. She underwent implantation of a [employer] CRT-P with RV and LV leads (no RA lead) on 1/18/19; she was discharged on apixaban and metoprolol 50 bid. She returned to [facility] ED on 1/22/19 with chest pain, found to have RV perforation by chest CT scan without effusion, hemodynamically stable. RV lead repositioning done 1/23/19. Last interrogation at [facility] 2/1/19.    SInce hospital discharge, she has c/o more dyspnea. She has no palpitations, no chest discomfort. She has no orthopnea, PND, weight changes. She did not tolerate apixaban - c/o diaphoresis, so was switched to warfarin.      The following portions of the patient's history were reviewed and updated as appropriate: allergies, current medications, past family history, past medical history, past social history, past surgical history, and the problem list.    Past Medical History:  1. CAD. CABG 1992;  MI with VF arrest, s/p PCI SVG to RCA graft 8/29/09  ([place]  2. Hypertension ~ home  - 140/150 , occasionally 160-180mmHg  3. Hyperlipidemia  4. COPD. PFT 2014 showed reduced FEV1.  5. Hernia repair  6. Cholecystectomy  7. Ischemic stroke 2013; recent hospital admission 7/7/18 with another stroke, received tPA, residual right weakness. Ziopatch post discharge showed no atrial fibrillation.  8. Peripheral arterial disease s/p superior femoral artery stent 2018  9. Atrial fibrillation, 1/2019, with RVR  10. Bradycardia with rate control - s/p [employer] CRT-P with RV and LV leads 1/18/19, complicated with RV lead microperforation requiring repositioning, no pericardial effusion    Allergies:    Allergies   Allergen Reactions     Adhesive Tape      blisters     Atenolol      SOB     Lopressor Hct      SOB       Medications:   Amlodipine 10 every day  Metoprolol tartrate 50 bid (new since hospital 1/2019)  Atorvastatin 40 every day  Calcium  Vitamin D  Lexapro 10 every day  Lasix 20 every day  Losartan 100 every day  Prednisone 2 every day  Tramadol   Warfarin  (previously on hydralazine 50 bid, discontinued in hospital)       Family History: No CAD    Psychosocial history:  reports that she has quit smoking. she has never used smokeless tobacco. She reports that she does not drink alcohol or use drugs.    Review of systems: Cardiovascular - no chest pain, palpitations, dizziness, shortness of breath, dyspnea, orthopnea, PND.    In addition,   Constitutional: No change in weight, sleep or appetite.  Normal energy.  No fever or chills  Eyes: Negative for vision changes or eye problems  ENT: No problems with ears, nose or  throat.  No difficulty swallowing.  Resp: No coughing, wheezing. More shortness of breath  GI: No nausea, vomiting,  heartburn, abdominal pain, diarrhea, constipation or change in bowel habits  : No urinary frequency or dysuria, bladder or kidney problems  Musculoskeletal: No significant muscle or joint pains  Neurologic: No headaches, numbness, tingling, weakness, problems with balance or coordination  Psychiatric: No problems with anxiety, depression or mental health  Heme/immune/allergy: No history of bleeding or clotting problems or anemia.  No allergies or immune system problems  Integumentary: No rashes,worrisome lesions or skin problems      Physical examination  Vitals: 154/86, HR 90  BMI= 21 (54 kg)    Constitutional: In general, the patient is a pleasant female in no apparent distress.    Eyes: PERRLA.  EOMI.  Sclerae white, not injected.  ENT/mouth: Normiocephalic and atraumatic.  Nares clear.  Pharynx without erythema or exudate.  Dentition intact.  No adenopathy.  No thyromegaly. Carotids +2/2 bilaterally without bruits.  No jugular venous distension.   Card/Vasc: The PMI is in the 5th ICS in the midclavicular line. There is no heave. Irregularly irregular. Normal S1, S2. No murmur, rub, click, or gallop. Pulses are normal bilaterally throughout. No peripheral edema.  Respiratory: poor breath sounds with scattered wheezes;  No ronchi, wheezes, rales.  No dullness to percussion.   GI: Abdomen is soft, nontender, nondistended. No organomegaly. No AAA.  No bruits.   Integument: No significant bruises or rashes  Neurological: The neurological examination reveal a patient who was oriented to person, place, and time.    Psych: Normal  Heme/Lymph/Immun: no significant adenopathy      I have previously reviewed the following labs/imaging:  Echo 7/7/18: EF 45-50%, basal inferior/inferolateral AK, normal RV. PASp 48mmHg. IVC normal. No effusion. EREN 58.7 ml/m2  Echo 2/9/17: EF 40-45%, concentric LVH, inferior  AK, normal RV. LAE.  Nuclear stress test 5/30/14: inferolateral HK with ischemia  Labs 7/8/18: cholesterol 134, HDL 69, LDL 57, TG 40, K 4.0, cr 0.90, hgb 11, plt 124k  CT angio head/neck 7/7/18: PHAN 40%, LIC 60%  EKG 7/8/18: sinus 60 bpm, normal MD, RBBB    I have reviewed the following data today:  CXR 1/22/19: normal heart size; RV/LV leads in good positions; emphysematous changes, no pumonary congestion  Echo 1/22/19: EF 30-35%, no effusion  Complete echo 1/15/19 (in AF rate 98 bpm):  EF 27%, inferior HK, mild to mod TR, severely dilated LA, EREN 44  Nuclear stress test 1/15/19: no ischemia    I have personally and independently reviewed the following:  Interrogation from [email or web address] 2/1/19: VVIR  bpm,  70%. Parameters stable.  EKG 1/22/19: afib with BIV pacing  Labs: 1/23/19: Cr 1.04  INR 2/14/19: 1.7      Assessment :  1. Atrial fibrillation, newly diagnosed, RVR. Persistent. She appears to be fairly asymptomatic in AFib. Appropriately anticoagulated given [id number] score of 7, although subtherapeutic on warfarin. Rate is not controlled, as evidenced by her biventricular pacing of only 70%. Rate likely contributes to worsening EF. She has severe LAE, unlikely to maintain sinus rhythm.  2. Shortness of breath. Perhaps due to metoprolol - she had not been on it previously due to her fairly advanced COPD. She is not volume overloaded.   3. CAD/CABG, worsening EF. No ischemic symptoms, no CHF. EF may improve if she can achieve >95% biventricular pacing.   4. Hypertension. Not well controlled. She had previously been on hydralazine  5. H/o CVA. Minimal residuals.    Plan:  Stop metoprolol - will assess to see her breathing better off metoprolol  Begin digoxin 0.125 mg every day to attempt rate control  Resume hydralazine 50 bid - will increase dose if needed  Suspect she might need AV node ablation for rate control    I spent a total of 40 minutes face to face with  Jennifer Bob during today's  office visit. Over 50% of this time was spent counseling the patient and/or coordinating care regarding future management options.    The patient is to return [facility] [place] device check to assess % biventricular pacing. Will see if we can transfer her Latitude remote monitor to [facility] so I can follow her rate trend. The patient understood the treatment plan as outlined above.  There were no barriers to learning.    Claudia Lowe MD

## 2019-02-14 NOTE — PROGRESS NOTES
ANTICOAGULATION FOLLOW-UP CLINIC VISIT    Patient Name:  Jennifer Bob  Date:  2019  Contact Type:  Face to Face    SUBJECTIVE:     Patient Findings     Positives:   Initiation of therapy (Pt's family verified that MTV has 25 mcg of Vit K. New unopened bottle has 50 mcg. Pt will notify [facility] when she is close to transitioning (in a month or so).), No Problem Findings           OBJECTIVE    INR Protime   Date Value Ref Range Status   2019 1.7 (A) 0.86 - 1.14 Final       ASSESSMENT / PLAN  INR assessment SUB    Recheck INR In: 4 DAYS    INR Location Clinic      Anticoagulation Summary  As of 2019    INR goal:   2.0-3.0   TTR:   --   INR used for dosin.7! (2019)   Warfarin maintenance plan:   No maintenance plan   Full warfarin instructions:   : 3.75 mg; 2/15: 3.75 mg; : 2.5 mg; : 2.5 mg; Otherwise No maintenance plan   Next INR check:   2019   Target end date:       Indications    New onset atrial fibrillation (H) [I48.91]  Stroke (H) [I63.9]             Anticoagulation Episode Summary     INR check location:       Preferred lab:       Send INR reminders to:   [facility]    Comments:   Fragile. Low appetite, no large portions of dark greens (broccoli- 2 x wk). MTV 25 mcg, new bottle 50 mcg (will start in late March/April). 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 Cardiology team: Chrissy Rojo.       Anticoagulation Care Providers     Provider Role Specialty Phone number    Bridget Rojo MD Referring Clinical Cardiac Electrophysiology [phone]    Torri Fisher MD [facility] [phone]            See the Encounter Report to view Anticoagulation Flowsheet and Dosing Calendar (Go to Encounters tab in chart review, and find the Anticoagulation Therapy Visit)    Per protocol, patient and son-in-law, Christopher, advised to increase today and tomorrow's warfarin dose by 1.25 mg to account for  continued sub-therapeutic INR level. Patient instructed to eat normally. Recheck in 4 days to ensure stability. New weekly total will be 18.75 mg (15% increase).     Patient and family made aware if signs of clotting (pain, tenderness, swelling, or color change in any extremity) AND/OR bleeding occur (nosebleeds, bleeding gums, bruising, or blood in stool or urine) to notify provider & seek medical attention. If severe symptoms develop, such as major bleeding, chest pain, shortness of breath, fall, trauma or s/s of stroke, patient to call 911 immediately.     Dosage adjustment made based on physician directed care plan.    Sandy Young RN

## 2019-02-15 NOTE — PROGRESS NOTES
I am delighted to see Jennifer Bob for follow up of atrial fibrillation.  She is here with her son in law.    As you know, the patient is a 87 year old  female with a h/o CAD s/p CABG, stroke in 2013 with mild residual right hand weakness. She was hospitalized 7/7/18 with another embolic stroke and received tPA, minimal residual deficits. A ziopatch monitor was placed prior to discharge, and showed no atrial fibrillation. I last saw her in July 2018. She had not been on a beta blocker after CABG due to advanced COPD.    She presented to [facility] 1/15/19 when her home BP cuff had shown HR 140s. In ED she was found to be in afib. She denied palpitations, chest pressure, or shortness of breath. EF found to be 27% by echo although she was in afib at the time. Nuclear stress test showed no reversible ischemia. She was noted to have pauses on telemetry on metoprolol 25 bid; with reduction of dose to 12.5 bid she had ventricular rates up to 150s. She underwent implantation of a [employer] CRT-P with RV and LV leads (no RA lead) on 1/18/19; she was discharged on apixaban and metoprolol 50 bid. She returned to [facility] ED on 1/22/19 with chest pain, found to have RV perforation by chest CT scan without effusion, hemodynamically stable. RV lead repositioning done 1/23/19. Last interrogation at [facility] 2/1/19.    SInce hospital discharge, she has c/o more dyspnea. She has no palpitations, no chest discomfort. She has no orthopnea, PND, weight changes. She did not tolerate apixaban - c/o diaphoresis, so was switched to warfarin.     The following portions of the patient's history were reviewed and updated as appropriate: allergies, current medications, past family history, past medical history, past social history, past surgical history, and the problem list.    Past Medical History:  1. CAD. CABG 1992;  MI with VF arrest, s/p PCI SVG to RCA graft 8/29/09  ([place])  2. Hypertension ~ home  -  140/150 , occasionally 160-180mmHg  3. Hyperlipidemia  4. COPD. PFT 2014 showed reduced FEV1.  5. Hernia repair  6. Cholecystectomy  7. Ischemic stroke 2013; recent hospital admission 7/7/18 with another stroke, received tPA, residual right weakness. Ziopatch post discharge showed no atrial fibrillation.  8. Peripheral arterial disease s/p superior femoral artery stent 2018  9. Atrial fibrillation, 1/2019, with RVR  10. Bradycardia with rate control - s/p [employer] CRT-P with RV and LV leads 1/18/19, complicated with RV lead microperforation requiring repositioning, no pericardial effusion    Allergies:    Allergies   Allergen Reactions     Adhesive Tape      blisters     Atenolol      SOB     Lopressor Hct      SOB       Medications:   Amlodipine 10 every day  Metoprolol tartrate 50 bid (new since hospital 1/2019)  Atorvastatin 40 every day  Calcium  Vitamin D  Lexapro 10 every day  Lasix 20 every day  Losartan 100 every day  Prednisone 2 every day  Tramadol   Warfarin  (previously on hydralazine 50 bid, discontinued in hospital)       Family History: No CAD    Psychosocial history:  reports that she has quit smoking. she has never used smokeless tobacco. She reports that she does not drink alcohol or use drugs.    Review of systems: Cardiovascular - no chest pain, palpitations, dizziness, shortness of breath, dyspnea, orthopnea, PND.    In addition,   Constitutional: No change in weight, sleep or appetite.  Normal energy.  No fever or chills  Eyes: Negative for vision changes or eye problems  ENT: No problems with ears, nose or throat.  No difficulty swallowing.  Resp: No coughing, wheezing. More shortness of breath  GI: No nausea, vomiting,  heartburn, abdominal pain, diarrhea, constipation or change in bowel habits  : No urinary frequency or dysuria, bladder or kidney problems  Musculoskeletal: No significant muscle or joint pains  Neurologic: No headaches, numbness, tingling, weakness, problems with  balance or coordination  Psychiatric: No problems with anxiety, depression or mental health  Heme/immune/allergy: No history of bleeding or clotting problems or anemia.  No allergies or immune system problems  Integumentary: No rashes,worrisome lesions or skin problems      Physical examination  Vitals: 154/86, HR 90  BMI= 21 (54 kg)    Constitutional: In general, the patient is a pleasant female in no apparent distress.    Eyes: PERRLA.  EOMI.  Sclerae white, not injected.  ENT/mouth: Normiocephalic and atraumatic.  Nares clear.  Pharynx without erythema or exudate.  Dentition intact.  No adenopathy.  No thyromegaly. Carotids +2/2 bilaterally without bruits.  No jugular venous distension.   Card/Vasc: The PMI is in the 5th ICS in the midclavicular line. There is no heave. Irregularly irregular. Normal S1, S2. No murmur, rub, click, or gallop. Pulses are normal bilaterally throughout. No peripheral edema.  Respiratory: poor breath sounds with scattered wheezes;  No ronchi, wheezes, rales.  No dullness to percussion.   GI: Abdomen is soft, nontender, nondistended. No organomegaly. No AAA.  No bruits.   Integument: No significant bruises or rashes  Neurological: The neurological examination reveal a patient who was oriented to person, place, and time.    Psych: Normal  Heme/Lymph/Immun: no significant adenopathy      I have previously reviewed the following labs/imaging:  Echo 7/7/18: EF 45-50%, basal inferior/inferolateral AK, normal RV. PASp 48mmHg. IVC normal. No effusion. EREN 58.7 ml/m2  Echo 2/9/17: EF 40-45%, concentric LVH, inferior AK, normal RV. LAE.  Nuclear stress test 5/30/14: inferolateral HK with ischemia  Labs 7/8/18: cholesterol 134, HDL 69, LDL 57, TG 40, K 4.0, cr 0.90, hgb 11, plt 124k  CT angio head/neck 7/7/18: PHAN 40%, LIC 60%  EKG 7/8/18: sinus 60 bpm, normal TN, RBBB    I have reviewed the following data today:  CXR 1/22/19: normal heart size; RV/LV leads in good positions; emphysematous  changes, no pumonary congestion  Echo 1/22/19: EF 30-35%, no effusion  Complete echo 1/15/19 (in AF rate 98 bpm):  EF 27%, inferior HK, mild to mod TR, severely dilated LA, EREN 44  Nuclear stress test 1/15/19: no ischemia    I have personally and independently reviewed the following:  Interrogation from [facility] 2/1/19: VVIR  bpm,  70%. Parameters stable.  EKG 1/22/19: afib with BIV pacing  Labs: 1/23/19: Cr 1.04  INR 2/14/19: 1.7      Assessment :  1. Atrial fibrillation, newly diagnosed, RVR. Persistent. She appears to be fairly asymptomatic in AFib. Appropriately anticoagulated given [id number] score of 7, although subtherapeutic on warfarin. Rate is not controlled, as evidenced by her biventricular pacing of only 70%. Rate likely contributes to worsening EF. She has severe LAE, unlikely to maintain sinus rhythm.  2. Shortness of breath. Perhaps due to metoprolol - she had not been on it previously due to her fairly advanced COPD. She is not volume overloaded.   3. CAD/CABG, worsening EF. No ischemic symptoms, no CHF. EF may improve if she can achieve >95% biventricular pacing.   4. Hypertension. Not well controlled. She had previously been on hydralazine  5. H/o CVA. Minimal residuals.    Plan:  Stop metoprolol - will assess to see her breathing better off metoprolol  Begin digoxin 0.125 mg every day to attempt rate control  Resume hydralazine 50 bid - will increase dose if needed  Suspect she might need AV node ablation for rate control      I spent a total of 40 minutes face to face with  Jennifer Bob during today's office visit. Over 50% of this time was spent counseling the patient and/or coordinating care regarding future management options.    The patient is to return 1 month at [facility] with device check to assess % biventricular pacing. Will see if we can transfer her Latitude remote monitor to [facility] so I can follow her rate trend. The patient understood the treatment plan as outlined  above.  There were no barriers to learning.      lCaudia Lowe MD

## 2019-02-16 PROBLEM — I48.91 ATRIAL FIBRILLATION WITH RVR (H): Status: ACTIVE | Noted: 2019-01-01

## 2019-02-16 NOTE — LETTER
Transition Communication Hand-off for Care Transitions to Next Level of Care Provider    Name: Jennifer Bob  : 1931  MRN #: [id number]  Primary Care Provider: Torri Fisher MD     Primary Clinic: [address] [place] [id number]     Reason for Hospitalization:  Acute on chronic systolic heart failure (H) [I50.23]  Atrial fibrillation with RVR (H) [I48.91]  Admit Date/Time: 2019  2:49 PM  Discharge Date: 19  Payor Source: Payor: [facility] / Plan: [employer] MEDICARE / Product Type: HMO   Readmission Assessment Measure (ROSIO) Risk Score/category: average  Reason for Communication Hand-off Referral: Multiple providers/specialties  Discharge Plan:  Discharge Needs Assessment:  Needs      Most Recent Value   Equipment Currently Used at Home  none   Other Resources  Other (see comment)   Other  -- [[facility]]      Follow-up specialty is recommended: Yes    Follow-up plan:    Future Appointments   Date Time Provider Department Center   3[facility]18/2019 10:00 AM  [facility]   2019 10:20 AM Torri Fisher MD [facility]       Any outstanding tests or procedures:        Referrals     Future Labs/Procedures    Medication Therapy Management Referral     Comments:    MTM referral reason            Patient had a hospital or ED visit in last 6 months and has more than 10   PTA or Discharge medications    Patient has 5 PTA or Discharge Medications AND one of the following   diagnoses: DM,HF,COPD,AMI DX,PULM HTN       This service is designed to help you get the most from your medications.  A specially trained pharmacist will work closely with you and your doctors  to solve any problems related to your medications and to help you get the   best results from taking them.      The Medication Therapy Management staff will call you to schedule an appointment.            Key Recommendations:  Post hospitalization follow up.    SOSA BUSTILLO RN BSN  Care Coordinator Unit  [address]  [facility]  Phone: [phone]

## 2019-02-16 NOTE — ED NOTES
[facility], [place]   ED Nurse to Floor Handoff     Jennifer Bob is a 87 year old female who speaks English and lives alone,  in a home  They arrived in the ED by ambulance from home    ED Chief Complaint: Palpitations    ED Dx;   Final diagnoses:   Atrial fibrillation with RVR (H)   Acute on chronic systolic heart failure (H)         Needed?: No    Allergies:   Allergies   Allergen Reactions     Adhesive Tape      blisters     Atenolol      SOB     Lopressor Hct      SOB   .  Past Medical Hx:   Past Medical History:   Diagnosis Date     Abdominal wall hernia     three hernias at previous incision sites, allergic to mesh so repair not repeated, wears girdle     Anxiety 1/9/2012     ASCVD (arteriosclerotic cardiovascular disease) 1/9/2012     CKD (chronic kidney disease) stage 3, GFR 30-59 ml/min (H) 1/10/2012     Colon polyp     resected     DDD (degenerative disc disease), lumbar 1/10/2012     Hyperlipidemia LDL goal <100 1/9/2012     Hyperlipidemia LDL goal <70 12/16/2013     Hypertension goal BP (blood pressure) < 140/90 1/9/2012     Incontinence of urine 1/9/2012     Left hip pain 1/9/2012     Low back pain 1/9/2012     Seasonal allergies 1/9/2012     STEMI (ST elevation myocardial infarction) (H) 8-    with VF arrest.     Stented coronary artery 8-     TIA (transient ischaemic attack) 1/10/2012      Baseline Mental status: WDL  Current Mental Status changes: at basesline    Infection present or suspected this encounter: no  Sepsis suspected: No  Isolation type: No active isolations     Activity level - Baseline/Home:  Independent  Activity Level - Current:   Stand with Assist    Bariatric equipment needed?: No    In the ED these meds were given:   Medications   furosemide (LASIX) injection 40 mg (40 mg Intravenous Given 2/16/19 9399)       Drips running?  No    Home pump  No    Current LDAs  Incision/Surgical Site 01/23/19 Left Chest (Active)  "  Number of days: 24       Labs results:   Labs Ordered and Resulted from Time of ED Arrival Up to the Time of Departure from the ED   CBC WITH PLATELETS DIFFERENTIAL - Abnormal; Notable for the following components:       Result Value     (*)     Platelet Count 132 (*)     All other components within normal limits   BASIC METABOLIC PANEL - Abnormal; Notable for the following components:    Glucose 126 (*)     GFR Estimate 54 (*)     All other components within normal limits   NT PROBNP INPATIENT - Abnormal; Notable for the following components:    N-Terminal Pro BNP Inpatient 5,402 (*)     All other components within normal limits   TROPONIN I - Abnormal; Notable for the following components:    Troponin I ES 0.060 (*)     All other components within normal limits   INR - Abnormal; Notable for the following components:    INR 2.08 (*)     All other components within normal limits   MAGNESIUM   DIGOXIN LEVEL   TSH WITH FREE T4 REFLEX   CARDIAC CONTINUOUS MONITORING   INTAKE AND OUTPUT       Imaging Studies:   Recent Results (from the past 24 hour(s))   Chest XR,  PA & LAT    Impression    Impression:     1. No acute airspace opacity.  2. Mild pulmonary vascular congestion and trace bilateral effusion.       Recent vital signs:   /90   Pulse 116   Temp 97.7  F (36.5  C) (Oral)   Resp (!) 31   Ht 1.575 m (5' 2\")   Wt 53.9 kg (118 lb 14.4 oz)   SpO2 97%   Breastfeeding? No   BMI 21.75 kg/m      Cardiac Rhythm: Other atrial fibrillation   Pt needs tele? Yes  Skin/wound Issues: None    Code Status: Full Code    Pain control: good    Nausea control: pt had none    Abnormal labs/tests/findings requiring intervention:     Family present during ED course? Yes   Family Comments/Social Situation comments: Family @ bedside    Tasks needing completion: Strict I/O    Susan Cuevas RN  ascom-- [phone] [address] ED  [phone] [address] ED    "

## 2019-02-16 NOTE — H&P
Cardiology History and Physical    Jennifer Bob MRN# [id number]   Age: 87 year old YOB: 1931     Date of Admission:  2/16/2019    Primary care provider: Torri Fisher          Assessment and Plan:   Assessment:  Jennifer Bob is a 87 year old yo female with a history of CAD s/p CABG, CVA, HTN, COPD, PMR, CKD3 and permanent Afib with tachybrady syndrome and recent PPM (1/18/19) at [facility] during hospitalization for new Afib, here with palpitations and shortness of breath.     Plan:  # Afib with RVR, symptomatic  Recent onset, anticoagulated, LVEF 30% on last TTE. Not tolerting beta blocker due to dyspnea; discontinued per outpatient cardiologist. Started digoxin this week with worsening symptoms and poor oral intake; may not tolerate well in general.   - Diurese as mild volume overload may be exacerbating Afib: lasix IV given in ED, repeat IV dose in AM. Hold home PO dose  - discontinue digoxin, continue to hold metoprolol/BB  - Start amiodarone without initial bolus as patient is hemodynamically stable and mildly symptomatic  - Telemetry monitoring  - Consider EP consultation and consideration of  AVN ablation pending clinical status/tolerating amiodarone  - Check AM BMP  - Daily INR, pharmacy consultation for warfarin management     #HFrEF, mild decompensation  #CAD s/p CABG and PCI  #HTN  Symptoms most likely due to Afib and volume overload, not consistent with ongoing ischemia/ACS. BNP uptrending (2000->5400), CXR with mild vascular congestion  -Lasix 40mg IV in ED, will repeat in AM, holding home oral lasix  -continue home amlodipine, cozaar, hydralazine   -IV hydralazine PRN SBP >180s    #Troponin elevation  Slightly elevated but downtrending from last troponins in January - may be related to RV injury with PPM placement versus demand ischemia in setting of AFib with RVR.   -Repeat troponin now to ensure downtrending  -CAD management as above   - Telemetry  monitoring    #CKD III  Renal function at baseline  -Avoid nephrotoxins  -AM BMP    #COPD  No signs of acute exacerbation  -Continue home inhalers- spiriva/albuterol    #PMR  Continue home prednisone    #CVA   No new deficits  -Continue home statin, anticoagulation  -PT/OT evaluation for home discharge planning     FEN: No IVF, regular no caffeine free diet  (avoid restrictions given poor appetite and weight loss)  Prophylaxis: warfarin  Code Status: DNR/DNI- verified with patient and family   Disposition: Telemetry bed    Patient will be formally staffed with attending physician, Dr. Guzman, in the morning.     Abby Pedroza MD [facility]  Cardiology 1 Service  [id number]            Chief Complaint:     Chief Complaint   Patient presents with     Palpitations            History of Present Illness:   Jennifer Bob is a 87 year old yo female with a history of CAD s/p CABG, CVA, HTN, COPD, PMR, CKD3 and permanent Afib with tachybrady syndrome and recent PPM (1/18/19) at [facility] during hospitalization for new Afib, here with palpitations and shortness of breath. Patient reports baseline dyspnea at rest for past month, intermittent palpitations, no chest pain or dizziness. She saw Dr. Lowe in clinic on 2/14 with increased dyspnea, no orthopnea, chest pain or palpitations. Anticoagulation had been switched from apixaban to warfarin. She was instructed to stop metoprolol, start digoxin 0.125mg daily and restart hydralazine BID. Since then shes noticed increased dyspnea at rest and with exertion and some new lower extremity edema but no orthopnea or chest pain. Appetite is poor. She currently lives alone and has a daughter staying with her. No fevers, chills, recent illness, urinary symptoms, diarrhea, abdominal pain, syncope or dizziness.     Per last cardiology note, during hospitalization at [facility], TTE showed LVEF of 27% (Afib).  Nuclear stress test showed no reversible ischemia. She was noted to have pauses on  telemetry on metoprolol 25mg bid; dose reduced to 12.5 BID with ventricular rates up to 150s. She underwent [employer] CRT-P with RV and LV leads (no RA lead) on 1/18/19; she was discharged on apixaban and metoprolol 50 bid. She returned to [facility] ED on 1/22/19 with chest pain, found to have RV perforation by chest CT scan without effusion, hemodynamically stable. RV lead repositioned on 1/23/19. Last interrogation at [facility] 2/1/19.          Review of Systems:    A comprehensive ROS was performed with the patient negative with the exception of that noted in the HPI above and below.  GENERAL: no fever or chills, + fatigue  ENT: no sore throat or nasal congestion  RESP: +shortness of breath, no orthopnea or wheezing  CV: no chest pain +palpitation +edema  GI: no nausea, vomiting or abdominal pain, +anorexia  : no dysuria or urinary symptoms  MUSCULOSKELETAL: no myalgias, no arthralgias  ENDOCRINE: lost weight recently  SKIN: no rash or skin lesions  NEURO: no headache, no numbness or tingling  PSYCHIATRIC: no mood changes, no depression         Past Medical History:     Past Medical History:  1. CAD. CABG 1992;  MI with VF arrest, s/p PCI SVG to RCA graft 8/29/09  ([place])  2. Hypertension ~ home  - 140/150 , occasionally 160-180mmHg  3. Hyperlipidemia  4. COPD. PFT 2014 showed reduced FEV1.  5. Hernia repair  6. Cholecystectomy  7. Ischemic stroke 2013; recent hospital admission 7/7/18 with another stroke, received tPA, residual right weakness. Ziopatch post discharge showed no atrial fibrillation.  8. Peripheral arterial disease s/p superior femoral artery stent 2018  9. Atrial fibrillation, 1/2019, with RVR  10. Bradycardia with rate control - s/p [employer] CRT-P with RV and LV leads 1/18/19, complicated with RV lead microperforation requiring repositioning, no pericardial effusion  Past Medical History:   Diagnosis Date     Abdominal wall hernia     three hernias at previous  incision sites, allergic to mesh so repair not repeated, wears girdle     Anxiety 1/9/2012     ASCVD (arteriosclerotic cardiovascular disease) 1/9/2012     CKD (chronic kidney disease) stage 3, GFR 30-59 ml/min (H) 1/10/2012     Colon polyp     resected     DDD (degenerative disc disease), lumbar 1/10/2012     Hyperlipidemia LDL goal <100 1/9/2012     Hyperlipidemia LDL goal <70 12/16/2013     Hypertension goal BP (blood pressure) < 140/90 1/9/2012     Incontinence of urine 1/9/2012     Left hip pain 1/9/2012     Low back pain 1/9/2012     Seasonal allergies 1/9/2012     STEMI (ST elevation myocardial infarction) (H) 8-    with VF arrest.     Stented coronary artery 8-     TIA (transient ischaemic attack) 1/10/2012             Past Surgical History:   Surgical History reviewed.   Past Surgical History:   Procedure Laterality Date     abdominal abscess      at incisional site after kristine     adominal hernia repair      had mesh placed, then allergic so it was removed and she wears a girdle     CHOLECYSTECTOMY       CORONARY ARTERY BYPASS  1992     EP PACEMAKER N/A 1/18/2019    Procedure: EP Pacemaker;  Surgeon: Serafin Llamas MD;  Location:  HEART CARDIAC CATH LAB     EP PACEMAKER Left 1/23/2019    Procedure: EP Pacemaker;  Surgeon: Ran Hodges MD;  Location:  HEART CARDIAC CATH LAB             Social History:   Social History reviewed.  Lives at home alone   Social History     Tobacco Use     Smoking status: Former Smoker     Smokeless tobacco: Never Used     Tobacco comment: quit smoking in 1983   Substance Use Topics     Alcohol use: No             Family History:   Family History not relevant to current complaints.   No family history on file.          Allergies:     Allergies   Allergen Reactions     Adhesive Tape      blisters     Atenolol      SOB     Lopressor Hct      SOB             Medications:   Medications Reviewed.   No current facility-administered medications for this  encounter.      Current Outpatient Medications   Medication Sig     albuterol (PROVENTIL) (2.5 MG/3ML) 0.083% neb solution Take 1 vial (2.5 mg) by nebulization every 6 hours as needed for shortness of breath / dyspnea or wheezing     amLODIPine (NORVASC) 10 MG tablet Take 1 tablet (10 mg) by mouth daily     atorvastatin (LIPITOR) 40 MG tablet Due for appt in March, one tab daily     Cholecalciferol (VITAMIN D3 PO) Take 1,000 Units by mouth daily     digoxin (LANOXIN) 125 MCG tablet Take 1 tablet (125 mcg) by mouth daily     docusate sodium (COLACE) 100 MG capsule Take 1 capsule by mouth 2 times daily      escitalopram (LEXAPRO) 10 MG tablet Take 10 mg by mouth daily     furosemide (LASIX) 20 MG tablet TAKE ONE TABLET BY MOUTH EVERY DAY     hydrALAZINE (APRESOLINE) 50 MG tablet Take 1 tablet (50 mg) by mouth 3 times daily     losartan (COZAAR) 100 MG tablet Take 1 tablet (100 mg) by mouth daily     Multiple Vitamins-Minerals (MULTIVITAMIN OR) Take 1 tablet by mouth daily      order for DME Equipment being ordered: Oxygen 1 litre via nasal canula     order for DME Equipment being ordered: Nebulizer with mask and tubing     predniSONE (DELTASONE) 1 MG tablet Take 2 mg by mouth daily     tiotropium (SPIRIVA) 18 MCG inhaled capsule Inhale 1 capsule (18 mcg) into the lungs daily     TRAMADOL HCL PO Take 50 mg by mouth every 6 hours as needed for moderate to severe pain     VENTOLIN  (90 BASE) MCG/ACT Inhaler INHALE 2 PUFFS INTO THE LUNGS EVERY 6 HOURS AS NEEDED     warfarin (COUMADIN) 2.5 MG tablet Start 2.5 mg daily (with Eliquis). STOP Eliquis after the THIRD dose of Warfarin. Further dosing to be advised by [facility].     BETA BLOCKER NOT PRESCRIBED, INTENTIONAL, Beta Blocker not prescribed intentionally due to COPD, shortness of breath with atenolol and lopressor     fluticasone (FLONASE) 50 MCG/ACT nasal spray Spray 1 spray into both nostrils daily as needed for rhinitis or allergies     Saline (OCEAN NASAL SPRAY  "NA) Administer 1 spray in each nostril up to two times daily as needed     sodium chloride-sodium bicarb (CLASSIC NETI POT SINUS WASH) 2300-700 MG KIT Mix 1 packet in Neti Pot and administer in each nostril daily as needed             Physical Exam:   BP (!) 184/118   Pulse 118   Temp 97.7  F (36.5  C) (Oral)   Resp (!) 37   Ht 1.575 m (5' 2\")   Wt 53.9 kg (118 lb 14.4 oz)   SpO2 96%   Breastfeeding? No   BMI 21.75 kg/m    Vitals:    02/16/19 1510   Weight: 53.9 kg (118 lb 14.4 oz)     General: Alert, thin, sitting in bed, in no acute distress.  Skin: Warm, dry, no rashes or lesions on limited exam  HEENT:  EOMI, anicteric sclera. Pupils equal. No neck masses. Oral mucosa dry.   Respiratory: slightly increased work of breathing, symmetric air entry, few basilar rales, no wheezing.   Cardiovascular: tachycardic, Irr Irr, no murmurs appreciated.   Gastrointestinal:  Abdomen soft, non-distended, nontender. No palpable masses or organomegaly.  Extremities: 1+ BLE edema. Warm, dry. Normal tone. Healed vein graft scars.   MSK: No deformities, strength grossly normal. Gait not tested   Neurologic: A&O x 3, speech normal, memory intact  Psych: appropriate mood and affect, cooperative and pleasant          Data:   TTE (1/23/19): The left ventricle is normal in size.  There is severe inferior and inferolateral wall hypokinesis.  Left ventricular systolic function is moderate to severely reduced.  The visual ejection fraction is estimated at 30-35%.  There is no pericardial effusion.  Small bilateral pleural effusion  LV function looks slightly better. Pericardial effusion is still not present.    Lexiscan ST (1/17/19):  1.  Myocardial perfusion imaging using single isotope technique  demonstrated a small nontransmural scar of the distal anteroapical  wall with mild nirav-infarct ischemia. There is a second small fixed  basal inferior defect likely attenuation artifact.   2. Gated images demonstrated not performed due " to arrhythmia.  The  left ventricular systolic function is not assessed.    CXR:  1. No acute airspace opacity.  2. Mild pulmonary vascular congestion and trace bilateral effusion.     ROUTINE LABS (Last four results)  CMP  Recent Labs   Lab 02/16/19  1513      POTASSIUM 4.3   CHLORIDE 106   CO2 27   ANIONGAP 8   *   BUN 17   CR 0.94   GFRESTIMATED 54*   GFRESTBLACK 63   JERILYN 8.8   MAG 1.8     CBC  Recent Labs   Lab 02/16/19  1513   WBC 6.8   RBC 4.36   HGB 14.3   HCT 44.5   *   MCH 32.8   MCHC 32.1   RDW 14.6   *     INR  Recent Labs   Lab 02/16/19  1513 02/14/19 02/11/19   INR 2.08* 1.7* 1.7*     Troponin 0.060  BNP 5400    Telemetry Afib with RVR, rates 115-130

## 2019-02-16 NOTE — ED PROVIDER NOTES
History     Chief Complaint   Patient presents with     Palpitations     HPI  Jennifer Bob is a 87 year old female with a past medical history notable for coronary artery disease, s/p CABG and PCI, CVA, hypertension, dyslipidemia, COPD, polymyalgia rheumatica, anxiety, chronic kidney disease stage III, low back pain, persistent atrial fibrillation, and tachybrady syndrome, s/p permanent pacemaker on 01/18/2019 who presents to the ED for evaluation of chest palpitations and shortness of breath. Per chart review, patient was recently seen in [facility] ED on 1/22/2019 after pacemaker placement for left-sided chest pain. A chest CT scan showed possible pacemaker lead displacement an RV perforation - fortunately she did not have a pericardial effusion, and patient's pacemaker lead was readjusted. Since that time, patient reports she has been experienced an increased heart rate, which makes the patient shaky and anxious. Patient denies any chest pain or pressure. Patient has also been experiencing shortness of breath for the past few weeks. She uses an albuterol inhaler, but has stopped using her albuterol nebulizer for her COPD because she believes it has been causing her palpitations. Patient denies any fever, rhinorrhea, nasal congestion, abdominal pain, nausea or vomiting. Patient has had decreased oral intake. Patient was recently started on Lanoxin yesterday.    She lives independently in her own home alone.    Past Medical History:   Diagnosis Date     Abdominal wall hernia     three hernias at previous incision sites, allergic to mesh so repair not repeated, wears girdle     Anxiety 1/9/2012     ASCVD (arteriosclerotic cardiovascular disease) 1/9/2012     CKD (chronic kidney disease) stage 3, GFR 30-59 ml/min (H) 1/10/2012     Colon polyp     resected     DDD (degenerative disc disease), lumbar 1/10/2012     Hyperlipidemia LDL goal <100 1/9/2012     Hyperlipidemia LDL goal <70 12/16/2013     Hypertension  goal BP (blood pressure) < 140/90 1/9/2012     Incontinence of urine 1/9/2012     Left hip pain 1/9/2012     Low back pain 1/9/2012     Seasonal allergies 1/9/2012     STEMI (ST elevation myocardial infarction) (H) 8-    with VF arrest.     Stented coronary artery 8-     TIA (transient ischaemic attack) 1/10/2012       Past Surgical History:   Procedure Laterality Date     abdominal abscess      at incisional site after kristine     adominal hernia repair      had mesh placed, then allergic so it was removed and she wears a girdle     CHOLECYSTECTOMY       CORONARY ARTERY BYPASS  1992     EP PACEMAKER N/A 1/18/2019    Procedure: EP Pacemaker;  Surgeon: Serafin Llamas MD;  Location:  [facility]     EP PACEMAKER Left 1/23/2019    Procedure: EP Pacemaker;  Surgeon: Ran Hodges MD;  Location:  [facility]       No family history on file.    Social History     Tobacco Use     Smoking status: Former Smoker     Smokeless tobacco: Never Used     Tobacco comment: quit smoking in 1983   Substance Use Topics     Alcohol use: No       Current Facility-Administered Medications   Medication     hydrALAZINE (APRESOLINE) injection 10 mg     Current Outpatient Medications   Medication     albuterol (PROVENTIL) (2.5 MG/3ML) 0.083% neb solution     amLODIPine (NORVASC) 10 MG tablet     atorvastatin (LIPITOR) 40 MG tablet     Cholecalciferol (VITAMIN D3 PO)     digoxin (LANOXIN) 125 MCG tablet     docusate sodium (COLACE) 100 MG capsule     escitalopram (LEXAPRO) 10 MG tablet     furosemide (LASIX) 20 MG tablet     hydrALAZINE (APRESOLINE) 50 MG tablet     losartan (COZAAR) 100 MG tablet     Multiple Vitamins-Minerals (MULTIVITAMIN OR)     order for DME     order for DME     predniSONE (DELTASONE) 1 MG tablet     tiotropium (SPIRIVA) 18 MCG inhaled capsule     TRAMADOL HCL PO     VENTOLIN  (90 BASE) MCG/ACT Inhaler     warfarin (COUMADIN) 2.5 MG tablet     BETA BLOCKER NOT  "PRESCRIBED, INTENTIONAL,     fluticasone (FLONASE) 50 MCG/ACT nasal spray     Saline (OCEAN NASAL SPRAY NA)     sodium chloride-sodium bicarb (CLASSIC NETI POT SINUS WASH) 2300-700 MG KIT        Allergies   Allergen Reactions     Adhesive Tape      blisters     Atenolol      SOB     Lopressor Hct      SOB     I have reviewed the Medications, Allergies, Past Medical and Surgical History, and Social History in the Epic system.    Review of Systems   Constitutional: Negative for chills.   HENT: Negative for rhinorrhea and sore throat.    Respiratory: Positive for chest tightness and shortness of breath. Negative for cough.    Cardiovascular: Positive for palpitations and leg swelling. Negative for chest pain.   Gastrointestinal: Negative for abdominal pain, diarrhea, nausea and vomiting.   Neurological: Negative for light-headedness.   Psychiatric/Behavioral: The patient is nervous/anxious.    All other systems reviewed and are negative.      Physical Exam   BP: (!) 169/99  Pulse: 129  Heart Rate: 110  Temp: 97.7  F (36.5  C)  Resp: 30  Height: 157.5 cm (5' 2\")  Weight: 53.9 kg (118 lb 14.4 oz)  SpO2: 96 %      Physical Exam   Constitutional: She is oriented to person, place, and time. She appears well-developed and well-nourished.   HENT:   Head: Normocephalic and atraumatic.   Eyes: Conjunctivae and EOM are normal. Pupils are equal, round, and reactive to light.   Cardiovascular: Normal heart sounds and intact distal pulses.   Tachycardic, irr irr rhythm   Pulmonary/Chest: She is in respiratory distress. She has no wheezes. She has no rales.   Some increased work of breathing, tachypneic. Speaking in full sentences.  No wheezing.  Some crackles noted at bases B.      Abdominal: Soft. Bowel sounds are normal. She exhibits no distension. There is no tenderness.   Musculoskeletal: She exhibits edema.   Trace-1+ edema BLE   Neurological: She is alert and oriented to person, place, and time.   Skin: Skin is warm and dry. " She is not diaphoretic.   Psychiatric:   anxious   Nursing note and vitals reviewed.      ED Course        Procedures             EKG Interpretation:      Interpreted by Penny Muñoz  Time reviewed: 1505  Symptoms at time of EKG: palpitations   Rhythm: atrial fibrillation - rapid  Rate: 110-120  Axis: LAD  Ectopy: premature ventricular paced complexes  Conduction: right bundle branch block (complete)  ST Segments/ T Waves: T wave inversion V1-V3, aVL, aVR  Q Waves: none  Comparison to prior: similar to prior    Clinical Impression: chronic atrial fib with RVR                Critical Care time:  none             Results for orders placed or performed during the hospital encounter of 02/16/19   Chest XR,  PA & LAT    Narrative    Exam: XR CHEST 2 VW, 2/16/2019 4:45 PM    Indication: SOB, CHF    Comparison: Chest x-ray 1/24/2019    Findings:     Frontal and lateral view x-ray of the chest. Left chest wall cardiac  device with intracardiac leads. No pneumothorax. Trace bilateral  effusion. Stable postsurgical changes of median sternotomy.  Atherosclerotic aortic calcification. No acute airspace opacity. Mild  pulmonary vascular congestion..      Impression    Impression:     1. No acute airspace opacity.  2. Mild pulmonary vascular congestion and trace bilateral effusion.    I have personally reviewed the examination and initial interpretation  and I agree with the findings.    LUCINA YUNG MD   CBC with platelets differential   Result Value Ref Range    WBC 6.8 4.0 - 11.0 10e9/L    RBC Count 4.36 3.8 - 5.2 10e12/L    Hemoglobin 14.3 11.7 - 15.7 g/dL    Hematocrit 44.5 35.0 - 47.0 %     (H) 78 - 100 fl    MCH 32.8 26.5 - 33.0 pg    MCHC 32.1 31.5 - 36.5 g/dL    RDW 14.6 10.0 - 15.0 %    Platelet Count 132 (L) 150 - 450 10e9/L    Diff Method Automated Method     % Neutrophils 73.8 %    % Lymphocytes 14.5 %    % Monocytes 9.7 %    % Eosinophils 1.3 %    % Basophils 0.6 %    % Immature Granulocytes 0.1 %     Nucleated RBCs 0 0 /100    Absolute Neutrophil 5.0 1.6 - 8.3 10e9/L    Absolute Lymphocytes 1.0 0.8 - 5.3 10e9/L    Absolute Monocytes 0.7 0.0 - 1.3 10e9/L    Absolute Eosinophils 0.1 0.0 - 0.7 10e9/L    Absolute Basophils 0.0 0.0 - 0.2 10e9/L    Abs Immature Granulocytes 0.0 0 - 0.4 10e9/L    Absolute Nucleated RBC 0.0    Basic metabolic panel   Result Value Ref Range    Sodium 141 133 - 144 mmol/L    Potassium 4.3 3.4 - 5.3 mmol/L    Chloride 106 94 - 109 mmol/L    Carbon Dioxide 27 20 - 32 mmol/L    Anion Gap 8 3 - 14 mmol/L    Glucose 126 (H) 70 - 99 mg/dL    Urea Nitrogen 17 7 - 30 mg/dL    Creatinine 0.94 0.52 - 1.04 mg/dL    GFR Estimate 54 (L) >60 mL/min/[1.73_m2]    GFR Estimate If Black 63 >60 mL/min/[1.73_m2]    Calcium 8.8 8.5 - 10.1 mg/dL   Nt probnp inpatient   Result Value Ref Range    N-Terminal Pro BNP Inpatient 5,402 (H) 0 - 1,800 pg/mL   Troponin I   Result Value Ref Range    Troponin I ES 0.060 (H) 0.000 - 0.045 ug/L   Magnesium   Result Value Ref Range    Magnesium 1.8 1.6 - 2.3 mg/dL   Digoxin level   Result Value Ref Range    Digoxin Level 0.8 0.5 - 2.0 ug/L   INR   Result Value Ref Range    INR 2.08 (H) 0.86 - 1.14   TSH with free T4 reflex   Result Value Ref Range    TSH 2.19 0.40 - 4.00 mU/L   EKG 12 lead   Result Value Ref Range    Interpretation ECG Click View Image link to view waveform and result    Cardiac Device Check - Remote   Result Value Ref Range    Date Time Interrogation Session [id number]     Implantable Pulse Generator  [employer]     Implantable Pulse Generator Model U128 VALITUDE     Implantable Pulse Generator Serial Number [id number]     Type Interrogation Session Remote      Clinic Name [facility]     Implantable Pulse Generator Type Cardiac Resynchronization Therapy - Pacemaker     Implantable Pulse Generator Implant Date 20190118     Implantable Lead  [employer]     Implantable Lead Model 4671 Acuity X4  Straight     Implantable Lead Serial Number [id number]     Implantable Lead Implant Date 20190118     Implantable Lead Polarity Type Quadripolar Lead     Implantable Lead Location Detail 1 UNKNOWN     Implantable Lead Location Left Ventricle     Implantable Lead  [employer]     Implantable Lead Model 7741 Ingevity MRI     Implantable Lead Serial Number [id number]     Implantable Lead Implant Date 20190118     Implantable Lead Polarity Type Bipolar Lead     Implantable Lead Location Detail 1 UNKNOWN     Implantable Lead Location Right Ventricle     Stiven Setting Mode (NBG Code) VVIR     Stiven Setting Lower Rate Limit 60 [beats]/min    Stiven Setting Maximum Sensor Rate 130 [beats]/min    Stiven Setting AT Mode Switch Rate 170 [beats]/min    Lead Channel Setting Sensing Sensitivity 0.75 mV    Lead Channel Setting Sensing Adaptation Mode Fixed      Lead Channel Setting Sensing Polarity Bipolar     Lead Channel Setting Sensing Sensitivity 2.5 mV    Lead Channel Setting Sensing Adaptation Mode Fixed      Lead Channel Setting Sensing Anode Location Left Ventricle     Lead Channel Setting Sensing Anode Terminal Ring2     Lead Channel Setting Sensing Cathode Location Left Ventricle     Lead Channel Setting Sensing Cathode Terminal Tip     Lead Channel Setting Sensing Sensitivity 2.5 mV    Lead Channel Setting Sensing Adaptation Mode Fixed      Ventricular chambers paced during CRT pacing. BiV     CRT LV-RV Delay 30 ms    Lead Channel Setting Pacing Polarity Bipolar     Lead Channel Setting Pacing Pulse Width 0.4 ms    Lead Channel Setting Pacing Amplitude 2.0 V    Lead Channel Setting Pacing Capture Mode Adaptive     Lead Channel Setting Pacing Anode Location Other     Lead Channel Setting Pacing Anode Terminal Can     Lead Channel Setting Sensing Cathode Location Left Ventricle     Lead Channel Setting Sensing Cathode Terminal Tip     Lead Channel Setting Pacing Pulse Width 0.5 ms    Lead Channel Setting Pacing  Amplitude 3.5 V    Zone Setting Type Category VT     Zone Setting Vendor Type Category VT     Zone Setting Detection Interval 375 ms    Lead Channel Impedance Value 623 ohm    Lead Channel Pacing Threshold Amplitude 0.9 V    Lead Channel Pacing Threshold Pulse Width 0.5 ms    Lead Channel Impedance Value 796 ohm    Lead Channel Pacing Threshold Amplitude 0.7 V    Lead Channel Pacing Threshold Pulse Width 0.4 ms    Battery Date Time of Measurements [id number]     Battery Status Beginning of Service     Battery Remaining Longevity 126 mo    Battery Remaining Percentage 100 %    Stiven Statistic Date Time Start 20190201060000     Stiven Statistic Date Time End 20190216060000     Stiven Statistic RA Percent Paced 0 %    Stiven Statistic RV Percent Paced 73 %    CRT Statistic LV Percent Paced 73 %    CRT Statistic Date Time Start 20190201060000     CRT Statistic Date Time End 20190216060000     Episode Statistic Recent Count 0     Episode Statistic Type Category AT/AF     Episode Statistic Vendor Type Category AF     Episode Statistic Recent Count 0     Episode Statistic Type Category SVT     Episode Statistic Vendor Type Category SVT     Episode Statistic Recent Count 21     Episode Statistic Type Category VT     Episode Statistic Vendor Type Category NSVT     Episode Statistic Recent Count 0     Episode Statistic Type Category VT     Episode Statistic Vendor Type Category VT     Episode Statistic Recent Date Time Start [id number]     Episode Statistic Recent Date Time End 68375200245534     Episode Statistic Recent Date Time Start 95114308757789     Episode Statistic Recent Date Time End 07219041566937     Episode Statistic Recent Date Time Start 80293238817010     Episode Statistic Recent Date Time End 34409297826929     Episode Statistic Recent Date Time Start 43943883927036     Episode Statistic Recent Date Time End 53152086247730     Episode Identifier V-21     Episode Type Category VT     Episode Date Time  30477011238600     Episode Duration 12 s    Episode Identifier V-20     Episode Type Category VT     Episode Date Time 59933029264338     Episode Duration 12 s    Episode Identifier V-19     Episode Type Category VT     Episode Date Time 39950494165905     Episode Duration 14 s    Episode Identifier V-18     Episode Type Category VT     Episode Date Time 11542675412042     Episode Duration 30 s    Episode Identifier V-17     Episode Type Category VT     Episode Date Time 45201346566728     Episode Duration 18 s    Episode Identifier V-16     Episode Type Category VT     Episode Date Time 20190216202200     Episode Duration 12 s    Episode Identifier V-15     Episode Type Category VT     Episode Date Time 20190216201100     Episode Duration 15 s    Episode Identifier V-14     Episode Type Category VT     Episode Date Time 48317922734129     Episode Duration 16 s    Episode Identifier V-13     Episode Type Category VT     Episode Date Time 20190216200700     Episode Duration 19 s    Episode Identifier V-12     Episode Type Category VT     Episode Date Time [id number]     Episode Duration 14 s    Episode Identifier RVAT-30     Episode Type Category Other     Episode Date Time [id number]      *Note: Due to a large number of results and/or encounters for the requested time period, some results have not been displayed. A complete set of results can be found in Results Review.              Assessments & Plan (with Medical Decision Making)   This is an 87 year old with complex past cardiac history who presents to the emergency department complaining of shortness of breath, palpitations, and a feeling of anxiety. She has a past cardiac history significant for MI s/p v-fib arrest in 1992, ultimately underwent coronary artery bypass grafting. She has had strokes in the past. Her most recent issues started when she presented to the [facility] in late January 2019 with atrial fibrillation with rvr. She  had an echocardiogram that showed an EF of 27%, nuclear stress did not show any sign of reversible ischemia. She ultimately did have pauses on telemetry when she was placed on beta blockade at 25mg of metoprolol BID. Unfortunately with slight dose reduction to 12.5 BID she ended up with a-fib with RVR with rates in the 150s. On 1/18/19 she underwent implantation of a [employer] CRT-P with RV and LV leads. She was discharged then on apixaban and metoprolol 50 BID. She had another [facility] ED visit on 1/22/19 with chest pain and was found to have right ventricular perforation without effusion. She had lead repositioning done on 1/23/19. She ended up following up with her cardiologist 2 days ago. Her EP physician believes that due to severe left atrial enlargement she's unlikely to be able to maintain sinus rhythm and is probably in permanent a-fib. Her EP physician discontinued metoprolol 2 days ago to see if this would help with her shortness of breath. They began digoxin. She now presents complaining of palpitations and some shortness of breath. She does believe that her albuterol, which she is taking for her COPD, may be contributing to her symptoms so she stopped her albuterol nebulizers yesterday. She states she is still taking her albuterol inhaler and spiriva inhaler.     On exam here in the ED, she appears a little bit anxious, she's slightly tachypneic, she's sitting up in bed. She does not have any wheezing on exam. EKG demonstrates atrial fibrillation with rvr with a rate of 117. We did establish IV access, and we did draw blood for laboratory analysis. CBC demonstrates normal white count, normal hemoglobin, platelet count of 132,000, BMP within normal limits with the exception of elevated glucose at 126, BNP elevated at 5402, Troponin to be elevated at 0.060, Magnesium within normal limits, Digoxin level 0.8, INR therapeutic at 2.08, TSH within normal limits. Chest x-ray shows mild pulmonary  vascular congestion. Most recent echocardiogram was done on 1/22/19 at [facility] and this showed an EF of 30-35% without pericardial effusion, there was severe inferior and inferolateral wall hypokinesis.     Any number of etiologies could explain her symptoms. It appears that her EP physician thought that ultimately she might need AV node ablation in order to eliminate her symptoms. We did elect to interrogate her device here in the emergency department.  Report as above, this demonstrates multiple episodes of A. fib with RVR.    Given her reduced ejection fraction, poor left ventricular function, A. fib with RVR with difficulty controlling rates, and worsening heart failure, it is reasonable to bring her in the hospital.  I did speak to cardiology about this.  They have accepted her on their service.  I have ordered IV Lasix for her.  1 dose of hydralazine was ordered for hypertension.  Per cardiology, she will likely need IV amiodarone on the cardiac floor in order to obtain rate control as we cannot beta-blockade her.  However this can be deferred to upstairs as her rates are between 110 and 130 here in the ED.  Patient to be admitted at this time.    This part of the document was transcribed by Raina Jones, Medical Scribe.     I have reviewed the nursing notes.    I have reviewed the findings, diagnosis, plan and need for follow up with the patient.       Medication List      ASK your doctor about these medications    predniSONE 20 MG tablet  Commonly known as:  DELTASONE  40 mg, Oral, DAILY  Ask about: Should I take this medication?            Final diagnoses:   Atrial fibrillation with RVR (H)   Acute on chronic systolic heart failure (H)   I, Raina Jones, am serving as a trained medical scribe to document services personally performed by Penny Muñoz MD, based on the provider's statements to me.      I, Penny Muñoz MD, was physically present and have reviewed and verified the accuracy of this  note documented by Raina Jones.     2/16/2019   [facility], [place], EMERGENCY DEPARTMENT     Penny Muñoz MD  02/16/19 1840

## 2019-02-17 NOTE — PROGRESS NOTES
Attempted to place extended dwell PIV for amiodarone but failed twice. Placed PIV with ultrasound guided on big vein with good blood return. Nurse informed if need midline will be done tomorrow.

## 2019-02-17 NOTE — PLAN OF CARE
Admission  D-Admitted to 6c from ER via litter,accompanied by RN estelle/estelle. Admitted for afib with rvr. Many family members at bedside.  I-Oriented to bedside controls and unit routines. Instructed to call for assistance to get up to the commode. Admission profile complete.  A-Telemetry currently shows v paced 100's.  P-Initiate admission orders.

## 2019-02-17 NOTE — PROGRESS NOTES
"BP (P) 129/50 (BP Location: Left arm)   Pulse 79   Temp 98.1  F (36.7  C) (Oral)   Resp (P) 16   Ht 1.575 m (5' 2\")   Wt 52 kg (114 lb 9.6 oz)   SpO2 97%   Breastfeeding? No   BMI 20.96 kg/m       Neuro: A&Ox4.   Cardiac: Afebrile, VSS.   Respiratory: RA   GI/: Voiding spontaneously.( on pure wick) .No BM this shift.   Diet/appetite: Tolerating diet.Denies nausea   Activity: Up SBA  Pain: Denies   Skin: No new deficits noted.  Lines: PIV x2  Drains: None      Amio gtt @ 0.5mg/min. Paced/underlying A-fib.Will continue to monitor and follow plan of care.       "

## 2019-02-17 NOTE — PHARMACY-ANTICOAGULATION SERVICE
Clinical Pharmacy - Warfarin Dosing Consult     Pharmacy has been consulted to manage this patient s warfarin therapy.  Therapy Goal: INR 2-3  OP Anticoag Clinic: [facility]  Warfarin Prior to Admission: Yes  Warfarin PTA Regimen: 2/14: 3.75 mg; 2/15: 3.75 mg; 2/16: 2.5 mg; 2/17: 2.5 mg; Otherwise No maintenance plan  Significant drug interactions: apixaban (instructions states to stop after 3rd warfarin dose)  Recent documented change in oral intake/nutrition: Unknown    INR   Date Value Ref Range Status   02/16/2019 2.08 (H) 0.86 - 1.14 Final     INR Protime   Date Value Ref Range Status   02/14/2019 1.7 (A) 0.86 - 1.14 Final       Recommend warfarin 2.5 mg today.  Pharmacy will monitor Jennifer Bob daily and order warfarin doses to achieve specified goal.      Please contact pharmacy as soon as possible if the warfarin needs to be held for a procedure or if the warfarin goals change.      Ronel Bailey, PharmD  Pager [phone]

## 2019-02-17 NOTE — PLAN OF CARE
D: Pt withwith a history of CAD s/p CABG, CVA, HTN, COPD, PMR, CKD3 and permanent A fib,recent PPM (1/18/19) at [facility] during hospitalization for new Afib, here with palpitations and SOB per MD note. Volume overload on lasix IV inj . DNR/DNI    I: Monitored vitals and assessed pt status.   Running: Amiodarone gtt started at 9 p.m. 2/16 through PIV  Amiodarone gtt at 30 ml/hr  A: A0x4. VSS, on 3 L nc_. Afib with 109-102. Afebrile. Denies any pain,no nausea reported. Pacemaker site incision WNL.    LA 1.3. Trop 0. 111 ,MD aware. Potassium 3.4 and Mg  replaced per replacement protocol.     Temp:  [97.5  F (36.4  C)-97.7  F (36.5  C)] 97.7  F (36.5  C)  Pulse:  [] 97  Heart Rate:  [104-121] 104  Resp:  [22-31] 22  BP: (148-184)/() 168/89  SpO2:  [94 %-98 %] 97 %      P: Continue to monitor Pt status and report changes to treatment team. EP consult . Possible AVN ablation,

## 2019-02-17 NOTE — PLAN OF CARE
6C PT: PT orders acknowledged and appreciated. RN requests check back in PM, though pt with another provider upon attempt. Will reschedule PT eval.

## 2019-02-18 NOTE — PLAN OF CARE
D/she continues in chronic a fib. She had a whole room full of family members earlier. She declined supper as she felt nauseated and told me she coughed up some mucus  I/I offered seven up to help her burp up some gas also.   A/within 5 minutes she told me that really helped and she felt better.  D/she wanted a warm blanket at 1930 and wanted her bedtime pills early so I didn't have to wake her up later-both given per request  P/monitor for changes  I/called MD to ask about plan as around 3 am will be 24 hours of Amio drip  D/she said to continue until am, and she will write pt care order to say this  P/remind team to re-evaluate drip and needs for po in the am

## 2019-02-18 NOTE — PROGRESS NOTES
Patient A&O x 4. VSS. Denies any pain, dizziness, lightheadedness, shortness of breath. PFTs performed before discharge. Up ad ayo, able to make needs known.     DISCHARGE   Discharged to: Home  Via: Automobile  Accompanied by: Family  Discharge Instructions: diet, activity, medications, follow up appointments, when to call the MD, and what to watchout for (i.e. s/s of infection, increasing SOB, palpitations, chest pain,)  Prescriptions: To be filled by [facility] pharmacy per pt's request; medication list reviewed & sent with pt  Follow Up Appointments: arranged; information given  Belongings: All sent with pt  IV: out  Telemetry: off  Pt exhibits understanding of above discharge instructions; all questions answered.  Discharge Paperwork: faxed

## 2019-02-18 NOTE — PROGRESS NOTES
Care Coordinator - Discharge Planning    Admission Date/Time:  2/16/2019  Attending MD:  Vaughn Guzman MD   Data  Chart reviewed, discussed with interdisciplinary team.   Patient was admitted for:   1. Atrial fibrillation with RVR (H)    2. Acute on chronic systolic heart failure (H)    3. Permanent atrial fibrillation (H)    Assessment   Concerns with insurance coverage for discharge needs: None stated by pt.  Current Living Situation: Patient lives alone. Pt said that she is impendent with her own care.   Support System: Supportive and Involved family are available to assist pt as needed after discharge.   Services Involved: [facility]  Transportation at Discharge: Family to provide.   Transportation to Medical Appointments: family to provide.    - Name of caregiver: daughter.     Coordination of Care and Referrals: No home care needs per pt.     Plan  Anticipated Discharge Date:  2/18/19  Anticipated Discharge Plan:  discharge to home with outpt f/u.     CTS Handoff completed:  YES    OSSA BUSTILLO RN BSN  Care Coordinator Unit   [phone]

## 2019-02-18 NOTE — DISCHARGE SUMMARY
[address] [address]  p: [phone]    Discharge Summary: Cardiology Service    Jennifer Bob MRN# [id number]   YOB: 1931 Age: 87 year old     Admission Date: 2/16/2019  Discharge Date: 02/18/19    Discharge Diagnoses:  Afib with RVR, symptomatic  Troponin elevation, demand ischemia   HFrEF, mild decompensation  CAD s/p CABG and PCI  HTN  CKD III  COPD    Consults:  PT/OT    Imaging with results:  CXR: 1. No acute airspace opacity.  2. Mild pulmonary vascular congestion and trace bilateral effusion.    Brief HPI:  Jennifer Bob is a 87 year old yo female with a history of CAD s/p CABG, CVA, HTN, COPD, PMR, CKD3 and permanent Afib with tachybrady syndrome and recent PPM (1/18/19) at [facility] during hospitalization for new Afib, here with palpitations and shortness of breath.     Hospital Course by Diagnosis:  # Afib with RVR, symptomatic  Recent onset, anticoagulated, LVEF 30% on last TTE. Not tolerting beta blocker due to dyspnea; discontinued per outpatient cardiologist. Started digoxin this week with worsening symptoms and poor oral intake; may not tolerate well in general. Diuresed for mild volume overload which may be exacerbating Afib. Discontinued digoxin and started amiodarone without initial bolus. Patients heart rate improved with improvement in dyspnea and exercise tolerance. Amiodarone was transitioned to oral with plan to slowly taper. Ideally, given patient's underlying COPD (last DLCO 48%), would limit amiodarone therapy to 3 months assuming LVEF may improved with control of Afib. If LVEF improves, could be a candidate for CCB therapy. Also unclear if patient truly didn't tolerate beta blocker and could retrial with dose adjustment for adequate rate control. Restarted home oral lasix dose upon discharge. She was continued on her warfarin for anticoagulation with dose adjustment and plan for repeat INR  on 2/20/19 and follow up with her anticoagulation clinic.     #HFrEF, mild decompensation  #CAD s/p CABG and PCI  #HTN  Symptoms most likely due to Afib and volume overload, not consistent with ongoing ischemia/ACS. BNP uptrending (2000->5400), CXR with mild vascular congestion. Gave lasix 40mg IV x 2 doses and resumed home oral lasix on discharge. She was continued on home amlodipine, cozaar, hydralazine and appears fairly euvolemic and compensated on discharge.      #Troponin elevation  Slightly elevated but downtrending from last troponins in January - may be related to RV injury with PPM placement versus demand ischemia in setting of AFib with RVR. Troponin trended to peak, no new ECG changes, felt likely demand ischemia and was continued on home ASA and statin.      #CKD III  Renal function at baseline. Avoided nephrotoxins.     #COPD  No signs of acute exacerbation. Continued home inhalers- spiriva/albuterol     #PMR  Continued home prednisone     #CVA   No new deficits. Continued home statin, anticoagulation. PT/OT evaluated and felt safe for home discharge without any skilled needs at home.         Condition on discharge  Temp:  [97.5  F (36.4  C)-99.3  F (37.4  C)] 98.8  F (37.1  C)  Pulse:  [70-93] 93  Heart Rate:  [] 91  Resp:  [16-17] 16  BP: (117-146)/(50-89) 131/73  SpO2:  [92 %-99 %] 92 %  General: Alert, thin, sitting in bed, in no acute distress.  Skin: Warm, dry, no rashes or lesions on limited exam  HEENT:  EOMI, anicteric sclera. Pupils equal. No neck masses. Oral mucosa moist.   Respiratory:No increased work of breathing, symmetric air entry, no rales, on RA, no wheezing.   Cardiovascular: tachycardic, Irr Irr, no murmurs appreciated.   Gastrointestinal:  Abdomen soft, non-distended, nontender. No palpable masses or organomegaly.  Extremities: No edema. Warm, dry. Normal tone. Healed vein graft scars.   MSK: No deformities, strength grossly normal. Gait not tested   Neurologic: A&O x 3,  speech normal, memory intact  Psych: appropriate mood and affect, cooperative and pleasant       Medication Changes:  Stop digoxin  Start amiodarone  Change warfarin dose    Discharge medications:   Current Discharge Medication List      START taking these medications    Details   amiodarone (PACERONE/CODARONE) 200 MG tablet Take 1 tablet (200 mg) by mouth 3 times daily Three times daily for one week, then two times daily for one week, then daily.  Qty: 90 tablet, Refills: 1    Associated Diagnoses: Atrial fibrillation with RVR (H)         CONTINUE these medications which have CHANGED    Details   warfarin (COUMADIN) 2.5 MG tablet Take 1/2 tablet (1.25mg) daily until next INR check  Qty: 30 tablet, Refills: 0    Associated Diagnoses: Permanent atrial fibrillation (H)         CONTINUE these medications which have NOT CHANGED    Details   albuterol (PROVENTIL) (2.5 MG/3ML) 0.083% neb solution Take 1 vial (2.5 mg) by nebulization every 6 hours as needed for shortness of breath / dyspnea or wheezing  Qty: 60 vial, Refills: 3    Associated Diagnoses: Chronic obstructive pulmonary disease, unspecified COPD type (H)      amLODIPine (NORVASC) 10 MG tablet Take 1 tablet (10 mg) by mouth daily  Qty: 90 tablet, Refills: 1    Comments: Profile Rx: patient will contact pharmacy when needed  Associated Diagnoses: Essential hypertension with goal blood pressure less than 140/90      atorvastatin (LIPITOR) 40 MG tablet Due for appt in March, one tab daily  Qty: 90 tablet, Refills: 3    Associated Diagnoses: Hyperlipidemia LDL goal <100      Cholecalciferol (VITAMIN D3 PO) Take 1,000 Units by mouth daily      docusate sodium (COLACE) 100 MG capsule Take 1 capsule by mouth 2 times daily       escitalopram (LEXAPRO) 10 MG tablet Take 10 mg by mouth daily      furosemide (LASIX) 20 MG tablet TAKE ONE TABLET BY MOUTH EVERY DAY  Qty: 90 tablet, Refills: 3    Associated Diagnoses: Essential hypertension with goal blood pressure less than  140/90      hydrALAZINE (APRESOLINE) 50 MG tablet Take 1 tablet (50 mg) by mouth 3 times daily  Qty: 60 tablet, Refills: 3    Associated Diagnoses: Benign essential hypertension      losartan (COZAAR) 100 MG tablet Take 1 tablet (100 mg) by mouth daily  Qty: 90 tablet, Refills: 3    Associated Diagnoses: Essential hypertension with goal blood pressure less than 140/90      Multiple Vitamins-Minerals (MULTIVITAMIN OR) Take 1 tablet by mouth daily       !! order for DME Equipment being ordered: Oxygen 1 litre via nasal canula  Qty: 1 each, Refills: 0    Associated Diagnoses: Chronic obstructive pulmonary disease, unspecified COPD type (H)      !! order for DME Equipment being ordered: Nebulizer with mask and tubing  Qty: 1 Units, Refills: 0    Associated Diagnoses: Chronic obstructive pulmonary disease, unspecified COPD type (H)      predniSONE (DELTASONE) 1 MG tablet Take 2 mg by mouth daily      tiotropium (SPIRIVA) 18 MCG inhaled capsule Inhale 1 capsule (18 mcg) into the lungs daily  Qty: 90 capsule, Refills: 3    Associated Diagnoses: Chronic obstructive pulmonary disease, unspecified COPD type (H)      TRAMADOL HCL PO Take 50 mg by mouth every 6 hours as needed for moderate to severe pain      VENTOLIN  (90 BASE) MCG/ACT Inhaler INHALE 2 PUFFS INTO THE LUNGS EVERY 6 HOURS AS NEEDED  Qty: 18 g, Refills: 9    Associated Diagnoses: Chronic obstructive pulmonary disease, unspecified COPD type (H)      BETA BLOCKER NOT PRESCRIBED, INTENTIONAL, Beta Blocker not prescribed intentionally due to COPD, shortness of breath with atenolol and lopressor  Refills: 0    Associated Diagnoses: Essential hypertension with goal blood pressure less than 140/90; ASCVD (arteriosclerotic cardiovascular disease)      fluticasone (FLONASE) 50 MCG/ACT nasal spray Spray 1 spray into both nostrils daily as needed for rhinitis or allergies      Saline (OCEAN NASAL SPRAY NA) Administer 1 spray in each nostril up to two times daily as  needed      sodium chloride-sodium bicarb (CLASSIC NETI POT SINUS WASH) 2300-700 MG KIT Mix 1 packet in Neti Pot and administer in each nostril daily as needed       !! - Potential duplicate medications found. Please discuss with provider.      STOP taking these medications       digoxin (LANOXIN) 125 MCG tablet Comments:   Reason for Stopping:               Labs or imaging requiring follow-up after discharge:  INR on 2/20/19, adjust warfarin dose based on INR   PFTs done prior to discharge for baseline as starting amiodarone therapy     Follow-up:     Follow up with primary care provider, Torri Fisher MD, within 2-4 weeks to evaluate medication change and for hospital follow- up. Will require TSH, LFTs and PFT monitoring if remains on amiodarone longer term.     Mar 18, 2019 10:00 AM CDT     CARDIAC DEVICE CHECK - IN CLINIC with HUANG DCR2   [facility]   [address] [place] [id number] [phone]     Follow up with Dr. Lowe , in Cardiology Clinic at [facility], within 2 weeks  to evaluate medication change and for hospital follow- up.     Follow up at [facility] on 2/20/19 for INR, adjust coumadin (warfarin) based on instructions.           Code status:  DNR/DNI    Abby Pedroza MD [facility]  Cardiology Service    CARDIOLOGY STAFF  Patient seen and examined by me.  History and physical examination discussed with Dr. Pedroza whose note reflects our joint assessment and recommendation/plans.  I am assuming the Cardiology 1 service today.  Ms. Bob is an 87 year old woman with ischemic cardiomyopathy, CABG (approx 2007), permanent AF and PPM implant (VVIR with CRT) 1/18/2019 at [facility].  AF was first diagnosed there.  She was admitted here with AF with RVR.  She did not tolerate beta-blockers or digoxin initially.  She was started on IV amiodarone (for rate control) and was switched to oral amiodarone today.  Her LVEF was  45-50% in July 2018 and 27% last month.  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  She has COPD.  Baseline DLCO 48%.  Our goal will be to obtain initial rate control with amiodarone and anticipate discontinuation in 3 months or less.  Hopefully with rate control her EF will improve and she can swtich from amiodarone to either a second attempt with beta-blocker or a calcium channel blocker (currently relatively contraindicated due to her low EF).  She will follow-up with Dr. Claudia Lowe.  20 minutes were spent in discharge planning and instruction.  Antonino Finley

## 2019-02-18 NOTE — PLAN OF CARE
Discharge Planner OT   Patient plan for discharge: Home with assist from family prn; declining recommendation for home therapy   Current status: Pt performing basic ADLs and short household mobility at IND level with use of fww. Pt fatigues and SOB with light activity. Ambulating 300 feet with supervision and fww, Sats >93% on RA throughout activity, -130 bpm. Discussed compensatory strategies and increased assist at home, with gradual progression of activity for increased activity tolerance. Pt reports her daughter is staying with her for next 2-3 days, and then granddaughter planning to check in daily and stay with her 2-3 nights/week.   Barriers to return to prior living situation: Medical status, activity tolerance   Recommendations for discharge: Home with assist for IADLs, home PT (however, pt declining)  Rationale for recommendations: Assist with heavier household tasks due to current activity tolerance- pts family able to achieve this. Home PT to progress strength and activity tolerance for improved participation in daily activity.        Entered by: Susan Stiles 02/18/2019 9:48 AM

## 2019-02-18 NOTE — PLAN OF CARE
Pt remains in Afib 120-130's w/ intermittently paced rhythm. Pt incontinent of urine x2 into brief. SBA to bathroom. Amiodarone gtt remains on at 0.5 mg/min and MD will remind day team to re-evaluate and possibly bridge to PO amiodarone. Notify CARDS 1 with any changes.

## 2019-02-18 NOTE — PLAN OF CARE
PT-6C- PT Consult Order received, per chart review and communication with interdisciplinary care team, pt does not need skilled PT at this time, Pt will continue to participate in OT/CR during hospital admission. PT will complete order. Please re-consult should new needs arise.

## 2019-02-19 NOTE — PROGRESS NOTES
02/17/19 1600   Quick Adds   Type of Visit Initial Occupational Therapy Evaluation   Living Environment   Lives With alone   Living Arrangements apartment   Home Accessibility no concerns   Living Environment Comment Pt lives alone in an apartment.    Self-Care   Usual Activity Tolerance good   Current Activity Tolerance poor   Regular Exercise No   Equipment Currently Used at Home none   Activity/Exercise/Self-Care Comment Pt reports poor activity tolerance since pacemaker.    Functional Level   Ambulation 0-->independent   Transferring 0-->independent   Toileting 0-->independent   Bathing 0-->independent   Dressing 0-->independent   Eating 0-->independent   Cognition 0 - no cognition issues reported   Fall history within last six months no   Which of the above functional risks had a recent onset or change? ambulation;transferring;toileting;bathing  (activity tolerance)   General Information   Onset of Illness/Injury or Date of Surgery - Date 02/16/19   Referring Physician Dr Pedroza   Patient/Family Goals Statement improve activity tolerance   Additional Occupational Profile Info/Pertinent History of Current Problem 87 year old yo female with a history of CAD s/p CABG, CVA, HTN, COPD, PMR, CKD3 and permanent Afib with tachybrady syndrome and recent PPM (1/18/19) at [facility] during hospitalization for new Afib, here with palpitations and shortness of breath.    Precautions/Limitations fall precautions;oxygen therapy device and L/min   General Observations OPt supine with family present, agreeable to session.     General Info Comments activity: ambualte   Cognitive Status Examination   Orientation orientation to person, place and time   Level of Consciousness alert   Visual Perception   Visual Perception Wears glasses   Pain Assessment   Patient Currently in Pain No   Integumentary/Edema   Integumentary/Edema no deficits were identifed   Posture   Posture Comments intact   Range of Motion (ROM)   ROM Comment WFL  "  Strength   Strength Comments below baseline   Mobility   Bed Mobility Comments Mod I   Transfer Skills   Transfer Comments SBA, short of breath   Transfer Skill: Bed to Chair/Chair to Bed   Level of [place]: Bed to Chair stand-by assist   Toilet Transfer   Toilet Transfer Comments declined   Upper Body Dressing   Level of [place]: Dress Upper Body minimum assist (75% patients effort)   Lower Body Dressing   Level of [place]: Dress Lower Body minimum assist (75% patients effort)   Instrumental Activities of Daily Living (IADL)   Previous Responsibilities meal prep;housekeeping;laundry;shopping;yardwork   Activities of Daily Living Analysis   Impairments Contributing to Impaired Activities of Daily Living balance impaired;cognition impaired   General Therapy Interventions   Planned Therapy Interventions ADL retraining;IADL retraining   Clinical Impression   Criteria for Skilled Therapeutic Interventions Met yes, treatment indicated   OT Diagnosis heart failure   Influenced by the following impairments fatigue,  MCNAIR poor activity tolerance following recent pacemaker   Assessment of Occupational Performance 1-3 Performance Deficits   Identified Performance Deficits home management, showering   Clinical Decision Making (Complexity) Low complexity   Therapy Frequency daily   Predicted Duration of Therapy Intervention (days/wks) 4 days   Anticipated Discharge Disposition Home with Assist;Home with Home Therapy   Risks and Benefits of Treatment have been explained. Yes   Patient, Family & other staff in agreement with plan of care Yes   [facility] [email or web address]@ [email or web address] TM \"6 Clicks\"   2016, Trustees of [facility], under license to [employer].  All rights reserved.   6 Clicks Short Forms Daily Activity Inpatient Short Form   [facility]  \"6 Clicks\" Daily Activity Inpatient Short Form   1. Putting on and taking off regular lower body clothing? 3 - A Little   2. Bathing (including washing, " rinsing, drying)? 3 - A Little   3. Toileting, which includes using toilet, bedpan or urinal? 3 - A Little   4. Putting on and taking off regular upper body clothing? 3 - A Little   5. Taking care of personal grooming such as brushing teeth? 4 - None   6. Eating meals? 4 - None   Daily Activity Raw Score (Score out of 24.Lower scores equate to lower levels of function) 20   Total Evaluation Time   Total Evaluation Time (Minutes) 5

## 2019-02-19 NOTE — TELEPHONE ENCOUNTER
RN Care coordinator completed post-hospital follow up.    No outreach from triage will be attempted.    SIMON Davison, RN

## 2019-02-19 NOTE — PLAN OF CARE
Discharge Planner OT  6C Evaluation  LATE ENTRY  Patient plan for discharge: home w assist from family, family from out of state here  Current status: Pt with poor activity tolerance, first time OOB since admission, up with SBA using walker on 2L with O2at 91% with activity.  Tolerated ~50 ft ambulation, sat to rest then 75ft sitting in chair after on 2L.  Barriers to return to prior living situation: MCNAIR, fatigue  Recommendations for discharge: home w A from family and home OT PT to determine AE and improve activity tolerance for IADLS  Rationale for recommendations: pt is below her baseline post pacemaker admission at [facility]       Entered by: Susan Melendez 02/18/2019 6:19 PM

## 2019-02-19 NOTE — PROGRESS NOTES
Clinic Care Coordination Contact    Clinic Care Coordination Contact  OUTREACH    Referral Information: CTS      Chief Complaint   Patient presents with     Clinic Care Coordination - Post Hospital     clinic Care Coordiination RN         Universal Utilization: Hospitalization 12/16-2/18/2019-Atrial fibrillation with RVR     Utilization    Last refreshed: 2/19/2019 12:36 PM:  Hospital Admissions 4           Last refreshed: 2/19/2019 12:36 PM:  ED Visits 0           Last refreshed: 2/19/2019 12:36 PM:  No Show Count (past year) 4              Current as of: 2/19/2019 12:36 PM              Clinical Concerns:  Current Medical Concerns:  Patient reports her HR was up to 154  No further heart racing  Started on amiodarone and will discuss decreasing  dose after Dr Lowe appointment 3/6/2019  C   Health Maintenance Reviewed:    Clinical Pathway: None    Medication Management:  Reviewed           Goals:   Goals        General    #1 Medical (pt-stated)     Notes - Note created  2/19/2019  2:48 PM by Sara Mcghee RN    Goal Statement: I will monitor my heart rate   Measure of Success: no further episodes of heart racing Supportive Steps to Achieve: I   will seek medical help if my heart starts racing   I will take new medication amiodarone as directed   I bambi keep cardiology appointment 3/6/2019  Barriers: recent hospitalization for fast heart rate    Strengths: no further episodes of heart racing   Date to Achieve By: 3/19/2019  Patient expressed understanding of goal: yes              Patient/Caregiver understanding: Patient expresses good understanding of discharge instructions    Future Appointments              [facility] 2 days  [facility]     In 2 weeks Claudia Lowe MD [facility] [place],     In 3 weeks [facility]     In 3 weeks UC CV DEVICE 1 M [facility], [facility]    In 2 months Torri Fisher MD [place]  [facility]          Plan: Patient will seek medical help if reoccurrence of racing heart  Patient will keep future INR,Cardioloy and PCP appointments  CC will follow up after Cardiology appointment     Sara Mcghee RN / Clinical Care Coordinator     [facility]   [email or web address]  Office :  [phone] / Fax :  [phone]

## 2019-02-19 NOTE — PROGRESS NOTES
Date: 2/21/2019 Status: Ivana   Time: 11:00 AM Length: 15   Visit Type: ANTICOAG FOLLOW UP [1233] DAMION:     Provider: [facility] Department:  NURSE   Special Needs:   Comments:       Patient was contacted by an RN for post DC follow up so no duplicate post DC follow up call will be made

## 2019-02-20 NOTE — PROGRESS NOTES
SUBJECTIVE/OBJECTIVE:                Jennifer Bob is a 87 year old female called for a transitions of care visit.  She was discharged from [facility] on 2/18/19 for   Afib with RVR, symptomatic  Troponin elevation, demand ischemia   HFrEF, mild decompensation  CAD s/p CABG and PCI  HTN  CKD III  COPD.     Chief Complaint: Wondering about reducing her BP meds.    Allergies/ADRs: Reviewed in Epic  Tobacco: No tobacco use   Alcohol: none  PMH: Reviewed in Epic    Medication Adherence/Access:  no issues reported    HFrEF/HTN: Current medications include losartan 100mg QD, amlodipine 10mg QD, hydralazine 50mg TID, furosemide 20mg once daily.  Patient reports no current medication side effects. She specifically deniess, dizziness, LHness, weakness, SOB, MCNIAR, bloating, cough, CP. She does monitor BP at home and reports it has been low since being home: 105/58mmHg, but as above, asymptommatic. States her PTA dose of hydralazine was 1 tablet BID with option to take a 3rd dose for SBP >160mmHg.  ECHO:  Date 1/23/2019, EF 30-35%  Pt is complaining of sx of HFof: leg swelling which is about the same as when she was inpatient. She does wear compression stockings all the time.   Pt is measuring daily weights and reports 115lbs today, 117lbs yesterday.   Pt is following a low sodium diet, is avoiding EtOH.  BP Readings from Last 6 Encounters:   02/18/19 131/73   02/14/19 154/86   02/04/19 148/84   01/24/19 151/89   01/19/19 130/72   01/15/19 (!) 174/98     Potassium   Date Value Ref Range Status   02/18/2019 3.8 3.4 - 5.3 mmol/L Final     Afib: Patient is currently taking warfarin 2.5mg 1/2 tablet QD per discharge summary -- reports she has been taking a whole 2.5mg tablet daily ACC for anticoagulation. She has follow-up INR scheduled for tomorrow. Patient reports excessive bruising from hospitalization. Patient does not have a hx of GI bleed.   Patient was started on amiodarone while inpatient and is taking 200mg TID x1 week,  then BID x1 week, then QD until follow-up with cardiology. Pt reports no current medication side effects.   Lab Results   Component Value Date    INR 2.82 02/18/2019    INR 2.23 02/17/2019    INR 2.08 02/16/2019     Hyperlipidemia: Current therapy includes atorvastatin 40mg once daily.  Pt reports no significant myalgias or other side effects.    COPD: Current medications:   Short-Acting Bronchodilator: Albuterol MDI and nebs. Pt reports using nebs one times per day or every other day.   LAMA- Spiriva Handihaler 1 puff(s) once daily. Reports she stopped taking Spiriva about 8 days ago. It is unclear why.     Prednisone 2mg daily. Reports she will be on this until follow-up with PCP in May  Pt is not experiencing side effects.   Pt reports the following symptoms: none.  Has spirometry been completed: Yes   PIF was completed today: No    Depression:  Current medications include: Escitalopram 10mg once daily. Pt reports that depression symptoms are stable. Feels this works well..  PHQ-9 SCORE 3/14/2017 1/15/2019   PHQ-9 Total Score 0 0     Pain: Tramadol 50mg takes 2 tablets once daily for hip and back pain. Feels it is effective. Denies side effects.    Allergic rhinitis: Current medications include fluticasone nasal spray PRN, saline nasal spray PRN and occasional use of Neti Pot. She feels these work well for allergy symptoms when needed.     Constipation: Colace one daily (sometimes 2 ) keeps her regular.    Today's Vitals: There were no vitals taken for this visit. Phone Visit.    ASSESSMENT:                 Current medications were reviewed today.      Medication Adherence: fair, needs improvement - see below    HFrEF/HTN: Needs Improvement. Patient is meeting BP goal of < 150/90mmHg -- is well below goal. She may benefit from returning to PTA dose of hydralazine 50mg BID with option for 3rd dose based on SBP. She is asymptomatic, however I worry about hypoperfusion and risk of falls in this elderly  patient.    Afib: Needs improvement. She has been taking higher warfarin dose than recommended at discharge. She would benefit from reducing to prescribed dose to reduce risk of hypercoagulation. She should follow-up with ACC tomorrow as scheduled.    Hyperlipidemia: Stable.     COPD: Needs Improvement. Patient would benefit from restarting daily use of Spiriva; further education on maintenance and controlled inhalers.    Depression: Stable.    Pain: Stable.    Allergic rhinitis: Stable.     Constipation: Stable.    PLAN:                  Post Discharge Medication Reconciliation Status: discharge medications reconciled, continue medications without change.    1. Pt will have BP checked at INR appointment tomorrow. PCP to consider reducing hydralzine back to PTA dose of 50mg BID with option to take 3rd tablet for SBP <160mmHg.  2. Reduce warfarin to 1/2 tablet tonight and follow-up with ACC tomorrow as planned.  3. Restart Spiriva 1 capsule inhaled daily as previously prescribed.    I spent 60 minutes with this patient today. I offer these suggestions for consideration by Torri Fisher. A copy of the visit note was provided to the patient's primary care provider.    Will follow up as needed; referred her to Radha Joseph PharmD [facility] provider at [facility] if needed for further questions.    The patient was mailed a summary of these recommendations as an after visit summary.    Radha Wilson, Pharm.D., [facility] Center[facility]  Medication Therapy Management Pharmacist  [phone]

## 2019-02-20 NOTE — TELEPHONE ENCOUNTER
"Requested Prescriptions   Pending Prescriptions Disp Refills     VENTOLIN  (90 Base) MCG/ACT inhaler [Pharmacy Med Name: VENTOLIN HFA 108MCG/ACT AERS]  Last Written Prescription Date:  1/26/2018  Last Fill Quantity: 18g,  # refills: 9   Last Office Visit: 2/4/2019   Future Office Visit:    Next 5 appointments (look out 90 days)    Mar 07, 2019  1:00 PM CST  Return Visit with Claudia Lowe MD  [facility] ([facility]) [address] [place] [id number]  [phone]   Mar 14, 2019  9:00 AM CDT  Office Visit with Torri Fisher MD  [facility] ([facility]) [address] [place] [id number]  [phone]   May 07, 2019 10:20 AM CDT  Office Visit with Torri Fisher MD  [facility] ([facility]) [address] [place] [id number]  [phone]        18 g 9     Sig: INHALE TWO PUFFS BY MOUTH EVERY 6 HOURS AS NEEDED    Asthma Maintenance Inhalers - Anticholinergics Passed - 2/20/2019  8:56 AM       Passed - Patient is age 12 years or older       Passed - Recent (12 mo) or future (30 days) visit within the authorizing provider's specialty    Patient had office visit in the last 12 months or has a visit in the next 30 days with authorizing provider or within the authorizing provider's specialty.  See \"Patient Info\" tab in inbasket, or \"Choose Columns\" in Meds & Orders section of the refill encounter.           Passed - Medication is active on med list          "

## 2019-02-20 NOTE — Clinical Note
Dr. Phillip Costa I am having Jennifer check her BP at INR visit tomorrow (2/20/19). Her home BP has been running low-normal, and she may need to return to her PTA dose of hydralazine. RN may consult you following INR check at 11am.Thank you!Radha Wilson, Pharm.D., BCACPMedication Therapy Management [phone]

## 2019-02-21 NOTE — TELEPHONE ENCOUNTER
Routing refill request to provider for review/approval because:  Using more than 6 canisters per year protocol asks provider to approve please - patient states she is out currently

## 2019-02-21 NOTE — PROGRESS NOTES
"ANTICOAGULATION FOLLOW-UP CLINIC VISIT    Patient Name:  Jennifer Bob  Date:  2019  Contact Type:  Face to Face    SUBJECTIVE:     Patient Findings     Positives:   Extra doses (Took a full dose on Monday instead of 1.25 mg as directed by hospital team. ), Change in medications (Lopressor dc'd. Hydralazine TID. Amiodarone taper: 200 mg TID for 1 wk, BID for 1 wk, then daily. Uptodate: \"Amiodarone may enhance the anticoagulant effect of Vitamin K Antagonists. Amiodarone may increase the serum concentration of Vitamin K Antagonists), Hospital admission (- ; Afib and SOB.), Inflammation (SOB and Afib sx have dramatically improved since hospitalization. ), Missed doses (Pt missed Tuesday's dose.)    Comments:   Vitals: 133/71, .            OBJECTIVE    INR Protime   Date Value Ref Range Status   2019 2.7 (A) 0.86 - 1.14 Final       ASSESSMENT / PLAN  INR assessment THER    Recheck INR In: 4 DAYS    INR Location Clinic      Anticoagulation Summary  As of 2019    INR goal:   2.0-3.0   TTR:   --   INR used for dosin.7 (2019)   Warfarin maintenance plan:   No maintenance plan   Full warfarin instructions:   : 2.5 mg; : 2.5 mg; : 2.5 mg; : 2.5 mg; Otherwise No maintenance plan   Next INR check:   2019   Target end date:       Indications    New onset atrial fibrillation (H) [I48.91]  Stroke (H) [I63.9]             Anticoagulation Episode Summary     INR check location:       Preferred lab:       Send INR reminders to:    [facility]    Comments:   Fragile. Low appetite, no large portions of dark greens (broccoli- 2 x wk). MTV 25 mcg, new bottle 50 mcg (will start in late March/April). 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 Cardiology team: Chrissy Rojo.       Anticoagulation Care Providers     Provider Role Specialty Phone number    Bridget Rojo MD Referring Clinical Cardiac Electrophysiology " [phone]    Torri Fisher MD [facility] [phone]            See the Encounter Report to view Anticoagulation Flowsheet and Dosing Calendar (Go to Encounters tab in chart review, and find the Anticoagulation Therapy Visit)    Writer advised patient to resume 2.5 mg daily given possible Amiodarone interactions. Recheck Monday. Weekly dose will be 13.25 mg.     Patient made aware if signs of clotting (pain, tenderness, swelling, or color change in any extremity) AND/OR bleeding occur (nosebleeds, bleeding gums, bruising, or blood in stool or urine) to notify provider & seek medical attention. If severe symptoms develop, such as major bleeding, chest pain, shortness of breath, fall, trauma or s/s of stroke, patient to call 911 immediately.     Dosage adjustment made based on physician directed care plan.    Sandy Young RN

## 2019-02-21 NOTE — TELEPHONE ENCOUNTER
Ventolin inhaler approved per Dr. Fisher. Further refills to be signed at upcoming ov on 3/14. GLENN LarsonN, RN

## 2019-02-25 NOTE — PROGRESS NOTES
ANTICOAGULATION FOLLOW-UP CLINIC VISIT    Patient Name:  Jennifer Bob  Date:  2019  Contact Type:  Face to Face    SUBJECTIVE:     Patient Findings     Positives:   Change in medications (Amiodarone taper from TID to BID starts today. Likely medication interactions account for most of today's spike in INR level.)    Comments:   Patient denies ANY s/s of BLEEDING!           OBJECTIVE    INR Protime   Date Value Ref Range Status   2019 4.0 (A) 0.86 - 1.14 Final       ASSESSMENT / PLAN  INR assessment SUPRA    Recheck INR In: 4 DAYS    INR Location Clinic      Anticoagulation Summary  As of 2019    INR goal:   2.0-3.0   TTR:   23.1 % (4 d)   INR used for dosin.0! (2019)   Warfarin maintenance plan:   No maintenance plan   Full warfarin instructions:   : Hold; : 1.25 mg; : 1.25 mg; : 2.5 mg; Otherwise No maintenance plan   Next INR check:   3/1/2019   Target end date:       Indications    New onset atrial fibrillation (H) [I48.91]  Stroke (H) [I63.9]             Anticoagulation Episode Summary     INR check location:       Preferred lab:       Send INR reminders to:   [facility]    Comments:   Fragile. Low appetite, no large portions of dark greens (broccoli- 2 x wk). MTV 25 mcg, new bottle 50 mcg (will start in late March/April). 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 Cardiology team: Chrissy Rojo.       Anticoagulation Care Providers     Provider Role Specialty Phone number    Bridget Rojo MD Referring Clinical Cardiac Electrophysiology [phone]    Torri Fisher MD [facility] [phone]            See the Encounter Report to view Anticoagulation Flowsheet and Dosing Calendar (Go to Encounters tab in chart review, and find the Anticoagulation Therapy Visit)    Per protocol, patient advised to hold today's dose and then decrease weekly dose to 12.5 mg to account for  supra-therapeutic INR level. Patient instructed to increase green intake today and tomorrow as well, patient plans to have some canned spinach (this was taken into consideration for dosing plan). Recheck in 4 days to ensure INR stability. Patient advised to use extra caution with activities to prevent injuries and falls.     Patient made aware if signs of clotting (pain, tenderness, swelling, or color change in any extremity) AND/OR bleeding occur (nosebleeds, bleeding gums, bruising, or blood in stool or urine) to notify provider & seek medical attention. If severe symptoms develop, such as major bleeding, chest pain, shortness of breath, fall, trauma or s/s of stroke, patient to call 911 immediately.     Dosage adjustment made based on physician directed care plan.    Sandy Young RN

## 2019-02-25 NOTE — TELEPHONE ENCOUNTER
Patient has lab only appt 3/1/19 for INR, no orders in chart.  Please place FUTURE orders in Epic if appropriate.    Thanks, lab

## 2019-03-01 NOTE — PROGRESS NOTES
ANTICOAGULATION FOLLOW-UP CLINIC VISIT    Patient Name:  Jennifer Bob  Date:  3/1/2019  Contact Type:  Telephone/ Patient    SUBJECTIVE:     Patient Findings     Positives:   Change in medications (Amiodarone reduced on Monday from TID dosing to BID. ), Intentional hold of therapy (Per ACC instructions, pt advised to hold Monday's dose. )           OBJECTIVE    INR   Date Value Ref Range Status   2019 2.20 (H) 0.86 - 1.14 Final     Comment:     This test is intended for monitoring Coumadin therapy.  Results are not   accurate in patients with prolonged INR due to factor deficiency.         ASSESSMENT / PLAN  INR assessment THER    Recheck INR In: 6 DAYS    INR Location Clinic      Anticoagulation Summary  As of 3/1/2019    INR goal:   2.0-3.0   TTR:   34.3 % (1.1 wk)   INR used for dosin.20 (3/1/2019)   Warfarin maintenance plan:   1.25 mg (2.5 mg x 0.5) every Mon, Wed, Fri; 2.5 mg (2.5 mg x 1) all other days   Full warfarin instructions:   1.25 mg every Mon, Wed, Fri; 2.5 mg all other days   Weekly warfarin total:   13.75 mg   Plan last modified:   Sandy Young RN (3/1/2019)   Next INR check:   3/7/2019   Target end date:       Indications    New onset atrial fibrillation (H) [I48.91]  Stroke (H) [I63.9]             Anticoagulation Episode Summary     INR check location:       Preferred lab:       Send INR reminders to:   [facility]    Comments:   Fragile. Low appetite, no large portions of dark greens (broccoli- 2 x wk). MTV 25 mcg, new bottle 50 mcg (will start in late March/April). 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 Cardiology team: Chrissy Rojo.       Anticoagulation Care Providers     Provider Role Specialty Phone number    Bridget Rojo MD Referring Clinical Cardiac Electrophysiology [phone]    Torri Fisher MD [facility] [phone]            See the Encounter Report to view  Anticoagulation Flowsheet and Dosing Calendar (Go to Encounters tab in chart review, and find the Anticoagulation Therapy Visit)    With today's weekly total at 12.5 mg and Amiodarone dose being reduced again on Monday, patient should be able to tolerate 1.25 mg increase. New weekly dose will be 13.75 mg at recheck on Thursday 3/7.     Patient made aware if signs of clotting (pain, tenderness, swelling, or color change in any extremity) AND/OR bleeding occur (nosebleeds, bleeding gums, bruising, or blood in stool or urine) to notify provider & seek medical attention. If severe symptoms develop, such as major bleeding, chest pain, shortness of breath, fall, trauma or s/s of stroke, patient to call 911 immediately.     Dosage adjustment made based on physician directed care plan.    Sandy Young RN

## 2019-03-07 NOTE — PROGRESS NOTES
ANTICOAGULATION FOLLOW-UP CLINIC VISIT    Patient Name:  Jennifer Bob  Date:  3/7/2019  Contact Type:  Face to Face    SUBJECTIVE:     Patient Findings     Positives:   Dental/Other procedures (Pt would like to incorporate some lettuce into her meals, no salads, just as a fixing/topping (like in tacos). Writer encouraged pt to start now. )           OBJECTIVE    INR Protime   Date Value Ref Range Status   2019 2.8 (A) 0.86 - 1.14 Final       ASSESSMENT / PLAN  INR assessment THER    Recheck INR In: 1 WEEK    INR Location Clinic      Anticoagulation Summary  As of 3/7/2019    INR goal:   2.0-3.0   TTR:   60.5 % (2 wk)   INR used for dosin.8 (3/7/2019)   Warfarin maintenance plan:   1.25 mg (2.5 mg x 0.5) every Mon, Wed, Fri; 2.5 mg (2.5 mg x 1) all other days   Full warfarin instructions:   1.25 mg every Mon, Wed, Fri; 2.5 mg all other days   Weekly warfarin total:   13.75 mg   No change documented:   Sandy Young RN   Plan last modified:   Sandy Young RN (3/1/2019)   Next INR check:   3/14/2019   Target end date:       Indications    New onset atrial fibrillation (H) [I48.91]  Stroke (H) [I63.9]             Anticoagulation Episode Summary     INR check location:       Preferred lab:       Send INR reminders to:   [facility]    Comments:   Fragile. Low appetite, no large portions of dark greens (broccoli- 2 x wk). MTV 25 mcg, new bottle 50 mcg (will start in late March/April). 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 Cardiology team: Chrissy Rojo.       Anticoagulation Care Providers     Provider Role Specialty Phone number    Bridget Rojo MD Referring Clinical Cardiac Electrophysiology [phone]    Torri Fisher MD [facility] [phone]            See the Encounter Report to view Anticoagulation Flowsheet and Dosing Calendar (Go to Encounters tab in chart review, and find the Anticoagulation  Therapy Visit)    Patient made aware if signs of clotting (pain, tenderness, swelling, or color change in any extremity) AND/OR bleeding occur (nosebleeds, bleeding gums, bruising, or blood in stool or urine) to notify provider & seek medical attention. If severe symptoms develop, such as major bleeding, chest pain, shortness of breath, fall, trauma or s/s of stroke, patient to call 911 immediately.     Dosage adjustment made based on physician directed care plan.    Sandy Young RN

## 2019-03-07 NOTE — PROGRESS NOTES
I am delighted to see Jennifer Bob for follow up of atrial fibrillation. She is here with her daughter today.     As you know, the patient is a 87 year old  female with a h/o CAD s/p CABG, stroke in 2013 with mild residual right hand weakness. She was hospitalized 7/7/18 with another embolic stroke and received tPA, minimal residual deficits. A ziopatch monitor was placed prior to discharge, and showed no atrial fibrillation. She had not been on a beta blocker after CABG due to advanced COPD.     She presented to [facility] 1/15/19 when her home BP cuff had shown HR 140s. In ED she was found to be in afib. She denied palpitations, chest pressure, or shortness of breath. EF found to be 27% by echo although she was in afib at the time. Nuclear stress test showed no reversible ischemia. She was noted to have pauses on telemetry on metoprolol 25 bid; with reduction of dose to 12.5 bid she had ventricular rates up to 150s. She underwent implantation of a [employer] CRT-P with RV and LV leads (no RA lead) on 1/18/19; she was discharged on apixaban and metoprolol 50 bid. She returned to [facility] ED on 1/22/19 with chest pain, found to have RV perforation by chest CT scan without effusion, hemodynamically stable. RV lead repositioning done 1/23/19. She did not tolerate apixaban - c/o diaphoresis, so was switched to warfarin.     I saw her on 2/14/2019.  At that time, she had complained of increasing dyspnea since hospital discharge.  She was not volume overloaded by my exam in the office.  Increasing shortness of breath may have been COPD exacerbation with the addition of beta-blockers and she was at that time on 50 mg twice daily.  I have discontinued and started her on digoxin to attempt rate control.  By the present pacing percentage was about 70%.  I was not confident that digoxin was adequate to control her rate and had discussed her about the possibility of AV node ablation in the  future.    She was admitted to the hospital on 2/16/2019 with worsening shortness of breath.  She was diuresed, digoxin was discontinued, and amiodarone was started. Since her hospital discharge, she has felt better. No palpitations, orthopnea, dyspnea, but still somewhat fatigued and lower energy than before.    The following portions of the patient's history were reviewed and updated as appropriate: allergies, current medications, past family history, past medical history, past social history, past surgical history, and the problem list.    Past Medical History:  1. CAD. CABG 1992;  MI with VF arrest, s/p PCI SVG to RCA graft 8/29/09  ([place])  2. Hypertension ~ home  - 140/150 , occasionally 160-180mmHg  3. Hyperlipidemia  4. COPD. PFT 2014 showed reduced FEV1.  5. Hernia repair  6. Cholecystectomy  7. Ischemic stroke 2013; recent hospital admission 7/7/18 with another stroke, received tPA, residual right weakness. Ziopatch post discharge showed no atrial fibrillation.  8. Peripheral arterial disease s/p superior femoral artery stent 2018  9. Atrial fibrillation, 1/2019, with RVR.  Had increasing shortness of breath with both metoprolol and digoxin.  Amiodarone was started for rate control in February 2019.  10. Bradycardia with rate control - s/p [employer] CRT-P with RV and LV leads 1/18/19, complicated with RV lead microperforation requiring repositioning, no pericardial effusion    Allergies:    Allergies   Allergen Reactions     Adhesive Tape      blisters     Atenolol      SOB     Lopressor Hct      SOB       Medications:   Losartan 100 every day  Amlodipine 10 every day  Amiodarone 200 qd  Hydralazine 50 mg bid (patient states home BP ~ 120 at baseline, low to  on tid doses of hydral with dizzy)  Atorvastatin 40 every day  Calcium  Vitamin D  Lexapro 10 every day  Lasix 20 every day  Prednisone 2 every day  Tramadol   Warfarin  Albuterol inhalers    Family History: No  CAD    Psychosocial history:  reports that she has quit smoking. she has never used smokeless tobacco. She reports that she does not drink alcohol or use drugs.    Review of systems: Cardiovascular - no chest pain, palpitations, dizziness, shortness of breath, dyspnea, orthopnea, PND.    In addition,   Constitutional: No change in weight, sleep or appetite.  Normal energy.  No fever or chills  Eyes: Negative for vision changes or eye problems  ENT: No problems with ears, nose or throat.  No difficulty swallowing.  Resp: No coughing, wheezing or shortness of breath  GI: No nausea, vomiting,  heartburn, abdominal pain, diarrhea, constipation or change in bowel habits  : No urinary frequency or dysuria, bladder or kidney problems  Musculoskeletal: No significant muscle or joint pains  Neurologic: No headaches, numbness, tingling, weakness, problems with balance or coordination  Psychiatric: No problems with anxiety, depression or mental health  Heme/immune/allergy: No history of bleeding or clotting problems or anemia.  No allergies or immune system problems  Integumentary: No rashes,worrisome lesions or skin problems      Physical examination  Vitals: 155/74, HR 90, 96% RA   BMI= 52 kg    Constitutional: In general, the patient is a pleasant female in no apparent distress.    Eyes: PERRLA.  EOMI.  Sclerae white, not injected.  ENT/mouth: Normiocephalic and atraumatic.  Nares clear.  Pharynx without erythema or exudate.  Dentition intact.  No adenopathy.  No thyromegaly. Carotids +2/2 bilaterally without bruits.  No jugular venous distension.   Card/Vasc: The PMI is in the 5th ICS in the midclavicular line. There is no heave. Regular rate and rhythm. Normal S1, S2. No murmur, rub, click, or gallop. Pulses are normal bilaterally throughout. No peripheral edema.  Respiratory: Clear to asculation.  No ronchi, wheezes, rales.  No dullness to percussion.   GI: Abdomen is soft, nontender, nondistended. No organomegaly. No  AAA.  No bruits.   Integument: No significant bruises or rashes  Neurological: The neurological examination reveal a patient who was oriented to person, place, and time.    Psych: Normal  Heme/Lymph/Immun: no significant adenopathy    I have previously reviewed the following labs/imaging:  Echo  2/9/17: EF 40-45%, concentric LVH, inferior AK, normal RV. LAE.  7/7/18: EF 45-50%, basal inferior/inferolateral AK, normal RV. PASp 48mmHg. IVC normal. No effusion. EREN 58.7 ml/m2  1/15/19 (in AF rate 98 bpm):  EF 27%, inferior HK, mild to mod TR, severely dilated LA, EREN 44  1/22/19: EF 30-35%  Nuclear stress test   5/30/14: inferolateral HK with ischemia  1/15/19: no ischemia  Labs   7/8/18: cholesterol 134, HDL 69, LDL 57, TG 40, K 4.0, cr 0.90, hgb 11, plt 124k  1/23/19: Cr 1.04  INR 2/14/19: 1.7; 3/1/19: 2.2  CT angio head/neck 7/7/18: PHAN 40%, LIC 60%  CXR 1/22/19: normal heart size; RV/LV leads in good positions; emphysematous changes, no pumonary congestion  Interrogation from [facility] 2/1/19: VVIR  bpm,  70%. Parameters stable.  EKG   7/8/18: sinus 60 bpm, normal OH, RBBB  1/22/19: afib with BIV pacing      I have reviewed the following labs/imaging TODAY:  Labs 2/16/19: TSH 2.19  5/2/18: ALT 29, AST 24    I have personally and independently reviewed the following:  Pacemaker interrogation 2/16/2019: [employer] CRT-P with RV/LV leads.  By V pacing percentage of 73%. Programmed to VVIR  bpm.  EKG today 3/7/19: AF with RBBB and intermittent pacing    Assessment :  1. Atrial fibrillation, diagnosed in January 2019, no plans to restore sinus rhythm. Rate had been difficult to control. Amiodarone started with better rate control, however it is not ideal to continue this for rate control in a patient with significant COPD. I discussed AV node ablation with her and her daughter. It would be a great way to control rate, allow for adequate biventricular pacing to see if EF can improve. They will  consider. In the meantime, will get LFTs and TFTs every 6 months on amiodarone. She will also need PFTs annually if she continues amiodarone. EHT0DU2-EHKd score is 7, continue warfarin, INR 2-3.  2. CAD/CABG. EF had previously been 45%, now 30-35%, likely due to AF with RVR.   3. Tachy-ralph with baseline RBBB. [employer] CRT-P implanted without atrial lead. She is not getting full benefit of biv pacing due to AF with RVR. Suggest AVN ablation as above.  4. Hypertension. Home SBP much lower than office, no change in meds today  5. H/o CVA with minimal residual deficit.     Plan:  1. Follow up 4 months  2. AST, ALT, TSH in 6 months   3. Device follow up via remote monitor every 3 months - will cancel her device clinic appt 3/18/19 she does not need this, I can check her BIV pacing percentage remotely    I spent a total of 30 minutes face to face with  Jennifer Bob during today's office visit. Over 50% of this time was spent counseling the patient and/or coordinating care regarding management options, including risks/benefits of AVN ablation.      The patient is to return 4 months. Will get device check remotely in 3 months. The patient understood the treatment plan as outlined above.  There were no barriers to learning.      Claudia Lowe MD

## 2019-03-07 NOTE — PATIENT INSTRUCTIONS
You were seen today in the Cardiovascular Clinic at [facility].     Cardiology Providers you saw during your visit: Dr. Claudia Lowe    Diagnosis:  Atrial fibrillation    Results: discussed with patient      Orders:   none    Medication Changes:   none    Recommendations:   Cancel device check 3/18/19    Follow-up:  4 months       Please feel free to call me with any questions or concerns.        Questions and [phone]  .      On Call Cardiologist for after hours or on weekends: [phone]   option #4 and ask to speak to the on-call Cardiologist.          If you need a medication refill please contact your pharmacy.  Please allow 3 business days for your refill to be completed.

## 2019-03-14 NOTE — PATIENT INSTRUCTIONS
I recommend getting the new shingles vaccine, Shingrix.  You can call your insurance company to find out if this vaccine is covered.  You may also ask our pharmacy to check if your insurance covers Shingrix if given at the pharmacy.      See me in 3 months for follow up.

## 2019-03-14 NOTE — PROGRESS NOTES
ANTICOAGULATION FOLLOW-UP CLINIC VISIT    Patient Name:  Jennifer Bob  Date:  3/14/2019  Contact Type:  Face to Face    SUBJECTIVE:     Patient Findings     Comments:   No concerns reported today.           OBJECTIVE    INR Protime   Date Value Ref Range Status   2019 2.5 (A) 0.86 - 1.14 Final       ASSESSMENT / PLAN  INR assessment THER    Recheck INR In: 10 DAYS    INR Location Clinic      Anticoagulation Summary  As of 3/14/2019    INR goal:   2.0-3.0   TTR:   73.7 % (3 wk)   INR used for dosin.5 (3/14/2019)   Warfarin maintenance plan:   1.25 mg (2.5 mg x 0.5) every Mon, Wed, Fri; 2.5 mg (2.5 mg x 1) all other days   Full warfarin instructions:   1.25 mg every Mon, Wed, Fri; 2.5 mg all other days   Weekly warfarin total:   13.75 mg   No change documented:   Sandy Young RN   Plan last modified:   Sandy Young RN (3/1/2019)   Next INR check:   3/25/2019   Target end date:       Indications    New onset atrial fibrillation (H) [I48.91]  Stroke (H) [I63.9]             Anticoagulation Episode Summary     INR check location:       Preferred lab:       Send INR reminders to:   [facility]    Comments:   Fragile. Low appetite, no large portions of dark greens (broccoli- 2 x wk). MTV 25 mcg, new bottle 50 mcg (will start in late March/April). 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 Cardiology team: Chrissy Rojo.       Anticoagulation Care Providers     Provider Role Specialty Phone number    Bridget Rojo MD Referring Clinical Cardiac Electrophysiology [phone]    Torri Fisher MD [facility] [phone]            See the Encounter Report to view Anticoagulation Flowsheet and Dosing Calendar (Go to Encounters tab in chart review, and find the Anticoagulation Therapy Visit)    AVS printed and reviewed with patient. No change in dosing plan.     Patient and daughter made aware if signs of clotting  (pain, tenderness, swelling, or color change in any extremity) AND/OR bleeding occur (nosebleeds, bleeding gums, bruising, or blood in stool or urine) to notify provider & seek medical attention. If severe symptoms develop, such as major bleeding, chest pain, shortness of breath, fall, trauma or s/s of stroke, patient to call 911 immediately.     Dosage adjustment made based on physician directed care plan.    Sandy Young RN

## 2019-03-14 NOTE — PROGRESS NOTES
"  SUBJECTIVE:   Jennifer Bob is a 87 year old female who presents to clinic today for the following health issues:        Hospital Follow-up Visit:    Hospital/Nursing Home/IP Rehab Facility: [facility]  Date of Admission: 2/16/2019  Date of Discharge: 2/18/2019  Reason(s) for Admission:    Discharge Diagnoses:  Afib with RVR, symptomatic  Troponin elevation, demand ischemia   HFrEF, mild decompensation  CAD s/p CABG and PCI  HTN  CKD III  COPD     She saw Dr. Lowe last week.             Problems taking medications regularly:  None       Medication changes since discharge: None       Problems adhering to non-medication therapy:  None     Summary of hospitalization:  [facility] discharge summary reviewed  Diagnostic Tests/Treatments reviewed.  Follow up needed: cardiology (completed)  Other Healthcare Providers Involved in Patient s Care:         Specialist appointment - cardiology  Update since discharge: improved.      Post Discharge Medication Reconciliation: discharge medications reconciled, continue medications without change.  Plan of care communicated with patient and her daughter     Coding guidelines for this visit:  Type of Medical   Decision Making Face-to-Face Visit       within 7 Days of discharge Face-to-Face Visit        within 14 days of discharge   Moderate Complexity 52060 [id number]   High Complexity 62957 68196          She has seen Dr. Lowe, cardiologist, in follow up after her hospitalization for worsening shortness of breath.       From hospital discharge summary:  \"  Brief HPI:  Jennifer Bob is a 87 year old yo female with a history of CAD s/p CABG, CVA, HTN, COPD, PMR, CKD3 and permanent Afib with tachybrady syndrome and recent PPM (1/18/19) at [facility] during hospitalization for new Afib, here with palpitations and shortness of breath.      Hospital Course by Diagnosis:  # Afib with RVR, symptomatic  Recent onset, anticoagulated, LVEF 30% on last TTE. " Not tolerting beta blocker due to dyspnea; discontinued per outpatient cardiologist. Started digoxin this week with worsening symptoms and poor oral intake; may not tolerate well in general. Diuresed for mild volume overload which may be exacerbating Afib. Discontinued digoxin and started amiodarone without initial bolus. Patients heart rate improved with improvement in dyspnea and exercise tolerance. Amiodarone was transitioned to oral with plan to slowly taper. Ideally, given patient's underlying COPD (last DLCO 48%), would limit amiodarone therapy to 3 months assuming LVEF may improved with control of Afib. If LVEF improves, could be a candidate for CCB therapy. Also unclear if patient truly didn't tolerate beta blocker and could retrial with dose adjustment for adequate rate control. Restarted home oral lasix dose upon discharge. She was continued on her warfarin for anticoagulation with dose adjustment and plan for repeat INR on 2/20/19 and follow up with her anticoagulation clinic.     #HFrEF, mild decompensation  #CAD s/p CABG and PCI  #HTN  Symptoms most likely due to Afib and volume overload, not consistent with ongoing ischemia/ACS. BNP uptrending (2000->5400), CXR with mild vascular congestion. Gave lasix 40mg IV x 2 doses and resumed home oral lasix on discharge. She was continued on home amlodipine, cozaar, hydralazine and appears fairly euvolemic and compensated on discharge.      #Troponin elevation  Slightly elevated but downtrending from last troponins in January - may be related to RV injury with PPM placement versus demand ischemia in setting of AFib with RVR. Troponin trended to peak, no new ECG changes, felt likely demand ischemia and was continued on home ASA and statin.      #CKD III  Renal function at baseline. Avoided nephrotoxins.     #COPD  No signs of acute exacerbation. Continued home inhalers- spiriva/albuterol     #PMR  Continued home prednisone     #CVA   No new deficits. Continued  "home statin, anticoagulation. PT/OT evaluated and felt safe for home discharge without any skilled needs at home. \"    Problem list and histories reviewed & adjusted, as indicated.  Additional history: as documented    Patient Active Problem List   Diagnosis     Anxiety     Low back pain     Left hip pain     ASCVD (arteriosclerotic cardiovascular disease)     Incontinence of urine     Hypertension goal BP (blood pressure) < 140/90     Seasonal allergies     Myocardial infarction (H)     DDD (degenerative disc disease), lumbar     Transient cerebral ischemia     CKD (chronic kidney disease) stage 3, GFR 30-59 ml/min (H)     Corns and callosities     Health Care Home     Spinal stenosis, lumbar region, without neurogenic claudication     Synovial cyst of lumbar facet joint     Acquired spondylolisthesis     Lumbar radicular pain     Stroke (H)     Hypertension     Hyperlipidemia LDL goal <70     Late effects of CVA (cerebrovascular accident)     COPD (chronic obstructive pulmonary disease) (H)     RA (rheumatoid arthritis) (H)     Gout     Chronic pain syndrome     Heart failure (H)     Chronic diastolic heart failure (H)     PMR (polymyalgia rheumatica) (H)     New onset atrial fibrillation (H)     Chest pain     Atrial fibrillation with RVR (H)     Past Surgical History:   Procedure Laterality Date     abdominal abscess      at incisional site after kristine     adominal hernia repair      had mesh placed, then allergic so it was removed and she wears a girdle     CHOLECYSTECTOMY       CORONARY ARTERY BYPASS  1992     EP PACEMAKER N/A 1/18/2019    Procedure: EP Pacemaker;  Surgeon: Serafin Llamas MD;  Location:  [facility]     EP PACEMAKER Left 1/23/2019    Procedure: EP Pacemaker;  Surgeon: Ran Hodges MD;  Location:  [facility]       Social History     Tobacco Use     Smoking status: Former Smoker     Smokeless tobacco: Never Used     Tobacco comment: quit smoking in 1983 "   Substance Use Topics     Alcohol use: No     No family history on file.      Current Outpatient Medications   Medication Sig Dispense Refill     albuterol (PROVENTIL) (2.5 MG/3ML) 0.083% neb solution Take 1 vial (2.5 mg) by nebulization every 6 hours as needed for shortness of breath / dyspnea or wheezing 60 vial 3     amiodarone (PACERONE/CODARONE) 200 MG tablet Take 1 tablet (200 mg) by mouth daily Three times daily for one week, then two times daily for one week, then daily. 30 tablet 6     amLODIPine (NORVASC) 10 MG tablet Take 1 tablet (10 mg) by mouth daily 90 tablet 1     atorvastatin (LIPITOR) 40 MG tablet Due for appt in March, one tab daily 90 tablet 3     BETA BLOCKER NOT PRESCRIBED, INTENTIONAL, Beta Blocker not prescribed intentionally due to COPD, shortness of breath with atenolol and lopressor  0     Cholecalciferol (VITAMIN D3 PO) Take 1,000 Units by mouth daily       docusate sodium (COLACE) 100 MG capsule Take 1 capsule by mouth 2 times daily        escitalopram (LEXAPRO) 10 MG tablet Take 10 mg by mouth daily       fluticasone (FLONASE) 50 MCG/ACT nasal spray Spray 1 spray into both nostrils daily as needed for rhinitis or allergies       furosemide (LASIX) 20 MG tablet TAKE ONE TABLET BY MOUTH EVERY DAY 90 tablet 3     hydrALAZINE (APRESOLINE) 50 MG tablet Take 1 tablet (50 mg) by mouth 3 times daily (Patient taking differently: Take 50 mg by mouth 2 times daily ) 60 tablet 3     losartan (COZAAR) 100 MG tablet Take 1 tablet (100 mg) by mouth daily 90 tablet 3     Multiple Vitamins-Minerals (MULTIVITAMIN OR) Take 1 tablet by mouth daily        order for DME Equipment being ordered: Oxygen 1 litre via nasal canula 1 each 0     order for DME Equipment being ordered: Nebulizer with mask and tubing 1 Units 0     predniSONE (DELTASONE) 1 MG tablet Take 2 mg by mouth daily       Saline (OCEAN NASAL SPRAY NA) Administer 1 spray in each nostril up to two times daily as needed       sodium  chloride-sodium bicarb (CLASSIC NETI POT SINUS WASH) 2300-700 MG KIT Mix 1 packet in Neti Pot and administer in each nostril daily as needed       tiotropium (SPIRIVA) 18 MCG inhaled capsule Inhale 1 capsule (18 mcg) into the lungs daily 90 capsule 3     TRAMADOL HCL PO Take 50 mg by mouth every 6 hours as needed for moderate to severe pain       VENTOLIN  (90 Base) MCG/ACT inhaler INHALE TWO PUFFS BY MOUTH EVERY 6 HOURS AS NEEDED 18 g 0     warfarin (COUMADIN) 2.5 MG tablet Take 1/2 tablet (1.25mg) daily until next INR check 30 tablet 11     Allergies   Allergen Reactions     Adhesive Tape      blisters     Atenolol      SOB     Lopressor Hct      SOB     Recent Labs   Lab Test 02/18/19  0626 02/17/19  0348 02/16/19  1513  01/15/19  1150 09/10/18  0724  07/08/18  0323  05/02/18  0936  02/09/17  1239 02/09/17  0440  04/24/14  2221   A1C  --   --   --   --   --  5.6  --  5.9*  --   --   --   --   --   --  5.6   LDL  --   --   --   --   --  62  --  57  --  67  --  66  --    < >  --    HDL  --   --   --   --   --  62  --  69  --  72  --  87  --    < >  --    TRIG  --   --   --   --   --  89  --  40  --  71  --  80  --    < >  --    ALT  --   --   --   --   --   --   --   --   --  29  --  56* 49   < >  --    CR 1.17* 1.00 0.94   < > 0.96 0.97   < > 0.90   < > 0.88   < >  --  0.93   < >  --    GFRESTIMATED 42* 51* 54*   < > 53* 54*   < > 59*   < > 61   < >  --  57*  47*   < >  --    GFRESTBLACK 48* 59* 63   < > 62 66   < > 72   < > 73   < >  --  69  57*   < >  --    POTASSIUM 3.8 3.4 4.3   < > 4.3  --    < > 4.0   < > 3.9   < >  --  3.8   < >  --    TSH  --   --  2.19  --  2.18  --   --   --   --   --   --  1.66  --    < >  --     < > = values in this interval not displayed.      BP Readings from Last 3 Encounters:   03/14/19 145/86   03/07/19 155/74   02/21/19 133/71    Wt Readings from Last 3 Encounters:   03/14/19 53.6 kg (118 lb 4 oz)   03/07/19 52.6 kg (116 lb)   02/18/19 51.5 kg (113 lb 8 oz)               Reviewed and updated as needed this visit by clinical staff  Tobacco  Allergies  Meds       Reviewed and updated as needed this visit by Provider         ROS:  Denies chest pain or shortness of breath.    OBJECTIVE:     /86   Pulse 84   Temp 98.3  F (36.8  C) (Oral)   Wt 53.6 kg (118 lb 4 oz)   SpO2 95%   BMI 21.63 kg/m    Body mass index is 21.63 kg/m .  GENERAL: elderly female, alert and no distress    Diagnostic Test Results:  none     ASSESSMENT/PLAN:        # Afib with RVR, symptomatic  Stable diagnosed 1/2019, no plan to restore sinus rhythm.  Rate had been difficult to control.  Better rate control was achieved with amiodarone.  Dr. Lowe discussed AV node ablation with daughter.  Jennifer is still considering this.  In the meantime, she continues amiodarone and will follow with cardiology for LFTs and TFTs every 6 months.  She has an appointment scheduled in 4 months.  She will also need PFTs annually while on amiodarone.     Recent onset, anticoagulated, LVEF 30% on last TTE. Not tolerting beta blocker due to dyspnea; discontinued per outpatient cardiologist. Started digoxin this week with worsening symptoms and poor oral intake; may not tolerate well in general. Diuresed for mild volume overload which may be exacerbating Afib. Discontinued digoxin and started amiodarone without initial bolus. Patients heart rate improved with improvement in dyspnea and exercise tolerance. Amiodarone was transitioned to oral with plan to slowly taper. Ideally, given patient's underlying COPD (last DLCO 48%), would limit amiodarone therapy to 3 months assuming LVEF may improved with control of Afib. If LVEF improves, could be a candidate for CCB therapy. Also unclear if patient truly didn't tolerate beta blocker and could retrial with dose adjustment for adequate rate control. Restarted home oral lasix dose upon discharge. She was continued on her warfarin for anticoagulation with dose adjustment and plan for  repeat INR on 2/20/19 and follow up with her anticoagulation clinic.     #HFrEF, mild decompensation  #CAD s/p CABG and PCI  #HTN  Home blood pressures are well controlled.  No changes in medication today.    Symptoms most likely due to Afib and volume overload, not consistent with ongoing ischemia/ACS. BNP uptrending (2000->5400), CXR with mild vascular congestion. Gave lasix 40mg IV x 2 doses and resumed home oral lasix on discharge. She was continued on home amlodipine, cozaar, hydralazine and appears fairly euvolemic and compensated on discharge.      #Troponin elevation    Slightly elevated but downtrending from last troponins in January - may be related to RV injury with PPM placement versus demand ischemia in setting of AFib with RVR. Troponin trended to peak, no new ECG changes, felt likely demand ischemia and was continued on home ASA and statin.      #CKD III  Renal function at baseline. Avoided nephrotoxins in hospital.      #COPD  No signs of acute exacerbation. Continued home inhalers- spiriva/albuterol     #PMR  Continued home prednisone     #CVA, history of  Minimal residual deficit. No change in plan.     No new deficits. Continued home statin, anticoagulation. PT/OT evaluated and felt safe for home discharge without any skilled needs at home.     Patient Instructions   I recommend getting the new shingles vaccine, Shingrix.  You can call your insurance company to find out if this vaccine is covered.  You may also ask our pharmacy to check if your insurance covers Shingrix if given at the pharmacy.      See me in 3 months for follow up.       Torri Fisher M.D.        [facility]

## 2019-03-14 NOTE — PROGRESS NOTES
"  SUBJECTIVE:   Jennifer Bob is a 87 year old female who presents to clinic today for the following health issues:    ED/UC Followup:    Facility:  [facility]  Date of visit: 2/16/2019-2/18/2019  Reason for visit: Afib with RVR, symptomatic  Current Status: ***         {additional problems for provider to add:537362}    Problem list and histories reviewed & adjusted, as indicated.  Additional history: {NONE - AS DOCUMENTED:256501::\"as documented\"}    {HIST REVIEW/ LINKS 2:184391}    Reviewed and updated as needed this visit by clinical staff       Reviewed and updated as needed this visit by Provider         {PROVIDER CHARTING PREFERENCE:071762}  "

## 2019-03-19 NOTE — PROGRESS NOTES
Clinic Care Coordination Contact    Clinic Care Coordination Contact  OUTREACH    Referral Information:  Referral Source: IP Report    Primary Diagnosis: Cardiovascular - other    Chief Complaint   Patient presents with     Clinic Care Coordination - Follow-up     Clinic Care Coordination RN         [facility] Utilization: Hospitalization 12/16-2/18/2019-Atrial fibrillation with RVR  Clinic Utilization  Difficulty keeping appointments:: No  Compliance Concerns: No  No-Show Concerns: No  No PCP office visit in Past Year: No  Utilization    Last refreshed: 3/18/2019  8:46 PM:  Hospital Admissions 4           Last refreshed: 3/18/2019  8:46 PM:  ED Visits 0           Last refreshed: 3/18/2019  8:46 PM:  No Show Count (past year) 4              Current as of: 3/18/2019  8:46 PM              Clinical Concerns:  Current Medical Concerns:  Patient denies any further rapid heart beat episodes.  Patient continue the same dose of amiodarone and will follow up with Cardiologist in 4 months .  Patients weight is stable at 118  Patient states she has a poor appetite but forces herself to eat  Drinks 1 can of Boost a day   Patient is walking 300 feet a day in her atrium and her plan is to increase to 600 feet a day.    Pain  Pain (GOAL):: No  Health Maintenance Reviewed:    Clinical Pathway: None    Medication Management:  Not reviewed today  Just discussed sh is on the same dose of amiodarone    Functional Status:  Dependent ADLs:: Independent  Dependent IADLs:: Independent  Bed or wheelchair confined:: No  Mobility Status: Independent    Living Situation:  Type of residence:: Apartment    Diet/Exercise/Sleep:  Diet:: No added salt  Inadequate nutrition (GOAL):: No  Food Insecurity: No  Tube Feeding: No  Inadequate activity/exercise (GOAL):: No  Significant changes in sleep pattern (GOAL): No      Psychosocial:  Mental health DX:: No  Mental health management concern (GOAL):: No  Informal Support system:: Children      Financial/Insurance:   Financial/Insurance concerns (GOAL):: No    Community Resources: None  Supplies used at home:: None       Advance Care Plan/Directive  Advanced Care Plans/Directives on file:: Yes  Type Advanced Care Plans/Directives: DNR/DNI    Referrals Placed: None     Goals:   Goals        General    #1 Medical (pt-stated)     Notes - Note edited  3/19/2019 11:22 AM by Sara Mcghee, RN    Goal Statement: I will monitor my heart rate   Measure of Success: No further episodes of heart racing Supportive Steps to Achieve: I   will seek medical help if my heart starts racing   I will take new medication amiodarone as directed   I will keep future cardiology appointments  I will check my weight daily and clal if weight gain is 2-3 # in a day or 5# in a week    Barriers: Recent hospitalization for fast heart rate    Strengths: No further episodes of heart racing   Date to Achieve By: 4/201/2019  Patient expressed understanding of goal: yes         Lifestyle    #! Increase physical activity     Notes - Note created  3/19/2019 11:21 AM by Sara Mcghee RN    Goal Statement: I will increase my walk to 600 feet daily  Measure of Success: I will be walking 600 feet a day and I will have more strength for daily activities   Supportive Steps to Achieve: I will walk in a [place] in my Atrium   Barriers: Non  Strengths: Patient is walking 300 feet  daily  Date to Achieve By: 4/20/2019  Patient expressed understanding of goal: Yes               Outreach Frequency: 2 weeks  Future Appointments              In 6 days  INR CLINIC [facility]     In 1 month [facility]    In 3 months Torri Fisher MD [facility]    In 4 months Claudia Lowe MD [facility]          Plan:   Patient will continue to monitor HR and seek medical help with any concerns  Patient will continue to walk her atrium daily and her goal is  to  increase to 600 feet   Patient will continue weight checks daily and continue a daily can of boost a day   Patient will keep future Cardiology appointment in 4 months   CC will follow up monthly     Sara Mcghee RN / Clinical Care Coordinator     [facility]   [email or web address]  Office :  [phone] / Fax :  [phone]

## 2019-03-19 NOTE — LETTER
[facility]  Complex Care Plan  About Me  Patient Name:  Jennifer Bob    YOB: 1931  Age:     87 year old   Lelia MRN:   [id number] Telephone Information:  Home Phone [phone]   Mobile [phone]       Address:    [address]   [address]  [facility] [place] [facility] [address] Email address:  [email or web address]      Emergency Contact(s)  Name Relationship Lgl Grd Work Phone Home Phone Mobile Phone   1. EDELKVNG* Daughter No  [phone]   2. YESIKA TOURE* Daughter No  [phone]           Primary language:  English     needed? No   [employer]:  [phone] op. 1  Other communication barriers: None  Preferred Method of Communication:  Mail  Current living arrangement:    Mobility Status/ Medical Equipment: Independent    Health Maintenance  Health Maintenance Reviewed:    Health Maintenance Due   Topic Date Due     HF ACTION PLAN Q3 YR  12/05/1931     URINE DRUG SCREEN Q1 YR  12/05/1946     DEPRESSION ACTION PLAN  12/05/1949     ZOSTER IMMUNIZATION (2 of 3) 12/25/2012     COPD ACTION PLAN Q1 YR  10/23/2015     MEDICARE ANNUAL WELLNESS VISIT  07/06/2016       My Access Plan  Medical Emergency 911   Primary Clinic Line [facility] [phone]   24 Hour Appointment Line [phone] or  [phone]) (toll-free)   24 Hour Nurse Line [phone] (toll-free)   Preferred Urgent Care     Preferred Hospital     Preferred Pharmacy [facility] [place], [place] - [address]     Behavioral Health Crisis Line The National Suicide Prevention Lifeline at 1-908.766.5008 or 911     My Care Team Members    Patient Care Team       Relationship Specialty Notifications Start Torri Greene MD PCP - General Family Practice  1/16/12     Phone: [phone] Fax: [phone] [address] [facility] [address]    [facility] Care Coordinator  Cardiology Admissions 12/10/14     Phone: [phone] Pager: [phone]        Torri Fisher MD Assigned PCP   6/28/15     Phone: [phone] Fax: [phone] [address] [facility] [address]    Sara Mcghee, RN Lead Care Coordinator Primary Care - CC Admissions 1/25/19     Phone: [phone] Fax: [phone]                My Care Plans  Self Management and Treatment Plan  Goals and (Comments)  Goals        General    #1 Medical (pt-stated)     Notes - Note edited  3/19/2019 11:22 AM by Sara Mcghee, RN    Goal Statement: I will monitor my heart rate   Measure of Success: No further episodes of heart racing Supportive Steps to Achieve: I   will seek medical help if my heart starts racing   I will take new medication amiodarone as directed   I will keep future cardiology appointments  I will check my weight daily and clal if weight gain is 2-3 # in a day or 5# in a week    Barriers: Recent hospitalization for fast heart rate    Strengths: No further episodes of heart racing   Date to Achieve By: 4/201/2019  Patient expressed understanding of goal: yes         Lifestyle    #! Increase physical activity     Notes - Note created  3/19/2019 11:21 AM by Sara Mcghee, RN    Goal Statement: I will increase my walk to 600 feet daily  Measure of Success: I will be walking 600 feet a day and I will have more strength for daily activities   Supportive Steps to Achieve: I will walk in a [place] in my Atrium   Barriers: Non  Strengths: Patient is walking 300 feet  daily  Date to Achieve By: 4/20/2019  Patient expressed understanding of goal: Yes              Action Plans on File: Hear Failure Action Plan                       Advance Care Plans/Directives Type:   Type Advanced Care Plans/Directives: DNR/DNI    My Medical and Care Information  Problem List   Patient Active Problem List   Diagnosis     Anxiety     Low back pain     Left hip pain     ASCVD (arteriosclerotic cardiovascular disease)      Incontinence of urine     Hypertension goal BP (blood pressure) < 140/90     Seasonal allergies     Myocardial infarction (H)     DDD (degenerative disc disease), lumbar     Transient cerebral ischemia     CKD (chronic kidney disease) stage 3, GFR 30-59 ml/min (H)     Corns and callosities     Health Care Home     Spinal stenosis, lumbar region, without neurogenic claudication     Synovial cyst of lumbar facet joint     Acquired spondylolisthesis     Lumbar radicular pain     Stroke (H)     Hypertension     Hyperlipidemia LDL goal <70     Late effects of CVA (cerebrovascular accident)     COPD (chronic obstructive pulmonary disease) (H)     RA (rheumatoid arthritis) (H)     Gout     Chronic pain syndrome     Heart failure (H)     Chronic diastolic heart failure (H)     PMR (polymyalgia rheumatica) (H)     New onset atrial fibrillation (H)     Chest pain     Atrial fibrillation with RVR (H)      Current Medications and Allergies:  See printed Medication Report.    Care Coordination Start Date: 1/25/2019   Frequency of Care Coordination: 2 weeks   Form Last Updated: 03/19/2019

## 2019-03-25 NOTE — PROGRESS NOTES
ANTICOAGULATION FOLLOW-UP CLINIC VISIT    Patient Name:  Jennifer Bob  Date:  3/25/2019  Contact Type:  Face to Face    SUBJECTIVE:     Patient Findings     Positives:   Change in activity (Slightly more active.), Change in diet/appetite (Pt is trying to eat larger portions at meal times. Denies increase in green consumption.)           OBJECTIVE    INR Protime   Date Value Ref Range Status   2019 2.0 (A) 0.86 - 1.14 Final       ASSESSMENT / PLAN  INR assessment THER    Recheck INR In: 10 DAYS    INR Location Clinic      Anticoagulation Summary  As of 3/25/2019    INR goal:   2.0-3.0   TTR:   82.7 % (1.1 mo)   INR used for dosin.0 (3/25/2019)   Warfarin maintenance plan:   1.25 mg (2.5 mg x 0.5) every Mon, Fri; 2.5 mg (2.5 mg x 1) all other days   Full warfarin instructions:   1.25 mg every Mon, Fri; 2.5 mg all other days   Weekly warfarin total:   15 mg   Plan last modified:   Sandy Young RN (3/25/2019)   Next INR check:   2019   Target end date:       Indications    New onset atrial fibrillation (H) [I48.91]  Stroke (H) [I63.9]             Anticoagulation Episode Summary     INR check location:       Preferred lab:       Send INR reminders to:   [facility] [place] [facility]    Comments:   Fragile. Low appetite, no large portions of dark greens (broccoli- 2 x wk). MTV 25 mcg, new bottle 50 mcg (will start in late March/April). 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 Cardiology team: Chrissy Rojo.       Anticoagulation Care Providers     Provider Role Specialty Phone number    Bridget Rojo MD Referring Clinical Cardiac Electrophysiology [phone]    Torri Fisher MD [facility] [phone]            See the Encounter Report to view Anticoagulation Flowsheet and Dosing Calendar (Go to Encounters tab in chart review, and find the Anticoagulation Therapy Visit)    Writer advised patient and daughter to trial new dose  of 15 mg d/t minor changes in diet and activity level. INR levels have been gradually dropping over the last few checks. Recheck in 10 days to ensure stability.    Patient made aware if signs of clotting (pain, tenderness, swelling, or color change in any extremity) AND/OR bleeding occur (nosebleeds, bleeding gums, bruising, or blood in stool or urine) to notify provider & seek medical attention. If severe symptoms develop, such as major bleeding, chest pain, shortness of breath, fall, trauma or s/s of stroke, patient to call 911 immediately.     Dosage adjustment made based on physician directed care plan.    Sandy Young RN

## 2019-03-27 PROBLEM — R06.00 DYSPNEA: Status: ACTIVE | Noted: 2019-01-01

## 2019-03-27 NOTE — PHARMACY-ADMISSION MEDICATION HISTORY
"Admission Medication History status for the 3/27/2019 admission is complete. See EPIC admission navigator for allergy information, pharmacy, prior to admission medications, and immunization status.    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), Nima, 3/25/19 anticoagulation note     Medication history source reliability: Good    Medication adherence:  Good    Changes made to PTA medication list (reason)  Added: none    Deleted: none    Changed:   - amiodarone 200 mg - 1 tablet by mouth once daily (removed taper instructions, as patient is done tapering)  - atorvastatin 40 mg - 1 tablet by mouth at bedtime (no previous instructions)  - warfarin 2.5 mg - one half tablet by mouth once daily on Mon, Fri, and one full tablet all other days of the week (was one half tablet by mouth daily until next INR)    Warfarin (per 3/25/19 anticoagulation note):  - primary clinic: [facility]  - indication: new onset afib, stroke - also has a \"[place] pacemaker\" per patient  - INR goal: 2-3  - recent INRs: 2 (3/25), 2.06 (3/27)  - next INR check: 4/4/19  - current regimen: warfarin 2.5 mg - one half tablet mon, fri, and one full tablet all other days (15 mg weekly total)  (before appt on 3/25/19, her regimen was one half tablet mon, weds, fri, and one full tablet all other days)  - last dose: 2.5 mg today (3/27)    Note:  Patient is very hesitant about all medications, as she has had bad reactions to multiple medications just recently (jan/feb) - SOB with metoprolol and apixaban (added apixaban to allergy list)    Frequency of irregularly used meds:  - albuterol 108 mcg/act inhaler - used 4 times daily the past 2 days (twice this morning - 3/27) - used once daily otherwise  - albuterol 0.083% neb solution - used once daily past 2 days - used less than once a month otherwise  - tiotropium 18 mcg inhaler - last used over a month ago - only tried it a couple times (apprehensive about new meds)  - " amlodipine 10 mg (not supposed to be prn): last used 2 weeks ago - used irregularly - apprehensive about her meds and only uses this occasionally  - fluticasone 50 mcg/act - last used Monday - regularly used twice a week  - saline nasal spray - last used about a week ago - regularly used about once a month  - tramadol 50 mg - used yesterday - regularly uses 1 tablet every other day    Education provided:  - discussed that it is good that she monitors BP daily, but that it would be best if she stays consistent with how she takes her BP meds (as they do not have immediate onset and taking them irregularly makes it difficult for her doctor to determine the appropriate BP regimen)  - discussed that if we can find a maintenance inhaler that works well for her, that would be much better than using the albuterol regularly and would hopefully make her feel SOB much less frequently (patient said she would consider giving the tiotropium inhaler another chance)    Primary pharmacy: [facility]    Time spent in this activity: 45 min    Medication history completed by: Yeimi Goncalves, Pharm.D. Student      Prior to Admission medications    Medication Sig Last Dose Taking? Auth Provider   albuterol (PROVENTIL) (2.5 MG/3ML) 0.083% neb solution Take 1 vial (2.5 mg) by nebulization every 6 hours as needed for shortness of breath / dyspnea or wheezing 3/27/2019 at Unknown time Yes Torri Fisher MD   amiodarone (PACERONE/CODARONE) 200 MG tablet Take 200 mg by mouth daily 3/27/2019 at Unknown time Yes Unknown, Entered By History   amLODIPine (NORVASC) 10 MG tablet Take 1 tablet (10 mg) by mouth daily Past Month at Unknown time Yes Torri Fisher MD   atorvastatin (LIPITOR) 40 MG tablet Take 40 mg by mouth At Bedtime 3/26/2019 at Unknown time Yes Unknown, Entered By History   Cholecalciferol (VITAMIN D3 PO) Take 1,000 Units by mouth daily 3/27/2019 at Unknown time Yes Reported, Patient   docusate sodium (COLACE) 100 MG capsule  Take 1 capsule by mouth 2 times daily  3/27/2019 at Unknown time Yes Reported, Patient   escitalopram (LEXAPRO) 10 MG tablet Take 10 mg by mouth daily 3/27/2019 at Unknown time Yes Unknown, Entered By History   fluticasone (FLONASE) 50 MCG/ACT nasal spray Spray 1 spray into both nostrils daily as needed for rhinitis or allergies 3/25/2019 at Unknown time Yes Unknown, Entered By History   furosemide (LASIX) 20 MG tablet TAKE ONE TABLET BY MOUTH EVERY DAY 3/27/2019 at Unknown time Yes Torri Fisher MD   hydrALAZINE (APRESOLINE) 50 MG tablet Take 50 mg by mouth 2 times daily 3/27/2019 at Unknown time Yes Unknown, Entered By History   losartan (COZAAR) 100 MG tablet Take 1 tablet (100 mg) by mouth daily 3/27/2019 at Unknown time Yes Torri Fisher MD   Multiple Vitamins-Minerals (MULTIVITAMIN OR) Take 1 tablet by mouth daily  3/27/2019 at Unknown time Yes Reported, Patient   predniSONE (DELTASONE) 1 MG tablet Take 2 mg by mouth daily 3/27/2019 at Unknown time Yes Unknown, Entered By History   Saline (OCEAN NASAL SPRAY NA) Administer 1 spray in each nostril up to two times daily as needed Past Week at Unknown time Yes Reported, Patient   TRAMADOL HCL PO Take 50 mg by mouth every 6 hours as needed for moderate to severe pain 3/26/2019 at Unknown time Yes Reported, Patient   VENTOLIN  (90 Base) MCG/ACT inhaler INHALE TWO PUFFS BY MOUTH EVERY 6 HOURS AS NEEDED 3/27/2019 at Unknown time Yes Torri Fisher MD   warfarin (COUMADIN) 2.5 MG tablet Take one half tablet by mouth once daily on Mon, Fri, and one full tablet all other days of the week  Yes Unknown, Entered By History   BETA BLOCKER NOT PRESCRIBED, INTENTIONAL, Beta Blocker not prescribed intentionally due to COPD, shortness of breath with atenolol and lopressor   Torri Fisher MD   order for DME Equipment being ordered: Oxygen 1 litre via nasal canula   Zora Dalton MD   order for DME Equipment being ordered: Nebulizer with mask and tubing   House,  Torri ALEX MD   sodium chloride-sodium bicarb (CLASSIC NETI POT SINUS WASH) 2300-700 MG KIT Mix 1 packet in Neti Pot and administer in each nostril daily as needed More than a month at Unknown time  Reported, Patient   tiotropium (SPIRIVA) 18 MCG inhaled capsule Inhale 1 capsule (18 mcg) into the lungs daily More than a month at Unknown time  Torri Fisher MD

## 2019-03-27 NOTE — ED TRIAGE NOTES
Pt presents to ED for a complaint of heart palpitations this morning. PT has a history of A-Fib but is paced.

## 2019-03-27 NOTE — PLAN OF CARE
D: Admitted from ED with SOB and fast HR. Diaphoretic in AM X 2 days.     PMHx COPD, CAD s/p CABG '92, PCI '09, CVA 2013 & 2018 s/p TPA, Afib - on warfarin, PAD s/p Femoral Artery stent 1/19, dual chamber PM 1/18/19    I: Monitored vitals and assessed pt status.   Changed: Admitted to hospital  Running: PIV SL  PRN: Nothing given    A: A0x4. VSS. Afebrile. Urinating adequately. Pt up with SBA. Intermittent incontinence. Tele: A/V Paced. O2: RA. No c/o pain, SOB, Diaphoresis, n/v. Plan to discharge tomorrow once diuresis plan in place per MD note.    Observation Status    P: Continue to monitor Pt status and report changes to Cards 1.    Temp:  [97.5  F (36.4  C)-98.8  F (37.1  C)] 98.8  F (37.1  C)  Pulse:  [] 105  Heart Rate:  [] 88  Resp:  [16-30] 16  BP: (155-178)/() 159/85  SpO2:  [92 %-97 %] 93 %

## 2019-03-27 NOTE — ED NOTES
[facility], [place]   ED Nurse to Floor Handoff     Jennifer Bob is a 87 year old female who speaks English and lives with family members,  in a home  They arrived in the ED by car from home    ED Chief Complaint: Palpitations    ED Dx;   Final diagnoses:   Acute on chronic congestive heart failure, unspecified heart failure type (H)   Atrial fibrillation, rapid (H)         Needed?: No    Allergies:   Allergies   Allergen Reactions     Adhesive Tape      blisters     Atenolol      SOB     Lopressor Hct      SOB   .  Past Medical Hx:   Past Medical History:   Diagnosis Date     Abdominal wall hernia     three hernias at previous incision sites, allergic to mesh so repair not repeated, wears girdle     Anxiety 1/9/2012     ASCVD (arteriosclerotic cardiovascular disease) 1/9/2012     CKD (chronic kidney disease) stage 3, GFR 30-59 ml/min (H) 1/10/2012     Colon polyp     resected     DDD (degenerative disc disease), lumbar 1/10/2012     Hyperlipidemia LDL goal <100 1/9/2012     Hyperlipidemia LDL goal <70 12/16/2013     Hypertension goal BP (blood pressure) < 140/90 1/9/2012     Incontinence of urine 1/9/2012     Left hip pain 1/9/2012     Low back pain 1/9/2012     Seasonal allergies 1/9/2012     STEMI (ST elevation myocardial infarction) (H) 8-    with VF arrest.     Stented coronary artery 8-     TIA (transient ischaemic attack) 1/10/2012      Baseline Mental status: WDL  Current Mental Status changes: at basesline    Infection present or suspected this encounter: no  Sepsis suspected: No  Isolation type: No active isolations     Activity level - Baseline/Home:  Independent  Activity Level - Current:   Stand with Assist    Bariatric equipment needed?: No    In the ED these meds were given:   Medications   ipratropium - albuterol 0.5 mg/2.5 mg/3 mL (DUONEB) neb solution 3 mL (3 mLs Nebulization Given 3/27/19 1140)   predniSONE (DELTASONE) tablet 40 mg (40  mg Oral Given 3/27/19 1140)   furosemide (LASIX) injection 40 mg (40 mg Intravenous Given 3/27/19 1259)       Drips running?  No    Home pump  No    Current LDAs  Peripheral IV 02/16/19 Left;Anterior Lower forearm (Active)   Number of days: 39       Peripheral IV 02/16/19 Right Upper forearm (Active)   Number of days: 39       Peripheral IV 03/27/19 Left;Lateral Upper forearm (Active)   Number of days: 0       Incision/Surgical Site 01/23/19 Left Chest (Active)   Number of days: 63       Labs results:   Labs Ordered and Resulted from Time of ED Arrival Up to the Time of Departure from the ED   CBC WITH PLATELETS DIFFERENTIAL - Abnormal; Notable for the following components:       Result Value     (*)     RDW 15.6 (*)     Platelet Count 139 (*)     Absolute Lymphocytes 0.6 (*)     All other components within normal limits   INR - Abnormal; Notable for the following components:    INR 2.06 (*)     All other components within normal limits   COMPREHENSIVE METABOLIC PANEL - Abnormal; Notable for the following components:    Glucose 114 (*)     Creatinine 1.06 (*)     GFR Estimate 47 (*)     GFR Estimate If Black 55 (*)     Protein Total 6.7 (*)     ALT 76 (*)     AST 57 (*)     All other components within normal limits   TROPONIN I - Abnormal; Notable for the following components:    Troponin I ES 0.050 (*)     All other components within normal limits   NT PROBNP INPATIENT - Abnormal; Notable for the following components:    N-Terminal Pro BNP Inpatient 4,929 (*)     All other components within normal limits   ROUTINE UA WITH MICROSCOPIC - Abnormal; Notable for the following components:    pH Urine 7.5 (*)     Mucous Urine Present (*)     All other components within normal limits   PERIPHERAL IV CATHETER   NURSING DRAW AND HOLD   URINE CULTURE AEROBIC BACTERIAL       Imaging Studies:   Recent Results (from the past 24 hour(s))   POC US ECHO LIMITED    Impression    Limited Bedside Cardiac Ultrasound, performed  and interpreted by me.   Indication: Shortness of Breath.  Parasternal long axis, parasternal short axis and apical 4 chamber views were acquired.   Image quality was satisfactory.    Findings:    Abnormal dilated 4 chambers, poor global function of LV     IMPRESSION:   Abnormal dilated 4 chambers, poor global function of LV    XR Chest 2 Views    Narrative    Exam: Chest x-ray, 2 views, 3/27/2019.    COMPARISON: 2/16/2019.    HISTORY: Shortness of breath.    FINDINGS: PA and lateral views of the chest were obtained. Left-sided  PIC the and its leads are in place. Median sternotomy wires. Stable  cardiomediastinal silhouette. Prominent aortic knob with  calcifications. Trace right and small left pleural effusion with  overlying atelectasis versus consolidation. No pneumothorax. Mild  pulmonary vascular congestion.      Impression    IMPRESSION:  1. Trace right and small left pleural effusion with overlying  atelectasis versus consolidation.  2. Mild pulmonary vascular congestion.    GENESIS NICOLE MD       Recent vital signs:   BP (!) 172/96   Temp 97.5  F (36.4  C) (Oral)   Resp 16   SpO2 97%     Cardiac Rhythm: Tachycardia  Pt needs tele? Yes  Skin/wound Issues: None    Code Status: DNR / DNI    Pain control: pt had none    Nausea control: pt had none    Abnormal labs/tests/findings requiring intervention: N/A    Family present during ED course? Yes   Family Comments/Social Situation comments: Daughter at bedside    Tasks needing completion: None    Edward Gomez, RN    [phone] [facility]

## 2019-03-27 NOTE — H&P
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    # Mildly decompensated HFrEF, now significantly improved with IV diuresis in ED  Notes dyspnea in morning, orthopnea, decreased O2 tolerance x3 days (300' walking to <30'), +JVD. TTE w/ EF 30-35% w/ WMA (similar to prior in January) as well as non-collapsing IVC. CXR shows mild pulmonary vascular congestion and bilateral pleural effusions (though these have been noted on multiple recent CXR).  Unfortunately no weight was obtained in the ED. NT-PRO-BNP in range of prior. Differential obviously includes COPD, especially with wheezing and increased production clear sputum (though could also see wheezing with HFrEF). No infectious symptoms currently. Altogether suspect mildly decompensated HF that has improved with 40 IV furosemide in ED -> weight on floor 51.2 kg (lowest on record)  - s/p furosemide 40 mg IV  - hold pta furosemide 20 mg daily, can probably restart PO tomorrow albeit at a higher dose  - hold ED prednisone 40 mg daily  - daily standing weights, I/O  - pta losartan 100 mg daily  - pta hydralazine 50 mg bid  - not on BB apparently due to worsening dyspnea in February (thought potentially worsening COPD)  - check lytes tonight  - if worsening overnight, assess volume status, consider treating as COPD exacerbation    # Mild troponin elevation  Lower than prior. No acute ischemic changes on EKG, and no chest pain. Suspect demand in setting of known CAD.  - trend troponin to  peak    # CAD s/p CABG '92 and later PCI SVG to RCA graft 8/29/2009  WMA (inferior and posterior walls) on TTE today line up with prior WMA on TTE and MPI in January, which demonstrated a small nontransmural scar of the distal anteroapical wall with mild nirav-infarct ischemia and second small fixed basal inferior defect.   - pta aspirin 81 mg daily  - pta atorvastatin 40 mg daily     # Mild COPD   Last FEV1/FVC ratio was normal though FEV1 was reduced (and did not change with bronchodilators). Prior PFTs showed some reversible obstructive changes. S/p prednisone in ED for presumed COPD exacerbation.  - pta tiotropium daily and albuterol PRN    # Palpitations in setting of A. Fib (Dx 1/2019, CHADSVAC 7)  Notes intermittent palpitations over past few days, with HR on oximeter 130 bpm this morning. HR currently 100 bpm on floor. Originally started on metoprolol then to digoxin, but due to dyspnea, was and finally transitioned to amiodarone.   - pta amiodarone 200 mg daily, not ideal w/ COPD, family considering AVN ablation  - warfarin per pharmacy, goal INR 2-3  - pacer interrogation     # Poorly controlled HTN  - pta losartan 100 mg daily  - pta hydralazine 50 mg bid  - pta amlodipine 10 mg daily (had not been taken recently, will restart today)    CHRONIC PROBLEMS  # Tachy-ralph with baseline RBBB: Ezequiel Sci CRT-P implanted without atrial lead 1/18/19, complicated with RV lead microperforation requiring repositioning, no pericardial effusion.  # PMR: pta prednisone 2 mg daily  # Depression: pta lexapro  # PAD s/p superior femoral artery stent (2018): statin, ASA as above  # Chronic pain, OA: pta tramadol PRN  # Hx CVA: statin, ASA as above       Diet: Cardiac  Fluids: None  DVT Prophylaxis: Warfarin  Conde Catheter: not present  Code Status: DNR/DNI    Disposition Plan   Expected discharge: Tomorrow, recommended to prior living arrangement once diuretic plan established.  Entered: Shabbir Weaver MD 03/27/2019, 3:08 PM      The patient's care was discussed with the Patient and Patient's Family.    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     For the past two days the patient has woken up short of breath with associated diaphoresis and mild heart palpitations. Her heart rate this morning was in the 130's, so she decided to come to the hospital. During this time period the patient reports worsening dyspnea with activity; normally she can walk >300', but since Monday can barely go 30'. He has had to use extra pillows recently to get sleep, but denies any new lower extremity edema. She suspects she has had weight gain over the past few weeks but attributes this to an improving appetite. Over the past few days has had a cough productive of clear sputum and has noticed some mild wheezing, but no fevers, chills, sore throat, nasal congestion, or other signs of illness.     Proud to say that she takes all of her meds, other than amlodipine, which she takes as needed. No change in diet recently.    Review of Systems    The 10 point Review of Systems is negative other than noted in the HPI.    Past Medical History    I have reviewed this patient's medical history and updated it with pertinent information if needed.   Past Medical History:   Diagnosis Date     Abdominal wall hernia     three hernias at previous incision sites, allergic to mesh so repair not  repeated, wears girdle     Anxiety 1/9/2012     ASCVD (arteriosclerotic cardiovascular disease) 1/9/2012     CKD (chronic kidney disease) stage 3, GFR 30-59 ml/min (H) 1/10/2012     Colon polyp     resected     DDD (degenerative disc disease), lumbar 1/10/2012     Hyperlipidemia LDL goal <100 1/9/2012     Hyperlipidemia LDL goal <70 12/16/2013     Hypertension goal BP (blood pressure) < 140/90 1/9/2012     Incontinence of urine 1/9/2012     Left hip pain 1/9/2012     Low back pain 1/9/2012     Seasonal allergies 1/9/2012     STEMI (ST elevation myocardial infarction) (H) 8-    with VF arrest.     Stented coronary artery 8-     TIA (transient ischaemic attack) 1/10/2012      Past Surgical History   I have reviewed this patient's surgical history and updated it with pertinent information if needed.  Past Surgical History:   Procedure Laterality Date     abdominal abscess      at incisional site after kristine     adominal hernia repair      had mesh placed, then allergic so it was removed and she wears a girdle     CHOLECYSTECTOMY       CORONARY ARTERY BYPASS  1992     EP PACEMAKER N/A 1/18/2019    Procedure: EP Pacemaker;  Surgeon: Serafin Llamas MD;  Location:  HEART CARDIAC CATH LAB     EP PACEMAKER Left 1/23/2019    Procedure: EP Pacemaker;  Surgeon: Ran Hodges MD;  Location:  HEART CARDIAC CATH LAB      Social History   I have reviewed this patient's social history and updated it with pertinent information if needed. Jennifer Bob  reports that she has quit smoking. she has never used smokeless tobacco. She reports that she does not drink alcohol or use drugs.    Family History   I have reviewed this patient's family history and updated it with pertinent information if needed.     Prior to Admission Medications   Prior to Admission Medications   Prescriptions Last Dose Informant Patient Reported? Taking?   BETA BLOCKER NOT PRESCRIBED, INTENTIONAL,  Daughter No No   Sig: Beta  Blocker not prescribed intentionally due to COPD, shortness of breath with atenolol and lopressor   Cholecalciferol (VITAMIN D3 PO) 3/27/2019 at Unknown time Self Yes Yes   Sig: Take 1,000 Units by mouth daily   Multiple Vitamins-Minerals (MULTIVITAMIN OR) 3/27/2019 at Unknown time Self Yes Yes   Sig: Take 1 tablet by mouth daily    Saline (OCEAN NASAL SPRAY NA) Past Week at Unknown time Self Yes Yes   Sig: Administer 1 spray in each nostril up to two times daily as needed   TRAMADOL HCL PO 3/26/2019 at Unknown time Self Yes Yes   Sig: Take 50 mg by mouth every 6 hours as needed for moderate to severe pain   VENTOLIN  (90 Base) MCG/ACT inhaler 3/27/2019 at Unknown time Self No Yes   Sig: INHALE TWO PUFFS BY MOUTH EVERY 6 HOURS AS NEEDED   albuterol (PROVENTIL) (2.5 MG/3ML) 0.083% neb solution 3/27/2019 at Unknown time Self No Yes   Sig: Take 1 vial (2.5 mg) by nebulization every 6 hours as needed for shortness of breath / dyspnea or wheezing   amLODIPine (NORVASC) 10 MG tablet Past Month at Unknown time Self No Yes   Sig: Take 1 tablet (10 mg) by mouth daily   amiodarone (PACERONE/CODARONE) 200 MG tablet 3/27/2019 at Unknown time Self Yes Yes   Sig: Take 200 mg by mouth daily   atorvastatin (LIPITOR) 40 MG tablet 3/26/2019 at Unknown time Self Yes Yes   Sig: Take 40 mg by mouth At Bedtime   docusate sodium (COLACE) 100 MG capsule 3/27/2019 at Unknown time Self Yes Yes   Sig: Take 1 capsule by mouth 2 times daily    escitalopram (LEXAPRO) 10 MG tablet 3/27/2019 at Unknown time Self Yes Yes   Sig: Take 10 mg by mouth daily   fluticasone (FLONASE) 50 MCG/ACT nasal spray Past Week at Unknown time Self Yes Yes   Sig: Spray 1 spray into both nostrils daily as needed for rhinitis or allergies   furosemide (LASIX) 20 MG tablet 3/27/2019 at Unknown time Self No Yes   Sig: TAKE ONE TABLET BY MOUTH EVERY DAY   hydrALAZINE (APRESOLINE) 50 MG tablet 3/27/2019 at Unknown time Self Yes Yes   Sig: Take 50 mg by mouth 2 times  daily   losartan (COZAAR) 100 MG tablet 3/27/2019 at Unknown time Self No Yes   Sig: Take 1 tablet (100 mg) by mouth daily   order for DME   No No   Sig: Equipment being ordered: Nebulizer with mask and tubing   order for DME   No No   Sig: Equipment being ordered: Oxygen 1 litre via nasal canula   predniSONE (DELTASONE) 1 MG tablet 3/27/2019 at Unknown time Self Yes Yes   Sig: Take 2 mg by mouth daily   sodium chloride-sodium bicarb (CLASSIC NETI POT SINUS WASH) 2300-700 MG KIT 3/27/2019 at Unknown time Daughter Yes Yes   Sig: Mix 1 packet in Neti Pot and administer in each nostril daily as needed   tiotropium (SPIRIVA) 18 MCG inhaled capsule More than a month at Unknown time  No No   Sig: Inhale 1 capsule (18 mcg) into the lungs daily   warfarin (COUMADIN) 2.5 MG tablet  Self Yes Yes   Sig: Take one half tablet by mouth once daily on Mon, Fri, and one full tablet all other days of the week      Facility-Administered Medications: None     Allergies   Allergies   Allergen Reactions     Adhesive Tape      blisters     Atenolol      SOB     Lopressor Hct      SOB       Physical Exam   Vital Signs: Temp: 98.3  F (36.8  C) Temp src: Oral BP: (!) 166/101 Pulse: 105 Heart Rate: 98 Resp: 16 SpO2: 95 % O2 Device: None (Room air)    Weight: 112 lbs 12.8 oz  Gen: laying in bed, no acute distress  Pulm: crackles at bases bilaterally, nonlabored  CV: regular rhythm w/ palpation (not on tele at time), rate about 100, peripheral pulses intact, no extra sounds, +JVD  Abd: non-tender, non-distended  Ext: L>R 1+ KATHERINE (chronically worse on left following vein graft for Bipap)    Data   Data reviewed today: I reviewed all medications, new labs and imaging results over the last 24 hours.

## 2019-03-27 NOTE — ED PROVIDER NOTES
[place] EMERGENCY DEPARTMENT ([facility])  3/27/19   History     Chief Complaint   Patient presents with     Palpitations     The history is provided by the patient.      Jennifer Bob is a 87 year old female with a medical history significant for CAD s/p CABG, CVA, hypertension, COPD, polymyalgia rheumatica and permanent atrial fibrillation with tachybradycardia syndrome and recent pacemaker placement (1/18/2019) who presents to the Emergency Department for evaluation of shortness of breath and palpitations.  The patient reports that early yesterday morning, around 5 AM, she developed sudden shortness of breath, diaphoresis and palpitations with a heart rate in the 120-130 range.  The patient reports that this lasted for approximately 5-6 hours before significantly improving, but it did not completely resolved.  The patient reports that her shortness of breath continued, but was improved.  The patient this morning at approximately 4 AM woke up with sudden worsening of her shortness of breath as well as palpitations and diaphoresis once again.  She denies any associated chest pain.  She also denies any lightheadedness or syncopal episodes.  It is unclear whether or not the patient's shortness of breath is changed with positional changes.      The patient does note a history of COPD and she reports that she has a chronic productive cough, she has noted some mild worsening of this cough over the last 2 days.  The patient reports that her symptoms today and yesterday feel different than her COPD symptoms as she does not typically have palpitations related to the COPD.      The patient reports that she is on amiodarone and warfarin for her atrial fibrillation; she denies any recent missed doses of these medications and she reports that she has been taking the remainder of her medications as prescribed.  The patient did take her medications this morning.  The patient denies any history of DVT/PE.  The  patient reports that she has chronic left leg swelling, she does feel that this is mildly worse than its baseline today.  The patient otherwise denies any recent illnesses, she specifically denies any recent fevers, chills, nausea or vomiting.    I have reviewed the Medications, Allergies, Past Medical and Surgical History, and Social History in the [facility] system.    Past Medical History:   Diagnosis Date     Abdominal wall hernia     three hernias at previous incision sites, allergic to mesh so repair not repeated, wears girdle     Anxiety 1/9/2012     ASCVD (arteriosclerotic cardiovascular disease) 1/9/2012     CKD (chronic kidney disease) stage 3, GFR 30-59 ml/min (H) 1/10/2012     Colon polyp     resected     DDD (degenerative disc disease), lumbar 1/10/2012     Hyperlipidemia LDL goal <100 1/9/2012     Hyperlipidemia LDL goal <70 12/16/2013     Hypertension goal BP (blood pressure) < 140/90 1/9/2012     Incontinence of urine 1/9/2012     Left hip pain 1/9/2012     Low back pain 1/9/2012     Seasonal allergies 1/9/2012     STEMI (ST elevation myocardial infarction) (H) 8-    with VF arrest.     Stented coronary artery 8-     TIA (transient ischaemic attack) 1/10/2012       Past Surgical History:   Procedure Laterality Date     abdominal abscess      at incisional site after kristine     adominal hernia repair      had mesh placed, then allergic so it was removed and she wears a girdle     CHOLECYSTECTOMY       CORONARY ARTERY BYPASS  1992     EP PACEMAKER N/A 1/18/2019    Procedure: EP Pacemaker;  Surgeon: Serafin Llamas MD;  Location:  HEART CARDIAC CATH LAB     EP PACEMAKER Left 1/23/2019    Procedure: EP Pacemaker;  Surgeon: Ran Hodges MD;  Location:  HEART CARDIAC CATH LAB       No family history on file.    Social History     Tobacco Use     Smoking status: Former Smoker     Smokeless tobacco: Never Used     Tobacco comment: quit smoking in 1983   Substance Use Topics     Alcohol  use: No       No current facility-administered medications for this encounter.      Current Outpatient Medications   Medication     albuterol (PROVENTIL) (2.5 MG/3ML) 0.083% neb solution     amiodarone (PACERONE/CODARONE) 200 MG tablet     amLODIPine (NORVASC) 10 MG tablet     atorvastatin (LIPITOR) 40 MG tablet     BETA BLOCKER NOT PRESCRIBED, INTENTIONAL,     Cholecalciferol (VITAMIN D3 PO)     docusate sodium (COLACE) 100 MG capsule     escitalopram (LEXAPRO) 10 MG tablet     fluticasone (FLONASE) 50 MCG/ACT nasal spray     furosemide (LASIX) 20 MG tablet     hydrALAZINE (APRESOLINE) 50 MG tablet     losartan (COZAAR) 100 MG tablet     Multiple Vitamins-Minerals (MULTIVITAMIN OR)     order for DME     order for DME     predniSONE (DELTASONE) 1 MG tablet     Saline (OCEAN NASAL SPRAY NA)     sodium chloride-sodium bicarb (CLASSIC NETI POT SINUS WASH) 2300-700 MG KIT     tiotropium (SPIRIVA) 18 MCG inhaled capsule     TRAMADOL HCL PO     VENTOLIN  (90 Base) MCG/ACT inhaler     warfarin (COUMADIN) 2.5 MG tablet        Allergies   Allergen Reactions     Adhesive Tape      blisters     Atenolol      SOB     Lopressor Hct      SOB         Review of Systems   Constitutional: Positive for diaphoresis. Negative for chills and fever.   Respiratory: Positive for shortness of breath.    Cardiovascular: Positive for palpitations and leg swelling (chronic left leg; mildly worse). Negative for chest pain.   Gastrointestinal: Negative for nausea and vomiting.   Neurological: Negative for syncope and light-headedness.   All other systems reviewed and are negative.      Physical Exam   BP: (!) 172/96  Heart Rate: 113  Temp: 97.5  F (36.4  C)  Resp: 16  SpO2: 97 %      Physical Exam   General: awake, alert, NAD  Head: normal cephalic  HEENT: pupils equal, conjugate gaze in tact  Neck: Supple  CV: Tachycardic rate irregular rhythm no murmur appreciated  Lungs: Expiratory wheezes noted in all lung fields, patient mildly  tachypneic but does not appear to be in respiratory distress  Abd: soft, non-tender, no guarding, no peritoneal signs  EXT: Swelling of the left lower extremity worse than right  Neuro: awake, answers questions appropriately. No focal deficits noted         ED Course   10:33 AM  The patient was seen and examined by Dewayne Garcia MD in Room ED10.        Procedures  Results for orders placed during the hospital encounter of 03/27/19   POC US ECHO LIMITED    Impression Limited Bedside Cardiac Ultrasound, performed and interpreted by me.   Indication: Shortness of Breath.  Parasternal long axis, parasternal short axis and apical 4 chamber views were acquired.   Image quality was satisfactory.    Findings:    Abnormal dilated 4 chambers, poor global function of LV     IMPRESSION:   Abnormal dilated 4 chambers, poor global function of LV              EKG Interpretation:      Interpreted by Dewayne Garcia  Time reviewed: 1037  Symptoms at time of EKG: SOB   Rhythm: undetermined   Rate: 100-110  Axis: Normal  Ectopy: none  Conduction: right bundle branch block (complete)  ST Segments/ T Waves: Non-specific ST-T wave changes  Q Waves: none  Comparison to prior: Unchanged    Clinical Impression: Abnormal EKG but unchanged from previous         Labs Ordered and Resulted from Time of ED Arrival Up to the Time of Departure from the ED   CBC WITH PLATELETS DIFFERENTIAL - Abnormal; Notable for the following components:       Result Value     (*)     RDW 15.6 (*)     Platelet Count 139 (*)     Absolute Lymphocytes 0.6 (*)     All other components within normal limits   INR - Abnormal; Notable for the following components:    INR 2.06 (*)     All other components within normal limits   COMPREHENSIVE METABOLIC PANEL - Abnormal; Notable for the following components:    Glucose 114 (*)     Creatinine 1.06 (*)     GFR Estimate 47 (*)     GFR Estimate If Black 55 (*)     Protein Total 6.7 (*)     ALT 76 (*)     AST 57 (*)     All  other components within normal limits   TROPONIN I - Abnormal; Notable for the following components:    Troponin I ES 0.050 (*)     All other components within normal limits   NT PROBNP INPATIENT - Abnormal; Notable for the following components:    N-Terminal Pro BNP Inpatient 4,929 (*)     All other components within normal limits   ROUTINE UA WITH MICROSCOPIC   PERIPHERAL IV CATHETER   NURSING DRAW AND HOLD   URINE CULTURE AEROBIC BACTERIAL       Consults  Cardiology: Responded(Cards[facility] Staff) (03/27/19 1255)    Assessments & Plan (with Medical Decision Making)   Jennifer is an 87-year-old female past medical history significant for COPD, CHF, CAD and atrial fibrillation who presents with reported palpitations, tachycardia, and associated shortness of breath.  On physical exam she is A. fib with mild RVR in the low 100s but does not exceed 120s.  She is mildly tachypneic with diffuse wheezing in all lung fields.     Patient was placed on a cardiac monitors.  IV access was obtained.  EKG was obtained which is difficult to interpret due to paced with rhythm but appears unchanged from baseline.  Bedside point-of-care ultrasound obtained showed no large pericardial effusion, overall poor function globally.    Patient was treated with prednisone and DuoNeb in case her COPD is contributing to her shortness of breath and symptoms.  On repeat exam her tachypnea was improved and her wheezing was improved.    Labs are notable for a mildly elevated troponin though this is lower than all of her previous has been (and during previous evaluations felt to be secondary to demand ischemia).  We will give asa and continue to trend troponins but no acute intervention currently due to lack of chest pain, no EKG changes likely alternative source for shortness of breath and palpitations.    Patient also has an elevated BNP, patient has signs of fluid overload on chest x-ray with pulmonary edema and pleural effusions.  Per chart review  patient's last admission they felt her A. fib was exacerbated by fluid overload.  Will treat with 20 IV Lasix here in the emergency department.  Will admit to the cardiology service for ongoing monitoring and treatment.    Cardiology requested formal echo.  Patient's vitals remained stable while in the emergency department.  Her rates were in the 90's to low 100s throughout her ER course so will not treat with rate control currently.    I have reviewed the nursing notes.    I have reviewed the findings, diagnosis, plan and need for follow up with the patient.       Medication List      There are no discharge medications for this visit.         Final diagnoses:   Acute on chronic congestive heart failure, unspecified heart failure type (H)   Atrial fibrillation, rapid (H)     ITy, am serving as a trained medical scribe to document services personally performed by Dewayne Garcia MD, based on the provider's statements to me.   Dewayne PALMA MD, was physically present and have reviewed and verified the accuracy of this note documented by Ty Donato.\    3/27/2019   [facility], [place], EMERGENCY DEPARTMENT     Dewayne Garcia MD  03/27/19 7822

## 2019-03-28 NOTE — PLAN OF CARE
PT 6C - Cancel - PT evaluation orders received.  Spoke with pt and daughter regarding mobility needs.  Pt and daughter reporting no concerns about mobility so declining any PT needs at this time.  Will complete PT orders.

## 2019-03-28 NOTE — PLAN OF CARE
AVSS. Telemetry maintained: NSR, ST with intermittent pacing/capture. Denied pain, resp or cardiac issues. Up with sba. Void spontaneous, intermittent incontinence. Awaiting obs goals. Continue to monitor

## 2019-03-28 NOTE — PLAN OF CARE
"/74   Pulse 105   Temp 98.8  F (37.1  C) (Oral)   Resp 16   Ht 1.575 m (5' 2\")   Wt 51.2 kg (112 lb 12.8 oz)   SpO2 91%   BMI 20.63 kg/m      Cared for pt [id number]    A/Ox4. VSS on RA. MCNAIR. Incontinent of urine. BMx1. Up with SBA. Denies pain. Paged MD to place obs goals. Possible discharge tomorrow once diuresis plan is in place. Pt calls appropriately and is able to make needs known. Continue to monitor and follow POC.   "

## 2019-03-28 NOTE — PLAN OF CARE
OT 6C: OT orders received and acknowledged. Per PT, pt denies concerns for discharge and validated by daughter in room. No acute OT needs. Will complete orders.

## 2019-03-28 NOTE — PLAN OF CARE
AVSS. Telemetry maintained: NSR, ST with intermittent pacing/capture. Denied pain, resp or cardiac issues. Awaiting obs goals. Continue to monitor.

## 2019-03-28 NOTE — PLAN OF CARE
DISCHARGE   Discharged to: Home  Via: Automobile  Accompanied by: Daughter  Discharge Instructions: diet, activity, medications, follow up appointments, when to call the MD, and what to watchout for (i.e. s/s of infection, increasing SOB, palpitations, chest pain). The patient has been instructed about heart failure (HF) management after discharge and to review the written instructions provided on admission, during the hospital stay or on discharge.     Instructed on Diet: A heart healthy diet ordered by MD  Activity: At the level ordered by MD  Weight Monitoring: Monitor the weight every day or as ordered by MD  Worsening symptoms of HF: Monitor symptoms of worsening HF such as   weight gain of 2lb or more per day or 5lb in a week  shortness of breath  leg/ankle swelling  chest pain/ discomfort  decreased energy  fatigue or other symptoms as specified by MD  Call the MD when worsening symptom(s) occur  Medications:  All medications were reviewed upon discharge  Follow-up: Reviewed appointment(s) as directed by the MD  Smoking Cessation: Re-enforced cigarette smoking cessation and avoidance of cigarette smoke   Prescriptions: To be filled by home pharmacy per pt's request; medication list reviewed & sent with pt  Follow Up Appointments: arranged; information given. INR monitoring as well.  Belongings: All sent with pt  IV: out  Telemetry: off  Pt exhibits understanding of above discharge instructions; all questions answered.  Discharge Paperwork: faxed

## 2019-03-29 NOTE — PROGRESS NOTES
Clinic Care Coordination Contact    Clinic Care Coordination Contact  OUTREACH    Referral Information:  Referral Source: IP Report    Primary Diagnosis: Cardiovascular - other    Chief Complaint   Patient presents with     Clinic Care Coordination - Post Hospital     Clinic Care Coordination RN         [place] Utilization: [facility] admission 3/27-3/28/2019-  Acute on chronic CHF  Atrial fibrillation    Benign hypertension     Clinic Utilization  Difficulty keeping appointments:: No  Compliance Concerns: No  No-Show Concerns: No  No PCP office visit in Past Year: No  Utilization    Last refreshed: 3/28/2019 10:48 PM:  Hospital Admissions 5           Last refreshed: 3/28/2019 10:48 PM:  ED Visits 0           Last refreshed: 3/28/2019 10:48 PM:  No Show Count (past year) 4              Current as of: 3/28/2019 10:48 PM              Clinical Concerns:    Pain  Pain (GOAL):: No  Health Maintenance Reviewed:    Clinical Pathway: Clinic Care Coordination CHF Assessment    Discharge:    Day of hospital discharge: 3/27/19  What recommendations were made for follow up after your recent hospitalization? PCP follow up  Patient daughter is on vacation and will be back  and then will make a hospital follow up appointment        CHF:    Home scale available:  Yes  Home scale weight this mornin#    Heart Failure Zones sheet on refrigerator or available: Yes  Any increased SOB since hospital discharge:  No  Any increased edema since hospital discharge:  No  What number to call for YELLOW zones: [phone]    Symptom Review:   Heart Failure Symptoms  Shortness of breath:: No  Wheezing or noisy breathing?: No  Cough: Yes  Is your cough:: Loose  Increased sputum: No  Fever: No  Chest pain: : No  Heartbeat: Regular  Checking weight daily? : Yes  Weight?: Unchanged  Weight increase more than 2 lbs in 24 hours?: No  Weight Increase more than 5 lbs in 1 week? : No  Does the patient have  "understanding of Diuretic self-management?: Yes  Bloating:: None  Fatigue: No  What Heart Failure zone are you currently in?: Green  Overall your CHF symptoms are (GOAL):: Stable    Medications:  \"How many new medications are you on since your hospitalization?\"  0 - 1  \"How many of your current medicines changed (dose, timing, name, etc.) while you were in the hospital?\"  0 - 1  \"Do you have questions about your medications?\"  No    Is patient on Warfarin?  Yes:   Follow-up INR clinic nurse appointment assure per hospital recommendation  Is Ejection Fraction <40%:   When is the first appointment with the CHF clinic: Cardiac device appointment                  Medication Management:  Reviewed      Functional Status:  Dependent ADLs:: Independent  Dependent IADLs:: Independent  Bed or wheelchair confined:: No  Mobility Status: Independent    Living Situation:  Type of residence:: Apartment    Diet/Exercise/Sleep:  Diet:: No added salt  Inadequate nutrition (GOAL):: No  Food Insecurity: No  Tube Feeding: No  Inadequate activity/exercise (GOAL):: No  Significant changes in sleep pattern (GOAL): No    Transportation:           Psychosocial:  Mental health DX:: No  Mental health management concern (GOAL):: No  Informal Support system:: Children     Financial/Insurance:   Financial/Insurance concerns (GOAL):: No  Community Resources: None  Supplies used at home:: None       Advance Care Plan/Directive  Advanced Care Plans/Directives on file:: Yes  Type Advanced Care Plans/Directives: DNR/DNI    Referrals Placed: None     Goals:   Goals        General    #1 Medical (pt-stated)     Notes - Note edited  3/29/2019 11:24 AM by Sara Mcghee RN    Goal Statement: I will monitor my heart rate   Measure of Success: No further episodes of heart racing   Supportive Steps to Achieve:   I will follow the Heart Action Plan   I will seek medical help if my heart starts racing   I will take new medication amiodarone as directed   I " will keep future cardiology appointments  I will check my weight daily and clal if weight gain is 2-3 # in a day or 5# in a week    Barriers: Recent hospitalization for fast heart rate    Strengths: No further episodes of heart racing   Date to Achieve By: 4/28/2019  Patient expressed understanding of goal: yes         Lifestyle    #! Increase physical activity     Notes - Note created  3/19/2019 11:21 AM by Sara Mcghee, RN    Goal Statement: I will increase my walk to 600 feet daily  Measure of Success: I will be walking 600 feet a day and I will have more strength for daily activities   Supportive Steps to Achieve: I will walk in a [place] in my [place]   Barriers: Non  Strengths: Patient is walking 300 feet  daily  Date to Achieve By: 4/20/2019  Patient expressed understanding of goal: Yes               Patient/Caregiver understanding: Expresses good understanding of discharge instructions     Outreach Frequency: 2 weeks  Future Appointments              In 6 days  [facility]     In 1 month [facility]    In 2 months Torri Fisher MD [facility]     In 3 months Claudia Lowe MD [facility]          Plan:   Patient will continue to check weight daily and will seek medical help if increased edema in legs,SOB or rapid heart rate   Patient will make a hospital follow up when her daughter gets back from vacation 4/1/2019  CC will follow up in 1-2 weeks    Sara Mcghee RN, Care Coordinator   [facility] -Care Coordination  Coby Nelson

## 2019-03-30 NOTE — DISCHARGE SUMMARY
[facility], [place]  Discharge Summary - Cardiology       Date of Admission:  3/27/2019  Date of Discharge:  3/28/2019 12:36 PM  Discharging Provider: Dr. Donal Yang, on behalf of Dr. Mayra Orr    Discharge Diagnoses   Acute on chronic systolic heart failure  Mixed ischemic and tachycardia-mediated cardiomyopathy  Hypertension, uncontrolled    Follow-ups Needed After Discharge   Follow-up Appointments     Adult [facility] Follow-up and recommended labs and tests      You already have an appointment scheduled with your cardiologist Dr. Claudia Lowe on 5/13/19.    Appointments on [place] and/or [facility] (with [facility] or [facility]   provider or service). Call [phone] if you haven't heard regarding   these appointments within 7 days of discharge.             Discharge Disposition   Discharged to home; lives alone  Condition at discharge: Stable    Hospital Course   Jennifer Bob is an 86 yo female with heart failure and reduced EF, 30-35%, attributed to mixed ischemic and tachycardia-mediated cardiomyopathy, who was admitted yesterday for mildly decompensated heart failure manifesting as three days of increased dyspnea and orthopnea with pulmonary vascular congestion on chest xray.  She was given 40 mg of IV furosemide in the emergency department, subjectively had significant urine output overnight, and feels notably improved this morning.  Her weight today is 112 lbs (her lowest documented weight).  She feels at her baseline and had ambulated in the morning without dyspnea, lightheadedness, palpitations, or other limiting symptoms.    We felt that elevated blood pressure and cardiac afterload may have precipitated or contributed to her decompensation.  Her blood pressure on presentation was elevated at 160s-170s/100s and remained in the 140s-150s/70s so we increased her hydralazine from 50 to 100 mg TID.    She is on an ARB but apparently is reportedly not on a beta  "blocker because of her COPD.  At her age, spironolactone carries too much risk with a creatinine clearance <30.    We also discussed weighing herself daily and taking extra lasix when she has weight gain of 3 lbs or develops the symptoms that prompted admission today.  We clarified how to distinguish between mere dyspnea and orthopnea vs new cough or wheezing that may herald an exacerbation of COPD, and to call the clinic or go to the ED in the case of the latter.    She has follow up scheduled with her cardiologist and electrophysiologist Dr. Claudia Lowe.    The patient was discussed with Dr. Kirby Yang MD  Cardiovascular Medicine Service  [facility], [place]  Pager: [phone]  ______________________________________________________________________    Physical Exam   /78 (BP Location: Right arm)   Pulse 105   Temp 98.2  F (36.8  C) (Oral)   Resp 16   Ht 1.575 m (5' 2\")   Wt 50.5 kg (111 lb 4.8 oz)   SpO2 93%   BMI 20.36 kg/m    General - comfortable appearing female reclining in bed with unlabored breathing  Neuro - alert & fully oriented  Neck - JVP is not elevated  CV - Normal s1 and s2.  Warm extremities.  No peripheral edema.  Lungs - clear on ascultation.  Abdomen - soft.  Not tender or distended.      Primary Care Physician   Torri Fisher MD    Immunizations       Discharge Orders      Reason for your hospital stay    You were hospitalized for congestive heart failure, which can cause your body to accumulate fluid in the lungs and make it difficult to breathe.  This can occur after increased salt and/or fluid intake or if the blood pressure gets too high.  We gave you extra doses of your diuretic, FUROSEMIDE (LASIX), and also increased one of your blood pressure medications- HYDRALAZINE.     Adult [facility] Follow-up and recommended labs and tests    You already have an appointment scheduled with your cardiologist Dr. Claudia Lowe on " "5/13/19.    Appointments on [place] and/or [facility] (with [facility] or [facility] provider or service). Call [phone] if you haven't heard regarding these appointments within 7 days of discharge.     Activity    Your activity upon discharge: activity as tolerated     Discharge Instructions    Weigh yourself daily.    If your weight increases by 3 pounds or if you have more difficulty breathing, take an extra lasix (40 mg instead of 20 mg daily) until your weight returns to its \"baseline\" and you feel that your breathing is improved.  If after one or two days you do not feel better or feel that you are worse, call the cardiology clinic or come to the emergency department.     Diet    Follow this diet upon discharge: Restrict salt intake to no more than 2 gm daily.       Significant Results and Procedures   Most Recent 3 CBC's:  Recent Labs   Lab Test 03/27/19 1658 03/27/19  1124 02/17/19  0348   WBC 4.7 5.3 6.1   HGB 13.9 13.4 13.8   * 102* 102*    139* 125*     Most Recent 3 BMP's:  Recent Labs   Lab Test 03/28/19  0610 03/27/19  1658 03/27/19  1124    141 142   POTASSIUM 3.7 3.6 3.7   CHLORIDE 103 102 104   CO2 29 28 29   BUN 28 20 20   CR 1.20* 1.06* 1.06*   ANIONGAP 10 10 9   JERILYN 8.7 9.0 9.0   GLC 98 137* 114*     Most Recent 2 LFT's:  Recent Labs   Lab Test 03/27/19 1124 05/02/18  0936   AST 57* 24   ALT 76* 29   ALKPHOS 75 60   BILITOTAL 1.2 0.6     Most Recent 3 INR's:  Recent Labs   Lab Test 03/28/19  0610 03/27/19  1124 03/25/19   INR 1.98* 2.06* 2.0*     Most Recent 3 Troponin's:  Recent Labs   Lab Test 03/27/19 1658 03/27/19  1124 02/17/19  0348  07/07/18  2225  02/09/17  0438 04/24/14  1902   TROPI 0.046* 0.050* 0.111*   < >  --    < >  --   --    TROPONIN  --   --   --   --  0.02  --  0.04 0.02    < > = values in this interval not displayed.     Most Recent 3 BNP's:  Recent Labs   Lab Test 03/27/19  1124 02/16/19  1513 01/22/19  1812   NTBNPI 4,929* 5,402* 4,503*       Results " for orders placed or performed during the hospital encounter of 03/27/19   XR Chest 2 Views    Narrative    Exam: Chest x-ray, 2 views, 3/27/2019.    COMPARISON: 2/16/2019.    HISTORY: Shortness of breath.    FINDINGS: PA and lateral views of the chest were obtained. Left-sided  PIC the and its leads are in place. Median sternotomy wires. Stable  cardiomediastinal silhouette. Prominent aortic knob with  calcifications. Trace right and small left pleural effusion with  overlying atelectasis versus consolidation. No pneumothorax. Mild  pulmonary vascular congestion.      Impression    IMPRESSION:  1. Trace right and small left pleural effusion with overlying  atelectasis versus consolidation.  2. Mild pulmonary vascular congestion.    GENESIS NICOLE MD   POC US ECHO LIMITED    Impression    Limited Bedside Cardiac Ultrasound, performed and interpreted by me.   Indication: Shortness of Breath.  Parasternal long axis, parasternal short axis and apical 4 chamber views were acquired.   Image quality was satisfactory.    Findings:    Abnormal dilated 4 chambers, poor global function of LV     IMPRESSION:   Abnormal dilated 4 chambers, poor global function of LV        Echocardiogram 3/27/19  Interpretation Summary  Left ventricular size is normal.  The Ejection Fraction is estimated at 30-35%.  Inferior wall akinesis is present.  Posterior wall akinesis is present.  Right ventricular function, chamber size, wall motion, and thickness are  normal.  Dilation of the inferior vena cava is present with abnormal respiratory  variation in diameter.  No pericardial effusion is present.  A left pleural effusion is present.  _____________________________________________________________________________  __        Left Ventricle  Left ventricular size is normal. Left ventricular wall thickness is normal.  Left ventricular diastolic function is indeterminate. The Ejection Fraction is  estimated at 30-35%. Inferior wall akinesis is  present. Posterior wall  akinesis is present.     Right Ventricle  Right ventricular function, chamber size, wall motion, and thickness are  normal.     Mitral Valve  Mild mitral annular calcification is present. Mild mitral insufficiency is  present.        Aortic Valve  The aortic valve is tricuspid. Trace to mild aortic insufficiency is present.     Tricuspid Valve  The tricuspid valve is normal. Mild tricuspid insufficiency is present.  Pulmonary artery systolic pressure cannot be assessed.     Pulmonic Valve  The pulmonic valve is normal. Trace pulmonic insufficiency is present.     Vessels  The aorta root is normal. The pulmonary artery is normal. Dilation of the  inferior vena cava is present with abnormal respiratory variation in diameter.     Pericardium  No pericardial effusion is present.     Miscellaneous  A left pleural effusion is present.     _____________________________________________________________________________  __  MMode/2D Measurements & Calculations  IVSd: 1.2 cm  LVIDd: 5.0 cm  LVIDs: 4.2 cm  LVPWd: 0.94 cm     FS: 15.3 %  LV mass(C)d: 192.1 grams  LV mass(C)dI: 125.7 grams/m2  LVOT diam: 2.0 cm  LVOT area: 3.1 cm2     EF(MOD-bp): 20.3 %  RWT: 0.38  TAPSE: 1.8 cm        Discharge Medications   Discharge Medication List as of 3/28/2019 11:27 AM      CONTINUE these medications which have CHANGED    Details   hydrALAZINE (APRESOLINE) 50 MG tablet Take 2 tablets (100 mg) by mouth 3 times daily, Disp-60 tablet, R-3, E-Prescribe         CONTINUE these medications which have NOT CHANGED    Details   albuterol (PROVENTIL) (2.5 MG/3ML) 0.083% neb solution Take 1 vial (2.5 mg) by nebulization every 6 hours as needed for shortness of breath / dyspnea or wheezing, Disp-60 vial, R-3, E-Prescribe      amiodarone (PACERONE/CODARONE) 200 MG tablet Take 200 mg by mouth daily, Historical      amLODIPine (NORVASC) 10 MG tablet Take 1 tablet (10 mg) by mouth daily, Disp-90 tablet, R-1, E-PrescribeProfile Rx:  patient will contact pharmacy when needed      atorvastatin (LIPITOR) 40 MG tablet Take 40 mg by mouth At Bedtime, Historical      BETA BLOCKER NOT PRESCRIBED, INTENTIONAL, Beta Blocker not prescribed intentionally due to COPD, shortness of breath with atenolol and lopressor, R-0, No Print Out      Cholecalciferol (VITAMIN D3 PO) Take 1,000 Units by mouth daily, Historical      docusate sodium (COLACE) 100 MG capsule Take 1 capsule by mouth 2 times daily , Historical      escitalopram (LEXAPRO) 10 MG tablet Take 10 mg by mouth daily, Historical      fluticasone (FLONASE) 50 MCG/ACT nasal spray Spray 1 spray into both nostrils daily as needed for rhinitis or allergies, Historical      furosemide (LASIX) 20 MG tablet TAKE ONE TABLET BY MOUTH EVERY DAY, Disp-90 tablet, R-3, E-Prescribe      losartan (COZAAR) 100 MG tablet Take 1 tablet (100 mg) by mouth daily, Disp-90 tablet, R-3, E-Prescribe      Multiple Vitamins-Minerals (MULTIVITAMIN OR) Take 1 tablet by mouth daily , Historical      !! order for DME Equipment being ordered: Oxygen 1 litre via nasal canulaDisp-1 each, R-0, Local Print      !! order for DME Equipment being ordered: Nebulizer with mask and tubingDisp-1 Units, R-0, Local Print      predniSONE (DELTASONE) 1 MG tablet Take 2 mg by mouth daily, Historical      Saline (OCEAN NASAL SPRAY NA) Administer 1 spray in each nostril up to two times daily as needed, Historical      sodium chloride-sodium bicarb (CLASSIC NETI POT SINUS WASH) 2300-700 MG KIT Mix 1 packet in Neti Pot and administer in each nostril daily as needed, Historical      tiotropium (SPIRIVA) 18 MCG inhaled capsule Inhale 1 capsule (18 mcg) into the lungs daily, Disp-90 capsule, R-3, E-Prescribe      TRAMADOL HCL PO Take 50 mg by mouth every 6 hours as needed for moderate to severe pain, Historical      VENTOLIN  (90 Base) MCG/ACT inhaler INHALE TWO PUFFS BY MOUTH EVERY 6 HOURS AS NEEDED, Disp-18 g, R-0, E-Prescribe      warfarin  (COUMADIN) 2.5 MG tablet Take one half tablet by mouth once daily on Mon, Fri, and one full tablet all other days of the week, Historical       !! - Potential duplicate medications found. Please discuss with provider.        Allergies   Allergies   Allergen Reactions     Apixaban Shortness Of Breath     Patient became SOB the first few times she tried to take this medication, and consequently discontinued use (occurred around the beginning of February 2019)     Adhesive Tape      blisters     Atenolol      SOB     Lopressor Hct      SOB

## 2019-04-01 NOTE — TELEPHONE ENCOUNTER
"Requested Prescriptions   Pending Prescriptions Disp Refills     VENTOLIN  (90 Base) MCG/ACT inhaler [Pharmacy Med Name: VENTOLIN HFA 108MCG/ACT AERS]  Last Written Prescription Date:  2/21/2019  Last Fill Quantity: 18g,  # refills: 0   Last Office Visit: 3/14/2019   Future Office Visit:    Next 5 appointments (look out 90 days)    Jun 17, 2019 10:20 AM CDT  Office Visit with Torri Fisher MD  [facility] [address] [place] [id number] [phone]        18 g 9     Sig: INHALE TWO PUFFS BY MOUTH EVERY 6 HOURS AS NEEDED    Asthma Maintenance Inhalers - Anticholinergics Passed - 4/1/2019 10:13 AM       Passed - Patient is age 12 years or older       Passed - Recent (12 mo) or future (30 days) visit within the authorizing provider's specialty    Patient had office visit in the last 12 months or has a visit in the next 30 days with authorizing provider or within the authorizing provider's specialty.  See \"Patient Info\" tab in inbasket, or \"Choose Columns\" in Meds & Orders section of the refill encounter.             Passed - Medication is active on med list          "

## 2019-04-04 NOTE — PROGRESS NOTES
ANTICOAGULATION FOLLOW-UP CLINIC VISIT    Patient Name:  Jennifer Bob  Date:  4/4/2019  Contact Type:  Face to Face    SUBJECTIVE:     Patient Findings     Positives:   Change in medications (Hydralazine increased. Pt to take 2 tab TID. No interactions per up to date. ), Hospital admission (Date of Admission:  3/27/2019)    Comments:   Reason for Hospital stay:  hospitalized for congestive heart failure, which  caused body to accumulate fluid in the lungs and make it difficult to breathe.  This can occur after increased salt and/or fluid intake or if the blood pressure gets too high.  Patient was given  extra doses of your diuretic, FUROSEMIDE (LASIX), and also increased  blood pressure medications- HYDRALAZINE           OBJECTIVE    INR Protime   Date Value Ref Range Status   04/04/2019 3.4 (A) 0.86 - 1.14 Final       ASSESSMENT / PLAN  INR assessment SUPRA    Recheck INR In: 2 WEEKS    INR Location Clinic      Anticoagulation Summary  As of 4/4/2019    INR goal:   2.0-3.0   TTR:   81.6 % (1.4 mo)   INR used for dosing:   3.4! (4/4/2019)   Warfarin maintenance plan:   1.25 mg (2.5 mg x 0.5) every Mon, Fri; 2.5 mg (2.5 mg x 1) all other days   Full warfarin instructions:   4/4: 1.25 mg; Otherwise 1.25 mg every Mon, Fri; 2.5 mg all other days   Weekly warfarin total:   15 mg   Plan last modified:   Sandy Young RN (3/25/2019)   Next INR check:   4/15/2019   Target end date:       Indications    New onset atrial fibrillation (H) [I48.91]  Stroke (H) [I63.9]             Anticoagulation Episode Summary     INR check location:       Preferred lab:       Send INR reminders to:    [facility] CL[facility]IC    Comments:   Fragile. Low appetite, no large portions of dark greens (broccoli- 2 x wk). MTV 25 mcg, new bottle 50 mcg (will start in late March/April). 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 Cardiology team: Chrissy Rojo.       Anticoagulation Care Providers      Provider Role Specialty Phone number    Bridget Rojo MD Referring Clinical Cardiac Electrophysiology [phone]    Torri Fisher MD Responsible Family Practice [phone]            See the Encounter Report to view Anticoagulation Flowsheet and Dosing Calendar (Go to Encounters tab in chart review, and find the Anticoagulation Therapy Visit)    Patient to decrease today's dose to 1.25 mg. Unclear if INR elevation due to increase in maintenance or recent hospital stay. Will re-assess at next INR recheck on 4/15.     Patient aware if signs of clotting (pain, tenderness, swelling, color change in leg or arm, SOB) and bleeding occur (blood in stool, urine, large bruising, bleeding gums, nosebleeds) to have INR check sooner. If sx severe report to ER or concerned for stroke call 911. If general questions or concerns arise, call clinic.         Haily Radford RN

## 2019-04-13 NOTE — TELEPHONE ENCOUNTER
"Requested Prescriptions   Pending Prescriptions Disp Refills     VENTOLIN  (90 Base) MCG/ACT inhaler [Pharmacy Med Name: VENTOLIN HFA 108MCG/ACT AERS]  Last Written Prescription Date:  2/21/2019  Last Fill Quantity: 18 g,  # refills: 0   Last office visit: 3/14/2019 with prescribing provider:  House   Future Office Visit:   Next 5 appointments (look out 90 days)    Jun 17, 2019 10:20 AM CDT  Office Visit with Torri Fisher MD  [facility] [address] [place] [id number] [phone]          18 g 9     Sig: INHALE TWO PUFFS BY MOUTH EVERY 6 HOURS AS NEEDED       Asthma Maintenance Inhalers - Anticholinergics Passed - 4/12/2019 10:35 AM        Passed - Patient is age 12 years or older        Passed - Recent (12 mo) or future (30 days) visit within the authorizing provider's specialty     Patient had office visit in the last 12 months or has a visit in the next 30 days with authorizing provider or within the authorizing provider's specialty.  See \"Patient Info\" tab in inbasket, or \"Choose Columns\" in Meds & Orders section of the refill encounter.              Passed - Medication is active on med list          "

## 2019-04-15 NOTE — TELEPHONE ENCOUNTER
Prescription approved per [facility] Refill Protocol.  Maribel Marroquin RN

## 2019-04-15 NOTE — PROGRESS NOTES
ANTICOAGULATION FOLLOW-UP CLINIC VISIT    Patient Name:  Jennifer Bob  Date:  4/15/2019  Contact Type:  Face to Face    SUBJECTIVE:     Patient Findings     Positives:   Change in health (Increase in MCNAIR. Pt took an extra 1/2 tab of Lasix as directed. Writer advised pt to f/u with Cardiology team. ), Change in diet/appetite (Pt reports having a larger portion of broccoli over the weekend.)           OBJECTIVE    INR Protime   Date Value Ref Range Status   04/15/2019 2.0 (A) 0.86 - 1.14 Final       ASSESSMENT / PLAN  INR assessment THER    Recheck INR In: 2 WEEKS    INR Location Clinic      Anticoagulation Summary  As of 4/15/2019    INR goal:   2.0-3.0   TTR:   79.5 % (1.8 mo)   INR used for dosin.0 (4/15/2019)   Warfarin maintenance plan:   1.25 mg (2.5 mg x 0.5) every Mon, Fri; 2.5 mg (2.5 mg x 1) all other days   Full warfarin instructions:   1.25 mg every Mon, Fri; 2.5 mg all other days   Weekly warfarin total:   15 mg   No change documented:   Sandy Young RN   Plan last modified:   Sandy Young RN (3/25/2019)   Next INR check:   2019   Target end date:       Indications    New onset atrial fibrillation (H) [I48.91]  Stroke (H) [I63.9]             Anticoagulation Episode Summary     INR check location:       Preferred lab:       Send INR reminders to:   [facility]    Comments:   Fragile. Low appetite, no large portions of dark greens (broccoli- 2 x wk). MTV 25 mcg, new bottle 50 mcg (will start in late March/April). 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 Cardiology team: Chrissy Rojo.       Anticoagulation Care Providers     Provider Role Specialty Phone number    Bridget Rojo MD Referring Clinical Cardiac Electrophysiology [phone]    Torri Fisher MD [facility] [phone]            See the Encounter Report to view Anticoagulation Flowsheet and Dosing Calendar (Go to Encounters tab in  chart review, and find the Anticoagulation Therapy Visit)    Writer advised patient to hold dark greens today and tomorrow to account for dip in INR level. Continue maintenance dose and recheck in 2 weeks.     Patient and daughter made aware if signs of clotting (pain, tenderness, swelling, or color change in any extremity) AND/OR bleeding occur (nosebleeds, bleeding gums, bruising, or blood in stool or urine) to notify provider & seek medical attention. If severe symptoms develop, such as major bleeding, chest pain, shortness of breath, fall, trauma or s/s of stroke, patient to call 911 immediately.     Dosage adjustment made based on physician directed care plan.    Sandy Young RN

## 2019-04-25 NOTE — PROGRESS NOTES
Clinic Care Coordination Contact  [facility]/Voicemail    Referral Source: IP Report  [facility] admission 3/27-3/28/2019-  Acute on chronic CHF  Atrial fibrillation    Benign hypertension   Clinical Data: Care Coordinator Outreach  Outreach attempted x 1.  CC spoke to the patient briefly  Patient states she has a call out to Sandy dyer she is having a hard time with her Coumadin and only 1 pill left.  During our conversation Sandy called back and call was ended   Plan: Care Coordinator will try to reach patient again in 1-2 business days.  Sara Mcghee RN, Care Coordinator   [facility] -Care Coordination  Coby Nelson

## 2019-04-25 NOTE — TELEPHONE ENCOUNTER
Reason for Call:  Medication or medication refill:    Do you use a [facility]?  Name of the pharmacy and phone number for the current request:  [facility] [place] - [phone]    Name of the medication requested: warfarin (COUMADIN) 2.5 MG tablet       Other request: pt said she was told to speak to the doctor about her refill.    Can we leave a detailed message on this number? YES    Phone number patient can be reached at: Home number on file [phone] (home)    Best Time: asap    Call taken on 4/25/2019 at 8:59 AM by Tana Wood

## 2019-04-25 NOTE — PROGRESS NOTES
"Clinic Care Coordination Contact    Clinic Care Coordination Contact  OUTREACH    Referral Information:  Referral Source: IP Report    Primary Diagnosis: Cardiovascular - other    Chief Complaint   Patient presents with     Clinic Care Coordination - Follow-up     Clinic Care Coordination RN         [place] Utilization: [facility] admission 3/27-3/28/2019-  Acute on chronic CHF    Clinic Utilization  Difficulty keeping appointments:: No  Compliance Concerns: No  No-Show Concerns: No  No PCP office visit in Past Year: No  Utilization    Last refreshed: 4/25/2019 12:54 PM:  Hospital Admissions 5           Last refreshed: 4/25/2019 12:54 PM:  ED Visits 0           Last refreshed: 4/25/2019 12:54 PM:  No Show Count (past year) 4              Current as of: 4/25/2019 12:54 PM              Clinical Concerns:  Current Medical Concerns:  Patient states she got all her questions answered about her coumadin after she talked to Sandy Patient denies any SOB episodes with increased activity   Walks around the atrium 200 feet   Pain  Pain (GOAL):: No  Health Maintenance Reviewed:    Clinical Pathway:Clinic Care Coordination Heart Failure Follow Up Assessment:      Home scale available: Yes    Home scale weight this morning: ranging 114-115#    Following a low sodium diet: Yes        Heart Failure Zones sheet on refrigerator or available: Yes    What Heart Failure Zone currently in: Green    Any increased SOB since last contacted: No    Any increased edema since last contacted: No    Is your activity more limited since last contact: No    Have you had any chest pain since last contact: No    What number to call for YELLOW zones: [phone]     Medications:   o \"How many of your current medicines changed (dose, timing, name, etc.) since last contacted ?\" 0 - 1  o \"Do you have questions about your medications?\"No    When is your next appointment with the CHF clinic: 7/18/2019-    When is the appointment with " PCP: Stressed the importance of talking to her daughter to get in for a hospital follow up overdue  Patient agrees with the plan       Medication Management:  Not discussed today     Functional Status:  Dependent ADLs:: Independent  Dependent IADLs:: Independent  Bed or wheelchair confined:: No  Mobility Status: Independent    Living Situation:  Type of residence:: Apartment    Diet/Exercise/Sleep:  Diet:: No added salt  Inadequate nutrition (GOAL):: No  Food Insecurity: No  Tube Feeding: No  Inadequate activity/exercise (GOAL):: No  Significant changes in sleep pattern (GOAL): No       Psychosocial:  Mental health DX:: No  Mental health management concern (GOAL):: No  Informal Support system:: Children     Financial/Insurance:   Financial/Insurance concerns (GOAL):: No  Community Resources: None  Supplies used at home:: None       Advance Care Plan/Directive  Advanced Care Plans/Directives on file:: Yes  Type Advanced Care Plans/Directives: DNR/DNI    Referrals Placed: None     Goals:   Goals        General    #1 Medical (pt-stated)     Notes - Note edited  4/25/2019  1:26 PM by Sara Mcghee RN    Goal Statement: I will monitor my heart rate   Measure of Success: No further episodes of heart racing   Supportive Steps to Achieve:   I will follow the Heart Action Plan   I will seek medical help if my heart starts racing   I will take new medication amiodarone as directed   I will keep future cardiology appointments  I will check my weight daily and clal if weight gain is 2-3 # in a day or 5# in a week    Barriers: Recent hospitalization for fast heart rate    Strengths: No further episodes of heart racing   Date to Achieve By:5/25/2019  Patient expressed understanding of goal: yes         Lifestyle    #! Increase physical activity     Notes - Note edited  4/25/2019  1:25 PM by Sara Mcghee RN    Goal Statement: I will increase my walk to 600 feet daily  Measure of Success: I will be walking 600 feet a day and I  will have more strength for daily activities   Supportive Steps to Achieve: I will walk in a [place] in my Atrium   Barriers: Non  Strengths: Patient is walking 300 feet  daily  Date to Achieve By:5/25/2019  Patient expressed understanding of goal: Yes               Outreach Frequency: 2 weeks  Future Appointments              In 4 days  INR CLINIC [facility]     In 2 weeks [facility]    In 1 month Torri Fisher MD [facility]     In 2 months Claudia Lowe MD [facility],           Plan:   Patient will continue to follow the heart Failure action plan and seek medical help if experiencing increased SOB,chest pain or any other symptoms of concern   CC will follow up in 1-2 weeks     Sara Mcghee RN, Care Coordinator   [facility] -Care Coordination  Coby Nelson

## 2019-04-25 NOTE — TELEPHONE ENCOUNTER
TC, please call patient and notify her of Warfarin refill. Writer informed pharmacy refill is needed before the weekend. Please encouraged patient to f/u tomorrow if she has not heard prior to that. Thanks! Sandy Young, GLENNN, RN

## 2019-04-26 NOTE — TELEPHONE ENCOUNTER
I did not see that Jennifer's amiodarone is on her active medication list, but she requested to have it filled. However, the directions on the prescription from 3/7/19 have two sets of directions. If she is to be taking, can you clarify her current dose? Is it 200 mg daily? Please call [facility] pharmacy and clarify if she is taking and if so the current dose.     Thanks,    Sumaya Stephens, Pharm. D.  Float Pharmacist  [facility]  [phone]

## 2019-04-29 NOTE — PROGRESS NOTES
ANTICOAGULATION FOLLOW-UP CLINIC VISIT    Patient Name:  Jennifer Bob  Date:  2019  Contact Type:  Face to Face    SUBJECTIVE:     Patient Findings     Positives:   Signs/symptoms of bleeding (Pt fell out of her chair at [place] and bruised her left rib. No other issues noted. )           OBJECTIVE    INR Protime   Date Value Ref Range Status   2019 2.8 (A) 0.86 - 1.14 Final       ASSESSMENT / PLAN  INR assessment THER    Recheck INR In: 2 WEEKS    INR Location Clinic      Anticoagulation Summary  As of 2019    INR goal:   2.0-3.0   TTR:   83.8 % (2.2 mo)   INR used for dosin.8 (2019)   Warfarin maintenance plan:   1.25 mg (2.5 mg x 0.5) every Mon, Fri; 2.5 mg (2.5 mg x 1) all other days   Full warfarin instructions:   1.25 mg every Mon, Fri; 2.5 mg all other days   Weekly warfarin total:   15 mg   No change documented:   Sandy Young RN   Plan last modified:   Sandy Young RN (3/25/2019)   Next INR check:   2019   Target end date:       Indications    New onset atrial fibrillation (H) [I48.91]  Stroke (H) [I63.9]             Anticoagulation Episode Summary     INR check location:       Preferred lab:       Send INR reminders to:   [facility]    Comments:   Fragile. Low appetite, no large portions of dark greens (broccoli- 2 x wk). MTV 25 mcg, new bottle 50 mcg (will start in late March/April). 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 Cardiology team: Chrissy Rojo.       Anticoagulation Care Providers     Provider Role Specialty Phone number    Bridget Rojo MD Referring Clinical Cardiac Electrophysiology [phone]    Torri Fisher MD [facility] [phone]            See the Encounter Report to view Anticoagulation Flowsheet and Dosing Calendar (Go to Encounters tab in chart review, and find the Anticoagulation Therapy Visit)    Patient made aware if signs of clotting (pain,  tenderness, swelling, or color change in any extremity) AND/OR bleeding occur (nosebleeds, bleeding gums, bruising, or blood in stool or urine) to notify provider & seek medical attention. If severe symptoms develop, such as major bleeding, chest pain, shortness of breath, fall, trauma or s/s of stroke, patient to call 911 immediately.     Dosage adjustment made based on physician directed care plan.    Sandy Young RN

## 2019-04-29 NOTE — TELEPHONE ENCOUNTER
Routing to Dr Lowe - Cards.  Please verify directions.  3/7/19 prescription said:  Sig - Route: Take 1 tablet (200 mg) by mouth daily Three times daily for one week, then two times daily for one week, then daily. - Oral   Patient taking differently: Take 200 mg by mouth daily             Med was subsequently taken off patient med list for Medication Reconciliation lean Up.  Zara Mcarthur, RN

## 2019-05-09 NOTE — PROGRESS NOTES
"Clinic Care Coordination Contact    Clinic Care Coordination Contact  OUTREACH    Referral Information:  Referral Source: IP Report    Primary Diagnosis: CHF    Chief Complaint   Patient presents with     Clinic Care Coordination - Follow-up     Clinic Care Coordination RN         [place] Utilization: [facility] admission 3/27-3/28/2019-  Acute on chronic CHF  Clinic Utilization  Difficulty keeping appointments:: No  Compliance Concerns: No  No-Show Concerns: No  No PCP office visit in Past Year: No  Utilization    Last refreshed: 2019 10:03 PM:  Hospital Admissions 5           Last refreshed: 2019 10:03 PM:  ED Visits 0           Last refreshed: 2019 12:31 PM:  No Show Count (past year) 4              Current as of: 2019 12:31 PM              Clinical Concerns:  Current Medical Concerns: Patient had one episode of ankle edema and the Cardiology provider instructed her to take an extra diuretic and edema resolved   Pain  Pain (GOAL):: No  Health Maintenance Reviewed: Due/Overdue   Health Maintenance Due   Topic Date Due     HF ACTION PLAN Q3 YR  [birth year]31     URINE DRUG SCREEN Q1 YR  1946     DEPRESSION ACTION PLAN  1949     ZOSTER IMMUNIZATION (2 of 3) 2012     COPD ACTION PLAN Q1 YR  10/23/2015     MEDICARE ANNUAL WELLNESS VISIT  2016     MICROALBUMIN Q1 YEAR  2019     Clinical Pathway: Clinic Care Coordination Heart Failure Follow Up Assessment:          Home scale available: Yes    Home scale weight this mornin#    Following a low sodium diet: Yes        Heart Failure Zones sheet on refrigerator or available: Yes    What Heart Failure Zone currently in: Green    Any increased SOB since last contacted: No    Any increased edema since last contacted: No    Is your activity more limited since last contact: No    Have you had any chest pain since last contact: No    What number to call for YELLOW zones: [phone]     Medications:   o \"How " "many of your current medicines changed (dose, timing, name, etc.) since last contacted ?\" 0 - 1  o \"Do you have questions about your medications?\"No    When is your next appointment with the CHF clinic:7/18/2019    When is the appointment with PCP: 6/17/2019    Medication Management:  Not discussed today     Functional Status:  Dependent ADLs:: Independent  Dependent IADLs:: Independent  Bed or wheelchair confined:: No  Mobility Status: Independent    Living Situation:  Type of residence:: Apartment    Diet/Exercise/Sleep:  Diet:: No added salt  Inadequate nutrition (GOAL):: No  Food Insecurity: No  Tube Feeding: No  Inadequate activity/exercise (GOAL):: No  Significant changes in sleep pattern (GOAL): No      Psychosocial:  Mental health DX:: No  Mental health management concern (GOAL):: No  Informal Support system:: Children     Financial/Insurance:   Financial/Insurance concerns (GOAL):: No    Community Resources: None  Supplies used at home:: None       Advance Care Plan/Directive  Advanced Care Plans/Directives on file:: Yes  Type Advanced Care Plans/Directives: DNR/DNI    Referrals Placed: None     Goals:   Goals        General    #1 Medical (pt-stated)     Notes - Note edited  5/9/2019  1:04 PM by Sara Mcghee RN    Goal Statement: I will monitor my heart rate   Measure of Success: No further episodes of heart racing   Supportive Steps to Achieve:   I will follow the Heart Action Plan   I will seek medical help if my heart starts racing   I will take new medication amiodarone as directed   I will keep future cardiology appointments  I will check my weight daily and clal if weight gain is 2-3 # in a day or 5# in a week    Barriers: Recent hospitalization for fast heart rate    Strengths: No further episodes of heart racing   Date to Achieve By 6/9/2019-  Patient expressed understanding of goal: yes         Lifestyle    #! Increase physical activity     Notes - Note edited  5/9/2019  1:04 PM by Litzy" BRAYDEN Ruiz    Goal Statement: I will increase my walk to 600 feet daily  Measure of Success: I will be walking 600 feet a day and I will have more strength for daily activities   Supportive Steps to Achieve: I will walk in a [place] in my Atrium   Barriers: Non  Strengths: Patient is walking 300 feet  daily  Date to Achieve By:6/9/2019  Patient expressed understanding of goal: Yes           Outreach Frequency: monthly    Future Appointments              In 4 days [facility]    In 4 days  INR CLINIC [facility]    In 1 month Torri Fisher MD [facility]    In 2 months Claudia Lowe MD [facility]          Plan:   Patient will continue to follow the Heart Failure Action Plan and call  if increased SOB edema.color to sputum  or chest discomfort   CC will continue to follow up monthly.    Patient has CC contact information if any questions or concerns     Sara Mcghee RN, Care Coordinator   [facility] -Care Coordination  Coby Nelson  [phone]

## 2019-05-13 NOTE — PROGRESS NOTES
ANTICOAGULATION FOLLOW-UP CLINIC VISIT    Patient Name:  Jennifer Bob  Date:  2019  Contact Type:  Face to Face    SUBJECTIVE:  Patient Findings     Positives:   Change in health (Mild allergies noted, sneezing and congestion. Pt using albuterol inhaler more often. Discussed options for Flonase and/or Claritin use. )             OBJECTIVE    INR Protime   Date Value Ref Range Status   2019 2.8 (A) 0.86 - 1.14 Final       ASSESSMENT / PLAN  INR assessment THER    Recheck INR In: 2 WEEKS    INR Location Clinic      Anticoagulation Summary  As of 2019    INR goal:   2.0-3.0   TTR:   86.6 % (2.7 mo)   INR used for dosin.8 (2019)   Warfarin maintenance plan:   1.25 mg (2.5 mg x 0.5) every Mon, Fri; 2.5 mg (2.5 mg x 1) all other days   Full warfarin instructions:   1.25 mg every Mon, Fri; 2.5 mg all other days   Weekly warfarin total:   15 mg   No change documented:   Sandy Young RN   Plan last modified:   Sandy Young RN (3/25/2019)   Next INR check:   2019   Target end date:       Indications    New onset atrial fibrillation (H) [I48.91]  Stroke (H) [I63.9]             Anticoagulation Episode Summary     INR check location:       Preferred lab:       Send INR reminders to:   [facility]    Comments:   Fragile. Low appetite, no large portions of dark greens (broccoli- 2 x wk). MTV 25 mcg, new bottle 50 mcg (will start in late March/April). 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 Cardiology team: Chrissy Rojo.       Anticoagulation Care Providers     Provider Role Specialty Phone number    Bridget Rojo MD Referring Clinical Cardiac Electrophysiology [phone]    Torri Fisher MD [facility] [phone]            See the Encounter Report to view Anticoagulation Flowsheet and Dosing Calendar (Go to Encounters tab in chart review, and find the Anticoagulation Therapy Visit)    Patient  made aware if signs of clotting (pain, tenderness, swelling, or color change in any extremity) AND/OR bleeding occur (nosebleeds, bleeding gums, bruising, or blood in stool or urine) to notify provider & seek medical attention. If severe symptoms develop, such as major bleeding, chest pain, shortness of breath, fall, trauma or s/s of stroke, patient to call 911 immediately.     Dosage adjustment made based on physician directed care plan.    Sandy Young RN

## 2019-05-30 NOTE — PROGRESS NOTES
ANTICOAGULATION FOLLOW-UP CLINIC VISIT    Patient Name:  Jennifer Bob  Date:  2019  Contact Type:  Face to Face    SUBJECTIVE:  Patient Findings     Positives:   Change in medications (As directed, pt takes an extra Lasix tablet with increase in SOB. Pt has increased dose 1-2 times in the last two weeks.)    Comments:   The patient was assessed for diet, medication, and activity level changes, missed or extra doses, bruising or bleeding, with no problem findings.            OBJECTIVE    INR Protime   Date Value Ref Range Status   2019 2.0 (A) 0.86 - 1.14 Final       ASSESSMENT / PLAN  INR assessment THER    Recheck INR In: 2 WEEKS    INR Location Clinic      Anticoagulation Summary  As of 2019    INR goal:   2.0-3.0   TTR:   88.9 % (3.3 mo)   INR used for dosin.0 (2019)   Warfarin maintenance plan:   1.25 mg (2.5 mg x 0.5) every Mon, Fri; 2.5 mg (2.5 mg x 1) all other days   Full warfarin instructions:   1.25 mg every Mon, Fri; 2.5 mg all other days   Weekly warfarin total:   15 mg   Plan last modified:   Sandy Young RN (3/25/2019)   Next INR check:   2019   Target end date:       Indications    New onset atrial fibrillation (H) [I48.91]  Stroke (H) [I63.9]             Anticoagulation Episode Summary     INR check location:       Preferred lab:       Send INR reminders to:   [facility]    Comments:   Fragile. Low appetite, no large portions of dark greens (broccoli- 2 x wk). MTV 25 mcg, new bottle 50 mcg (will start in late March/April). 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 Cardiology team: Chrissy Rojo.       Anticoagulation Care Providers     Provider Role Specialty Phone number    Bridget Rojo MD Referring Clinical Cardiac Electrophysiology [phone]    Torri Fisher MD [facility] [phone]            See the Encounter Report to view Anticoagulation Flowsheet and Dosing  Calendar (Go to Encounters tab in chart review, and find the Anticoagulation Therapy Visit)    Patient made aware if signs of clotting (pain, tenderness, swelling, or color change in any extremity) AND/OR bleeding occur (nosebleeds, bleeding gums, bruising, or blood in stool or urine) to notify provider & seek medical attention. If severe symptoms develop, such as major bleeding, chest pain, shortness of breath, fall, trauma or s/s of stroke, patient to call 911 immediately.     Dosage adjustment made based on physician directed care plan.    Sandy Young RN

## 2019-05-31 NOTE — TELEPHONE ENCOUNTER
Reason for Call:  Medication or medication refill:    Do you use a [facility]?  Name of the pharmacy and phone number for the current request:  [facility] [place], [place] - [address]    Name of the medication requested: furosemide (LASIX) 20 MG tablet    Other request: Patient states she spoke with a doctor at [facility] and they said she should take 3 extra tablets when she had episodes of shortness of breath and will need a new RX. Please assist. Thanks!    Can we leave a detailed message on this number? YES    Phone number patient can be reached at: Home number on file [phone] (home)    Best Time: Any    Call taken on 5/31/2019 at 9:44 AM by Niurka Terrell

## 2019-05-31 NOTE — TELEPHONE ENCOUNTER
Patient states that she needs to take the 3 additional pills about once a week or 12 additional tabs a month.  States that the ER/Observation provider that she saw in March told her to take 3 extra pills when she was having shortness of breath.  Please send new script that will allow her to take the additional when needed.  Zara Mcarthur RN

## 2019-06-01 NOTE — TELEPHONE ENCOUNTER
"Requested Prescriptions   Pending Prescriptions Disp Refills     furosemide (LASIX) 20 MG tablet 90 tablet 3     Sig: Take 1 tablet (20 mg) by mouth daily         Last Written Prescription Date:  7/19/18  Last Fill Quantity: 90,   # refills: 3  Last Office Visit: 3/14/19  Future Office visit:    Next 5 appointments (look out 90 days)    Jun 13, 2019  1:00 PM CDT  Return Visit with Claudia Lowe MD  [facility] ([facility] [place]) [address] [place] [id number]  [phone]   Jun 17, 2019 10:20 AM CDT  Office Visit with Torri Fisher MD  [facility] ([facility]) [address] [place] [id number] [phone]           Routing refill request to provider for review/approval because:  Patient requesting change to directions see below - from ED visit      Diuretics (Including Combos) Protocol Failed - 5/31/2019  4:09 PM        Failed - Blood pressure under 140/90 in past 12 months     BP Readings from Last 3 Encounters:   03/28/19 153/78   03/14/19 145/86   03/07/19 155/74                 Failed - Normal serum creatinine on file in past 12 months     Recent Labs   Lab Test 03/28/19  0610   CR 1.20*              Passed - Recent (12 mo) or future (30 days) visit within the authorizing provider's specialty     Patient had office visit in the last 12 months or has a visit in the next 30 days with authorizing provider or within the authorizing provider's specialty.  See \"Patient Info\" tab in inbasket, or \"Choose Columns\" in Meds & Orders section of the refill encounter.              Passed - Medication is active on med list        Passed - Patient is age 18 or older        Passed - No active pregancy on record        Passed - Normal serum potassium on file in past 12 months     Recent Labs   Lab Test 03/28/19  0610   POTASSIUM 3.7                    Passed - Normal serum sodium on file in past 12 months     Recent " Labs   Lab Test 03/28/19  0610                 Passed - No positive pregnancy test in past 12 months

## 2019-06-13 NOTE — PROGRESS NOTES
I am delighted to see Jennifer Godoyjere for follow up of atrial fibrillation and heart failure.     As you know, the patient is a 87 year old  female with a h/o CAD s/p CABG, stroke 2013, advanced COPD. New onset afib with  bpm 1/2019, EF 27%, no new ischemia by nuclear scan. Given metoprolol with some pauses on telemetry. Underwent CRT-P without atrial lead. Did not tolerate apixaban, anticoagulated with warfarin.    Since then, she's had two hospitalizations for shortness of breath and volume overload. Metoprolol was stopped due to worsening COPD. At one point she was given amiodarone for rate control and this had been stopped as well. Last hospitalization 3/2019 for volume overload which responded to diuresis, discharge weight was 112 lbs. She has been checking her weight daily and it is stable at abut 110 lbs. If she feels short of breath she adds lasix 20 mg bid to her daily 20 mg dose. She's had to do this about once a week.    She thinks she is mainly anxious - states that she feels her breathing is heavier and her rate race intermittently. Asking for some anxiolytics.    The following portions of the patient's history were reviewed and updated as appropriate: allergies, current medications, past family history, past medical history, past social history, past surgical history, and the problem list.    Past Medical History:  1. CAD. CABG 1992;  MI with VF arrest, s/p PCI SVG to RCA graft 8/29/09  ([place])  2. Hypertension ~ home  - 140/150 , occasionally 160-180mmHg  3. Hyperlipidemia  4. COPD. PFT 2014 showed reduced FEV1.  5. Hernia repair  6. Cholecystectomy  7. Ischemic stroke 2013; recent hospital admission 7/7/18 with another stroke, received tPA, residual right weakness. Ziopatch post discharge showed no atrial fibrillation.  8. Peripheral arterial disease s/p superior femoral artery stent 2018  9. Atrial fibrillation, 1/2019, with RVR.  Had increasing shortness of breath with both  metoprolol and digoxin.  Amiodarone was started for rate control in February 2019.  10. Bradycardia with rate control - s/p [employer] CRT-P with RV and LV leads 1/18/19, complicated with RV lead microperforation requiring repositioning, no pericardial effusion    Allergies:    Allergies   Allergen Reactions     Apixaban Shortness Of Breath     Patient became SOB the first few times she tried to take this medication, and consequently discontinued use (occurred around the beginning of February 2019)     Adhesive Tape      blisters     Atenolol      SOB     Lopressor Hct      SOB       Medications:   Losartan 100 every day  Amlodipine 10 every day  Amiodarone 200 qd  Hydralazine 50 mg bid   Atorvastatin 40 every day  Calcium  Vitamin D  Lexapro 10 every day  Lasix 20 every day (add 20 in am and 10 in pm if needed, about once a week)  Prednisone 2 every day  Tramadol   Warfarin  Albuterol inhalers      Family History: noncontributory    Psychosocial history:  reports that she has quit smoking. She has never used smokeless tobacco. She reports that she does not drink alcohol or use drugs.    Review of systems: Cardiovascular - no chest pain, dizziness, orthopnea, PND.    In addition,   Constitutional: No change in weight, sleep or appetite.  Normal energy.  No fever or chills  Eyes: Negative for vision changes or eye problems  ENT: No problems with ears, nose or throat.  No difficulty swallowing.  Resp: No coughing, wheezing or shortness of breath  GI: No nausea, vomiting,  heartburn, abdominal pain, diarrhea, constipation or change in bowel habits  : No urinary frequency or dysuria, bladder or kidney problems  Musculoskeletal: No significant muscle or joint pains  Neurologic: No headaches, numbness, tingling, weakness, problems with balance or coordination  Psychiatric: feels anxious  Heme/immune/allergy: No history of bleeding or clotting problems or anemia.  No allergies or immune system  problems  Integumentary: No rashes,worrisome lesions or skin problems      Physical examination  Vitals: 172/108, 122 bpm  BMI= 111 lbs    Constitutional: In general, the patient is a pleasant female in no apparent distress.    Eyes: PERRLA.  EOMI.  Sclerae white, not injected.  ENT/mouth: Normiocephalic and atraumatic.  Nares clear.  Pharynx without erythema or exudate.  Dentition intact.  No adenopathy.  No thyromegaly. Carotids +2/2 bilaterally without bruits.  No jugular venous distension.   Card/Vasc: The PMI is in the 5th ICS in the midclavicular line. There is no heave. Irregularly irregular. Normal S1, S2. No murmur, rub, click, or gallop. Pulses are normal bilaterally throughout. No peripheral edema.  Respiratory: Reduced breath sounds throughout.  No ronchi, wheezes, rales.  No dullness to percussion.   GI: Abdomen is soft, nontender, nondistended. No organomegaly. No AAA.  No bruits.   Integument: No significant bruises or rashes  Neurological: The neurological examination reveal a patient who was oriented to person, place, and time.    Psych: Normal  Heme/Lymph/Immun: no significant adenopathy    I have previously reviewed the following labs/imaging:  Echo  2/9/17: EF 40-45%, concentric LVH, inferior AK, normal RV. LAE.  7/7/18: EF 45-50%, basal inferior/inferolateral AK, normal RV. PASp 48mmHg. IVC normal. No effusion. EREN 58.7 ml/m2  1/15/19 (in AF rate 98 bpm):  EF 27%, inferior HK, mild to mod TR, severely dilated LA, EREN 44  1/22/19: EF 30-35%  Nuclear stress test   5/30/14: inferolateral HK with ischemia  1/15/19: no ischemia  CT angio head/neck 7/7/18: PHAN 40%, LIC 60%  CXR 1/22/19: normal heart size; RV/LV leads in good positions; emphysematous changes, no pumonary congestion  Interrogation from [facility] 2/1/19: VVIR  bpm,  70%. Parameters stable.    Today I have reviewed:  Labs 3/28/19: K 3.7, cr 1.2, hgb 14, plt 162K  6/13/19: INR 1.6  Echo 3/27/19: EF 30-35%, inferoposterior  shireen    I have personally and independently reviewed the following:  Pacemaker interrogation  5/13/19-  BIV pacing 72%; rhythm appears to be afib. Programmed to VVIR  bpm.  EKGs   7/8/18: sinus 60 bpm, normal NV, RBBB  1/22/19: afib with BIV pacing  3/7/19: AF with RBBB and intermittent pacing    Assessment :  1. Atrial fibrillation, permanent. Continues to have rapid rates, unable to tolerate beta blockers due to worsening COPD. I had previously discussed AF lena ablation with her since she has a CRT-P, she is reluctant to have more procedures. On warfarin, subtherapeutic INR today.  2. Cardiomyopathy with intermittent heart failure. Stable and euvolemic today. EF 30-35%, I suspect a significant part is due to AFib and tachycardia. She is also not getting full benefit from CRT-P as she is only BIV paced 70% of the time.  3. Hypertension. She says her BP at home is ~ 120 bpm. BP tends to be high in office.  4. CAD s/p CABG. No ischemic symptoms.  5. COPD. Advanced though not oxygen dependent.    Plan:  Once again discussed AV node ablation, she will consider  No medication changes today  Continue daily weights  Warfarin dose adjustment per anticoagulation clinic      I spent a total of 30 minutes face to face with  Jennifer Bob during today's office visit. Over 50% of this time was spent counseling the patient and/or coordinating care regarding management options.      The patient is to return 6 months. She has remote pacemaker check in 8/2019. The patient understood the treatment plan as outlined above.  There were no barriers to learning.      Claudia Lowe MD       Addendum:  Patient underwent AV node ablation 7/8/19. Warfarin was held for 2 days. INR on 7/8 was 1.47. She was advised to resume warfarin that evening.  Received message from anticoagulation clinic that patient has difficulty coming in for INR checks and would like to go back to eliquis. She had previously bene on eliquis but wanted  to stop it because she thought it was causing her shortness of breath. She did not have any contraindications for eliquis.    She is 86 yo, weighs 50 kg, and creatinine is 1.2. If she did not resume warfarin post AV node ablation, will have her start Eliquis at 2.5 mg bid.    GHANSHYAM  7/11/19

## 2019-06-13 NOTE — LETTER
6/13/2019      RE: Jennifer Bob  [address]   [address]  [facility] [address]       I am delighted to see Jennifer Bob for follow up of atrial fibrillation and heart failure.     As you know, the patient is a 87 year old  female with a h/o CAD s/p CABG, stroke 2013, advanced COPD. New onset afib with  bpm 1/2019, EF 27%, no new ischemia by nuclear scan. Given metoprolol with some pauses on telemetry. Underwent CRT-P without atrial lead. Did not tolerate apixaban, anticoagulated with warfarin.    Since then, she's had two hospitalizations for shortness of breath and volume overload. Metoprolol was stopped due to worsening COPD. At one point she was given amiodarone for rate control and this had been stopped as well. Last hospitalization 3/2019 for volume overload which responded to diuresis, discharge weight was 112 lbs. She has been checking her weight daily and it is stable at abut 110 lbs. If she feels short of breath she adds lasix 20 mg bid to her daily 20 mg dose. She's had to do this about once a week.    She thinks she is mainly anxious - states that she feels her breathing is heavier and her rate race intermittently. Asking for some anxiolytics.    The following portions of the patient's history were reviewed and updated as appropriate: allergies, current medications, past family history, past medical history, past social history, past surgical history, and the problem list.    Past Medical History:  1. CAD. CABG 1992;  MI with VF arrest, s/p PCI SVG to RCA graft 8/29/09  ([place])  2. Hypertension ~ home  - 140/150 , occasionally 160-180mmHg  3. Hyperlipidemia  4. COPD. PFT 2014 showed reduced FEV1.  5. Hernia repair  6. Cholecystectomy  7. Ischemic stroke 2013; recent hospital admission 7/7/18 with another stroke, received tPA, residual right weakness. Ziopatch post discharge showed no atrial fibrillation.  8. Peripheral arterial disease s/p superior femoral artery  stent 2018  9. Atrial fibrillation, 1/2019, with RVR.  Had increasing shortness of breath with both metoprolol and digoxin.  Amiodarone was started for rate control in February 2019.  10. Bradycardia with rate control - s/p [employer] CRT-P with RV and LV leads 1/18/19, complicated with RV lead microperforation requiring repositioning, no pericardial effusion    Allergies:    Allergies   Allergen Reactions     Apixaban Shortness Of Breath     Patient became SOB the first few times she tried to take this medication, and consequently discontinued use (occurred around the beginning of February 2019)     Adhesive Tape      blisters     Atenolol      SOB     Lopressor Hct      SOB       Medications:   Losartan 100 every day  Amlodipine 10 every day  Amiodarone 200 qd  Hydralazine 50 mg bid   Atorvastatin 40 every day  Calcium  Vitamin D  Lexapro 10 every day  Lasix 20 every day (add 20 in am and 10 in pm if needed, about once a week)  Prednisone 2 every day  Tramadol   Warfarin  Albuterol inhalers      Family History: noncontributory    Psychosocial history:  reports that she has quit smoking. She has never used smokeless tobacco. She reports that she does not drink alcohol or use drugs.    Review of systems: Cardiovascular - no chest pain, dizziness, orthopnea, PND.    In addition,   Constitutional: No change in weight, sleep or appetite.  Normal energy.  No fever or chills  Eyes: Negative for vision changes or eye problems  ENT: No problems with ears, nose or throat.  No difficulty swallowing.  Resp: No coughing, wheezing or shortness of breath  GI: No nausea, vomiting,  heartburn, abdominal pain, diarrhea, constipation or change in bowel habits  : No urinary frequency or dysuria, bladder or kidney problems  Musculoskeletal: No significant muscle or joint pains  Neurologic: No headaches, numbness, tingling, weakness, problems with balance or coordination  Psychiatric: feels anxious  Heme/immune/allergy: No  history of bleeding or clotting problems or anemia.  No allergies or immune system problems  Integumentary: No rashes,worrisome lesions or skin problems      Physical examination  Vitals: 172/108, 122 bpm  BMI= 111 lbs    Constitutional: In general, the patient is a pleasant female in no apparent distress.    Eyes: PERRLA.  EOMI.  Sclerae white, not injected.  ENT/mouth: Normiocephalic and atraumatic.  Nares clear.  Pharynx without erythema or exudate.  Dentition intact.  No adenopathy.  No thyromegaly. Carotids +2/2 bilaterally without bruits.  No jugular venous distension.   Card/Vasc: The PMI is in the 5th ICS in the midclavicular line. There is no heave. Irregularly irregular. Normal S1, S2. No murmur, rub, click, or gallop. Pulses are normal bilaterally throughout. No peripheral edema.  Respiratory: Reduced breath sounds throughout.  No ronchi, wheezes, rales.  No dullness to percussion.   GI: Abdomen is soft, nontender, nondistended. No organomegaly. No AAA.  No bruits.   Integument: No significant bruises or rashes  Neurological: The neurological examination reveal a patient who was oriented to person, place, and time.    Psych: Normal  Heme/Lymph/Immun: no significant adenopathy    I have previously reviewed the following labs/imaging:  Echo  2/9/17: EF 40-45%, concentric LVH, inferior AK, normal RV. LAE.  7/7/18: EF 45-50%, basal inferior/inferolateral AK, normal RV. PASp 48mmHg. IVC normal. No effusion. EREN 58.7 ml/m2  1/15/19 (in AF rate 98 bpm):  EF 27%, inferior HK, mild to mod TR, severely dilated LA, EREN 44  1/22/19: EF 30-35%  Nuclear stress test   5/30/14: inferolateral HK with ischemia  1/15/19: no ischemia  CT angio head/neck 7/7/18: PHAN 40%, LIC 60%  CXR 1/22/19: normal heart size; RV/LV leads in good positions; emphysematous changes, no pumonary congestion  Interrogation from [facility] 2/1/19: VVIR  bpm,  70%. Parameters stable.    Today I have reviewed:  Labs 3/28/19: K 3.7, cr 1.2,  hgb 14, plt 162K  6/13/19: INR 1.6  Echo 3/27/19: EF 30-35%, inferoposterior akinesis    I have personally and independently reviewed the following:  Pacemaker interrogation  5/13/19-  BIV pacing 72%; rhythm appears to be afib. Programmed to VVIR  bpm.  EKGs   7/8/18: sinus 60 bpm, normal MD, RBBB  1/22/19: afib with BIV pacing  3/7/19: AF with RBBB and intermittent pacing    Assessment :  1. Atrial fibrillation, permanent. Continues to have rapid rates, unable to tolerate beta blockers due to worsening COPD. I had previously discussed AF lena ablation with her since she has a CRT-P, she is reluctant to have more procedures. On warfarin, subtherapeutic INR today.  2. Cardiomyopathy with intermittent heart failure. Stable and euvolemic today. EF 30-35%, I suspect a significant part is due to AFib and tachycardia. She is also not getting full benefit from CRT-P as she is only BIV paced 70% of the time.  3. Hypertension. She says her BP at home is ~ 120 bpm. BP tends to be high in office.  4. CAD s/p CABG. No ischemic symptoms.  5. COPD. Advanced though not oxygen dependent.    Plan:  Once again discussed AV node ablation, she will consider  No medication changes today  Continue daily weights  Warfarin dose adjustment per anticoagulation clinic      I spent a total of 30 minutes face to face with  Jennifer Bob during today's office visit. Over 50% of this time was spent counseling the patient and/or coordinating care regarding management options.      The patient is to return 6 months. She has remote pacemaker check in 8/2019. The patient understood the treatment plan as outlined above.  There were no barriers to learning.      Claudia Lowe MD

## 2019-06-13 NOTE — LETTER
6/13/2019      RE: Jennifer Bob  [address]   [address]  [facility] [address]       Dear Colleague,    Thank you for the opportunity to participate in the care of your patient, Jennifer Bob, at the [facility] at [facility]. Please see a copy of my visit note below.    I am delighted to see Jennifer Bob for follow up of atrial fibrillation and heart failure.     As you know, the patient is a 87 year old  female with a h/o CAD s/p CABG, stroke 2013, advanced COPD. New onset afib with  bpm 1/2019, EF 27%, no new ischemia by nuclear scan. Given metoprolol with some pauses on telemetry. Underwent CRT-P without atrial lead. Did not tolerate apixaban, anticoagulated with warfarin.    Since then, she's had two hospitalizations for shortness of breath and volume overload. Metoprolol was stopped due to worsening COPD. At one point she was given amiodarone for rate control and this had been stopped as well. Last hospitalization 3/2019 for volume overload which responded to diuresis, discharge weight was 112 lbs. She has been checking her weight daily and it is stable at abut 110 lbs. If she feels short of breath she adds lasix 20 mg bid to her daily 20 mg dose. She's had to do this about once a week.    She thinks she is mainly anxious - states that she feels her breathing is heavier and her rate race intermittently. Asking for some anxiolytics.    The following portions of the patient's history were reviewed and updated as appropriate: allergies, current medications, past family history, past medical history, past social history, past surgical history, and the problem list.    Past Medical History:  1. CAD. CABG 1992;  MI with VF arrest, s/p PCI SVG to RCA graft 8/29/09  ([place]  2. Hypertension ~ home  - 140/150 , occasionally 160-180mmHg  3. Hyperlipidemia  4. COPD. PFT 2014 showed reduced FEV1.  5.  Hernia repair  6. Cholecystectomy  7. Ischemic stroke 2013; recent hospital admission 7/7/18 with another stroke, received tPA, residual right weakness. Ziopatch post discharge showed no atrial fibrillation.  8. Peripheral arterial disease s/p superior femoral artery stent 2018  9. Atrial fibrillation, 1/2019, with RVR.  Had increasing shortness of breath with both metoprolol and digoxin.  Amiodarone was started for rate control in February 2019.  10. Bradycardia with rate control - s/p [employer] CRT-P with RV and LV leads 1/18/19, complicated with RV lead microperforation requiring repositioning, no pericardial effusion    Allergies:    Allergies   Allergen Reactions     Apixaban Shortness Of Breath     Patient became SOB the first few times she tried to take this medication, and consequently discontinued use (occurred around the beginning of February 2019)     Adhesive Tape      blisters     Atenolol      SOB     Lopressor Hct      SOB       Medications:   Losartan 100 every day  Amlodipine 10 every day  Amiodarone 200 qd  Hydralazine 50 mg bid   Atorvastatin 40 every day  Calcium  Vitamin D  Lexapro 10 every day  Lasix 20 every day (add 20 in am and 10 in pm if needed, about once a week)  Prednisone 2 every day  Tramadol   Warfarin  Albuterol inhalers      Family History: noncontributory    Psychosocial history:  reports that she has quit smoking. She has never used smokeless tobacco. She reports that she does not drink alcohol or use drugs.    Review of systems: Cardiovascular - no chest pain, dizziness, orthopnea, PND.    In addition,   Constitutional: No change in weight, sleep or appetite.  Normal energy.  No fever or chills  Eyes: Negative for vision changes or eye problems  ENT: No problems with ears, nose or throat.  No difficulty swallowing.  Resp: No coughing, wheezing or shortness of breath  GI: No nausea, vomiting,  heartburn, abdominal pain, diarrhea, constipation or change in bowel  habits  : No urinary frequency or dysuria, bladder or kidney problems  Musculoskeletal: No significant muscle or joint pains  Neurologic: No headaches, numbness, tingling, weakness, problems with balance or coordination  Psychiatric: feels anxious  Heme/immune/allergy: No history of bleeding or clotting problems or anemia.  No allergies or immune system problems  Integumentary: No rashes,worrisome lesions or skin problems      Physical examination  Vitals: 172/108, 122 bpm  BMI= 111 lbs    Constitutional: In general, the patient is a pleasant female in no apparent distress.    Eyes: PERRLA.  EOMI.  Sclerae white, not injected.  ENT/mouth: Normiocephalic and atraumatic.  Nares clear.  Pharynx without erythema or exudate.  Dentition intact.  No adenopathy.  No thyromegaly. Carotids +2/2 bilaterally without bruits.  No jugular venous distension.   Card/Vasc: The PMI is in the 5th ICS in the midclavicular line. There is no heave. Irregularly irregular. Normal S1, S2. No murmur, rub, click, or gallop. Pulses are normal bilaterally throughout. No peripheral edema.  Respiratory: Reduced breath sounds throughout.  No ronchi, wheezes, rales.  No dullness to percussion.   GI: Abdomen is soft, nontender, nondistended. No organomegaly. No AAA.  No bruits.   Integument: No significant bruises or rashes  Neurological: The neurological examination reveal a patient who was oriented to person, place, and time.    Psych: Normal  Heme/Lymph/Immun: no significant adenopathy    I have previously reviewed the following labs/imaging:  Echo  2/9/17: EF 40-45%, concentric LVH, inferior AK, normal RV. LAE.  7/7/18: EF 45-50%, basal inferior/inferolateral AK, normal RV. PASp 48mmHg. IVC normal. No effusion. EREN 58.7 ml/m2  1/15/19 (in AF rate 98 bpm):  EF 27%, inferior HK, mild to mod TR, severely dilated LA, EREN 44  1/22/19: EF 30-35%  Nuclear stress test   5/30/14: inferolateral HK with ischemia  1/15/19: no ischemia  CT angio  head/neck 7/7/18: PHAN 40%, LIC 60%  CXR 1/22/19: normal heart size; RV/LV leads in good positions; emphysematous changes, no pumonary congestion  Interrogation from [facility] 2/1/19: VVIR  bpm,  70%. Parameters stable.    Today I have reviewed:  Labs 3/28/19: K 3.7, cr 1.2, hgb 14, plt 162K  6/13/19: INR 1.6  Echo 3/27/19: EF 30-35%, inferoposterior akinesis    I have personally and independently reviewed the following:  Pacemaker interrogation  5/13/19-  BIV pacing 72%; rhythm appears to be afib. Programmed to VVIR  bpm.  EKGs   7/8/18: sinus 60 bpm, normal TX, RBBB  1/22/19: afib with BIV pacing  3/7/19: AF with RBBB and intermittent pacing    Assessment :  1. Atrial fibrillation, permanent. Continues to have rapid rates, unable to tolerate beta blockers due to worsening COPD. I had previously discussed AF lena ablation with her since she has a CRT-P, she is reluctant to have more procedures. On warfarin, subtherapeutic INR today.  2. Cardiomyopathy with intermittent heart failure. Stable and euvolemic today. EF 30-35%, I suspect a significant part is due to AFib and tachycardia. She is also not getting full benefit from CRT-P as she is only BIV paced 70% of the time.  3. Hypertension. She says her BP at home is ~ 120 bpm. BP tends to be high in office.  4. CAD s/p CABG. No ischemic symptoms.  5. COPD. Advanced though not oxygen dependent.    Plan:  Once again discussed AV node ablation, she will consider  No medication changes today  Continue daily weights  Warfarin dose adjustment per anticoagulation clinic      I spent a total of 30 minutes face to face with  Jennifer Bob during today's office visit. Over 50% of this time was spent counseling the patient and/or coordinating care regarding management options.      The patient is to return 6 months. She has remote pacemaker check in 8/2019. The patient understood the treatment plan as outlined above.  There were no barriers to  learning.      Claudia Lowe MD

## 2019-06-13 NOTE — LETTER
6/13/2019      RE: Jennifer Bob  [address]   [address]  [facility] [address]       I am delighted to see Jennifer Bob for follow up of atrial fibrillation and heart failure.     As you know, the patient is a 87 year old  female with a h/o CAD s/p CABG, stroke 2013, advanced COPD. New onset afib with  bpm 1/2019, EF 27%, no new ischemia by nuclear scan. Given metoprolol with some pauses on telemetry. Underwent CRT-P without atrial lead. Did not tolerate apixaban, anticoagulated with warfarin.    Since then, she's had two hospitalizations for shortness of breath and volume overload. Metoprolol was stopped due to worsening COPD. At one point she was given amiodarone for rate control and this had been stopped as well. Last hospitalization 3/2019 for volume overload which responded to diuresis, discharge weight was 112 lbs. She has been checking her weight daily and it is stable at abut 110 lbs. If she feels short of breath she adds lasix 20 mg bid to her daily 20 mg dose. She's had to do this about once a week.    She thinks she is mainly anxious - states that she feels her breathing is heavier and her rate race intermittently. Asking for some anxiolytics.    The following portions of the patient's history were reviewed and updated as appropriate: allergies, current medications, past family history, past medical history, past social history, past surgical history, and the problem list.    Past Medical History:  1. CAD. CABG 1992;  MI with VF arrest, s/p PCI SVG to RCA graft 8/29/09  ([place])  2. Hypertension ~ home  - 140/150 , occasionally 160-180mmHg  3. Hyperlipidemia  4. COPD. PFT 2014 showed reduced FEV1.  5. Hernia repair  6. Cholecystectomy  7. Ischemic stroke 2013; recent hospital admission 7/7/18 with another stroke, received tPA, residual right weakness. Ziopatch post discharge showed no atrial fibrillation.  8. Peripheral arterial disease s/p superior femoral artery  stent 2018  9. Atrial fibrillation, 1/2019, with RVR.  Had increasing shortness of breath with both metoprolol and digoxin.  Amiodarone was started for rate control in February 2019.  10. Bradycardia with rate control - s/p [employer] CRT-P with RV and LV leads 1/18/19, complicated with RV lead microperforation requiring repositioning, no pericardial effusion    Allergies:    Allergies   Allergen Reactions     Apixaban Shortness Of Breath     Patient became SOB the first few times she tried to take this medication, and consequently discontinued use (occurred around the beginning of February 2019)     Adhesive Tape      blisters     Atenolol      SOB     Lopressor Hct      SOB       Medications:   Losartan 100 every day  Amlodipine 10 every day  Amiodarone 200 qd  Hydralazine 50 mg bid   Atorvastatin 40 every day  Calcium  Vitamin D  Lexapro 10 every day  Lasix 20 every day (add 20 in am and 10 in pm if needed, about once a week)  Prednisone 2 every day  Tramadol   Warfarin  Albuterol inhalers      Family History: noncontributory    Psychosocial history:  reports that she has quit smoking. She has never used smokeless tobacco. She reports that she does not drink alcohol or use drugs.    Review of systems: Cardiovascular - no chest pain, dizziness, orthopnea, PND.    In addition,   Constitutional: No change in weight, sleep or appetite.  Normal energy.  No fever or chills  Eyes: Negative for vision changes or eye problems  ENT: No problems with ears, nose or throat.  No difficulty swallowing.  Resp: No coughing, wheezing or shortness of breath  GI: No nausea, vomiting,  heartburn, abdominal pain, diarrhea, constipation or change in bowel habits  : No urinary frequency or dysuria, bladder or kidney problems  Musculoskeletal: No significant muscle or joint pains  Neurologic: No headaches, numbness, tingling, weakness, problems with balance or coordination  Psychiatric: feels anxious  Heme/immune/allergy: No  history of bleeding or clotting problems or anemia.  No allergies or immune system problems  Integumentary: No rashes,worrisome lesions or skin problems      Physical examination  Vitals: 172/108, 122 bpm  BMI= 111 lbs    Constitutional: In general, the patient is a pleasant female in no apparent distress.    Eyes: PERRLA.  EOMI.  Sclerae white, not injected.  ENT/mouth: Normiocephalic and atraumatic.  Nares clear.  Pharynx without erythema or exudate.  Dentition intact.  No adenopathy.  No thyromegaly. Carotids +2/2 bilaterally without bruits.  No jugular venous distension.   Card/Vasc: The PMI is in the 5th ICS in the midclavicular line. There is no heave. Irregularly irregular. Normal S1, S2. No murmur, rub, click, or gallop. Pulses are normal bilaterally throughout. No peripheral edema.  Respiratory: Reduced breath sounds throughout.  No ronchi, wheezes, rales.  No dullness to percussion.   GI: Abdomen is soft, nontender, nondistended. No organomegaly. No AAA.  No bruits.   Integument: No significant bruises or rashes  Neurological: The neurological examination reveal a patient who was oriented to person, place, and time.    Psych: Normal  Heme/Lymph/Immun: no significant adenopathy    I have previously reviewed the following labs/imaging:  Echo  2/9/17: EF 40-45%, concentric LVH, inferior AK, normal RV. LAE.  7/7/18: EF 45-50%, basal inferior/inferolateral AK, normal RV. PASp 48mmHg. IVC normal. No effusion. EREN 58.7 ml/m2  1/15/19 (in AF rate 98 bpm):  EF 27%, inferior HK, mild to mod TR, severely dilated LA, EREN 44  1/22/19: EF 30-35%  Nuclear stress test   5/30/14: inferolateral HK with ischemia  1/15/19: no ischemia  CT angio head/neck 7/7/18: PHAN 40%, LIC 60%  CXR 1/22/19: normal heart size; RV/LV leads in good positions; emphysematous changes, no pumonary congestion  Interrogation from [facility] 2/1/19: VVIR  bpm,  70%. Parameters stable.    Today I have reviewed:  Labs 3/28/19: K 3.7, cr 1.2,  hgb 14, plt 162K  6/13/19: INR 1.6  Echo 3/27/19: EF 30-35%, inferoposterior akinesis    I have personally and independently reviewed the following:  Pacemaker interrogation  5/13/19-  BIV pacing 72%; rhythm appears to be afib. Programmed to VVIR  bpm.  EKGs   7/8/18: sinus 60 bpm, normal [place], RBBB  1/22/19: afib with BIV pacing  3/7/19: AF with RBBB and intermittent pacing    Assessment :  1. Atrial fibrillation, permanent. Continues to have rapid rates, unable to tolerate beta blockers due to worsening COPD. I had previously discussed AF lena ablation with her since she has a CRT-P, she is reluctant to have more procedures. On warfarin, subtherapeutic INR today.  2. Cardiomyopathy with intermittent heart failure. Stable and euvolemic today. EF 30-35%, I suspect a significant part is due to AFib and tachycardia. She is also not getting full benefit from CRT-P as she is only BIV paced 70% of the time.  3. Hypertension. She says her BP at home is ~ 120 bpm. BP tends to be high in office.  4. CAD s/p CABG. No ischemic symptoms.  5. COPD. Advanced though not oxygen dependent.    Plan:  Once again discussed AV node ablation, she will consider  No medication changes today  Continue daily weights  Warfarin dose adjustment per anticoagulation clinic      I spent a total of 30 minutes face to face with  Jennifer Bob during today's office visit. Over 50% of this time was spent counseling the patient and/or coordinating care regarding management options.      The patient is to return 6 months. She has remote pacemaker check in 8/2019. The patient understood the treatment plan as outlined above.  There were no barriers to learning.      Claudia Lowe MD

## 2019-06-13 NOTE — PATIENT INSTRUCTIONS
You were seen today in the Cardiovascular Clinic at [facility].     Cardiology Providers you saw during your visit: Dr. Claudia Lowe    Diagnosis:  Atrial fibrillation, heart failure    Results: discussed with patient      Orders:   none    Medication Changes:   none    Recommendations:   As above    Follow-up:    6 months    Please follow up with primary care provider for medication refills     Please feel free to call me with any questions or concerns.        Questions and [phone]      For Radiology / Imaging [phone]       On Call Cardiologist for after hours or on weekends: [phone]   option #4 and ask to speak to the on-call Cardiologist.          If you need a medication refill please contact your pharmacy.  Please allow 3 business days for your refill to be completed.

## 2019-06-13 NOTE — PROGRESS NOTES
ANTICOAGULATION FOLLOW-UP CLINIC VISIT    Patient Name:  Jennifer Bob  Date:  2019  Contact Type:  Face to Face    SUBJECTIVE:  Patient Findings     Positives:   Change in health (SOB/MCNAIR. Pt saw Dr. Lowe for f/u today. ), Missed doses (??), Change in medications (Pt took an extra two doses of Lasix yesterday d/t SOB. )    Comments:   The patient was assessed for diet and activity level changes, missed or extra doses, bruising or bleeding, with no problem findings. Unclear why INR is so low.            OBJECTIVE    INR Protime   Date Value Ref Range Status   2019 1.6 (A) 0.86 - 1.14 Final       ASSESSMENT / PLAN  INR assessment SUB    Recheck INR In: 2 WEEKS    INR Location Clinic      Anticoagulation Summary  As of 2019    INR goal:   2.0-3.0   TTR:   77.8 % (3.7 mo)   INR used for dosin.6! (2019)   Warfarin maintenance plan:   1.25 mg (2.5 mg x 0.5) every Mon; 2.5 mg (2.5 mg x 1) all other days   Full warfarin instructions:   : 3.75 mg; : 1.25 mg; Otherwise 1.25 mg every Mon; 2.5 mg all other days   Weekly warfarin total:   16.25 mg   Plan last modified:   Sandy Young RN (2019)   Next INR check:   2019   Target end date:       Indications    New onset atrial fibrillation (H) [I48.91]  Stroke (H) [I63.9]             Anticoagulation Episode Summary     INR check location:       Preferred lab:       Send INR reminders to:   [facility]    Comments:   Fragile. Low appetite, no large portions of dark greens (broccoli- 2 x wk). MTV 25 mcg, new bottle 50 mcg (will start in late March/April). 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 Cardiology team: Chrissy Rojo.       Anticoagulation Care Providers     Provider Role Specialty Phone number    Bridget Rojo MD Referring Clinical Cardiac Electrophysiology [phone]    Torri Fisher MD [facility] [phone]            See the  Encounter Report to view Anticoagulation Flowsheet and Dosing Calendar (Go to Encounters tab in chart review, and find the Anticoagulation Therapy Visit)    Per protocol, patient advised to increase today's warfarin dose by 1.25 mg to account for sub-therapeutic INR level. Patient also instructed to increase weekly dose by 1.25 mg going forward d/t recent INR trends. Patient instructed to hold green intake today and tomorrow as well. Recheck within 2.5 weeks to ensure stability.     Patient made aware if signs of clotting (pain, tenderness, swelling, or color change in any extremity) AND/OR bleeding occur (nosebleeds, bleeding gums, bruising, or blood in stool or urine) to notify provider & seek medical attention. If severe symptoms develop, such as major bleeding, chest pain, shortness of breath, fall, trauma or s/s of stroke, patient to call 911 immediately.    Dosage adjustment made based on physician directed care plan.    Sandy Young RN

## 2019-06-13 NOTE — LETTER
6/13/2019       RE: Jennifer Bob  [address]   [address]  [facility] [address]     Dear Colleague,    Thank you for referring your patient, Jennifer Bob, to the [facility] at [facility]. Please see a copy of my visit note below.    I am delighted to see Jennifer Bob for follow up of atrial fibrillation and heart failure.     As you know, the patient is a 87 year old  female with a h/o CAD s/p CABG, stroke 2013, advanced COPD. New onset afib with  bpm 1/2019, EF 27%, no new ischemia by nuclear scan. Given metoprolol with some pauses on telemetry. Underwent CRT-P without atrial lead. Did not tolerate apixaban, anticoagulated with warfarin.    Since then, she's had two hospitalizations for shortness of breath and volume overload. Metoprolol was stopped due to worsening COPD. At one point she was given amiodarone for rate control and this had been stopped as well. Last hospitalization 3/2019 for volume overload which responded to diuresis, discharge weight was 112 lbs. She has been checking her weight daily and it is stable at abut 110 lbs. If she feels short of breath she adds lasix 20 mg bid to her daily 20 mg dose. She's had to do this about once a week.    She thinks she is mainly anxious - states that she feels her breathing is heavier and her rate race intermittently. Asking for some anxiolytics.    The following portions of the patient's history were reviewed and updated as appropriate: allergies, current medications, past family history, past medical history, past social history, past surgical history, and the problem list.    Past Medical History:  1. CAD. CABG 1992;  MI with VF arrest, s/p PCI SVG to RCA graft 8/29/09  ([place])  2. Hypertension ~ home  - 140/150 , occasionally 160-180mmHg  3. Hyperlipidemia  4. COPD. PFT 2014 showed reduced FEV1.  5. Hernia repair  6.  Cholecystectomy  7. Ischemic stroke 2013; recent hospital admission 7/7/18 with another stroke, received tPA, residual right weakness. Ziopatch post discharge showed no atrial fibrillation.  8. Peripheral arterial disease s/p superior femoral artery stent 2018  9. Atrial fibrillation, 1/2019, with RVR.  Had increasing shortness of breath with both metoprolol and digoxin.  Amiodarone was started for rate control in February 2019.  10. Bradycardia with rate control - s/p [employer] CRT-P with RV and LV leads 1/18/19, complicated with RV lead microperforation requiring repositioning, no pericardial effusion    Allergies:    Allergies   Allergen Reactions     Apixaban Shortness Of Breath     Patient became SOB the first few times she tried to take this medication, and consequently discontinued use (occurred around the beginning of February 2019)     Adhesive Tape      blisters     Atenolol      SOB     Lopressor Hct      SOB       Medications:   Losartan 100 every day  Amlodipine 10 every day  Amiodarone 200 qd  Hydralazine 50 mg bid   Atorvastatin 40 every day  Calcium  Vitamin D  Lexapro 10 every day  Lasix 20 every day (add 20 in am and 10 in pm if needed, about once a week)  Prednisone 2 every day  Tramadol   Warfarin  Albuterol inhalers      Family History: noncontributory    Psychosocial history:  reports that she has quit smoking. She has never used smokeless tobacco. She reports that she does not drink alcohol or use drugs.    Review of systems: Cardiovascular - no chest pain, dizziness, orthopnea, PND.    In addition,   Constitutional: No change in weight, sleep or appetite.  Normal energy.  No fever or chills  Eyes: Negative for vision changes or eye problems  ENT: No problems with ears, nose or throat.  No difficulty swallowing.  Resp: No coughing, wheezing or shortness of breath  GI: No nausea, vomiting,  heartburn, abdominal pain, diarrhea, constipation or change in bowel habits  : No urinary  frequency or dysuria, bladder or kidney problems  Musculoskeletal: No significant muscle or joint pains  Neurologic: No headaches, numbness, tingling, weakness, problems with balance or coordination  Psychiatric: feels anxious  Heme/immune/allergy: No history of bleeding or clotting problems or anemia.  No allergies or immune system problems  Integumentary: No rashes,worrisome lesions or skin problems      Physical examination  Vitals: 172/108, 122 bpm  BMI= 111 lbs    Constitutional: In general, the patient is a pleasant female in no apparent distress.    Eyes: PERRLA.  EOMI.  Sclerae white, not injected.  ENT/mouth: Normiocephalic and atraumatic.  Nares clear.  Pharynx without erythema or exudate.  Dentition intact.  No adenopathy.  No thyromegaly. Carotids +2/2 bilaterally without bruits.  No jugular venous distension.   Card/Vasc: The PMI is in the 5th ICS in the midclavicular line. There is no heave. Irregularly irregular. Normal S1, S2. No murmur, rub, click, or gallop. Pulses are normal bilaterally throughout. No peripheral edema.  Respiratory: Reduced breath sounds throughout.  No ronchi, wheezes, rales.  No dullness to percussion.   GI: Abdomen is soft, nontender, nondistended. No organomegaly. No AAA.  No bruits.   Integument: No significant bruises or rashes  Neurological: The neurological examination reveal a patient who was oriented to person, place, and time.    Psych: Normal  Heme/Lymph/Immun: no significant adenopathy    I have previously reviewed the following labs/imaging:  Echo  2/9/17: EF 40-45%, concentric LVH, inferior AK, normal RV. LAE.  7/7/18: EF 45-50%, basal inferior/inferolateral AK, normal RV. PASp 48mmHg. IVC normal. No effusion. EREN 58.7 ml/m2  1/15/19 (in AF rate 98 bpm):  EF 27%, inferior HK, mild to mod TR, severely dilated LA, EREN 44  1/22/19: EF 30-35%  Nuclear stress test   5/30/14: inferolateral HK with ischemia  1/15/19: no ischemia  CT angio head/neck 7/7/18: PHAN 40%, LIC  60%  CXR 1/22/19: normal heart size; RV/LV leads in good positions; emphysematous changes, no pumonary congestion  Interrogation from [facility] 2/1/19: VVIR  bpm,  70%. Parameters stable.    Today I have reviewed:  Labs 3/28/19: K 3.7, cr 1.2, hgb 14, plt 162K  6/13/19: INR 1.6  Echo 3/27/19: EF 30-35%, inferoposterior akinesis    I have personally and independently reviewed the following:  Pacemaker interrogation  5/13/19-  BIV pacing 72%; rhythm appears to be afib. Programmed to VVIR  bpm.  EKGs   7/8/18: sinus 60 bpm, normal MT, RBBB  1/22/19: afib with BIV pacing  3/7/19: AF with RBBB and intermittent pacing    Assessment :  1. Atrial fibrillation, permanent. Continues to have rapid rates, unable to tolerate beta blockers due to worsening COPD. I had previously discussed AF lena ablation with her since she has a CRT-P, she is reluctant to have more procedures. On warfarin, subtherapeutic INR today.  2. Cardiomyopathy with intermittent heart failure. Stable and euvolemic today. EF 30-35%, I suspect a significant part is due to AFib and tachycardia. She is also not getting full benefit from CRT-P as she is only BIV paced 70% of the time.  3. Hypertension. She says her BP at home is ~ 120 bpm. BP tends to be high in office.  4. CAD s/p CABG. No ischemic symptoms.  5. COPD. Advanced though not oxygen dependent.    Plan:  Once again discussed AV node ablation, she will consider  No medication changes today  Continue daily weights  Warfarin dose adjustment per anticoagulation clinic      I spent a total of 30 minutes face to face with  Jennifer Bob during today's office visit. Over 50% of this time was spent counseling the patient and/or coordinating care regarding management options.      The patient is to return 6 months. She has remote pacemaker check in 8/2019. The patient understood the treatment plan as outlined above.  There were no barriers to learning.      Claudia Lowe MD

## 2019-06-13 NOTE — PROGRESS NOTES
Clinic Care Coordination Contact  [facility]/Voicemail    Referral Source: IP Report  [facility] admission 3/27-3/28/2019-  Acute on chronic CHF    Clinical Data: Care Coordinator Outreach  Outreach attempted x 1.  Left message on voicemail with call back information and requested return call.  Plan: Care Coordinator will do no further outreaches at this time.  Sara Mcghee RN, Care Coordinator   [facility] -Care Coordination  C[facility] Nelson  [phone]

## 2019-06-17 NOTE — TELEPHONE ENCOUNTER
Reason for Call:  Other   Detailed comments: Patient is calling stating she would like to have the ablation now and to call her as to getting this set up      Phone Number Patient can be reached at: Home number on file [phone] (home)    Best Time:     Can we leave a detailed message on this number? YES    Call taken on 6/17/2019 at 4:20 PM by Jasmine Isbell

## 2019-06-17 NOTE — PATIENT INSTRUCTIONS
1) Schedule with pulmonology in the coming months.   2) See me in 6 months and earlier as needed.

## 2019-06-17 NOTE — PROGRESS NOTES
SUBJECTIVE:   Jennifer Bob is a 87 year old female who presents to clinic today for the following health issues:      COPD Follow-Up    Overall, how are your COPD symptoms since your last clinic visit?  Much worse    How much fatigue or shortness of breath do you have when you are walking?  More than usual    How much shortness of breath do you have when you are resting?  More than usual    How often do you cough? 4x a day    Have you noticed any change in your sputum/phlegm?  No    Have you experienced a recent fever? No    Please describe how far you can walk without stopping to rest:  50 feet    How many flights of stairs are you able to walk up without stopping?  Does not walk up stairs    Have you had any Emergency Room Visits, Urgent Care Visits, or Hospital Admissions because of your COPD since your last office visit?  No    History   Smoking Status     Former Smoker   Smokeless Tobacco     Never Used     Comment: quit smoking in 1983     No results found for: FEV1, FLU2OYU    Amount of exercise or physical activity: None    Problems taking medications regularly: No    Medication side effects: none    Diet: no salt or fat    She was just seen by Dr. Lowe on 6/13/2019.  She continues to have rapid rates, unable to tolerate beta-blockers due to worsening COPD symptoms.  Dr. Lowediscussed with her ablation.    She is on warfarin anticoagulation.    She decided she will have the ablation procedure. Continues to have fast heart rate and feels  short of breath, unchanged. She is hoping the procedure will help this.           Problem list and histories reviewed & adjusted, as indicated.  Additional history: as documented    Patient Active Problem List   Diagnosis     Anxiety     Low back pain     Left hip pain     ASCVD (arteriosclerotic cardiovascular disease)     Incontinence of urine     Hypertension goal BP (blood pressure) < 140/90     Seasonal allergies     Myocardial infarction (H)     DDD  (degenerative disc disease), lumbar     Transient cerebral ischemia     CKD (chronic kidney disease) stage 3, GFR 30-59 ml/min (H)     Corns and callosities     Health Care Home     Spinal stenosis, lumbar region, without neurogenic claudication     Synovial cyst of lumbar facet joint     Acquired spondylolisthesis     Lumbar radicular pain     Stroke (H)     Hypertension     Hyperlipidemia LDL goal <70     Late effects of CVA (cerebrovascular accident)     COPD (chronic obstructive pulmonary disease) (H)     RA (rheumatoid arthritis) (H)     Gout     Chronic pain syndrome     Heart failure (H)     Chronic diastolic heart failure (H)     PMR (polymyalgia rheumatica) (H)     New onset atrial fibrillation (H)     Chest pain     Atrial fibrillation with RVR (H)     Dyspnea     Past Surgical History:   Procedure Laterality Date     abdominal abscess      at incisional site after kristine     adominal hernia repair      had mesh placed, then allergic so it was removed and she wears a girdle     CHOLECYSTECTOMY       CORONARY ARTERY BYPASS  1992     EP PACEMAKER N/A 1/18/2019    Procedure: EP Pacemaker;  Surgeon: Serafin Llamas MD;  Location:  [facility]     EP PACEMAKER Left 1/23/2019    Procedure: EP Pacemaker;  Surgeon: Ran Hodges MD;  Location:  [facility]       Social History     Tobacco Use     Smoking status: Former Smoker     Smokeless tobacco: Never Used     Tobacco comment: quit smoking in 1983   Substance Use Topics     Alcohol use: No     History reviewed. No pertinent family history.      Current Outpatient Medications   Medication Sig Dispense Refill     albuterol (PROVENTIL) (2.5 MG/3ML) 0.083% neb solution Take 1 vial (2.5 mg) by nebulization every 6 hours as needed for shortness of breath / dyspnea or wheezing 60 vial 3     amLODIPine (NORVASC) 10 MG tablet Take 1 tablet (10 mg) by mouth daily 90 tablet 1     atorvastatin (LIPITOR) 40 MG tablet Take 40 mg by mouth At  Bedtime       BETA BLOCKER NOT PRESCRIBED, INTENTIONAL, Beta Blocker not prescribed intentionally due to COPD, shortness of breath with atenolol and lopressor  0     Cholecalciferol (VITAMIN D3 PO) Take 1,000 Units by mouth daily       docusate sodium (COLACE) 100 MG capsule Take 1 capsule by mouth 2 times daily        escitalopram (LEXAPRO) 10 MG tablet Take 10 mg by mouth daily       fluticasone (FLONASE) 50 MCG/ACT nasal spray Spray 1 spray into both nostrils daily as needed for rhinitis or allergies       furosemide (LASIX) 20 MG tablet Take 1 tablet (20 mg) by mouth daily 90 tablet 3     hydrALAZINE (APRESOLINE) 50 MG tablet Take 2 tablets (100 mg) by mouth 3 times daily 60 tablet 3     losartan (COZAAR) 100 MG tablet Take 1 tablet (100 mg) by mouth daily 90 tablet 3     Multiple Vitamins-Minerals (MULTIVITAMIN OR) Take 1 tablet by mouth daily        order for DME Equipment being ordered: Oxygen 1 litre via nasal canula 1 each 0     order for DME Equipment being ordered: Nebulizer with mask and tubing 1 Units 0     predniSONE (DELTASONE) 1 MG tablet Take 2 mg by mouth daily       Saline (OCEAN NASAL SPRAY NA) Administer 1 spray in each nostril up to two times daily as needed       sodium chloride-sodium bicarb (CLASSIC NETI POT SINUS WASH) 2300-700 MG KIT Mix 1 packet in Neti Pot and administer in each nostril daily as needed       tiotropium (SPIRIVA) 18 MCG inhaled capsule Inhale 1 capsule (18 mcg) into the lungs daily 90 capsule 3     TRAMADOL HCL PO Take 50 mg by mouth every 6 hours as needed for moderate to severe pain       VENTOLIN  (90 Base) MCG/ACT inhaler INHALE TWO PUFFS BY MOUTH EVERY 6 HOURS AS NEEDED 18 g 9     VENTOLIN  (90 Base) MCG/ACT inhaler INHALE TWO PUFFS BY MOUTH EVERY 6 HOURS AS NEEDED 18 g 0     warfarin (COUMADIN) 2.5 MG tablet Current dose (4/25/19) 1.25 mg every Mon, Fri + 2.5 mg all other days OR as directed by ACC team. 75 tablet 1     Allergies   Allergen Reactions      Apixaban Shortness Of Breath     Patient became SOB the first few times she tried to take this medication, and consequently discontinued use (occurred around the beginning of February 2019)     Adhesive Tape      blisters     Atenolol      SOB     Lopressor Hct      SOB     Recent Labs   Lab Test 03/28/19  0610 03/27/19  1658 03/27/19  1124  02/16/19  1513  01/15/19  1150 09/10/18  0724  07/08/18  0323  05/02/18  0936  02/09/17  1239  04/24/14  2221   A1C  --   --   --   --   --   --   --  5.6  --  5.9*  --   --   --   --   --  5.6   LDL  --   --   --   --   --   --   --  62  --  57  --  67  --  66   < >  --    HDL  --   --   --   --   --   --   --  62  --  69  --  72  --  87   < >  --    TRIG  --   --   --   --   --   --   --  89  --  40  --  71  --  80   < >  --    ALT  --   --  76*  --   --   --   --   --   --   --   --  29  --  56*   < >  --    CR 1.20* 1.06* 1.06*   < > 0.94   < > 0.96 0.97   < > 0.90   < > 0.88   < >  --    < >  --    GFRESTIMATED 41* 47* 47*   < > 54*   < > 53* 54*   < > 59*   < > 61   < >  --    < >  --    GFRESTBLACK 47* 55* 55*   < > 63   < > 62 66   < > 72   < > 73   < >  --    < >  --    POTASSIUM 3.7 3.6 3.7   < > 4.3   < > 4.3  --    < > 4.0   < > 3.9   < >  --    < >  --    TSH  --   --   --   --  2.19  --  2.18  --   --   --   --   --   --  1.66   < >  --     < > = values in this interval not displayed.      BP Readings from Last 3 Encounters:   06/17/19 141/84   06/13/19 (!) 172/108   03/28/19 153/78    Wt Readings from Last 3 Encounters:   06/17/19 50.3 kg (111 lb)   06/13/19 50.6 kg (111 lb 8 oz)   03/28/19 50.5 kg (111 lb 4.8 oz)              Reviewed and updated as needed this visit by clinical staff  Tobacco  Allergies  Meds  Med Hx  Surg Hx  Fam Hx  Soc Hx      Reviewed and updated as needed this visit by Provider         ROS:  As above     OBJECTIVE:     /84 (BP Location: Right arm, Patient Position: Sitting, Cuff Size: Adult Regular)   Pulse 105   Temp 98.2  " F (36.8  C) (Oral)   Resp 24   Ht 1.588 m (5' 2.5\")   Wt 50.3 kg (111 lb)   SpO2 95%   BMI 19.98 kg/m    Body mass index is 19.98 kg/m .  GENERAL: healthy, alert and no distress  RESP: lungs clear to auscultation - no rales, rhonchi or wheezes  CV: irregularly irregular     Diagnostic Test Results:  Results for orders placed or performed in visit on 06/13/19   INR point of care   Result Value Ref Range    INR Protime 1.6 (A) 0.86 - 1.14       ASSESSMENT/PLAN:           1. Chronic obstructive pulmonary disease, unspecified COPD type (H)     continues on ventolin  (90 base) inhaler 2 puffs every 6 hours as needed, Spiriva.  I have recommended she schedule with pulmonology at previous visits, however she has not done this yet.  I again recommended establishing with pulmonology.        2. CKD (chronic kidney disease) stage 3, GFR 30-59 ml/min   BMP today      3. Hyperlipidemia LDL goal <70  Continues atorvastatin 40 mg daily.  Due for lipids in September       4. Hypertension goal BP (blood pressure) < 140/90  White coat hypertension. Home bp per pt report all in goal range.Continues on losartan 100mg daily, furosemide 20mg daily, and amlodipine 10mg daily, hydralazine 50mg three times daily. Remains off metoprolol due to exacerbation of COPD sx with metoprolol.       5. Chronic diastolic heart failure (H)  Permanent atrial fibrillation   ASCVD (arteriosclerotic cardiovascular disease)  Cardiac resynchronization therapy pacemaker  Followed by cardiology  Continues on warfarin anticoagulation  Dr. Lowe recommended ablation procedure and Jennifer has decided to schedule this.       7. PMR (polymyalgia rheumatica) (H)  Followed by rheumatology. Continues on prednisone 2mg daily.  Continues to use tramadol as needed for pain     8. Osteopenia  Higher risk osteoporosis due to ongoing prednisone use.   Had DEXA in May 2018.  Supplementation with calcium and vitamin D recommended     9. Anxiety  Controlled. Patient " continues on lexapro 10 mg daily.    10 History of sroke  Has residual right sided weakness.   Patient Instructions   1) Schedule with pulmonology in the coming months.   2) See me in 6 months and earlier as needed.         Torri Fisher M.D.        [facility]

## 2019-07-02 NOTE — TELEPHONE ENCOUNTER
Scheduling Confirmation    Surgery Procedure Scheduled:   AVN ablation    Surgeon:   Claudia Lowe MD     Date of Surgery:   July 8, 2019    Time of Surgery:    6:30 am    Location: [facility] Cath lab    [facility] with:   Diamond      SPECIAL INSTRUCTIONS:  Dr. Lowe morrison NOT want the patient to come in for the 3 weekly visits post  Ablation. The patient is to report at 3 months.   Per Telephone encounter:   Claudia Lowe MD  You       I don't usually dial down - I just adjust at 3 month visit after AV node ablation    Routing comment        Flori Hayden  Periop Electrophysiology   [phone]

## 2019-07-02 NOTE — PROGRESS NOTES
ANTICOAGULATION FOLLOW-UP CLINIC VISIT    Patient Name:  Jennifer Bob  Date:  2019  Contact Type:  Face to Face    SUBJECTIVE:  Patient Findings     Positives:   Change in diet/appetite (Pt drinks her Boost inconsistently. Discussed having a Boost twice a week.)           OBJECTIVE    INR Protime   Date Value Ref Range Status   2019 1.8 (A) 0.86 - 1.14 Final       ASSESSMENT / PLAN  No question data found.  Anticoagulation Summary  As of 2019    INR goal:   2.0-3.0   TTR:   66.5 % (4.4 mo)   INR used for dosin.8! (2019)   Warfarin maintenance plan:   2.5 mg (2.5 mg x 1) every day   Full warfarin instructions:   2.5 mg every day   Weekly warfarin total:   17.5 mg   Plan last modified:   Sandy Young, RN (2019)   Next INR check:   2019   Target end date:       Indications    New onset atrial fibrillation (H) [I48.91]  Stroke (H) [I63.9]             Anticoagulation Episode Summary     INR check location:       Preferred lab:       Send INR reminders to:   BLANQUITA HINSON    Comments:   Fragile. Low appetite, no large portions of dark greens (broccoli- 2 x wk). MTV 25 mcg, new bottle 50 mcg (will start in late March/April). 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 Cardiology team: Chrissy Rojo.       Anticoagulation Care Providers     Provider Role Specialty Phone number    Bridget Rojo MD Referring Clinical Cardiac Electrophysiology [phone]    Torri Fisher MD [facility] [phone]            See the Encounter Report to view Anticoagulation Flowsheet and Dosing Calendar (Go to Encounters tab in chart review, and find the Anticoagulation Therapy Visit)    Writer encouraged patient to increase greens a bit per week and see if that helps stabilize level. Patient agreed to plan.    Patient made aware if signs of clotting (pain, tenderness, swelling, or color change in any extremity) AND/OR  bleeding occur (nosebleeds, bleeding gums, bruising, or blood in stool or urine) to notify provider & seek medical attention. If severe symptoms develop, such as major bleeding, chest pain, shortness of breath, fall, trauma or s/s of stroke, patient to call 911 immediately.     Dosage adjustment made based on physician directed care plan.    Sandy Young RN

## 2019-07-02 NOTE — TELEPHONE ENCOUNTER
Patient scheduled as requested by Dr. Lowe for 3 month follow up - devices nurses aware that there won't be a weekly visits.     Flori Hayden  Periop Electrophysiology   [phone]

## 2019-07-08 NOTE — H&P
H&P from Dr. Claudia Lowe's Clinic Visit on 6/13/19 reviewed. Following today's exam there are no interval changes.       JANE Hanson CNP  Electrophysiology Consult Service  Pager: [phone]

## 2019-07-08 NOTE — Clinical Note
Potential access sites were evaluated for patency using ultrasound.   The right femoral vein was selected. Access was obtained under with a micropuncture 21 guage needle.

## 2019-07-08 NOTE — PROGRESS NOTES
Pt arrived to unit 2a for EP Ablation with daughter Esthela at bedside. Groin prep complete with pedal pulses marked. IV in place, labs pending.

## 2019-07-08 NOTE — PLAN OF CARE
Pt met discharge criteria. Discharged to home accompanied by daughter. Provided discharge education. IV out.

## 2019-07-08 NOTE — DISCHARGE INSTRUCTIONS
Plan:     Please send remote transmission in 1 month    Follow up with Dr. Claudia Lowe in 3 months.     Care of groin site:         Remove the Band-Aid after 24 hours. If there is minor oozing, apply another Band-aid and remove it after 12 hours.          Do NOT take a bath, use a hot tub, pool, or submerse in water for at least 3 days You may shower.          It is normal to have a small bruise or lump at the site.         Do not scrub the site.         Do not use lotion or powder near the puncture site for 3 days.         For the first 2 days: Do not stoop or squat. When you cough, sneeze or move your bowels, hold your hand over the puncture site and press gently.         Do not lift more than 10 pounds or exertional activity for 10 days.      If you start bleeding from the site in your groin:  Lie down flat and press firmly on the site.  Call your physician immediately, or, come to the emergency room.    Call 911 right away if you have bleeding that is heavy or does not stop.     Call your doctor/provider if:         You have a large or growing hard lump around the site.         The site is red, swollen, hot or tender.         Blood or fluid is draining from the site.         You have chills or a fever greater than 101 F (38 C).         Your leg or arm turns bluish, feels numb or cool.         You have hives, a rash or unusual itching.     Cardiovascular Clinic:   [address] [place] [address]. [place] [address]  Your Care Team:  EP Cardiology   Telephone Number     Mary Jane Gibson RN [phone]     For scheduling appts or procedures:    Flori Hayden   [phone]   For the Device Clinic (Pacemakers and ICD's)   RN's :   Diamond Savage  During business hours: [phone]     After business hours:   [phone]- select option 4 and ask for job code [phone].       As always, Thank you for trusting us with your health care needs

## 2019-07-11 NOTE — TELEPHONE ENCOUNTER
Can try for home care in recheck but not sure if will be covered. What about the community paramedic doing a check until can get med sorted out. Will defer transition to PCp and cardilogy

## 2019-07-11 NOTE — TELEPHONE ENCOUNTER
ACC received call from patient requested to restart Eliquis. Patient reports her ability to get to and from the clinic is getting too hard as her SOB continues to worsen. Patient refused to be evaluated today in clinic or ER. Of note, patient previously blamed Eliquis for her SOB sx, but now knows it was not the medication as she still feels the same on Warfarin. Patient is on AC therapy for Afib.    Routing request to PCP and Cardiologist who initially had patient on Eliquis. Please advise transition dosing instructions.     At this time, patient is refusing an INR appt. Would she qualify for homecare for one check?     Thanks! Sandy Young, GLENNN, RN

## 2019-07-12 NOTE — TELEPHONE ENCOUNTER
"Inbasket note from Dr. Lowe copied and pasted:    \"If she never resumed warfarin after her AV node ablation on 7/8, then she can start apixaban 2.5 mg bid.     If she did resume warfarin, can have her stop for 3 days then start apixaban.\"      *Patient DID restart Warfarin on Mon 7/8 -Wed 7/10 and held last night's dose. Writer advised patient to continue to hold Warfarin tonight and tomorrow as directed by Dr. Lowe. Start Apixaban on Sunday and take consistently daily. No further INR appts required. Patient to f/u with Cardiologist as recommended. Sandy Young, GLENNN, RN    "

## 2019-07-15 NOTE — PROGRESS NOTES
"Called patient to see how she is feeling per Dr. Claudia Lowe's request. Patient said she \"is fine besides the breathing.\" Asked whether she was short of breath at rest or upon exertion, she said \"more at rest.\" Patient denies any other symptoms. Asked patient whether she thought she needed medical attention, she said \"no.\" Discussed with patient that should she have any worsening SOB than what she is currently feeling, she needs to go to the ED immediately. According to recent notation, she refused to go to ED for these symptoms. Reiterated that she needs to go with any worsening symptoms. Patient verbalized understanding of this. Dr. Claudia Lowe updated. Device nurses will be doing a remote transmission on her device today per Dr. Lowe's request.    GLENN DrummondN, RN, PHN  Electrophysiology Nurse Coordinator  "

## 2019-07-26 NOTE — PROGRESS NOTES
"ANTICOAGULATION FOLLOW-UP CLINIC VISIT    Patient Name:  Jennifer Bob  Date:  7/12/2019  Contact Type: Telephone/Pt    SUBJECTIVE:  Patient Findings     Comments:   As requested by patient, AC agent was changed again from Warfarin back to Eliquis. See 7/11 and 7/15 TE for further details. ACC episode closed and patient removed from patient list.      \"If she never resumed warfarin after her AV node ablation on 7/8, then she can start apixaban 2.5 mg bid.     If she did resume warfarin, can have her stop for 3 days then start apixaban.\"        *Patient DID restart Warfarin on Mon 7/8 -Wed 7/10 and held last night's dose. Writer advised patient to continue to hold Warfarin tonight and tomorrow as directed by Dr. Lowe. Start Apixaban on Sunday and take consistently daily. No further INR appts required. Patient to f/u with Cardiologist as recommended. SIMON Larson, RN\"             OBJECTIVE    INR   Date Value Ref Range Status   07/08/2019 1.47 (H) 0.86 - 1.14 Final       ASSESSMENT / PLAN  No question data found.  Anticoagulation Summary  As of 7/12/2019    INR goal:   2.0-3.0   TTR:   61.3 % (4.7 mo)   INR used for dosing:      Warfarin maintenance plan:   2.5 mg (2.5 mg x 1) every day   Full warfarin instructions:   2.5 mg every day   Weekly warfarin total:   17.5 mg   Plan last modified:   Sandy Young RN (7/2/2019)   Next INR check:      Target end date:       Indications    New onset atrial fibrillation (H) [I48.91]  Stroke (H) [I63.9]             Anticoagulation Episode Summary     INR check location:       Preferred lab:       Resolved date:   7/14/2019    Resolved reason:   Changed Anticoagulant     Send INR reminders to:   BLANQUITA HINSON    Comments:   Fragile. Low appetite, no large portions of dark greens (broccoli- 2 x wk). MTV 25 mcg, new bottle 50 mcg (will start in late March/April). 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 Cardiology " team: Chrissy Rojo.       Anticoagulation Care Providers     Provider Role Specialty Phone number    Bridget Rojo MD Referring Clinical Cardiac Electrophysiology [phone]    Torri Fisher MD [facility] [phone]            See the Encounter Report to view Anticoagulation Flowsheet and Dosing Calendar (Go to Encounters tab in chart review, and find the Anticoagulation Therapy Visit)    Dosage adjustment made based on physician directed care plan.    Sandy Young RN

## 2019-09-09 NOTE — TELEPHONE ENCOUNTER
"Requested Prescriptions   Pending Prescriptions Disp Refills     atorvastatin (LIPITOR) 40 MG tablet [Pharmacy Med Name: ATORVASTATIN CALCIUM 40MG TABS] 90 tablet 3     Sig: TAKE ONE TABLET BY MOUTH EVERY DAY  Historical  Last Office Visit: 6/17/2019   Future Office Visit:            Statins Protocol Passed - 9/9/2019 12:24 PM        Passed - LDL on file in past 12 months     Recent Labs   Lab Test 09/10/18  0724   LDL 62           Passed - No abnormal creatine kinase in past 12 months     No lab results found.           Passed - Recent (12 mo) or future (30 days) visit within the authorizing provider's specialty     Patient had office visit in the last 12 months or has a visit in the next 30 days with authorizing provider or within the authorizing provider's specialty.  See \"Patient Info\" tab in inbasket, or \"Choose Columns\" in Meds & Orders section of the refill encounter.            Passed - Medication is active on med list        Passed - Patient is age 18 or older        Passed - No active pregnancy on record        Passed - No positive pregnancy test in past 12 months          "

## 2019-10-08 NOTE — PATIENT INSTRUCTIONS
It was a pleasure to see you in clinic today.  Please do not hesitate to call with any questions or concerns.  You are scheduled for a remote transmission on 1/8/20.  We look forward to seeing you in clinic at your next device check in 6 months.    Johnnie Johnson, RN  Electrophysiology Nurse Clinician  [facility]    During Business Hours Please Call:  [phone]  After Hours Please Call:  [phone] - select option #4 and ask for job code [phone]

## 2019-10-08 NOTE — PATIENT INSTRUCTIONS
You were seen in the Electrophysiology Clinic today by: Dr. Claudia Lowe MD    Plan:     Medication Changes: none    Labs/Tests Needed :none    Follow up visit: 1 year    Further Instructions: remote monitor every 3 months      Your Care Team:  EP Cardiology   Telephone Number     Gloria Gibson RN [phone]     For scheduling appts or procedures:    Flori Hayden   [phone]   For the Device Clinic (Pacemakers, ICDs, Loop Recorders)    During business hours: [phone]  After business hours:   [phone]- select option 4 and ask for job code [phone].       Cardiovascular Clinic:   [address] [place] [address]. [place] [address]      As always, Thank you for trusting us with your health care needs!

## 2019-10-08 NOTE — PROGRESS NOTES
Cardiology Clinic  Jennifer Bob MRN: [id number]  Age: 87 year old, : 1931  Primary care provider: No Ref-Primary, Physician            Assessment and Plan:     Assessment :  1. Atrial fibrillation, permanent. S/p AVN ablation 2018. Device low rate now changed to 60 bpm on 10/8/2019. On Apixaban.  2. Cardiomyopathy with intermittent heart failure. Stable and euvolemic today. EF 30-35%, I suspect a significant part is due to AFib and tachycardia. Plan for repeat TTE 6 months after AVN ablation.   3. Hypertension. BP tends to be high in office.  4. CAD s/p CABG. No ischemic symptoms.  5. COPD. Advanced though not oxygen dependent.     Plan:  -TTE in 3 months    Patient discussed with staff attending, Dr. Lowe.    Sha Rivera MD  Cardiology Fellow  Pager: [phone]      Attending addendum:  Patient seen and examined with Dr. Rivera. The history and physical findings are accurate as recorded. She is at baseline. Feels better without palpitations. Home -120 mmhg per patient and her daughter.    All labs, imaging studies, ECG and telemetry data have been reviewed personally. Specifically, CRT-P interrogation showed underlying rhythm to be AF with CHB; she is BIV paced 100%. All parameters normal.     The assessment and plans outlined reflect our joint decision making.  1. AF with RVR s/p AVN RF and CRT-P. Reduce LRL to 60 bpm. Continue apixaban 2.5 bid  2. Cardiomyopathy, no evidence of volume overload. Continue current meds.  3. HTN. Controlled at home.    Plans:  Remote monitor follow up of CRT-P every 3 months  I will see patient back in office 1 year  Continue all current meds  Consider repeat echo next year to assess response to CRT-P    Claudia Lowe MD  Cardiology              Subjective:     Ms. Bob is am 87 year old  female with a h/o CAD s/p CABG, stroke , advanced COPD. New onset afib with  bpm 2019, EF 27%, no new ischemia by nuclear scan. Given metoprolol  with some pauses on telemetry. Underwent CRT-P without atrial lead. Did not tolerate apixaban, anticoagulated with warfarin. Now s/p AVN ablation 7/2019 for difficult to control rate of afib.     Today, patient reports that since her AVN ablation, she reports feeling about the same. Perhaps she is having more MCNAIR and doing less activities with her friends at the nursing home. No orthopnea, PND, LE edema. No episodes of lightheadedness, dizziness, palpitations.           The following portions of the patient's history were reviewed and updated as appropriate: allergies, current medications, past family history, past medical history, past social history, past surgical history, and the problem list.     Past Medical History:  1. CAD. CABG 1992;  MI with VF arrest, s/p PCI SVG to RCA graft 8/29/09  ([place])  2. Hypertension ~ home  - 140/150 , occasionally 160-180mmHg  3. Hyperlipidemia  4. COPD. PFT 2014 showed reduced FEV1.  5. Hernia repair  6. Cholecystectomy  7. Ischemic stroke 2013; recent hospital admission 7/7/18 with another stroke, received tPA, residual right weakness. Ziopatch post discharge showed no atrial fibrillation.  8. Peripheral arterial disease s/p superior femoral artery stent 2018  9. Atrial fibrillation, 1/2019, with RVR.  Had increasing shortness of breath with both metoprolol and digoxin.  Amiodarone was started for rate control in February 2019.  10. Bradycardia with rate control - s/p [employer] CRT-P with RV and LV leads 1/18/19, complicated with RV lead microperforation requiring repositioning, no pericardial effusion          Past Medical History:     Past Medical History:   Diagnosis Date     Abdominal wall hernia     three hernias at previous incision sites, allergic to mesh so repair not repeated, wears girdle     Anxiety 1/9/2012     ASCVD (arteriosclerotic cardiovascular disease) 1/9/2012     CKD (chronic kidney disease) stage 3, GFR 30-59 ml/min (H) 1/10/2012      Colon polyp     resected     DDD (degenerative disc disease), lumbar 1/10/2012     Hyperlipidemia LDL goal <100 1/9/2012     Hyperlipidemia LDL goal <70 12/16/2013     Hypertension goal BP (blood pressure) < 140/90 1/9/2012     Incontinence of urine 1/9/2012     Left hip pain 1/9/2012     Low back pain 1/9/2012     Seasonal allergies 1/9/2012     STEMI (ST elevation myocardial infarction) (H) 8-    with VF arrest.     Stented coronary artery 8-     TIA (transient ischaemic attack) 1/10/2012              Past Surgical History:      Past Surgical History:   Procedure Laterality Date     abdominal abscess      at incisional site after kristine     adominal hernia repair      had mesh placed, then allergic so it was removed and she wears a girdle     CHOLECYSTECTOMY       CORONARY ARTERY BYPASS  1992     EP ABLATION AV NODE N/A 7/8/2019    Procedure: EP ABLATION AV NODE;  Surgeon: Claudia Lowe MD;  Location:  HEART CARDIAC CATH LAB     EP PACEMAKER N/A 1/18/2019    Procedure: EP Pacemaker;  Surgeon: Serafin Llamas MD;  Location:  HEART CARDIAC CATH LAB     EP PACEMAKER Left 1/23/2019    Procedure: EP Pacemaker;  Surgeon: Ran Hodges MD;  Location:  HEART CARDIAC CATH LAB              Social History:     Social History     Socioeconomic History     Marital status:      Spouse name: Not on file     Number of children: Not on file     Years of education: Not on file     Highest education level: Not on file   Occupational History     Not on file   Social Needs     Financial resource strain: Not on file     Food insecurity:     Worry: Not on file     Inability: Not on file     Transportation needs:     Medical: Not on file     Non-medical: Not on file   Tobacco Use     Smoking status: Former Smoker     Smokeless tobacco: Never Used     Tobacco comment: quit smoking in 1983   Substance and Sexual Activity     Alcohol use: No     Drug use: No     Sexual activity: Never   Lifestyle      Physical activity:     Days per week: Not on file     Minutes per session: Not on file     Stress: Not on file   Relationships     Social connections:     Talks on phone: Not on file     Gets together: Not on file     Attends [employer]ism service: Not on file     Active member of club or organization: Not on file     Attends meetings of clubs or organizations: Not on file     Relationship status: Not on file     Intimate partner violence:     Fear of current or ex partner: Not on file     Emotionally abused: Not on file     Physically abused: Not on file     Forced sexual activity: Not on file   Other Topics Concern     Parent/sibling w/ CABG, MI or angioplasty before 65F 55M? No   Social History Narrative     Not on file              Family History:     No family history on file.  Family history reviewed and updated in EPIC          Allergies:     Allergies   Allergen Reactions     Apixaban Shortness Of Breath     Patient became SOB the first few times she tried to take this medication, and consequently discontinued use (occurred around the beginning of February 2019)     Adhesive Tape      blisters     Atenolol      SOB     Lopressor Hct      SOB              Medications:     (Not in a hospital admission)     Current Outpatient Medications   Medication     albuterol (PROVENTIL) (2.5 MG/3ML) 0.083% neb solution     amLODIPine (NORVASC) 10 MG tablet     apixaban ANTICOAGULANT (ELIQUIS) 2.5 MG tablet     atorvastatin (LIPITOR) 40 MG tablet     atorvastatin (LIPITOR) 40 MG tablet     Cholecalciferol (VITAMIN D3 PO)     docusate sodium (COLACE) 100 MG capsule     escitalopram (LEXAPRO) 10 MG tablet     fluticasone (FLONASE) 50 MCG/ACT nasal spray     furosemide (LASIX) 20 MG tablet     hydrALAZINE (APRESOLINE) 50 MG tablet     losartan (COZAAR) 100 MG tablet     Multiple Vitamins-Minerals (MULTIVITAMIN OR)     order for DME     predniSONE (DELTASONE) 1 MG tablet     Saline (OCEAN NASAL SPRAY NA)     sodium chloride-sodium  "bicarb (CLASSIC NETI POT SINUS WASH) 2300-700 MG KIT     TRAMADOL HCL PO     VENTOLIN  (90 Base) MCG/ACT inhaler     VENTOLIN  (90 Base) MCG/ACT inhaler     BETA BLOCKER NOT PRESCRIBED, INTENTIONAL,     order for DME     tiotropium (SPIRIVA) 18 MCG inhaled capsule     warfarin (COUMADIN) 2.5 MG tablet     No current facility-administered medications for this visit.                      Physical Exam:     BP (!) 170/82 (BP Location: Right arm, Patient Position: Chair, Cuff Size: Adult Small)   Pulse 60   Ht 1.575 m (5' 2\")   Wt 46.4 kg (102 lb 4.8 oz)   SpO2 95%   BMI 18.71 kg/m      Wt Readings from Last 4 Encounters:   10/08/19 46.4 kg (102 lb 4.8 oz)   19 49.9 kg (110 lb)   19 50.3 kg (111 lb)   19 50.6 kg (111 lb 8 oz)     Gen: No acute distress  HEENT: NC/AT, PERRL, EOM intact, MMM, OP without exudates  PULM/THORAX: Clear to auscultation bilaterally, no rales/rhonchi/wheezes  CV: normal rate, regular rhythm, normal S1 and S2, no murmurs or rubs. No JVD  ABD: Soft, NTND, bowel sounds present, no masses  EXT: WWP. No LE edema.  NEURO: CN II-XII grossly intact. A&Ox3            Data:     Labs Reviewed on Admission  Pertinent for:  Lab Results   Component Value Date    TROPI 0.046 (H) 2019    TROPI 0.050 (H) 2019    TROPI 0.111 (H) 2019    TROPI 0.121 (HH) 2019    TROPI 0.093 (H) 2019    TROPONIN 0.02 2018    TROPONIN 0.04 2017    TROPONIN 0.02 2014    TROPONIN 0.03 10/17/2013               Most Recent Cardiology Studies:     EK19: afib, V-paced, frequent PVCs      Echo:     Interpretation Summary (3/27/19)  Left ventricular size is normal.  The Ejection Fraction is estimated at 30-35%.  Inferior wall akinesis is present.  Posterior wall akinesis is present.  Right ventricular function, chamber size, wall motion, and thickness are  normal.  Dilation of the inferior vena cava is present with abnormal respiratory  variation in " diameter.  No pericardial effusion is present.  A left pleural effusion is present.      Device interrogation (10/8/19):  Patient seen in [facility] for evaluation and iterative programming of [employer] dual lead bi-v pacemaker per MD orders. Patient is scheduled to see Dr. Lowe today. Normal pacemaker function. 5 non sustained episodes recorded since last interrogation.  Non since AV node ablation on 7/10/19. No arrhythmias recorded since 7/10/19. Intrinsic rhythm = biv p @ 30 bpm.  = 100%. BVP = 100%.  Estimated battery longevity to DEBORA = 8 years.  No short v-v intervals recorded. RV lead impedance trends appear stable. Patient reports that she is feeling well and denies specific complaints. Plan for patient to send a remote transmission in 3 months and return to clinic in 6 months.  JACOB Johnson, RNdual lead bi-v pacemakerI have reviewed and interpreted the device interrogation, settings, programming and nurse's summary.  The device is functioning within normal device parameters.   I agree with the current findings, assessment and plan.

## 2019-10-08 NOTE — NURSING NOTE
Chief Complaint   Patient presents with     Follow Up     87yoF with a h/o CAD s/p CABG, stroke 2013, advanced COPD, s/t CRT-P + atrial lead 1/2019, AF, s/p AV node ablation 7/8/2019 presents for 3 month follow-up post-ablation with device check prior.     Vitals were taken and medications were reconciled.     Sumaya Lipscomb CMA    12:02 PM

## 2019-10-10 PROBLEM — I50.9 CHF (CONGESTIVE HEART FAILURE) (H): Status: ACTIVE | Noted: 2019-01-01

## 2019-10-10 NOTE — ED NOTES
Bed: ED02  Expected date: 10/10/19  Expected time: 9:41 AM  Means of arrival: Ambulance  Comments:  [address]    85 y/o Female    SOB  Hypotension    Triaged:  YELLOW

## 2019-10-10 NOTE — ED PROVIDER NOTES
"  History     Chief Complaint   Patient presents with     Shortness of Breath     HPI  Jennifer Bob is a 87 year old female with a history of hypertension, atrial fibrillation (on Apixaban) status post AVN ablation (7/8/2019), status post dual chamber pacemaker (1/23/2019), CAD status post CABG (1992), STEMI wiith VF status post PCI (8/29/2009), TIA (2012), hyperlipidemia, COPD, status post cholecystectomy who presents to the Emergency Department by ambulance for evaluation of shortness of breath. Patient reports that she had her pacemaker rate adjusted two days ago--from 80 to 60 bpm--and now complains of shortness of breath that began quite suddenly last night at 10:00 PM, approximately 12 hours prior to arrival, and has been ongoing since that time. She currently denies any chest pain, but states that she felt a \"sharp twinge\" earlier. She has a productive cough at baseline and denies any change in this. She denies any lower extremity swelling or recent weight change--she does take Lasix and states that she took two doses last night. She denies any fevers or chills. She is not on supplemental oxygen at home.       I have reviewed the Medications, Allergies, Past Medical and Surgical History, and Social History in the [employer] system.    Past Medical History:   Diagnosis Date     Abdominal wall hernia     three hernias at previous incision sites, allergic to mesh so repair not repeated, wears girdle     Anxiety 1/9/2012     ASCVD (arteriosclerotic cardiovascular disease) 1/9/2012     CKD (chronic kidney disease) stage 3, GFR 30-59 ml/min (H) 1/10/2012     Colon polyp     resected     DDD (degenerative disc disease), lumbar 1/10/2012     Hyperlipidemia LDL goal <100 1/9/2012     Hyperlipidemia LDL goal <70 12/16/2013     Hypertension goal BP (blood pressure) < 140/90 1/9/2012     Incontinence of urine 1/9/2012     Left hip pain 1/9/2012     Low back pain 1/9/2012     Seasonal allergies 1/9/2012     STEMI (ST " elevation myocardial infarction) (H) 8-    with VF arrest.     Stented coronary artery 8-     TIA (transient ischaemic attack) 1/10/2012       Past Surgical History:   Procedure Laterality Date     abdominal abscess      at incisional site after kristine     adominal hernia repair      had mesh placed, then allergic so it was removed and she wears a girdle     CHOLECYSTECTOMY       CORONARY ARTERY BYPASS  1992     EP ABLATION AV NODE N/A 7/8/2019    Procedure: EP ABLATION AV NODE;  Surgeon: Claudia Lowe MD;  Location:  [facility]     EP PACEMAKER N/A 1/18/2019    Procedure: EP Pacemaker;  Surgeon: Serafin Llamas MD;  Location:  [facility] CATH LAB     EP PACEMAKER Left 1/23/2019    Procedure: EP Pacemaker;  Surgeon: Ran Hodges MD;  Location:  [facility]ARD[facility] CATH LAB       No family history on file.    Social History     Tobacco Use     Smoking status: Former Smoker     Smokeless tobacco: Never Used     Tobacco comment: quit smoking in 1983   Substance Use Topics     Alcohol use: No     No current facility-administered medications for this encounter.      Current Outpatient Medications   Medication     albuterol (PROVENTIL) (2.5 MG/3ML) 0.083% neb solution     amLODIPine (NORVASC) 10 MG tablet     apixaban ANTICOAGULANT (ELIQUIS) 2.5 MG tablet     atorvastatin (LIPITOR) 40 MG tablet     atorvastatin (LIPITOR) 40 MG tablet     BETA BLOCKER NOT PRESCRIBED, INTENTIONAL,     Cholecalciferol (VITAMIN D3 PO)     docusate sodium (COLACE) 100 MG capsule     escitalopram (LEXAPRO) 10 MG tablet     fluticasone (FLONASE) 50 MCG/ACT nasal spray     furosemide (LASIX) 20 MG tablet     hydrALAZINE (APRESOLINE) 50 MG tablet     losartan (COZAAR) 100 MG tablet     Multiple Vitamins-Minerals (MULTIVITAMIN OR)     order for DME     predniSONE (DELTASONE) 1 MG tablet     Saline (OCEAN NASAL SPRAY NA)     sodium chloride-sodium bicarb (CLASSIC NETI POT SINUS WASH) 2300-700 MG KIT     TRAMADOL  HCL PO     VENTOLIN  (90 Base) MCG/ACT inhaler     VENTOLIN  (90 Base) MCG/ACT inhaler        Allergies   Allergen Reactions     Apixaban Shortness Of Breath     Patient became SOB the first few times she tried to take this medication, and consequently discontinued use (occurred around the beginning of February 2019)     Adhesive Tape      blisters     Atenolol      SOB     Lopressor Hct      SOB       Review of Systems   Constitutional: Negative for chills, fever and unexpected weight change.   Respiratory: Positive for shortness of breath. Negative for cough.    Cardiovascular: Negative for chest pain and leg swelling.   All other systems reviewed and are negative.      Physical Exam   BP: (!) 171/80  Pulse: 60  Heart Rate: 60  Temp: 97.1  F (36.2  C)  Resp: (!) 35  SpO2: 97 %      Physical Exam  Vitals signs and nursing note reviewed.   Constitutional:       General: She is not in acute distress.     Appearance: She is not diaphoretic.   HENT:      Head: Normocephalic and atraumatic.   Eyes:      Conjunctiva/sclera: Conjunctivae normal.      Pupils: Pupils are equal, round, and reactive to light.   Neck:      Musculoskeletal: Normal range of motion and neck supple.   Cardiovascular:      Rate and Rhythm: Normal rate.   Pulmonary:      Effort: Respiratory distress present.      Breath sounds: No stridor. No wheezing, rhonchi or rales.   Chest:      Chest wall: No tenderness.   Abdominal:      General: There is no distension.      Palpations: Abdomen is soft.      Tenderness: There is no tenderness. There is no guarding or rebound.   Musculoskeletal: Normal range of motion.   Skin:     General: Skin is warm and dry.   Neurological:      Mental Status: She is alert and oriented to person, place, and time.   Psychiatric:         Behavior: Behavior normal.         Thought Content: Thought content normal.         ED Course        Procedures             EKG Interpretation:      Interpreted by Dirk  Mejia Kenny MD  Time reviewed: 1038  Symptoms at time of EKG: dyspnea  Rhythm: Paced  Rate: 60  Axis: normal  Ectopy: none  Conduction: normal  ST Segments/ T Waves: No ST-T wave changes  Q Waves: none  Comparison to prior: Unchanged    Clinical Impression: Paced rhythm          Critical Care time:  none             Labs Ordered and Resulted from Time of ED Arrival Up to the Time of Departure from the ED - No data to display         Assessments & Plan (with Medical Decision Making)   This is an 88 yo F with a history of atrial fibrillation, pacemaker and CHF who presents with dyspnea.  On exam, she is tachypnic but not hypoxic.  An EKG was paced and showed no acute ischemic changes.  She did however have the lower rate threshold changed in clinic to 60, which is now her current rate.  A chest xray showed some mild pleural effusions and pulmonary edema.  She was treated with Lasix and will be admitted to the cardiology service.  I suspect her dyspnea may be related to CHF in combination with the lower paced rate.     I have reviewed the nursing notes.    I have reviewed the findings, diagnosis, plan and need for follow up with the patient.    New Prescriptions    No medications on file       Final diagnoses:   None     Johana PALMA, am serving as a trained medical scribe to document services personally performed by Dirk Kenny MD, based on the provider's statements to me.   Dirk PALMA MD, was physically present and have reviewed and verified the accuracy of this note documented by Johana Cisse.    10/10/2019   [facility], [place], EMERGENCY DEPARTMENT     Dirk Kenny MD  10/10/19 1324       Dirk Kenny MD  10/10/19 1333       Dirk Kenny MD  10/10/19 1724

## 2019-10-11 NOTE — H&P
[facility] Cardiology History and Physical    Jennifer Bob MRN# [id number]             Assessment and Plan:   Assessment:  87 year old female with a history of HTN, Afib on eliquis s/p AVN ablation (07/2019) s/p dual chamber pacemaker, CAD s/p CABG (1992), COPD presented to ED for SOB after PPM adjustment    Plan:  # HFrEF exacerbation  # Cardiomyopathy   Clinically appears euvolemic but BNP elevated from 4000s to >9000s. CXR shows increased interstitial prominence. Though patient's weight is down, she is frail and cachectic, may be losing weight unintentionally. PPM setting changed from 60 to 70bmp in ED, sxs improvement w/ diuresis, total of 40mg IV lasix x2 since admission, suspect HF exacerbation 2/2 decreased cardiac output after PPM setting change  - additional 20mg IV lasix in AM, can transition back to po after  - daily standing weight   - fluid restriction 2000cc  - repeat echo in AM    # Permanent Afib s/p AVN ablation s/p dual chamber pacemaker  Takes eliquis pta. EKG shows paced rhythm. Devise interrogated before presentation, showed normal pacemaker function, no tachy-arrhythmias  - tele    # HTN  Hypertensive in ED around 170s/90s. Initially held losartan c/w concern for NICHOLE, Cr improved after diuresis  - restart losartan  - pta amlodipine  - lasix as above    #NICHOLE, resolved  Presenting Cr 1.2 from baseline of 1. Very low muscle mass. NICHOLE likely 2/2 cardiorenal, improved after diuresis and PPM setting change  - restart pta losartan  - trend cr w/ diuresis  - lytes, K=4, Mg2    Chronic Problems  # COPD - pta albuterol, advair  # CAD s/p CABG - pta atorvastatin  # Rheumatoid arthritis - pta tramadol after Cr improvement, prednisone 2mg daily     FEN: no IVF, monitor and replete lytes, cardiac diet   Consults: none    Code Status: FULL    Disposition: 6C    Patient seen and discussed with Dr. Orr, who agrees with above plan.    Victorina Gross MD  Internal Medicine PGY-2  [phone]       "    Chief Complaint:   SOB         History of Present Illness:     Jennifer Bob is a 87 year old female with a history of HTN, Afib on eliquis s/p AVN ablation (07/2019) s/p dual chamber pacemaker, CAD s/p CABG (1992), COPD presented to ED for SOB x 2days   Pt has been in her usual state of health until last night when she reported increased SOB and orthopnea. Normally can sleep fla but needed 2 pillows night of presentation. Also reports increased dypnea when walking around the house for past day. At baseline pt lives independently and is able to handle all house chores w/ no issue. She has COPD but is compliant w/ inhalers and states that the sob does not feel like an exacerbation. She has a productive cough at baseline and denies any change in this. Otherwise denies any chest pain, but states that she felt a \"sharp twinge\" earlier. She denies any lower extremity swelling or recent weight change. Baseline dry weight is around 100lb, on presentation is 96.   In ED pt was hemodynamically stable, slightly hypertensive in the 170s/90s. Received total of 80mg IV lasix w/ good uop.     Device interrogation (10/08/19): Normal pacemaker function, No arrhythmias recorded since 7/10/19  Echo (03/2019): Left ventricular size is normal.  The Ejection Fraction is estimated at 30-35%.  Inferior wall akinesis is present.  Posterior wall akinesis is present.  Right ventricular function, chamber size, wall motion, and thickness are  normal.  Dilation of the inferior vena cava is present with abnormal respiratory  variation in diameter.  No pericardial effusion is present.  A left pleural effusion is present.  LexiScan (01/2019):   1.  Myocardial perfusion imaging using single isotope technique  demonstrated a small nontransmural scar of the distal anteroapical  wall with mild nirav-infarct ischemia. There is a second small fixed  basal inferior defect likely attenuation artifact.   2. Gated images demonstrated not performed " "due to arrhythmia.  The  left ventricular systolic function is not assessed.  3. Compared to the prior study from no previous study          Past Medical History:       Medical History reviewed.          Past Surgical History:   Surgical History reviewed.          Social History:   Social History reviewed.   Social History     Tobacco Use     Smoking status: Former Smoker     Smokeless tobacco: Never Used     Tobacco comment: quit smoking in 1983   Substance Use Topics     Alcohol use: No             Family History:   Family History reviewed.  No family history on file.          Medications:   Medications Reviewed.          Physical Exam:   Vitals were reviewed.  Blood pressure (!) 179/84, pulse 70, temperature 97.9  F (36.6  C), temperature source Oral, resp. rate 24, height 1.575 m (5' 2\"), weight 43.5 kg (96 lb), SpO2 92 %, not currently breastfeeding.    Gen: frail elderly lady, sitting in bed, NAD  HEENT: sclera anicteric, MMM  CV: RRR, no mrg, distant heart sound, JVD around 8cm, no hepatojugular reflux  Pulm: diminished breath sounds throughout lung fields, no wheezing, inspiratory crackles in b/l bases, no rhonchi  Abd: soft, nontender, +BS  Ext: no LE edema, +muscule wasting in all extremities  Neuro: AOx4, no focal signs    "

## 2019-10-11 NOTE — PROGRESS NOTES
Pt admitted from ED accompanied by daughter and nurse. Admitted with SOB. Hx of HTN, Afib, AV node ablation, pacemaker, CAD with a CAB, STEMI, TIA, HLD, COPD. Recent pacemaker rate change from 80 to 60. Pt A&O, oriented to unit and routines. Stable upon arrival ex HTN and SOB. MD came to assess patient. Belongings with patient. Two person skin assessment completed with Sima Moctezuma. See flowsheets for details.     Sandy Gar RN on 10/11/2019 at 3:26 AM

## 2019-10-11 NOTE — PLAN OF CARE
OT/6C: OT to defer.   Discharge Planner OT   Patient plan for discharge: Home with hospice services.   Current status: Per discussion with RN and pt, pt now transitioning to hospice cares and will discharge to home over the weekend. Per PT - denies concern with return to home or need of caregiver training. Has family involved if needed for assistance. OT will complete orders and sign off.   Barriers to return to prior living situation: Defer to medical team  Recommendations for discharge: Home with hospice cares.   Rationale for recommendations: See above.        Entered by: Viki Bellamy 10/11/2019 4:51 PM

## 2019-10-11 NOTE — ED NOTES
[facility], [place]   ED Nurse to Floor Handoff     Jennifer Bob is a 87 year old female who speaks English and lives with family members,  in a home  They arrived in the ED by ambulance from home    ED Chief Complaint: Shortness of Breath    ED Dx;   Final diagnoses:   Acute systolic congestive heart failure (H)         Needed?: No    Allergies:   Allergies   Allergen Reactions     Apixaban Shortness Of Breath     Patient became SOB the first few times she tried to take this medication, and consequently discontinued use (occurred around the beginning of February 2019)     Adhesive Tape      blisters     Atenolol      SOB     Lopressor Hct      SOB   .  Past Medical Hx:   Past Medical History:   Diagnosis Date     Abdominal wall hernia     three hernias at previous incision sites, allergic to mesh so repair not repeated, wears girdle     Anxiety 1/9/2012     ASCVD (arteriosclerotic cardiovascular disease) 1/9/2012     CKD (chronic kidney disease) stage 3, GFR 30-59 ml/min (H) 1/10/2012     Colon polyp     resected     DDD (degenerative disc disease), lumbar 1/10/2012     Hyperlipidemia LDL goal <100 1/9/2012     Hyperlipidemia LDL goal <70 12/16/2013     Hypertension goal BP (blood pressure) < 140/90 1/9/2012     Incontinence of urine 1/9/2012     Left hip pain 1/9/2012     Low back pain 1/9/2012     Seasonal allergies 1/9/2012     STEMI (ST elevation myocardial infarction) (H) 8-    with VF arrest.     Stented coronary artery 8-     TIA (transient ischaemic attack) 1/10/2012      Baseline Mental status: WDL  Current Mental Status changes: at basesline    Infection present or suspected this encounter: no  Sepsis suspected: No  Isolation type: No active isolations     Activity level - Baseline/Home:  Independent  Activity Level - Current:   Stand with Assist    Bariatric equipment needed?: No    In the ED these meds were given:   Medications   furosemide  (LASIX) injection 40 mg (40 mg Intravenous Given 10/10/19 1114)       Drips running?  No    Home pump  No    Current LDAs  Peripheral IV 10/10/19 Left (Active)   Number of days: 0       Right Groin Interventional Procedure Access (Active)   Number of days: 94       Incision/Surgical Site 01/23/19 Left Chest (Active)   Number of days: 260       Labs results:   Labs Ordered and Resulted from Time of ED Arrival Up to the Time of Departure from the ED   CBC WITH PLATELETS DIFFERENTIAL - Abnormal; Notable for the following components:       Result Value    RDW 15.3 (*)     Platelet Count 128 (*)     All other components within normal limits   BASIC METABOLIC PANEL - Abnormal; Notable for the following components:    Glucose 103 (*)     Urea Nitrogen 34 (*)     Creatinine 1.12 (*)     GFR Estimate 44 (*)     GFR Estimate If Black 51 (*)     All other components within normal limits   NT PROBNP INPATIENT - Abnormal; Notable for the following components:    N-Terminal Pro BNP Inpatient 9,279 (*)     All other components within normal limits   ISTAT  GASES LACTATE NONA POCT - Abnormal; Notable for the following components:    Ph Venous 7.44 (*)     Bicarbonate Venous 30 (*)     All other components within normal limits   TROPONIN I   ISTAT CG4 GASES LACTATE NONA NURSING POCT       Imaging Studies:   Recent Results (from the past 24 hour(s))   XR Chest Port 1 View    Narrative    Exam: Chest x-ray, 2 views, 3/27/2019.    COMPARISON: 2/16/2019.    HISTORY: Shortness of breath.    FINDINGS: . Left-sided implantable cardiac defibrillator and its leads  are in similar position. Median sternotomy wires. Stable  cardiomediastinal silhouette. Prominent aortic knob with  calcifications. Unchanged right and small left pleural effusion with  overlying atelectasis versus consolidation. Interstitial prominence  and a few curly B lines. No pneumothorax. Mild pulmonary vascular  congestion.      Impression    IMPRESSION:   1. Unchanged small  bilateral effusion with overlying atelectasis  versus consolidation.  2. Mild interstitial prominence and a few Loly B lines indicating  interstitial edema.    I have personally reviewed the examination and initial interpretation  and I agree with the findings.    NIK RENTERIA MD       Recent vital signs:   BP (!) 166/80   Pulse 70   Temp 97.1  F (36.2  C) (Oral)   Resp 22   SpO2 99%     Strawn Coma Scale Score: 15 (10/10/19 1800)       Cardiac Rhythm:ventricular paced  Pt needs tele? Yes  Skin/wound Issues: None    Code Status: Pt stated  DNR please clarify    Pain control: good    Nausea control: good    Abnormal labs/tests/findings requiring intervention: BNP high 9,279    Family present during ED course? Yes daughter  Family Comments/Social Situation comments:     Tasks needing completion: none    Breann Bell, RN    [phone] [facility]

## 2019-10-11 NOTE — PHARMACY-ADMISSION MEDICATION HISTORY
Admission medication history interview status for the 10/10/2019 admission is complete. See Epic admission navigator for allergy information, pharmacy, prior to admission medications and immunization status.     Medication history interview sources:  Patient, Patient's daughter, Chart Review     Changes made to PTA medication list (reason)  Added: None   Deleted: Duplicates on medication list of Ventolin HFA and Atorvastatin were deleted.    Changed: Apixaban (Eliquis) 2.5 mg Tablet - Take one tablet by mouth daily- *Changed from BID     Additional medication history information (including reliability of information, actions taken by pharmacist):    Patient was a good historian     Patients daughter was with her and aided in confirming medications     Per patient she does not believe that she is allergic to Apixaban, SOB occurred in early usage but is not longer occurring       Prior to Admission medications    Medication Sig Last Dose Taking? Auth Provider   albuterol (PROVENTIL) (2.5 MG/3ML) 0.083% neb solution Take 1 vial (2.5 mg) by nebulization every 6 hours as needed for shortness of breath / dyspnea or wheezing 10/10/2019 at am Yes Torri Fisher MD   amLODIPine (NORVASC) 10 MG tablet Take 1 tablet (10 mg) by mouth daily 10/9/2019 at Unknown time Yes Torri Fisher MD   apixaban ANTICOAGULANT (ELIQUIS) 2.5 MG tablet Take 2.5 mg by mouth daily 10/9/2019 at Unknown time Yes Unknown, Entered By History   atorvastatin (LIPITOR) 40 MG tablet TAKE ONE TABLET BY MOUTH EVERY DAY 10/9/2019 at Unknown time Yes Torri Fisher MD   Cholecalciferol (VITAMIN D3 PO) Take 1,000 Units by mouth daily 10/9/2019 at am Yes Reported, Patient   docusate sodium (COLACE) 100 MG capsule Take 1 capsule by mouth 2 times daily  10/9/2019 at Unknown time Yes Reported, Patient   escitalopram (LEXAPRO) 10 MG tablet Take 10 mg by mouth daily 10/9/2019 at Unknown time Yes Unknown, Entered By History   fluticasone (FLONASE) 50 MCG/ACT  nasal spray Spray 1 spray into both nostrils daily as needed for rhinitis or allergies 10/9/2019 at Unknown time Yes Unknown, Entered By History   furosemide (LASIX) 20 MG tablet Take 1 tablet (20 mg) by mouth daily 10/9/2019 at pm Yes Torri Fisher MD   hydrALAZINE (APRESOLINE) 50 MG tablet Take 2 tablets (100 mg) by mouth 3 times daily 10/9/2019 at Unknown time Yes Donal Yang MD   losartan (COZAAR) 100 MG tablet Take 1 tablet (100 mg) by mouth daily 10/9/2019 at Unknown time Yes Torri Fisher MD   Multiple Vitamins-Minerals (MULTIVITAMIN OR) Take 1 tablet by mouth daily  10/9/2019 at Unknown time Yes Reported, Patient   predniSONE (DELTASONE) 1 MG tablet Take 2 mg by mouth daily 10/9/2019 at Unknown time Yes Unknown, Entered By History   Saline (OCEAN NASAL SPRAY NA) Administer 1 spray in each nostril up to two times daily as needed Past Week at Unknown time Yes Reported, Patient   sodium chloride-sodium bicarb (CLASSIC NETI POT SINUS WASH) 2300-700 MG KIT Mix 1 packet in Neti Pot and administer in each nostril daily as needed Past Week at Unknown time Yes Reported, Patient   TRAMADOL HCL PO Take 50 mg by mouth every 6 hours as needed for moderate to severe pain 10/9/2019 at pm Yes Reported, Patient   VENTOLIN  (90 Base) MCG/ACT inhaler INHALE TWO PUFFS BY MOUTH EVERY 6 HOURS AS NEEDED 10/10/2019 at am Yes Torri Fisher MD   BETA BLOCKER NOT PRESCRIBED, INTENTIONAL, Beta Blocker not prescribed intentionally due to COPD, shortness of breath with atenolol and lopressor  Patient not taking: Reported on 10/8/2019   Torri Fisher MD   order for DME Equipment being ordered: Nebulizer with mask and tubing   Torri Fisher MD         Medication history completed by: Karen Price 2nd Year Pharmacy Student

## 2019-10-11 NOTE — PLAN OF CARE
D: Pt came from ED with SOB after PPM adjustment from 80 to 60. Hx HTN, Afib on eloquis after AVN ablation 7/2019 after dual chamber pacemaker, CAD after CABG in 1992, COPD.     I: A0x4. Pt very SOB upon arrival to unit RR 40. Neb treatment administered by RT, symptoms improved but breathing remained shallow. Provider notified. Pt inhaler ordered for today. Pt has chronic back pain. Oxycodone administered. Pt tolerated well. Afebrile. Urinating adequately. No BM this shift. 2g sodium diet with 2L FR. LPIV SL. Skin dry and fragile, no sores noted. Echo completed at bedside this AM. Troponin level elevated to 0.060 from 0.044. Provider notified. K 3.6. Replaced last night and one time order for this AM. Mag was 1.8, replaced last night, came back as 2.6 this AM. Pt incontinent of urine, wears briefs. Weight was down this AM after lasix.     P: Continue to monitor Pt status and report changes to treatment team.    Sandy Gar RN on 10/11/2019 at 8:07 AM

## 2019-10-11 NOTE — PLAN OF CARE
6C defer  Discharge Planner PT   Patient plan for discharge: home  Current status: per discussion with RN and pt, pt now transitioning to hospice cares and will discharge to home over the weekend. Denies concern with return to home or need of caregiver training. Has family involved if needed for assistance. Will complete orders and sign off.   Barriers to return to prior living situation: none per mobility  Recommendations for discharge: home  Rationale for recommendations: see above.        Entered by: Harshil Walsh 10/11/2019 4:08 PM

## 2019-10-11 NOTE — PROGRESS NOTES
CLINICAL NUTRITION SERVICES    Reason for Assessment:  Low-sodium (2 g/day) nutrition education    Diet History:  Pt reports having history of receiving low-sodium nutrition education in the past. Usually having 3 meals day. Pt states cooking at home, and does grocery shopping, but with groceries delivered to her. She likes varieties, will change food options almost everyday. Usually have cereal, toast, and coffee for breakfast. Pt states tried to follow low sodium food, but finding difficulty to apply when eating and cooking.     Nutrition Diagnosis:  Food- and nutrition-related knowledge deficit r/t limited previous knowledge of low-sodium diet AEB pt report of unsure of how to apply low sodium diet into her daily life.     Nutrition Prescription/Recs:  Continue current diet of low-sodium diet and 2000 ml fluid restriction.     Interventions:  Nutrition Education  1. Provided verbal instruction on low-sodium meal planning - suggested using spices like bay leaves and oregano to add flavor instead of adding extra salt and sauces. Educated pt that butter has a lot of saturated fat. Recommend food label reading and decrease frequency of buying canned soups as they are usually high sodium and saturated fat.   2. Provided the following handouts: Heart Failure Nutrition Therapy.     Goals:    Pt will verbalize at least five high sodium foods and the importance of avoiding added salt to foods for cooking or seasoning foods.     Follow-up:   Patient to ask any further nutrition-related questions before discharge. In addition, pt may request outpatient RD appointment.    Marianna Alves  Dietetic Intern    I have read & agree with the above nutrition assessment and interventions.     Margy Sanchez, SRAVAN, LD

## 2019-10-11 NOTE — PLAN OF CARE
OT/6C: OT evaluation orders received and appreciated. Attempting to see patient 2x this afternoon. Dry heaving with spitting up upon first attempt. Requesting for therapist to check back. Upon return, patient reporting to be feeling unwell with continued throat irritation. Requesting to be seen tomorrow.

## 2019-10-11 NOTE — PROGRESS NOTES
"  Cardiology Progress Note  Interval:  Patient was feeling improved from admission to this AM with IV diuresis and increasing her pacer rate but was still feeling generally unwell.     Exam revealed a severely cachectic elderly female lying in bed with tachypnea and coarse lung sounds bilaterally.     Discussed going home with patient and daughter pending her ability to ambulate (following her IV diuresis and nebulizer treatments for COPD). She was appearing better in regard to her presenting problem, was on room air - but patient was feeling unsafe, unsteady, and having coughing spells with post-tussive vomiting which uponr re-interview had been going on for some time and associated with decline in oral intake. Ordered a 2V CXR but upon re-evaluating patient, she and her daughter were discussing the patient's goals of care. The decision was then made that the patient would be made DNR/DNI and would go comfort care with plan for home hospice. The patient is competent to make this decision. She will be discharged on Sunday AM to her daughter's home.     Plan:   -Home with hospice on Jose 10/13/19  -Stopped EKGs, lab draws, tele    Objective:  Most recent vital signs:  BP (!) 145/76 (BP Location: Right arm)   Pulse 70   Temp 98.7  F (37.1  C) (Oral)   Resp 24   Ht 1.575 m (5' 2\")   Wt 42.5 kg (93 lb 11.2 oz)   SpO2 90%   BMI 17.14 kg/m    Temp:  [97.1  F (36.2  C)-98.7  F (37.1  C)] 98.7  F (37.1  C)  Pulse:  [60-71] 70  Heart Rate:  [59-74] 73  Resp:  [0-40] 24  BP: (135-185)/() 145/76  SpO2:  [90 %-100 %] 90 %  Wt Readings from Last 2 Encounters:   10/11/19 42.5 kg (93 lb 11.2 oz)   10/08/19 46.4 kg (102 lb 4.8 oz)       Intake/Output Summary (Last 24 hours) at 10/11/2019 0750  Last data filed at 10/11/2019 0300  Gross per 24 hour   Intake 80 ml   Output 325 ml   Net -245 ml     Physical exam:  General: Cachectic  female with tachypnea  HEENT: EOMI, sclera are injected, no scleral icterus "   CARDIAC: Paced. Peripheral pulses 1+  RESP: Coarse throughout, diminished in bases, tachypnea  GI: NABS, NT/ND, no guarding or rebound  EXTREMITIES: NO LE edema.   SKIN: No acute lesions appreciated. Poor skin turgor and pallor.   NEURO: Alert and oriented X3, CN II-XII grossly intact, no focal neurological deficits noted      Labs (Past three days):  CBC  Recent Labs   Lab 10/10/19  2107 10/10/19  1023   WBC 5.7 5.5   RBC 4.79 4.63   HGB 15.2 14.6   HCT 47.2* 46.1   MCV 99 100   MCH 31.7 31.5   MCHC 32.2 31.7   RDW 15.2* 15.3*   * 128*     BMP  Recent Labs   Lab 10/11/19  0459 10/10/19  2107 10/10/19  1023    139 141   POTASSIUM 3.6 3.7 3.7   CHLORIDE 102 102 103   CO2 30 30 30   ANIONGAP 10 8 8   GLC 80 115* 103*   BUN 34* 34* 34*   CR 1.10* 1.03 1.12*   GFRESTIMATED 45* 49* 44*   GFRESTBLACK 52* 56* 51*   JERILYN 9.2 9.2 9.0   MAG 2.6* 1.8  --        Assessment & Plan:    #Comfort care with plan for home with home hospice  #Cachexia   #Weight loss of >10% over short time span   #Failure to thrive   #Severe COPD   Patient was admitted for a fairly straightforward issue that was corrected however discussions with her about her life at home, her quality of life spurred conversation regarding her goals of care. She has lost a lot of her quality of life in a fairly rapid decline over the last 6 months or so. She endorses having meals but a poor appetite with frequent nausea and coughing with post-tussive vomiting. In the last year, the patient has gone from 59 kg to 42 kg and this is unintentional. Discussed with her the potential of an underlying malignancy or other issue and patient opted to not pursue. She discussed that ultimately her comorbidity and quality of life had declined so rapidly in the last 6 months or so that she would like to de-escalate all of her regular cares such as clinic visits, lab draws, etc. With her daughter, the patient decided that she would like to be discharged home on  hospice. Updated a POLST to DNR/DNI after discussing her goals of care.  -Inpatient comfort care admission   -Med rec complete and medications including home medications sent to discharge pharamacy for patient to discharge Sunday morning    -Discontinued unnecessary medications sparing quality of life improving meds. DIscontinued EKG, tele, lab draws, etc.     # HFrEF exacerbation  # Cardiomyopathy   BNP elevated from 4000s to >9000s. Initial CXR with increased interstitial prominence as well as bilateral small pleural effusions with atelectasis. On exam, patient is cachectic with poor skin turgor. She appears malnourished but states she does try to eat 3 meals a day. She has bouts of coughing which first felt were orthopneic in nature but continued despite diuresis and propping the patient in bed; she also had poor oral intake, poor appetite, and post-tussive vomiting. PPM setting changed from 60 to 70 bmp in ED. Symptoms of SOB are improving from admission after IV diuretic doses and the increased pacing rate. Then patient decided to go comfort with plan for home hospice for other reasons.      # Permanent Afib s/p AVN ablation s/p dual chamber pacemaker  Takes eliquis pta. EKG shows paced rhythm. Devise interrogated before presentation, showed normal pacemaker function, no tachy-arrhythmias     # HTN      #NICHOLE, resolved  Presenting Cr 1.2 from baseline of 1. Very low muscle mass. NICHOLE likely 2/2 cardiorenal, improved after diuresis and PPM setting change  -de-escalating cares including lab draws      Chronic Problems  # COPD - pta albuterol, advair  # CAD s/p CABG - stopped statin   # Rheumatoid arthritis - pta tramadol after Cr improvement, prednisone 2mg daily      FEN: Regular diet     Consults: none     Code Status: DNR/DNI     Disposition: Comfort inpatient, discharge home on hospice on Sunday        Patient seen and discussed w/ Dr. Orr.    Quoc JACKSON  [facility]  [place]  Cardiology  [id number]

## 2019-10-11 NOTE — PROGRESS NOTES
"SPIRITUAL HEALTH SERVICES  SPIRITUAL ASSESSMENT Progress Note  [facility] ([place] 6C   ON-CALL VISIT    REFERRAL SOURCE: Consult order for emotional support    I shared a reflective conversation with Jennifer Bob which integrated elements of her illness narrative as well as her spiritual history. I offered words of peace, validation of her spiritual questions and prayer at Klaudia's request.    Jennifer is [employer] and has a strong sense that God is present with her through good things and bad things.     Jennifer is well supported by her family, especially her two daughters.    Jennifer wonders \"why God hasn't taken me home yet?\" As we explored this, Klaudia concluded that \"God's ways aren't our ways\" and \"it's just not my time yet.\"    When asked what things help her feel peaceful, Klaudia replied \"everything.\" She says she is \"completely at peace\" but still wonders \"why God allows suffering?\"    PLAN: I will notify unit  Ritchie of this visit. Klaudia also knows how she can request additional visits as needed.    Maria Del Rosario Richey  Oncology   Pager [phone]  [facility] remains available 24/7 for emergent requests/referrals, either by having the switchboard page the on-call  or by entering an ASAP/STAT consult in Epic (this will also page the on-call ).        "

## 2019-10-12 NOTE — PROGRESS NOTES
"  Cardiology Progress Note  Interval:  No acute events overnight. Patient feels well today, morphine is helping with shortness of breath. No new chest pain or complaints. Plan to discharge tomorrow with home hospice.       Objective:  Most recent vital signs:  /62 (BP Location: Right arm)   Pulse 67   Temp 98.2  F (36.8  C) (Oral)   Resp 18   Ht 1.575 m (5' 2\")   Wt 42.5 kg (93 lb 11.2 oz)   SpO2 93%   BMI 17.14 kg/m    Temp:  [98.2  F (36.8  C)] 98.2  F (36.8  C)  Pulse:  [67] 67  Heart Rate:  [70] 70  Resp:  [16-22] 18  BP: (133)/(62) 133/62  SpO2:  [93 %] 93 %  Wt Readings from Last 2 Encounters:   10/11/19 42.5 kg (93 lb 11.2 oz)   10/08/19 46.4 kg (102 lb 4.8 oz)       Physical exam:  General: Cachectic  female, no acute distress    CARDIAC: RRR, no murmur, rub or gallop. Peripheral pulses 1+  RESP: No increased work of breathing, diminished in bases, no crackles or wheezes   GI: NABS, NT/ND, no guarding or rebound  EXTREMITIES: NO LE edema.   SKIN: No acute lesions/rashes appreciated.   NEURO: Alert and oriented X3, CN II-XII grossly intact, no focal neurological deficits noted      Labs (Past three days):  CBC  Recent Labs   Lab 10/10/19  2107 10/10/19  1023   WBC 5.7 5.5   RBC 4.79 4.63   HGB 15.2 14.6   HCT 47.2* 46.1   MCV 99 100   MCH 31.7 31.5   MCHC 32.2 31.7   RDW 15.2* 15.3*   * 128*     BMP  Recent Labs   Lab 10/11/19  0459 10/10/19  2107 10/10/19  1023    139 141   POTASSIUM 3.6 3.7 3.7   CHLORIDE 102 102 103   CO2 30 30 30   ANIONGAP 10 8 8   GLC 80 115* 103*   BUN 34* 34* 34*   CR 1.10* 1.03 1.12*   GFRESTIMATED 45* 49* 44*   GFRESTBLACK 52* 56* 51*   JERILYN 9.2 9.2 9.0   MAG 2.6* 1.8  --        Assessment & Plan:    #Comfort care with plan for home with home hospice  #Cachexia   #Weight loss of >10% over short time span   #Failure to thrive   #Severe COPD   Patient was admitted for a fairly straightforward issue that was corrected however discussions with her about " her life at home, her quality of life spurred conversation regarding her goals of care. She has lost a lot of her quality of life in a fairly rapid decline over the last 6 months or so. She endorses having meals but a poor appetite with frequent nausea and coughing with post-tussive vomiting. In the last year, the patient has gone from 59 kg to 42 kg and this is unintentional. Discussed with her the potential of an underlying malignancy or other issue and patient opted to not pursue. She discussed that ultimately her comorbidity and quality of life had declined so rapidly in the last 6 months or so that she would like to de-escalate all of her regular cares such as clinic visits, lab draws, etc. With her daughter, the patient decided that she would like to be discharged home on hospice. Updated a POLST to DNR/DNI after discussing her goals of care.  -Inpatient comfort care admission   -Med rec complete and medications including home medications sent to discharge pharamacy for patient to discharge Sunday morning    - Guaifenesin added to thin secretions   -Discontinued unnecessary medications sparing quality of life improving meds. DIscontinued EKG, tele, lab draws, vitals etc.     # HFrEF exacerbation  # Cardiomyopathy   BNP elevated from 4000s to >9000s. Initial CXR with increased interstitial prominence as well as bilateral small pleural effusions with atelectasis. On exam, patient is cachectic with poor skin turgor. She appears malnourished but states she does try to eat 3 meals a day. She has bouts of coughing which first felt were orthopneic in nature but continued despite diuresis and propping the patient in bed; she also had poor oral intake, poor appetite, and post-tussive vomiting. PPM setting changed from 60 to 70 bmp in ED. Symptoms of SOB are improving from admission after IV diuretic doses and the increased pacing rate. Then patient decided to go comfort with plan for home hospice for other reasons.       # Permanent Afib s/p AVN ablation s/p dual chamber pacemaker  Takes eliquis pta. EKG shows paced rhythm. Devise interrogated before presentation, showed normal pacemaker function, no tachy-arrhythmias     # HTN      #NICHOLE, resolved  Presenting Cr 1.2 from baseline of 1. Very low muscle mass. NICHOLE likely 2/2 cardiorenal, improved after diuresis and PPM setting change  -de-escalating cares including lab draws      Chronic Problems  # COPD - pta albuterol, advair  # CAD s/p CABG - stopped statin   # Rheumatoid arthritis - pta tramadol after Cr improvement, prednisone 2mg daily      FEN: Regular diet   Code Status: DNR/DNI  Disposition: Comfort inpatient, discharge home on hospice on Sunday        Patient seen and discussed w/ Dr. Marmolejo.    Frankie Redman MD   Internal Medicine PGY2  P: [phone]

## 2019-10-12 NOTE — PLAN OF CARE
"D: Pt admitted 10/10 with worsening shortness of breath. She has a medical hx of HTN, A-fib, AV node ablation, COPD, CAD w/ CABG, and HLD.      I/A: At start of shift, pt reported feeling 'a little better' since admission and requested a nebulizer treatment to help with her breathing. Duo-Neb treatments were ordered and one was given, which didn't provide the relief patient was hoping for as her breathing, she reported, \"is always difficult\". Pt declined oxygen, inhalers, or any other intervention to assist with breathing. Over the course of the day, pt's work of breathing and arthritic pain starting worsening with little success of interventions, including oral tramadol that pt takes at home and reports it \"always working in the past\". Daughter present at bedside throughout day--patient and daughter then requested to speak with a  and inquired about hospice. After speaking with social work and the physician, it was decided that home with hospice is the course patient feels ready to pursue. Pt now on comfort cares-no tele monitoring, no vital signs, labs, tests, etc. Morphine 1 mg IV given for 10/10 back pain with minimal relief, another 1 mg IV of morphine given an hour later. Minimal oral intake, up to bathroom with assist of 1, although also incontinent with brief on. Commode offered due to pt's increasing weakness, however, pt declined commode. A hospital bed and other supportive items will be getting delivered to pt's daughter's home tomorrow in order for pt to discharge to daughter's house Jose morning with everything in place.    P: Ensure narcotic order form in patient's chart gets signed by appropriate level of physician that is allowed to sign for the hospice narcotics and send the form to discharge pharmacy by mid-day Saturday in order for patient to have narcotics available upon discharge on Jose morning. Continue to monitor pain and provide analgesics as needed/call MD's if pain control " is inadequate. Continue to provide comfort cares until discharge, which is planned for Sunday am.       Rody Ulloa RN  Cardiology

## 2019-10-12 NOTE — PROGRESS NOTES
Social Work Services Discharge Note      Patient Name: Jennifer Bob     Anticipated Discharge Date: 10/13/19 @ 0900    Discharge Disposition:   Hospice Agency: [facility]  PH: [phone]    Following MD: Torri Fisher  [address] [place] [address] / [facility] [id number]     Pre-Admission Screening (PAS) online form has been completed.  The Level of Care (LOC) is:  Determined  Confirmation Code is:  NA  Patient/caregiver informed of referral to [facility] Line for Pre-Admission Screening for skilled nursing facility (SNF) placement and to expect a phone call post discharge from SNF.     Additional Services/Equipment Arranged:    - Transportation: Family - Daughter Pushpa (PH: [phone]) to transport pt at 0900 on Sunday.  - Hospice Equipment: Being ordered by Janeth with [facility] intake.  Pt's Daughter Pushpa will be at home to receive equipment.  Ordered: hospital bed, over bed table, commode.  - POLST: Reviewed with pt and daughter with Cards 1 LAURA.  Original given to family.  Copies in pt's discharge packet for hospice team.  Sent copy of POLST to upload to pt's chart.  - Hospice Medications: Scripts sent to discharge pharmacy to be filled by [facility].  Medications will be sent with family on Sunday  Hospice agency updated.  - Hospice admission appointment: [facility] PH: [phone] on Jose 10/13/19 at 1:00 pm.     Patient / Family response to discharge plan:  Pt and family in agreement with the plan.     Persons notified of above discharge plan:  Pt, Family, bedside RN, Cards 1 team, PCP, hospice admissions.    Staff Discharge Instructions:  Please fax discharge orders and signed hard scripts for any controlled substances.  Please print a packet and send with patient.     CTS Handoff completed:  YES    Medicare Notice of Rights provided to the patient/family:  Will ask weekend SW to complete.    KRISTA Lew, LCSW  6C Unit   Phone:  [phone]  Pager: [phone]  Unit: [phone]

## 2019-10-12 NOTE — PLAN OF CARE
A&OX4.  Pt declined VS.  Morphine for back/rib cage discomfort with relief.  Offered support/comfort measures.  Neb X1 per pt request.  Declines O2. No s/s respiratory distress.  Slightly tachypneic w/ pain episodes. Incontinent of urine.  Brief change/nirav-cares prn. Offered food/snacks. Pt reports no appetite.  Ate 1/2 popsicle. Pt appears resting comfortably between cares in NAD.  Continue POC.  Notify team with any issues/concerns.  Pt to discharge to [facility] home w/hospice care on 10/13.

## 2019-10-12 NOTE — PLAN OF CARE
Pt on comfort cares. Planning for discharge to daughter's home tomorrow morning with hospice care. IV morphine q4hrs with good relief of back/ribs pain and more relaxed breathing. Sats maintained on RA. Alert and visiting with numerous family members today. Up to bathroom w/SBA-incont of urine-wearing briefs.   Appetite fair; taking reglan qid. Started mucinex as well prn for secretions. Continue comfort cares until discharge in a.m.

## 2019-10-12 NOTE — DISCHARGE SUMMARY
[facility]   Cardiology I Service   Discharge Summary     Jennifer Bob MRN# [id number]   YOB: 1931 Age: 87 year old     DATE OF ADMISSION:  10/10/2019  DATE OF DISCHARGE: 10/13/2019  ADMITTING PROVIDER: Mayra Orr MD  DISCHARGE PROVIDER: David Marmolejo MD  PRIMARY PROVIDER: Torri Fisher           Reason for Admission:   Jennifer Bob is a 87 year old female with a history of HTN, Afib on eliquis s/p AVN ablation (07/2019) s/p dual chamber pacemaker, CAD s/p CABG (1992), COPD presented to ED for 2 days of shortness of breath and volume overload after pacemaker rate was decreased.           Discharge Diagnosis:   HFrEF exacerbation   Ischemic cardiomyopathy   Permanent atrial fibrillation s/p AVN ablation s/p dual chamber pacemaker   Cachexia   Failure to thrive   Severe COPD   Acute kidney injury            Hospital Course by Problem:    Comfort care with plan for home with home hospice  Cachexia; Weight loss of >10% over short time span   Failure to thrive   Severe COPD   Patient was admitted for a fairly straightforward issue that was corrected however discussions with her about her life at home, her quality of life spurred conversation regarding her goals of care. She has lost a lot of her quality of life in a fairly rapid decline over the last 6 months or so. She endorses a poor appetite with frequent nausea and coughing with post-tussive vomiting. In the last year, the patient has gone from 59 kg to 42 kg unintentionally. Discussed with her the potential of an underlying malignancy or other issue and patient opted to not pursue. She discussed that ultimately her comorbidity and quality of life had declined so rapidly in the last 6 months or so that she would like to de-escalate all of her regular cares such as clinic visits, lab draws, etc. With her daughter, the patient decided that she would like to be discharged home on hospice. Updated a POLST to DNR/DNI  "after discussing her goals of care. Pain and shortness of breath controlled well with morphine during admission. Will discharge with prescriptions for oral morphine and ativan solutions for comfort cares and will be followed by hospice.      HFrEF exacerbation  Ischemic Cardiomyopathy   BNP elevated from 4000s to >9000s. Initial CXR with increased interstitial prominence as well as bilateral small pleural effusions with atelectasis. On exam, patient is cachectic with poor skin turgor. She has bouts of coughing which first felt were orthopneic in nature but continued despite diuresis and propping the patient in bed; she also had poor oral intake, poor appetite, and post-tussive vomiting. PPM setting changed from 60 to 70 bmp in ED. Symptoms of SOB improved from admission after IV diuretic doses and the increased pacing rate. Then patient decided to go comfort with plan for home hospice for other reasons. Discontinued unnecessary medications sparing quality of life improving medications. Continued amlodipine and lasix per patient request.       Permanent Afib s/p AVN ablation s/p dual chamber pacemaker  Takes eliquis pta. EKG shows paced rhythm. Devise interrogated before presentation, showed normal pacemaker function, no tachy-arrhythmias. Discontinued anticoagulation after discussion with patient due to comfort care goals.      Hypertension: de-escalted medications given change of focus to comfort cares. Discontinued amlodipine, losartan, hydralazine      NICHOLE, resolved  Presenting Cr 1.2 from baseline of 1. Very low muscle mass. NICHOLE likely 2/2 cardiorenal, improved after diuresis and PPM setting change     Chronic Problems  COPD - pta albuterol, advair  CAD s/p CABG - stopped statin   Rheumatoid arthritis - continue prednisone     Physical Exam on day of Discharge:  Blood pressure 133/62, pulse 67, temperature 98.2  F (36.8  C), temperature source Oral, resp. rate 20, height 1.575 m (5' 2\"), weight 43 kg (94 lb 12.8 " oz), SpO2 93 %, not currently breastfeeding.    General: Cachectic elderly female, no acute distress  CV: RRR, nl S1/S2, no S3/S4 appreciated, no m/r/g  Lungs: CTAB, no wheezing/crackles, no cough or increased work of breathing   Abd: thin, soft, NT, ND +BS   Ext: WWP, No BLE edema  Skin: no rashes, cyanosis, or jaundice  Neuro: AOx3, CN II-XII intact and symmetric, No focal neurologic deficits    Lab Studies on Day of Discharge:   Last CBC:   Recent Labs   Lab Test 10/10/19  2107   WBC 5.7   RBC 4.79   HGB 15.2   HCT 47.2*   MCV 99   MCH 31.7   MCHC 32.2   RDW 15.2*   *       Last CMP:  Recent Labs   Lab Test 10/11/19  0459  03/27/19  1124      < > 142   POTASSIUM 3.6   < > 3.7   CHLORIDE 102   < > 104   JERILYN 9.2   < > 9.0   CO2 30   < > 29   BUN 34*   < > 20   CR 1.10*   < > 1.06*   GLC 80   < > 114*   AST  --   --  57*   ALT  --   --  76*   BILITOTAL  --   --  1.2   ALBUMIN  --   --  3.5   PROTTOTAL  --   --  6.7*   ALKPHOS  --   --  75    < > = values in this interval not displayed.            Procedures & Significant Findings:   TTE 10/11/2019  Interpretation Summary  Moderately (EF 30-35%) reduced left ventricular function is present. Grade III  or advanced diastolic dysfunction suggesting left ventricular filling  pressures are increased. Biplane EF is 30%.  Base inferolateral , base to mid inferior wall akinesis.  The right ventricle is normal size.  No significant valvular abnormalities were noted.  Right ventricular systolic pressure is 21mmHg above the right atrial pressure.  Global right ventricular function is normal.  The inferior vena cava was normal in size with preserved respiratory  variability.  No pericardial effusion is present.     This study was compared with the study from 3/27/2019 .IVC is normal now and  RA pressure has decreased to normal. Regional wall motion abnormalities are  not new.           Discharge Medications:     Discharge Medication List as of 10/13/2019 10:15 AM       START taking these medications    Details   guaiFENesin (MUCINEX) 600 MG 12 hr tablet Take 1 tablet (600 mg) by mouth 2 times daily as needed for congestion, Disp-30 tablet, R-0, E-Prescribe      LORazepam (ATIVAN) 2 MG/ML (HIGH CONC) solution Take 0.5 mLs (1 mg) by mouth every 8 hours as needed for anxiety Take 0.125 to 0.25 ml by mouth, sublinguial, or rectally every 4 hours as needed for anxiety or restlessness, Disp-30 mL, R-5, Local Print      magnesium hydroxide (MILK OF MAGNESIA) 400 MG/5ML suspension Take 15 mLs by mouth daily as needed for constipation, Disp-769 mL, R-0, E-Prescribe      ondansetron (ZOFRAN-ODT) 4 MG ODT tab Take 1 tablet (4 mg) by mouth every 6 hours as needed for nausea or vomiting, Disp-30 tablet, R-0, E-Prescribe         CONTINUE these medications which have CHANGED    Details   albuterol (PROVENTIL) (2.5 MG/3ML) 0.083% neb solution Take 1 vial (2.5 mg) by nebulization every 6 hours as needed for shortness of breath / dyspnea or wheezing, Disp-60 vial, R-3, E-Prescribe      albuterol (VENTOLIN HFA) 108 (90 Base) MCG/ACT inhaler INHALE TWO PUFFS BY MOUTH EVERY 6 HOURS AS NEEDED, Disp-18 g, R-9, E-PrescribePharmacy may dispense brand covered by insurance (Proair, or proventil or ventolin or generic albuterol inhaler)      amLODIPine (NORVASC) 10 MG tablet Take 1 tablet (10 mg) by mouth daily, Disp-30 tablet, R-0, E-Prescribe      docusate sodium (COLACE) 100 MG capsule Take 1 capsule (100 mg) by mouth 2 times daily as needed for constipation, Disp-90 capsule, R-0, E-Prescribe      fluticasone (FLONASE) 50 MCG/ACT nasal spray Spray 1 spray into both nostrils daily as needed for rhinitis or allergies, Disp-18.2 mL, R-3, E-Prescribe      furosemide (LASIX) 20 MG tablet Take 1 tablet (20 mg) by mouth daily, Disp-30 tablet, R-0, E-Prescribe      predniSONE (DELTASONE) 1 MG tablet Take 2 tablets (2 mg) by mouth daily, Disp-60 tablet, R-0, E-Prescribe      traMADol (ULTRAM) 50 MG tablet Take 1  tablet (50 mg) by mouth every 6 hours as needed for moderate to severe pain, Disp-180 tablet, R-0, Local Print         CONTINUE these medications which have NOT CHANGED    Details   Cholecalciferol (VITAMIN D3 PO) Take 1,000 Units by mouth daily, Historical      escitalopram (LEXAPRO) 10 MG tablet Take 10 mg by mouth daily, Historical      BETA BLOCKER NOT PRESCRIBED, INTENTIONAL, Beta Blocker not prescribed intentionally due to COPD, shortness of breath with atenolol and lopressor, R-0, No Print Out      order for DME Equipment being ordered: Nebulizer with mask and tubingDisp-1 Units, R-0, Local Print         STOP taking these medications       apixaban ANTICOAGULANT (ELIQUIS) 2.5 MG tablet Comments:   Reason for Stopping:         atorvastatin (LIPITOR) 40 MG tablet Comments:   Reason for Stopping:         hydrALAZINE (APRESOLINE) 50 MG tablet Comments:   Reason for Stopping:         losartan (COZAAR) 100 MG tablet Comments:   Reason for Stopping:         Multiple Vitamins-Minerals (MULTIVITAMIN OR) Comments:   Reason for Stopping:         Saline (OCEAN NASAL SPRAY NA) Comments:   Reason for Stopping:         sodium chloride-sodium bicarb (CLASSIC NETI POT SINUS WASH) 2300-700 MG KIT Comments:   Reason for Stopping:                    Discharge Instructions and Follow-Up:     Discharge Procedure Orders   Activity   Order Comments: Your activity upon discharge: activity as tolerated     Order Specific Question Answer Comments   Is discharge order? Yes      Adult [facility] Follow-up and recommended labs and tests   Order Comments: Home with hospice   Follow up as needed for desired medical cares    Appointments on [place] and/or [facility] (with [facility] provider or service). Call [phone] if you haven't heard regarding these appointments within 7 days of discharge.     Reason for your hospital stay   Order Comments: Jennifer Bob is a pleasant 87 year old female with history of coronary artery  disease and bypass, history of heart failure with a reduced ejection fraction, history of atrial fibrillation with RVR who could not tolerate rhythm and rate control, now status post AV node ablation and bi-ventricular pacemaker. She was recently seen in electrophysiology clinic and her pacing rate was decreased to 60 bpm. She reports that shortly after leaving clinic, she developed shortness of breath and fatigue that was substantial, making it difficult to do even minor tasks around the house. She thus presented to the ER. Here, an echo image study of the heart is unchanged from the last. Her lab work is unremarkable. A chest x-ray showed some fluid in her lungs consistent with a heart failure exacerbation which is likely secondary to her decreased heart rate. Her pacing rate was increased to 70 and she was given medications through the IV to help remove the fluid with good improvement in her symptoms. In this regard, she was feeling somewhat improved. However, in further discussion between patient, family, and our team patient was feeling inclined to de-escalate her care in general. She has severe anorexia and frequent coughing bouts with emesis, poor oral intake due to decreased appetite, and her weight has declined >10% over the last 1 year. It is increasingly difficult for her to enjoy her life due to her significant fatigue and failure to thrive. She decided ultimately that while we have corrected her presenting illness, her umbrella of health issues is too great a burden. She reversed her code status and is DNR/DNI and decided she would like to go home on home hospice. This will be arranged for a discharge to her daughter's home on Sunday.     DNR/DNI     Order Specific Question Answer Comments   Code status determined by: Discussion with patient/legal decision maker      Diet   Order Comments: Follow this diet upon discharge: Orders Placed This Encounter      Regular adult diet     Order Specific Question  Answer Comments   Is discharge order? Yes              Discharge Disposition:   Home with daughter with home hospice.          Condition on Discharge:   Discharge condition: Fair   Code status on discharge: DNR / DNI        Date of service: 10/13/2019    The patient was discussed with Dr. Marmolejo.    60 minutes spent in discharge, including >50% in counseling and coordination of care, medication review and plan of care recommended on follow up. Questions were answered.   Torri Gonzales  (PCP) was contacted at the time of discharge, so as to bridge from hospital to outpatient care.     It was our pleasure to care for Jennifer Bob during this hospitalization. Please do not hesitate to contact me should there be questions regarding the hospital course or discharge plan.      Frankie Redman MD  Internal Medicine-PGY2  P:: [phone]

## 2019-10-13 NOTE — PLAN OF CARE
Pt stable.  A&OX4.  No events/issues overnight.  Morphine for back discomfort with good relief.  Up with SBA.  Incontinent of urine.  Assisted w/ brief replacement/nirav-cares.  Pt enjoys having ice chips at bedside.  Using call light appropriately for assistance.  Continue comfort cares.  Pt to be discharged today to daughter's home w/ hospice care.

## 2019-10-13 NOTE — PLAN OF CARE
Pt discharged to her daughter's house this morning with home hospice. Hospice RN to contact family for initial meeting at home later today. MD spoke with pt regarding what meds she would like to continue and which ones were no longer necessary. Reviewed discharge paperwork and medications w/pt and daughter. Morphine significantly helping pain and respiratory status.   Daughter providing transport home.

## 2019-10-22 NOTE — TELEPHONE ENCOUNTER
[facility] home care and hospice forms have been faxed to [phone]  Also stat faxed     Raina Reis CMA on 10/22/2019 at 3:47 PM

## 2019-12-16 ENCOUNTER — TELEPHONE (OUTPATIENT)
Dept: FAMILY MEDICINE | Facility: CLINIC | Age: 84
End: 2019-12-16

## 2019-12-16 NOTE — TELEPHONE ENCOUNTER
Amber from the mortuary is requesting that PCP complete online form to sign off on the patients death.

## 2019-12-17 NOTE — TELEPHONE ENCOUNTER
I am not finding an email for the online report. Can you please call to ask that they send an email request with link? Thanks, Torri Fisher M.D.

## 2019-12-17 NOTE — TELEPHONE ENCOUNTER
Dr. Boykin called back and stated two emails were sent to you and may have gone into your spam folder.  E-mail address confirmed with Amber.    Amber stated [facility] Department also said Dr. Fisher could log into [facility] and patient should appear in provider's queue.    Thank you!  GLENN DavisonN, RN

## 2019-12-17 NOTE — TELEPHONE ENCOUNTER
Dr. Fisher-Writer called Amber who stated she will ask Health Department to resend email.    Thank you!  GLENN DavisonN, RN

## 2020-04-01 NOTE — PROGRESS NOTES
Encounter opened to ensure [facility] episode was closed. Patient transitioned to [facility] 7/12/19 and passed away sometime in December 2019 per chart review. Report of death was notified to PCP on 12/16/19. GLENN LarsonN, RN

## 2022-01-06 NOTE — PROGRESS NOTES
Admission          3/27/2019 10:22 AM  -----------------------------------------------------------  Diagnosis:  Mildly decompensating heart failure    Admitted from: ED  Report given from: ED - RN   Via: Cart  Accompanied by: Daughter  Belongings: Sent with Pt's Daughter, Watch and rings kept with pt  Admission Profile: Complete  Teaching: orientation to unit, call don't fall, use of console, meal times, visiting hours,  when to call for the RN (angina/sob/dizzyness, etc.), and enforced importance of safety   Vascular Access: L PIV SL  Telemetry: On - Paced  Ht./Wt.: complete    Temp:  [97.5  F (36.4  C)-98.8  F (37.1  C)] 98.8  F (37.1  C)  Pulse:  [] 105  Heart Rate:  [] 88  Resp:  [16-30] 16  BP: (155-178)/() 159/85  SpO2:  [92 %-97 %] 93 %    
  Care Coordinator - Discharge Planning    Admission Date/Time:  3/27/2019  Attending MD:  Mayra Orr MD     Data  Chart reviewed, discussed with interdisciplinary team.   Patient was admitted for:   1. Acute on chronic congestive heart failure, unspecified heart failure type (H)    2. Atrial fibrillation, rapid (H)    3. Atrial fibrillation with RVR (H)    4. Benign essential hypertension      Assessment   Concerns with insurance coverage for discharge needs: None.  Current Living Situation: Patient lives alone.  Support System: Supportive and Involved daughter  Services Involved: [facility]  Transportation at Discharge: Family or friend will provide  Transportation to Medical Appointments: Family    Coordination of Care and Referrals: This writer met with pt and her daughter briefly to discuss discharge planning, no discharge needs or concerns noted.      Plan  Anticipated Discharge Date: 3/28/2019  Anticipated Discharge Plan: Discharge to home with clinic follow up as recommended    CTS Handoff completed:  YES  Mercy Mathew RN BSN  6C Unit Care Coordinator  Phone number: [phone]  Pager: [phone]    
Observation Goals:    Patient will be free of dyspnea at rest and with ambulation - Goal met  Patient will not be hypoxic - Goal met  
Observation Goals:  Patient will be free of dyspnea at rest and with ambulation - Goal met  Patient will not be hypoxic - Goal met      
Discussed with patient the risk of dislodgement of fracture of preexisting loose teeth considering required intubation and AUBREY needed for surgery.    Patient understanding of these risks and wishes to proceed with case.

## (undated) DEVICE — INTRODUCER SHEATH FAST-CATH 8FRX12CM 406112

## (undated) DEVICE — PRO PADZ LIQUID GEL RADIOLUCENT ADULT MULTI-FUNCTION LVP ELECTRODES

## (undated) DEVICE — SHEATH PRELUDE SNAP 13CM 6FR

## (undated) DEVICE — PACK HEART LEFT CUSTOM

## (undated) DEVICE — Device

## (undated) DEVICE — INTRO SHEALTH 8.5FRX63CM SRO 406853

## (undated) DEVICE — TOTE

## (undated) DEVICE — INTRO SFSHEATH WORLEY 40CM 9FR S CSGWORLEY109M

## (undated) DEVICE — RAD INTRODUCER KIT MICRO 5FRX10CM .018 NITINOL G/W

## (undated) DEVICE — KIT VENOUS FLUSH 60210177

## (undated) DEVICE — CATH VENOGRAM BLLN W/INFLATION 6225

## (undated) DEVICE — CATH EP 60CM 5FR 2-8-2MM SPC-

## (undated) DEVICE — PACK PCMKR PERM SRG PROC LF SAN32PC573

## (undated) DEVICE — PAD DEFIBRILLATOR ELECTRODE 4.25INX6IN ADULT 2001M-C

## (undated) DEVICE — INTRO SHEATH MICRO PLATINUM TIP 4FRX40CM 7274

## (undated) RX ORDER — LIDOCAINE HYDROCHLORIDE 10 MG/ML
INJECTION, SOLUTION EPIDURAL; INFILTRATION; INTRACAUDAL; PERINEURAL
Status: DISPENSED
Start: 2019-01-01

## (undated) RX ORDER — FENTANYL CITRATE 50 UG/ML
INJECTION, SOLUTION INTRAMUSCULAR; INTRAVENOUS
Status: DISPENSED
Start: 2019-01-01

## (undated) RX ORDER — SODIUM CHLORIDE 9 MG/ML
INJECTION, SOLUTION INTRAVENOUS
Status: DISPENSED
Start: 2019-01-01

## (undated) RX ORDER — REGADENOSON 0.08 MG/ML
INJECTION, SOLUTION INTRAVENOUS
Status: DISPENSED
Start: 2019-01-01